# Patient Record
Sex: FEMALE | Race: WHITE | Employment: OTHER | URBAN - METROPOLITAN AREA
[De-identification: names, ages, dates, MRNs, and addresses within clinical notes are randomized per-mention and may not be internally consistent; named-entity substitution may affect disease eponyms.]

---

## 2017-02-20 ENCOUNTER — HOSPITAL ENCOUNTER (OUTPATIENT)
Age: 67
Setting detail: OUTPATIENT SURGERY
Discharge: HOME | End: 2017-02-20
Attending: COLON & RECTAL SURGERY | Admitting: COLON & RECTAL SURGERY
Payer: MEDICARE

## 2017-02-20 ENCOUNTER — ANESTHESIA (OUTPATIENT)
Dept: ENDOSCOPY | Age: 67
End: 2017-02-20
Payer: MEDICARE

## 2017-02-20 ENCOUNTER — ANESTHESIA EVENT (OUTPATIENT)
Dept: ENDOSCOPY | Age: 67
End: 2017-02-20
Payer: MEDICARE

## 2017-02-20 ENCOUNTER — SURGERY (OUTPATIENT)
Age: 67
End: 2017-02-20

## 2017-02-20 VITALS
HEART RATE: 63 BPM | BODY MASS INDEX: 40.46 KG/M2 | DIASTOLIC BLOOD PRESSURE: 58 MMHG | SYSTOLIC BLOOD PRESSURE: 119 MMHG | HEIGHT: 64 IN | OXYGEN SATURATION: 98 % | WEIGHT: 237 LBS | RESPIRATION RATE: 16 BRPM | TEMPERATURE: 98.6 F

## 2017-02-20 PROCEDURE — 77030009426 HC FCPS BIOP ENDOSC BSC -B: Performed by: COLON & RECTAL SURGERY

## 2017-02-20 PROCEDURE — 74011000250 HC RX REV CODE- 250

## 2017-02-20 PROCEDURE — 74011250636 HC RX REV CODE- 250/636: Performed by: ANESTHESIOLOGY

## 2017-02-20 PROCEDURE — 74011250636 HC RX REV CODE- 250/636

## 2017-02-20 PROCEDURE — 88305 TISSUE EXAM BY PATHOLOGIST: CPT | Performed by: COLON & RECTAL SURGERY

## 2017-02-20 PROCEDURE — 76060000033 HC ANESTHESIA 1 TO 1.5 HR: Performed by: COLON & RECTAL SURGERY

## 2017-02-20 PROCEDURE — 76040000026: Performed by: COLON & RECTAL SURGERY

## 2017-02-20 PROCEDURE — 77030011640 HC PAD GRND REM COVD -A: Performed by: COLON & RECTAL SURGERY

## 2017-02-20 PROCEDURE — 77030013991 HC SNR POLYP ENDOSC BSC -A: Performed by: COLON & RECTAL SURGERY

## 2017-02-20 RX ORDER — PROPOFOL 10 MG/ML
INJECTION, EMULSION INTRAVENOUS
Status: DISCONTINUED | OUTPATIENT
Start: 2017-02-20 | End: 2017-02-20 | Stop reason: HOSPADM

## 2017-02-20 RX ORDER — PROPOFOL 10 MG/ML
INJECTION, EMULSION INTRAVENOUS AS NEEDED
Status: DISCONTINUED | OUTPATIENT
Start: 2017-02-20 | End: 2017-02-20 | Stop reason: HOSPADM

## 2017-02-20 RX ORDER — SODIUM CHLORIDE, SODIUM LACTATE, POTASSIUM CHLORIDE, CALCIUM CHLORIDE 600; 310; 30; 20 MG/100ML; MG/100ML; MG/100ML; MG/100ML
100 INJECTION, SOLUTION INTRAVENOUS CONTINUOUS
Status: DISCONTINUED | OUTPATIENT
Start: 2017-02-20 | End: 2017-02-20 | Stop reason: HOSPADM

## 2017-02-20 RX ORDER — LIDOCAINE HYDROCHLORIDE 20 MG/ML
INJECTION, SOLUTION EPIDURAL; INFILTRATION; INTRACAUDAL; PERINEURAL AS NEEDED
Status: DISCONTINUED | OUTPATIENT
Start: 2017-02-20 | End: 2017-02-20 | Stop reason: HOSPADM

## 2017-02-20 RX ORDER — SODIUM CHLORIDE, SODIUM LACTATE, POTASSIUM CHLORIDE, CALCIUM CHLORIDE 600; 310; 30; 20 MG/100ML; MG/100ML; MG/100ML; MG/100ML
100 INJECTION, SOLUTION INTRAVENOUS CONTINUOUS
Status: CANCELLED | OUTPATIENT
Start: 2017-02-20

## 2017-02-20 RX ORDER — SODIUM CHLORIDE 0.9 % (FLUSH) 0.9 %
5-10 SYRINGE (ML) INJECTION AS NEEDED
Status: CANCELLED | OUTPATIENT
Start: 2017-02-20

## 2017-02-20 RX ADMIN — LIDOCAINE HYDROCHLORIDE 60 MG: 20 INJECTION, SOLUTION EPIDURAL; INFILTRATION; INTRACAUDAL; PERINEURAL at 10:04

## 2017-02-20 RX ADMIN — SODIUM CHLORIDE, SODIUM LACTATE, POTASSIUM CHLORIDE, AND CALCIUM CHLORIDE 100 ML/HR: 600; 310; 30; 20 INJECTION, SOLUTION INTRAVENOUS at 09:32

## 2017-02-20 RX ADMIN — PROPOFOL 140 MCG/KG/MIN: 10 INJECTION, EMULSION INTRAVENOUS at 10:23

## 2017-02-20 RX ADMIN — PROPOFOL 100 MG: 10 INJECTION, EMULSION INTRAVENOUS at 10:05

## 2017-02-20 RX ADMIN — PROPOFOL 50 MG: 10 INJECTION, EMULSION INTRAVENOUS at 10:04

## 2017-02-20 RX ADMIN — PROPOFOL 100 MG: 10 INJECTION, EMULSION INTRAVENOUS at 10:23

## 2017-02-20 NOTE — ANESTHESIA PREPROCEDURE EVALUATION
Anesthetic History   No history of anesthetic complications            Review of Systems / Medical History  Patient summary reviewed, nursing notes reviewed and pertinent labs reviewed    Pulmonary  Within defined limits                 Neuro/Psych     seizures (Childhood)    Psychiatric history     Cardiovascular    Hypertension: well controlled              Exercise tolerance: >4 METS     GI/Hepatic/Renal                Endo/Other        Morbid obesity and arthritis     Other Findings              Physical Exam    Airway  Mallampati: II  TM Distance: > 6 cm  Neck ROM: normal range of motion   Mouth opening: Normal     Cardiovascular    Rhythm: regular  Rate: normal         Dental    Dentition: Full lower dentures     Pulmonary  Breath sounds clear to auscultation               Abdominal         Other Findings            Anesthetic Plan    ASA: 3  Anesthesia type: total IV anesthesia            Anesthetic plan and risks discussed with: Patient

## 2017-02-20 NOTE — PROCEDURES
Colonoscopy Procedure Note    Mammie Felty  1950  [id number]    Indications:    Screening colonoscopy     Pre-operative Diagnosis:   Screening colonoscopy    Post-operative Diagnosis: Cecal, transverse, and rectal polyps; diverticulosis, rectocele    Surgeon: Matt Hodge MD    Referring Provider: Liane Kenney DO    Sedation:  MAC anesthesia Propofol    Pre-Procedure Exam:  Airway: clear   Heart: normal S1and S2    Lungs: clear bilateral  Abdomen: soft, nontender, bowel sounds present and normal in all quadrants   Mental Status: awake, alert, and oriented to person, place, and time    Procedure in Detail:  Informed consent was obtained for the procedure after delineating the risks, benefits, and alternatives to the procedure. Specific risks of perforation (1/1000), hemorrhage (1/1000), missed lesions, adverse drug reaction, and aspiration were discussed. The patient was placed in the left lateral decubitus position. Based on the pre-procedure assessment, including review of the patient's medical history, medications, and allergies, moderate sedation was felt to be appropriate. The aforementioned medications were given in an incremental fashion to achieve adequate sedation. The patient was monitored continuously with ECG tracing, pulse oximetry, blood pressure monitoring, and direct observations. A rectal examination was performed and showed small external hemorrhoidal tags and a small rectocele. The Olympus videocolonoscope [id number] was inserted per anus and advanced under direct vision to the terminal ileum. The quality of the colonic preparation was good. The colonoscope was slowly withdrawn using a to-and-fro and circumferential motion over 30 minutes with careful examination between folds. Retroflexion was performed in the rectum. Appropriate photodocumentation was obtained. Findings:   Rectum: Mild-moderate incidental internal hemorrhoids.   Four polyps--5, 3, 3, and 4 mm--hot snare excised an sent aggregate. No masses, bleeding, or mucosal abnormalities. Sigmoid: Mild incidental diverticulosis. No polyps, masses, bleeding, or mucosal abnormalities. Descending Colon: Normal.  No polyps, masses, bleeding, or mucosal abnormalities. Transverse Colon: Two 3 mm polyps cold forceps excised. No masses, bleeding, or mucosal abnormalities. Ascending Colon: Normal.  No polyps, masses, bleeding, or mucosal abnormalities. Cecum: Polypoid tissue at appendiceal orifice; multiple biopsies obtained, but polypoid tissue seemed to extend down into the appendiceal lumen and could not be completely cleared. No masses, bleeding, or mucosal abnormalities. Terminal Ileum: normal    Therapies:  7 complete and piece meal polypectomies were performed using hot snare  and cold biopsy forceps; the polyps were  retrieved    Implants: None    Specimen:  specimen #1, 10 mm in size, located in the cecum removed by cold biopsy and sent for pathology  #2, two polyps, 3 mm in size, located in the transverse colon removed by cold biopsy and sent for pathology  #3, three polyps, 5, 3, 3, and 4 mm in size, located in the rectum removed by snare cautery and retrieved for pathology    Complications: None; patient tolerated the procedure well. EBL:  3    Recommendations:   -Await pathology. -If adenoma is present in multiple specimens, repeat colonoscopy in 3 years.  -Otherwise, repeat colonoscopy in 5 years.   -If polypoid tissue at appendix is adenomatous, she will need appendectomy (with small partial cecectomy) to assure complete clearance  -High fiber diet.    -Follow up with Dr Silvio Roman in the office 4/18/17 as scheduled    Julieth Au MD  2/20/2017  11:08 AM

## 2017-02-20 NOTE — ANESTHESIA POSTPROCEDURE EVALUATION
Post-Anesthesia Evaluation and Assessment    Patient: Eric Goodwin MRN: [id number]  SSN: [id number]    YOB: 1950  Age: 77 y.o. Sex: female       Cardiovascular Function/Vital Signs  Visit Vitals    /58    Pulse 63    Temp 37 °C (98.6 °F)    Resp 16    Ht 5' 4\" (1.626 m)    Wt 107.5 kg (237 lb)    SpO2 98%    Breastfeeding No    BMI 40.68 kg/m2       Patient is status post total IV anesthesia anesthesia for Procedure(s):  COLONOSCOPY  BMI 41  COLON BIOPSY  ENDOSCOPIC POLYPECTOMY. Nausea/Vomiting: None    Postoperative hydration reviewed and adequate. Pain:  Pain Scale 1: Numeric (0 - 10) (02/20/17 1123)  Pain Intensity 1: 0 (02/20/17 1123)   Managed    Neurological Status: At baseline    Mental Status and Level of Consciousness: Arousable    Pulmonary Status:   O2 Device: Room air (02/20/17 1123)   Adequate oxygenation and airway patent    Complications related to anesthesia: None    Post-anesthesia assessment completed.  No concerns    Signed By: Randa Del Real MD     February 20, 2017

## 2017-02-20 NOTE — INTERVAL H&P NOTE
H&P Update:  All Gaitan was seen and examined. History and physical has been reviewed. The patient has been examined.  There have been no significant clinical changes since the completion of the originally dated History and Physical.    Signed By: Mica Ballard MD     February 20, 2017 9:57 AM

## 2017-02-20 NOTE — DISCHARGE INSTRUCTIONS
Gastrointestinal Colonoscopy/Flexible Sigmoidoscopy - Lower Exam Discharge Instructions  1. Call Dr. Bipin Paris at [phone] for any problems or questions. 2. Contact the doctors office for follow up appointment as directed  3. Medication may cause drowsiness for several hours, therefore, do not drive or operate machinery for remainder of the day. 4. No alcohol today. 5. Ordinarily, you may resume regular diet and activity after exam unless otherwise specified by your physician. 6. Because of air put into your colon during exam, you may experience some abdominal distension, relieved by the passage of gas, for several hours. 7. Contact your physician if you have any of the following:  a. Excessive amount of bleeding - large amount when having a bowel movement. Occasional specks of blood with bowel movement would not be unusual.  b. Severe abdominal pain  c. Fever or Chills  8. Polyp Removal - follow these additional instructions  a. Clear liquid diet for the next meal (jell-o, broth, clear drinks)  b. Soft diet for 24 hours, then resume regular diet   c. Take Metamucil - 1 tablespoon in juice every morning for 3 days  d. No Aspirin, Advil, Aleve, Nuprin, Ibuprofen, or medications that contain these drugs for 2 weeks. Any additional instructions:    -Follow up with Dr Bipin Paris in the office 4/18/17 as previously scheduled. -High fiber diet  -Repeat colonoscopy in 3-5 years depending on biopsy results      Instructions given to KupiKupon and other family members.   Instructions given by: Leon Prieto MD

## 2017-02-20 NOTE — ROUTINE PROCESS
Patient discharged via wheelchair. VSS on room air. No complaints of pain or discomfort. Spoke with Dr. Niraj Harrington at bedside. Anesthesia aware of discharge. Discharge instructions given to patient and family.

## 2017-02-20 NOTE — IP AVS SNAPSHOT
Claudia Uriosteguishaunanavarro 
 
 
 [address] [facility] [address] [facility] 
[phone] Patient: Eliz Mejia MRN: [id number] You are allergic to the following Allergen Reactions Codeine Nausea and Vomiting Oxycodone Nausea and Vomiting Recent Documentation Height Weight Breastfeeding? BMI OB Status Smoking Status 1.626 m 107.5 kg No 40.68 kg/m2 Postmenopausal Former Smoker Emergency Contacts Name Discharge Info Relation Home Work Mobile GermaineArnie DISCHARGE CAREGIVER [3] Spouse [3]   [phone] About your hospitalization You were admitted on:  February 20, 2017 You last received care in the:  [facility] You were discharged on:  February 20, 2017 Unit phone number:  [phone] Why you were hospitalized Your primary diagnosis was:  Not on File Providers Seen During Your Hospitalizations Provider Role Specialty Primary office phone Betzaida Louis MD Attending Provider Colon and Rectal Surgery [phone] Your Primary Care Physician (PCP) Primary Care Physician Office Phone Office Fax Edenilson Petersen [phone] Follow-up Information Follow up With Details Comments Contact Info Desi Liriano DO   [address] [facility] [address] [phone] Current Discharge Medication List  
  
CONTINUE these medications which have CHANGED Dose & Instructions Dispensing Information Comments Morning Noon Evening Bedtime  
 losartan-hydroCHLOROthiazide 50-12.5 mg per tablet Commonly known as:  HYZAAR What changed:  See the new instructions. Your next dose is: Today, Tomorrow Other:  _________ TAKE 1 TABLET ONE TIME DAILY Quantity:  90 Tab Refills:  3  
     
   
   
   
  
 sertraline 100 mg tablet Commonly known as:  ZOLOFT  
 What changed:  See the new instructions. Your next dose is: Today, Tomorrow Other:  _________ TAKE 1 TABLET EVERY DAY Quantity:  90 Tab Refills:  3  
     
   
   
   
  
 topiramate 100 mg tablet Commonly known as:  TOPAMAX What changed:  See the new instructions. Your next dose is: Today, Tomorrow Other:  _________ TAKE 1 TABLET EVERY DAY Quantity:  90 Tab Refills:  3 VITAMIN D2 50,000 unit capsule Generic drug:  ergocalciferol What changed:  See the new instructions. Your next dose is: Today, Tomorrow Other:  _________ TAKE 1 CAPSULE ONE TIME WEEKLY Quantity:  12 Cap Refills:  3 CONTINUE these medications which have NOT CHANGED Dose & Instructions Dispensing Information Comments Morning Noon Evening Bedtime HYDROcodone-acetaminophen 5-325 mg per tablet Commonly known as:  Denys Merino Your next dose is: Today, Tomorrow Other:  _________ Take  by mouth every six (6) hours as needed for Pain (not needed since 10/2016 shoulder surgery). Refills:  0 Discharge Instructions Gastrointestinal Colonoscopy/Flexible Sigmoidoscopy - Lower Exam Discharge Instructions 1. Call Dr. Escobar Hudson at [phone] for any problems or questions. 2. Contact the doctors office for follow up appointment as directed 3. Medication may cause drowsiness for several hours, therefore, do not drive or operate machinery for remainder of the day. 4. No alcohol today. 5. Ordinarily, you may resume regular diet and activity after exam unless otherwise specified by your physician. 6. Because of air put into your colon during exam, you may experience some abdominal distension, relieved by the passage of gas, for several hours. 7. Contact your physician if you have any of the following: a. Excessive amount of bleeding  large amount when having a bowel movement. Occasional specks of blood with bowel movement would not be unusual. 
b. Severe abdominal pain 
c. Fever or Chills 8. Polyp Removal  follow these additional instructions 
a. Clear liquid diet for the next meal (jell-o, broth, clear drinks) b. Soft diet for 24 hours, then resume regular diet  
c. Take Metamucil  1 tablespoon in juice every morning for 3 days 
d. No Aspirin, Advil, Aleve, Nuprin, Ibuprofen, or medications that contain these drugs for 2 weeks. Any additional instructions:   
-Follow up with Dr Niraj Harrington in the office 4/18/17 as previously scheduled. -High fiber diet 
-Repeat colonoscopy in 3-5 years depending on biopsy results Instructions given to Marcia Lutz and other family members. Instructions given by: Duncan Reyes MD 
 
 
 
 
Discharge Orders None ACO Transitions of Care Introducing [employer] [address] offers a voluntary care coordination program to provide high quality service and care to [facility] fee-for-service beneficiaries. [facility] was designed to help you enhance your health and well-being through the following services: ? Transitions of Care  support for individuals who are transitioning from one care setting to another (example: Hospital to home). ? Chronic and Complex Care Coordination  support for individuals and caregivers of those with serious or chronic illnesses or with more than one chronic (ongoing) condition and those who take a number of different medications. If you meet specific medical criteria, a [address] [place] [address] Rd may call you directly to coordinate your care with your primary care physician and your other care providers. For questions about the [facility] programs, please, contact your physicians office. For general questions or additional information about Accountable Care Organizations: 
Please visit www.medicare.gov/acos. html or call 1-800-MEDICARE (9-820.615.3323) TTY users should call 3-627.240.2042. Introducing [facility] SERVICES! Castaliarenny Oates[employer] introduces [employer] patient portal. Now you can access parts of your medical record, email your doctor's office, and request medication refills online. 1. In your internet browser, go to [email or web address] 2. Click on the First Time User? Click Here link in the Sign In box. You will see the New Member Sign Up page. 3. Enter your [employer] Access Code exactly as it appears below. You will not need to use this code after youve completed the sign-up process. If you do not sign up before the expiration date, you must request a new code. · [employer] Access Code: [email or web address] Expires: 4/6/2017 11:42 AM 
 
4. Enter the last four digits of your Social Security Number (xxxx) and Date of Birth (mm/dd/yyyy) as indicated and click Submit. You will be taken to the next sign-up page. 5. Create a [employer] ID. This will be your [employer] login ID and cannot be changed, so think of one that is secure and easy to remember. 6. Create a [employer] password. You can change your password at any time. 7. Enter your Password Reset Question and Answer. This can be used at a later time if you forget your password. 8. Enter your e-mail address. You will receive e-mail notification when new information is available in [address] 9. Click Sign Up. You can now view and download portions of your medical record. 10. Click the Download Summary menu link to download a portable copy of your medical information. If you have questions, please visit the Frequently Asked Questions section of the [employer] website. Remember, [employer] is NOT to be used for urgent needs. For medical emergencies, dial 911. Now available from your iPhone and Android! General Information Please provide this summary of care documentation to your next provider. Patient Signature:  ____________________________________________________________ Date:  ____________________________________________________________  
  
Burlene Von Provider Signature:  ____________________________________________________________ Date:  ____________________________________________________________

## 2017-02-20 NOTE — H&P (VIEW-ONLY)
Kailyn Kent M.D. Colon and Rectal Surgeon  [facility] [address], [address] [facility], [facility] Rhianna Hurtado   Phone: [phone] Fax: [phone]      Rupal Salgado  MRN: [id number]    PCP: Shelby Russell DO    HISTORY OF PRESENT ILLNESS:  Rupal Salgado is a 77y.o. year old female who I met 9/16/16 to discuss screening colonoscopy. She had never had a prior colonoscopy, and had no family history of colorectal cancer, polyps, or Cesar syndrome-related cancers. At her initial visit, she reported multiple other complaints. She reported increased straining with bowel movements and the sense of stool always being present in her rectum which she could not evacuate. She reported large, difficult bowel movements, along with small-volume, intermittent rectal bleeding. She reported perianal itching/burning. She reported generalized abdominal discomfort and cramping. There were no clear exacerbating or alleviating factors. Her abdominal exam was unremarkable. Her anal/rectal exam showed small, external fleshy tags and suggestion of a possible anal fistula. She had normal tone and squeeze. She had no significant internal hemorrhoids. I gave her recommendations on fiber supplementation, fluid hydration, and stool softeners. We talked about minimizing time on the commode and minimizing straining. We scheduled her for colonoscopy. She was scheduled for colonoscopy 11/28/16. She did not show up. When we called her, she stated she did not know about the procedure and had not done the prep. She returns today in follow-up. When I asked her again about not showing up for the colonoscopy, she blamed my office, stating that she never received any paperwork and did not even know that that procedure had been scheduled. This is despite the fact that her scheduling paperwork, along with her signature, is present in the chart.   She states that she has not tried to increase her fiber at all. She is not taking any stool softeners. She drinks plenty of water, per her report. She continues to have 2 or 3 bowel movements per day, which are always hard. Time on the commode is down to 10 or 15 minutes. She continues to strain a lot and have a frequent sense of incomplete evacuation. She continues to have small volume bleeding. She continues with mild, generalized abdominal discomfort/cramping. REVIEW OF SYSTEMS:  Constitutional: No fevers/chills, no weakness, no fatigue, no lightheadedness, no night sweats, no insomnia, no appetite changes, no weight changes, no memory problems. Eyes: No changes in vision, no diplopia, no tearing, no scotomata, no pain   Ears/Nose/Throat/Mouth:  No change in hearing, no tinnitus, no bleeding, no epistaxis, no nasal discharge, no sinusitis. Respiratory: No cough, no dyspnea, no wheezing, no hemoptysis, no sleep apnea   Cardiovascular: No chest pains, no chest pressure, no chest tightness, no palpitations   Gastrointestinal: +abdominal pains, +bowel habit changes, no nausea, no emesis, +hematochezia, no melena, no cramping, no constipation, no diarrhea, no incontinence, no jaundice, no diverticulosis.  Otherwise per HPI   Genitourinary: No dysuria, no hematuria, +urinary frequency, +nocturia, no recent UTIs   Musculoskeletal: +back/neck pain, +arthritis, no joint pain/swelling, +muscle pains   Skin: No rashes, no itching, no ulcers   Hematologic:  +easy bruising, no anemia   Neurologic: No headaches, no dizziness, no seizures   Psychiatric: No depressive symptoms, no anxiety symptoms, no changes in mood, no substance abuse     PAST MEDICAL HISTORY: Chronic diffuse pain, chronic sinusitis, osteoarthritis, history of migraines, HTN, impaired fasting glucose, restless leg syndrome, history of seizures as a child, history of tobacco abuse, vitamin D deficiency,     PAST SURGICAL HISTORY: Open cholecystectomy, right hip replacement ×3, tubal ligation, right rotator cuff repair, bilateral cataract surgery    ALLERGIES: codeine, oxycodone    HOME MEDICATIONS: Vitamin D 50,000 units weekly, Zoloft 100 mg daily, Topamax 100 mg daily, Hyzaar 50/12.5 daily, Myrbetriq 50 mg daily    SOCIAL HISTORY:  to her  of 45 years. They live in [place]. She has  2 children and 2 grandchildren. She is retired from a job as an . Non-smoker. Previous one pack per day ×50 year tobacco use. No alcohol    PREVENTION: Never had a colonoscopy     FAMILY HISTORY: No colon or rectal cancer. No history of polyps. No breast, prostate, ovary, endometrial, gastric, or pancreatic cancers. Mother with heart attack, hypertension, stomach ulcers. Father with diverticular disease. Mother had bladder cancer. PHYSICAL EXAM:  Blood pressure 108/58, pulse 64, height 5' 4\" (1.626 m), weight 237 lb (107.5 kg). Body mass index is 40.68 kg/(m^2). The patient was examined in the presence of a medical assistant. General: Normotensive, in no acute distress, well developed, well nourished appearing   Head:  AT/NC, no lesions   Eyes:  PERRLA, EOM's full, conjunctivae clear   Neck:  Supple, no masses, no lymphadenopathy, no thyromegaly, no carotid bruits   Chest:  Lungs clear, no rales, no rhonchi, no wheezes   Heart:  RR, no murmurs, no rubs, no gallops   Abdomen:  Soft, obese, no tenderness, no rebound, no guarding, no masses, nondistended. Well healed scars. No hernias   Back:  Normal curvature, no tenderness. Negative Aragon's punch. Extremities:  FROM, no deformities, no edema, no erythema   Neuro:  Physiologic, oriented x3, full affect, no localizing findings   Skin:  Normal, no rashes, no lesions noted. Rectal from 9/16/16: \"External exam shows no excoriation, no fissures, no erythema, no abscesses. She has some small, fleshy, external hemorrhoidal tags, none of which are irritated, inflamed, or really prominent.   Superior gluteal cleft reveals no pits or signs suggestive of prior pilonidal disease. Palpation of the perianal tissues and ischiorectal fossae shows no tenderness or fluctuance. Valsalva reveals no prolapse of tissue. Anal wink is present. There is a small dimple present in the left anterior position. I probed it and there is a small blind ending cavity that tracks about 1.5 cm anteromedially. No tracking into the anus    ANDRES shows normal resting tone and normal voluntary squeeze. Valsalva reveals appropriate relaxation. No presacral masses. Rectocele is not present. Stool in the vault is absent. Stool is heme negative, grossly. Anoscopy was performed to evaluate the internal hemorrhoids. No fissures, no fistula openings, no distal anorectal polyps or masses. No significant internal hemorrhoids. Mild mucosal redundancy without irritative changes, inflammation, prolapse, or bleeding. \"    Note from Dr Enid Lemus reviewed. Labs reviewed by me:  CMP normal except glucose 117 and creatinine 1.07. A1c 6.0. CRP 1.5. CBC essentially normal.    ASSESSMENT:  Age related colon cancer risk/screening  Incidental internal hemorrhoids  Small, blind ending chronic abscess/fistula cavity  Rare rectal bleeding, likely due to one of the above  Perianal itching  Generalized abdominal pain    PLAN:  --We reviewed the colonoscopy procedure. Major risks include bleeding (1/1000), perforation (1/1000), missed lesions, and reactions to sedation medications or bowel prep. We discussed alternatives which include flexible sigmoidoscopy, contrast enema, CT colonography, and FOBT. I explained the risks and benefits of each of these and why I feel colonoscopy is the best test for the patient. Reason for colonoscopy is screening  --The patient was given a handout on colonoscopy, as well as a handout describing the OTC Miralax/Dulcolax bowel prep. All questions were answered.     --We will work to schedule a colonoscopy at the next available opportunity. We will get her a new copy of all the paperwork, instructions, etc    --We talked about increasing her fiber intake and using supplements. We talked about drinking lots of fluids. We talked about starting a stool softener. I am hopeful that these changes will help improve her bowel frequency, consistency, and ease of evacuation. --We talked about minimizing time on the commode and minimizing straining  --We again reviewed that has no significant findings on anal/rectal exam to explain her complaints. I reassured her about most of them. She has normal function in terms of frequency and consistency. The small amount of bleeding may be related to the small fistula dimple, or possibly intermittent irritation of her hemorrhoids. --The patient will follow up with me in the office in a few months, some time after colonoscopy, to review symptoms and make further plans    Fax communication sent to Dr Blanca Jalloh. Greater than 50% of this 20 minute visit was spent on counseling, discussing the rationale behind colon cancer screening, the colonoscopy procedure, risks, benefits, and alternatives. We talked about her bowel function, the above recommended bowel regimen, as well as the possible anal fistula seen on her last exam    Janeth Colon MD  2/15/2017    Elements of this note have been created with speech-recognition software. As a result, errors in speech recognition may have occurred.

## 2017-02-27 NOTE — PROGRESS NOTES
Called patient with biopsy results. I reassured her that the biopsies do not show cancer in any of the specimens. That being said, the polyp that appeared to be growing out of her appendiceal orifice did show serrated sessile polyp. I explained to her that this is a precancerous lesion and that something more needs to be done. I recommended wide appendectomy taking a small piece of the base of the cecum to assure complete clearance. She asked about nonoperative therapies, which I recommended against.  She wished to speak with her  some more regarding this as well as with her PCP. I offered to set up a follow-up visit with her and discuss things with her further in the office. She did not feel that that is necessary. I will put together paperwork for laparoscopic appendectomy and await to hear from her. If we do not hear from her in the next few weeks, we will call to check on her.

## 2017-03-08 ENCOUNTER — HOSPITAL ENCOUNTER (OUTPATIENT)
Dept: SURGERY | Age: 67
Discharge: HOME | End: 2017-03-08
Payer: MEDICARE

## 2017-03-08 VITALS
DIASTOLIC BLOOD PRESSURE: 50 MMHG | TEMPERATURE: 96.2 F | WEIGHT: 235.4 LBS | RESPIRATION RATE: 20 BRPM | HEIGHT: 64 IN | BODY MASS INDEX: 40.19 KG/M2 | HEART RATE: 58 BPM | SYSTOLIC BLOOD PRESSURE: 140 MMHG | OXYGEN SATURATION: 97 %

## 2017-03-08 LAB
ATRIAL RATE: 56 BPM
CALCULATED P AXIS, ECG09: 70 DEGREES
CALCULATED R AXIS, ECG10: 43 DEGREES
CALCULATED T AXIS, ECG11: 57 DEGREES
CREAT SERPL-MCNC: 1.13 MG/DL (ref 0.6–1)
DIAGNOSIS, 93000: NORMAL
DIASTOLIC BP, ECG02: NORMAL MMHG
HGB BLD-MCNC: 13.1 G/DL (ref 11.7–15.4)
P-R INTERVAL, ECG05: 134 MS
POTASSIUM SERPL-SCNC: 4.2 MMOL/L (ref 3.5–5.1)
Q-T INTERVAL, ECG07: 436 MS
QRS DURATION, ECG06: 92 MS
QTC CALCULATION (BEZET), ECG08: 420 MS
SYSTOLIC BP, ECG01: NORMAL MMHG
VENTRICULAR RATE, ECG03: 56 BPM

## 2017-03-08 PROCEDURE — 82565 ASSAY OF CREATININE: CPT | Performed by: ANESTHESIOLOGY

## 2017-03-08 PROCEDURE — 85018 HEMOGLOBIN: CPT | Performed by: ANESTHESIOLOGY

## 2017-03-08 PROCEDURE — 84132 ASSAY OF SERUM POTASSIUM: CPT | Performed by: ANESTHESIOLOGY

## 2017-03-08 PROCEDURE — 93005 ELECTROCARDIOGRAM TRACING: CPT | Performed by: ANESTHESIOLOGY

## 2017-03-08 NOTE — PERIOP NOTES
Patient verified name, , and surgery as listed in [facility]. Type 2 surgery, PAT assessment complete. Labs per surgeon: no orders. Labs per anesthesia protocol: hgb,potassium,creatinine; results pending. EKG: today -states \" bradycardia, otherwise normal\". Echo printed from EMR: dated 10-18-16: within limits-placed in chart for reference. Hibiclens and instructions given per hospital policy. Patient provided with handouts including Guide to Surgery, Pain Management, Hand Hygiene, Blood Transfusion Education, and [facility] Brochure. Patient answered medical/surgical history questions at their best of ability. All prior to admission medications documented in [facility]. Original medication prescription bottle  visualized during patient appointment. Patient instructed to hold all vitamins 7 days prior to surgery and NSAIDS 5 days prior to surgery, patient verbalized understanding. Medications to be held :none. Patient instructed to continue previous medications as prescribed prior to surgery and to take the following medications the day of surgery according to anesthesia guidelines with a small sip of water: norco if needed. Patient taught back and verbalized understanding.

## 2017-03-14 ENCOUNTER — ANESTHESIA EVENT (OUTPATIENT)
Dept: SURGERY | Age: 67
End: 2017-03-14
Payer: MEDICARE

## 2017-03-15 ENCOUNTER — HOSPITAL ENCOUNTER (OUTPATIENT)
Age: 67
Setting detail: OUTPATIENT SURGERY
Discharge: HOME | End: 2017-03-15
Attending: COLON & RECTAL SURGERY | Admitting: COLON & RECTAL SURGERY
Payer: MEDICARE

## 2017-03-15 ENCOUNTER — SURGERY (OUTPATIENT)
Age: 67
End: 2017-03-15

## 2017-03-15 ENCOUNTER — ANESTHESIA (OUTPATIENT)
Dept: SURGERY | Age: 67
End: 2017-03-15
Payer: MEDICARE

## 2017-03-15 VITALS
WEIGHT: 237.19 LBS | OXYGEN SATURATION: 93 % | HEART RATE: 76 BPM | DIASTOLIC BLOOD PRESSURE: 62 MMHG | SYSTOLIC BLOOD PRESSURE: 146 MMHG | HEIGHT: 64 IN | RESPIRATION RATE: 16 BRPM | TEMPERATURE: 96.9 F | BODY MASS INDEX: 40.49 KG/M2

## 2017-03-15 DIAGNOSIS — D12.1 BENIGN NEOPLASM OF APPENDIX: Primary | ICD-10-CM

## 2017-03-15 PROCEDURE — 77030022473 HC HNDL ENDO GIA UNIV USDA -C: Performed by: COLON & RECTAL SURGERY

## 2017-03-15 PROCEDURE — 77030008756 HC TU IRR SUC STRY -B: Performed by: COLON & RECTAL SURGERY

## 2017-03-15 PROCEDURE — 74011250636 HC RX REV CODE- 250/636

## 2017-03-15 PROCEDURE — 74011000250 HC RX REV CODE- 250: Performed by: COLON & RECTAL SURGERY

## 2017-03-15 PROCEDURE — 77030009852 HC PCH RTVR ENDOSC COVD -B: Performed by: COLON & RECTAL SURGERY

## 2017-03-15 PROCEDURE — 77030020782 HC GWN BAIR PAWS FLX 3M -B: Performed by: ANESTHESIOLOGY

## 2017-03-15 PROCEDURE — 76010000162 HC OR TIME 1.5 TO 2 HR INTENSV-TIER 1: Performed by: COLON & RECTAL SURGERY

## 2017-03-15 PROCEDURE — 77030008612 HC TRCR ENDOSC VRS COVD -C: Performed by: COLON & RECTAL SURGERY

## 2017-03-15 PROCEDURE — 74011250636 HC RX REV CODE- 250/636: Performed by: ANESTHESIOLOGY

## 2017-03-15 PROCEDURE — 76210000017 HC OR PH I REC 1.5 TO 2 HR: Performed by: COLON & RECTAL SURGERY

## 2017-03-15 PROCEDURE — 77030008477 HC STYL SATN SLP COVD -A: Performed by: ANESTHESIOLOGY

## 2017-03-15 PROCEDURE — 74011000250 HC RX REV CODE- 250: Performed by: ANESTHESIOLOGY

## 2017-03-15 PROCEDURE — 77030031139 HC SUT VCRL2 J&J -A: Performed by: COLON & RECTAL SURGERY

## 2017-03-15 PROCEDURE — 77030011640 HC PAD GRND REM COVD -A: Performed by: COLON & RECTAL SURGERY

## 2017-03-15 PROCEDURE — 77030035048 HC TRCR ENDOSC OPTCL COVD -B: Performed by: COLON & RECTAL SURGERY

## 2017-03-15 PROCEDURE — 77030018836 HC SOL IRR NACL ICUM -A: Performed by: COLON & RECTAL SURGERY

## 2017-03-15 PROCEDURE — 88304 TISSUE EXAM BY PATHOLOGIST: CPT | Performed by: COLON & RECTAL SURGERY

## 2017-03-15 PROCEDURE — 77030034849: Performed by: COLON & RECTAL SURGERY

## 2017-03-15 PROCEDURE — 77030002933 HC SUT MCRYL J&J -A: Performed by: COLON & RECTAL SURGERY

## 2017-03-15 PROCEDURE — 77030008703 HC TU ET UNCUF COVD -A: Performed by: ANESTHESIOLOGY

## 2017-03-15 PROCEDURE — 74011250636 HC RX REV CODE- 250/636: Performed by: COLON & RECTAL SURGERY

## 2017-03-15 PROCEDURE — 77030032490 HC SLV COMPR SCD KNE COVD -B: Performed by: COLON & RECTAL SURGERY

## 2017-03-15 PROCEDURE — 77030022474 HC RELD STPLR ENDO GIA COVD -C: Performed by: COLON & RECTAL SURGERY

## 2017-03-15 PROCEDURE — 77030019908 HC STETH ESOPH SIMS -A: Performed by: ANESTHESIOLOGY

## 2017-03-15 PROCEDURE — 77030008518 HC TBNG INSUF ENDO STRY -B: Performed by: COLON & RECTAL SURGERY

## 2017-03-15 PROCEDURE — 77030019940 HC BLNKT HYPOTHRM STRY -B: Performed by: ANESTHESIOLOGY

## 2017-03-15 PROCEDURE — 77030010507 HC ADH SKN DERMBND J&J -B: Performed by: COLON & RECTAL SURGERY

## 2017-03-15 PROCEDURE — 77030009965 HC RELD STPLR ENDOS COVD -D: Performed by: COLON & RECTAL SURGERY

## 2017-03-15 PROCEDURE — 76060000034 HC ANESTHESIA 1.5 TO 2 HR: Performed by: COLON & RECTAL SURGERY

## 2017-03-15 PROCEDURE — 77030035051: Performed by: COLON & RECTAL SURGERY

## 2017-03-15 PROCEDURE — 74011000250 HC RX REV CODE- 250

## 2017-03-15 RX ORDER — METRONIDAZOLE 500 MG/100ML
500 INJECTION, SOLUTION INTRAVENOUS ONCE
Status: COMPLETED | OUTPATIENT
Start: 2017-03-15 | End: 2017-03-15

## 2017-03-15 RX ORDER — OXYCODONE HYDROCHLORIDE 5 MG/1
5 TABLET ORAL
Status: DISCONTINUED | OUTPATIENT
Start: 2017-03-15 | End: 2017-03-15

## 2017-03-15 RX ORDER — OXYCODONE HYDROCHLORIDE 5 MG/1
TABLET ORAL
Qty: 50 TAB | Refills: 0 | Status: SHIPPED | OUTPATIENT
Start: 2017-03-15 | End: 2017-06-16

## 2017-03-15 RX ORDER — ONDANSETRON 4 MG/1
4 TABLET, ORALLY DISINTEGRATING ORAL
Qty: 20 TAB | Refills: 0 | Status: SHIPPED | OUTPATIENT
Start: 2017-03-15 | End: 2017-06-16

## 2017-03-15 RX ORDER — LIDOCAINE HYDROCHLORIDE 20 MG/ML
INJECTION, SOLUTION EPIDURAL; INFILTRATION; INTRACAUDAL; PERINEURAL AS NEEDED
Status: DISCONTINUED | OUTPATIENT
Start: 2017-03-15 | End: 2017-03-15 | Stop reason: HOSPADM

## 2017-03-15 RX ORDER — NEOSTIGMINE METHYLSULFATE 1 MG/ML
INJECTION INTRAVENOUS AS NEEDED
Status: DISCONTINUED | OUTPATIENT
Start: 2017-03-15 | End: 2017-03-15 | Stop reason: HOSPADM

## 2017-03-15 RX ORDER — SUCCINYLCHOLINE CHLORIDE 20 MG/ML
INJECTION INTRAMUSCULAR; INTRAVENOUS AS NEEDED
Status: DISCONTINUED | OUTPATIENT
Start: 2017-03-15 | End: 2017-03-15 | Stop reason: HOSPADM

## 2017-03-15 RX ORDER — LIDOCAINE HYDROCHLORIDE 10 MG/ML
0.1 INJECTION INFILTRATION; PERINEURAL AS NEEDED
Status: DISCONTINUED | OUTPATIENT
Start: 2017-03-15 | End: 2017-03-15 | Stop reason: HOSPADM

## 2017-03-15 RX ORDER — PROPOFOL 10 MG/ML
INJECTION, EMULSION INTRAVENOUS AS NEEDED
Status: DISCONTINUED | OUTPATIENT
Start: 2017-03-15 | End: 2017-03-15 | Stop reason: HOSPADM

## 2017-03-15 RX ORDER — BUPIVACAINE HYDROCHLORIDE AND EPINEPHRINE 2.5; 5 MG/ML; UG/ML
INJECTION, SOLUTION EPIDURAL; INFILTRATION; INTRACAUDAL; PERINEURAL AS NEEDED
Status: DISCONTINUED | OUTPATIENT
Start: 2017-03-15 | End: 2017-03-15 | Stop reason: HOSPADM

## 2017-03-15 RX ORDER — SODIUM CHLORIDE, SODIUM LACTATE, POTASSIUM CHLORIDE, CALCIUM CHLORIDE 600; 310; 30; 20 MG/100ML; MG/100ML; MG/100ML; MG/100ML
75 INJECTION, SOLUTION INTRAVENOUS CONTINUOUS
Status: DISCONTINUED | OUTPATIENT
Start: 2017-03-15 | End: 2017-03-15 | Stop reason: HOSPADM

## 2017-03-15 RX ORDER — ROCURONIUM BROMIDE 10 MG/ML
INJECTION, SOLUTION INTRAVENOUS AS NEEDED
Status: DISCONTINUED | OUTPATIENT
Start: 2017-03-15 | End: 2017-03-15 | Stop reason: HOSPADM

## 2017-03-15 RX ORDER — FLUMAZENIL 0.1 MG/ML
0.2 INJECTION INTRAVENOUS AS NEEDED
Status: DISCONTINUED | OUTPATIENT
Start: 2017-03-15 | End: 2017-03-15 | Stop reason: HOSPADM

## 2017-03-15 RX ORDER — HYDROMORPHONE HYDROCHLORIDE 2 MG/ML
0.5 INJECTION, SOLUTION INTRAMUSCULAR; INTRAVENOUS; SUBCUTANEOUS
Status: DISCONTINUED | OUTPATIENT
Start: 2017-03-15 | End: 2017-03-15 | Stop reason: HOSPADM

## 2017-03-15 RX ORDER — MIDAZOLAM HYDROCHLORIDE 1 MG/ML
2 INJECTION, SOLUTION INTRAMUSCULAR; INTRAVENOUS
Status: COMPLETED | OUTPATIENT
Start: 2017-03-15 | End: 2017-03-15

## 2017-03-15 RX ORDER — FENTANYL CITRATE 50 UG/ML
INJECTION, SOLUTION INTRAMUSCULAR; INTRAVENOUS AS NEEDED
Status: DISCONTINUED | OUTPATIENT
Start: 2017-03-15 | End: 2017-03-15 | Stop reason: HOSPADM

## 2017-03-15 RX ORDER — DEXAMETHASONE SODIUM PHOSPHATE 4 MG/ML
INJECTION, SOLUTION INTRA-ARTICULAR; INTRALESIONAL; INTRAMUSCULAR; INTRAVENOUS; SOFT TISSUE AS NEEDED
Status: DISCONTINUED | OUTPATIENT
Start: 2017-03-15 | End: 2017-03-15 | Stop reason: HOSPADM

## 2017-03-15 RX ORDER — ONDANSETRON 2 MG/ML
INJECTION INTRAMUSCULAR; INTRAVENOUS AS NEEDED
Status: DISCONTINUED | OUTPATIENT
Start: 2017-03-15 | End: 2017-03-15 | Stop reason: HOSPADM

## 2017-03-15 RX ORDER — CEFAZOLIN SODIUM IN 0.9 % NACL 2 G/50 ML
2 INTRAVENOUS SOLUTION, PIGGYBACK (ML) INTRAVENOUS ONCE
Status: COMPLETED | OUTPATIENT
Start: 2017-03-15 | End: 2017-03-15

## 2017-03-15 RX ORDER — DIPHENHYDRAMINE HYDROCHLORIDE 50 MG/ML
12.5 INJECTION, SOLUTION INTRAMUSCULAR; INTRAVENOUS
Status: DISCONTINUED | OUTPATIENT
Start: 2017-03-15 | End: 2017-03-15 | Stop reason: HOSPADM

## 2017-03-15 RX ORDER — GLYCOPYRROLATE 0.2 MG/ML
INJECTION INTRAMUSCULAR; INTRAVENOUS AS NEEDED
Status: DISCONTINUED | OUTPATIENT
Start: 2017-03-15 | End: 2017-03-15 | Stop reason: HOSPADM

## 2017-03-15 RX ORDER — OXYCODONE HYDROCHLORIDE 5 MG/1
10 TABLET ORAL
Status: DISCONTINUED | OUTPATIENT
Start: 2017-03-15 | End: 2017-03-15

## 2017-03-15 RX ORDER — NALOXONE HYDROCHLORIDE 0.4 MG/ML
0.1 INJECTION, SOLUTION INTRAMUSCULAR; INTRAVENOUS; SUBCUTANEOUS
Status: DISCONTINUED | OUTPATIENT
Start: 2017-03-15 | End: 2017-03-15 | Stop reason: HOSPADM

## 2017-03-15 RX ADMIN — MIDAZOLAM HYDROCHLORIDE 2 MG: 1 INJECTION, SOLUTION INTRAMUSCULAR; INTRAVENOUS at 13:39

## 2017-03-15 RX ADMIN — PROPOFOL 200 MG: 10 INJECTION, EMULSION INTRAVENOUS at 15:04

## 2017-03-15 RX ADMIN — BUPIVACAINE HYDROCHLORIDE AND EPINEPHRINE BITARTRATE 20 ML: 2.5; .005 INJECTION, SOLUTION EPIDURAL; INFILTRATION; INTRACAUDAL; PERINEURAL at 15:24

## 2017-03-15 RX ADMIN — SODIUM CHLORIDE, SODIUM LACTATE, POTASSIUM CHLORIDE, AND CALCIUM CHLORIDE: 600; 310; 30; 20 INJECTION, SOLUTION INTRAVENOUS at 15:15

## 2017-03-15 RX ADMIN — LIDOCAINE HYDROCHLORIDE 100 MG: 20 INJECTION, SOLUTION EPIDURAL; INFILTRATION; INTRACAUDAL; PERINEURAL at 15:04

## 2017-03-15 RX ADMIN — CEFAZOLIN 2 G: 1 INJECTION, POWDER, FOR SOLUTION INTRAMUSCULAR; INTRAVENOUS; PARENTERAL at 14:59

## 2017-03-15 RX ADMIN — NEOSTIGMINE METHYLSULFATE 3 MG: 1 INJECTION INTRAVENOUS at 16:28

## 2017-03-15 RX ADMIN — DEXAMETHASONE SODIUM PHOSPHATE 10 MG: 4 INJECTION, SOLUTION INTRA-ARTICULAR; INTRALESIONAL; INTRAMUSCULAR; INTRAVENOUS; SOFT TISSUE at 15:04

## 2017-03-15 RX ADMIN — HYDROMORPHONE HYDROCHLORIDE 0.5 MG: 2 INJECTION, SOLUTION INTRAMUSCULAR; INTRAVENOUS; SUBCUTANEOUS at 16:50

## 2017-03-15 RX ADMIN — FENTANYL CITRATE 100 MCG: 50 INJECTION, SOLUTION INTRAMUSCULAR; INTRAVENOUS at 15:04

## 2017-03-15 RX ADMIN — ROCURONIUM BROMIDE 5 MG: 10 INJECTION, SOLUTION INTRAVENOUS at 15:04

## 2017-03-15 RX ADMIN — SODIUM CHLORIDE, SODIUM LACTATE, POTASSIUM CHLORIDE, AND CALCIUM CHLORIDE 75 ML/HR: 600; 310; 30; 20 INJECTION, SOLUTION INTRAVENOUS at 13:33

## 2017-03-15 RX ADMIN — ROCURONIUM BROMIDE 20 MG: 10 INJECTION, SOLUTION INTRAVENOUS at 15:15

## 2017-03-15 RX ADMIN — ONDANSETRON 4 MG: 2 INJECTION INTRAMUSCULAR; INTRAVENOUS at 15:04

## 2017-03-15 RX ADMIN — LIDOCAINE HYDROCHLORIDE 0.1 ML: 10 INJECTION, SOLUTION INFILTRATION; PERINEURAL at 13:33

## 2017-03-15 RX ADMIN — METRONIDAZOLE 500 MG: 500 INJECTION, SOLUTION INTRAVENOUS at 14:59

## 2017-03-15 RX ADMIN — GLYCOPYRROLATE 0.4 MG: 0.2 INJECTION INTRAMUSCULAR; INTRAVENOUS at 16:28

## 2017-03-15 RX ADMIN — SUCCINYLCHOLINE CHLORIDE 180 MG: 20 INJECTION INTRAMUSCULAR; INTRAVENOUS at 15:04

## 2017-03-15 NOTE — H&P (VIEW-ONLY)
Shweta Retana M.D. Colon and Rectal Surgeon  [facility], [address] [facility], [facility] Rhianna Hurtado   Phone: [phone] Fax: [phone]      Wyatt Celis  MRN: [id number]    PCP: Sveta Pemberton DO    HISTORY OF PRESENT ILLNESS:  Wyatt Celis is a 77y.o. year old female who I met 9/16/16 to discuss screening colonoscopy. She had never had a prior colonoscopy, and had no family history of colorectal cancer, polyps, or Cesar syndrome-related cancers. At her initial visit, she reported multiple other complaints. She reported increased straining with bowel movements and the sense of stool always being present in her rectum which she could not evacuate. She reported large, difficult bowel movements, along with small-volume, intermittent rectal bleeding. She reported perianal itching/burning. She reported generalized abdominal discomfort and cramping. There were no clear exacerbating or alleviating factors. Her abdominal exam was unremarkable. Her anal/rectal exam showed small, external fleshy tags and suggestion of a possible anal fistula. She had normal tone and squeeze. She had no significant internal hemorrhoids. I gave her recommendations on fiber supplementation, fluid hydration, and stool softeners. We talked about minimizing time on the commode and minimizing straining. We scheduled her for colonoscopy. She was scheduled for colonoscopy 11/28/16. She did not show up. When we called her, she stated she did not know about the procedure and had not done the prep. She returns today in follow-up. When I asked her again about not showing up for the colonoscopy, she blamed my office, stating that she never received any paperwork and did not even know that that procedure had been scheduled. This is despite the fact that her scheduling paperwork, along with her signature, is present in the chart.   She states that she has not tried to increase her fiber at all. She is not taking any stool softeners. She drinks plenty of water, per her report. She continues to have 2 or 3 bowel movements per day, which are always hard. Time on the commode is down to 10 or 15 minutes. She continues to strain a lot and have a frequent sense of incomplete evacuation. She continues to have small volume bleeding. She continues with mild, generalized abdominal discomfort/cramping. REVIEW OF SYSTEMS:  Constitutional: No fevers/chills, no weakness, no fatigue, no lightheadedness, no night sweats, no insomnia, no appetite changes, no weight changes, no memory problems. Eyes: No changes in vision, no diplopia, no tearing, no scotomata, no pain   Ears/Nose/Throat/Mouth:  No change in hearing, no tinnitus, no bleeding, no epistaxis, no nasal discharge, no sinusitis. Respiratory: No cough, no dyspnea, no wheezing, no hemoptysis, no sleep apnea   Cardiovascular: No chest pains, no chest pressure, no chest tightness, no palpitations   Gastrointestinal: +abdominal pains, +bowel habit changes, no nausea, no emesis, +hematochezia, no melena, no cramping, no constipation, no diarrhea, no incontinence, no jaundice, no diverticulosis.  Otherwise per HPI   Genitourinary: No dysuria, no hematuria, +urinary frequency, +nocturia, no recent UTIs   Musculoskeletal: +back/neck pain, +arthritis, no joint pain/swelling, +muscle pains   Skin: No rashes, no itching, no ulcers   Hematologic:  +easy bruising, no anemia   Neurologic: No headaches, no dizziness, no seizures   Psychiatric: No depressive symptoms, no anxiety symptoms, no changes in mood, no substance abuse     PAST MEDICAL HISTORY: Chronic diffuse pain, chronic sinusitis, osteoarthritis, history of migraines, HTN, impaired fasting glucose, restless leg syndrome, history of seizures as a child, history of tobacco abuse, vitamin D deficiency,     PAST SURGICAL HISTORY: Open cholecystectomy, right hip replacement ×3, tubal ligation, right rotator cuff repair, bilateral cataract surgery    ALLERGIES: codeine, oxycodone    HOME MEDICATIONS: Vitamin D 50,000 units weekly, Zoloft 100 mg daily, Topamax 100 mg daily, Hyzaar 50/12.5 daily, Myrbetriq 50 mg daily    SOCIAL HISTORY:  to her  of 45 years. They live in [place]. She has  2 children and 2 grandchildren. She is retired from a job as an . Non-smoker. Previous one pack per day ×50 year tobacco use. No alcohol    PREVENTION: Never had a colonoscopy     FAMILY HISTORY: No colon or rectal cancer. No history of polyps. No breast, prostate, ovary, endometrial, gastric, or pancreatic cancers. Mother with heart attack, hypertension, stomach ulcers. Father with diverticular disease. Mother had bladder cancer. PHYSICAL EXAM:  Blood pressure 108/58, pulse 64, height 5' 4\" (1.626 m), weight 237 lb (107.5 kg). Body mass index is 40.68 kg/(m^2). The patient was examined in the presence of a medical assistant. General: Normotensive, in no acute distress, well developed, well nourished appearing   Head:  AT/NC, no lesions   Eyes:  PERRLA, EOM's full, conjunctivae clear   Neck:  Supple, no masses, no lymphadenopathy, no thyromegaly, no carotid bruits   Chest:  Lungs clear, no rales, no rhonchi, no wheezes   Heart:  RR, no murmurs, no rubs, no gallops   Abdomen:  Soft, obese, no tenderness, no rebound, no guarding, no masses, nondistended. Well healed scars. No hernias   Back:  Normal curvature, no tenderness. Negative Aragon's punch. Extremities:  FROM, no deformities, no edema, no erythema   Neuro:  Physiologic, oriented x3, full affect, no localizing findings   Skin:  Normal, no rashes, no lesions noted. Rectal from 9/16/16: \"External exam shows no excoriation, no fissures, no erythema, no abscesses. She has some small, fleshy, external hemorrhoidal tags, none of which are irritated, inflamed, or really prominent.   Superior gluteal cleft reveals no pits or signs suggestive of prior pilonidal disease. Palpation of the perianal tissues and ischiorectal fossae shows no tenderness or fluctuance. Valsalva reveals no prolapse of tissue. Anal wink is present. There is a small dimple present in the left anterior position. I probed it and there is a small blind ending cavity that tracks about 1.5 cm anteromedially. No tracking into the anus    ANDRES shows normal resting tone and normal voluntary squeeze. Valsalva reveals appropriate relaxation. No presacral masses. Rectocele is not present. Stool in the vault is absent. Stool is heme negative, grossly. Anoscopy was performed to evaluate the internal hemorrhoids. No fissures, no fistula openings, no distal anorectal polyps or masses. No significant internal hemorrhoids. Mild mucosal redundancy without irritative changes, inflammation, prolapse, or bleeding. \"    Note from Dr Ranjit James reviewed. Labs reviewed by me:  CMP normal except glucose 117 and creatinine 1.07. A1c 6.0. CRP 1.5. CBC essentially normal.    ASSESSMENT:  Age related colon cancer risk/screening  Incidental internal hemorrhoids  Small, blind ending chronic abscess/fistula cavity  Rare rectal bleeding, likely due to one of the above  Perianal itching  Generalized abdominal pain    PLAN:  --We reviewed the colonoscopy procedure. Major risks include bleeding (1/1000), perforation (1/1000), missed lesions, and reactions to sedation medications or bowel prep. We discussed alternatives which include flexible sigmoidoscopy, contrast enema, CT colonography, and FOBT. I explained the risks and benefits of each of these and why I feel colonoscopy is the best test for the patient. Reason for colonoscopy is screening  --The patient was given a handout on colonoscopy, as well as a handout describing the OTC Miralax/Dulcolax bowel prep. All questions were answered.     --We will work to schedule a colonoscopy at the next available opportunity. We will get her a new copy of all the paperwork, instructions, etc    --We talked about increasing her fiber intake and using supplements. We talked about drinking lots of fluids. We talked about starting a stool softener. I am hopeful that these changes will help improve her bowel frequency, consistency, and ease of evacuation. --We talked about minimizing time on the commode and minimizing straining  --We again reviewed that has no significant findings on anal/rectal exam to explain her complaints. I reassured her about most of them. She has normal function in terms of frequency and consistency. The small amount of bleeding may be related to the small fistula dimple, or possibly intermittent irritation of her hemorrhoids. --The patient will follow up with me in the office in a few months, some time after colonoscopy, to review symptoms and make further plans    Fax communication sent to Dr Abner Summers. Greater than 50% of this 20 minute visit was spent on counseling, discussing the rationale behind colon cancer screening, the colonoscopy procedure, risks, benefits, and alternatives. We talked about her bowel function, the above recommended bowel regimen, as well as the possible anal fistula seen on her last exam    Rudy Mello MD  2/15/2017    Elements of this note have been created with speech-recognition software. As a result, errors in speech recognition may have occurred.

## 2017-03-15 NOTE — ANESTHESIA POSTPROCEDURE EVALUATION
Post-Anesthesia Evaluation and Assessment    Patient: Arun Ramirez MRN: [id number]  SSN: [id number]    YOB: 1950  Age: 77 y.o. Sex: female       Cardiovascular Function/Vital Signs  Visit Vitals    /62    Pulse 76    Temp 36.1 °C (96.9 °F)    Resp 16    Ht 5' 4\" (1.626 m)    Wt 107.6 kg (237 lb 3 oz)    SpO2 93%    BMI 40.71 kg/m2       Patient is status post general anesthesia for Procedure(s):   APPENDECTOMY LAPAROSCOPIC. Nausea/Vomiting: None    Postoperative hydration reviewed and adequate. Pain:  Pain Scale 1: Numeric (0 - 10) (03/15/17 1726)  Pain Intensity 1: 5 (03/15/17 1726)   Managed    Neurological Status:   Neuro (WDL): Exceptions to WDL (03/15/17 1726)  Neuro  Neurologic State: Drowsy (03/15/17 1726)   At baseline    Mental Status and Level of Consciousness: Arousable    Pulmonary Status:   O2 Device: Room air (03/15/17 1756)   Adequate oxygenation and airway patent    Complications related to anesthesia: None    Post-anesthesia assessment completed.  No concerns    Signed By: Ramin Zavala MD     March 15, 2017

## 2017-03-15 NOTE — OP NOTES
Operative Note   3/15/2017  [id number]    Preoperative diagnosis: Colon polyp at appendiceal orifice    Postoperative diagnosis: Same     Procedure: Laparoscopic appendectomy with partial cecectomy    Surgeon: Edmond Velasquez MD     Assistant: None     EBL: 20 ml     Anesthesia: General     Findings: distal appendix is fibrously obliterated. Proximal appendix with polypoid tissue. Appear to be a margin of negative/normal mucosa between staple line and polyp. Specimens: Appendix     Indications: Felice Jones is a 77 y.o. female who was found on colonoscopy to have a polyp extruding from the appendiceal orifice. I counseled her on attempts at laparoscopic resection with a wide appendectomy. She wished to proceed. Procedure: The patient was evaluated in the preoperative holding area. The planned procedure was reviewed. We discussed risks of bleeding, infection, damage to bowel, bladder, blood vessels, nerves, and other intraabdominal structures. We discussed the possible need for laparotomy, possibly bowel resection, etc.  We discussed the possibility of need for further procedures, anesthetic complications, and cardiopulmonary complications. She was brought to the OR and general anesthesia was obtained. SCD's were placed, and she was placed supine on the OR table. All pressure points were padded. Antibiotics were administered. DVT prophylaxis was given. A wu catheter was placed. Time out was performed. A periumbilical incision was made and dissection carried down to the fascia. This was grasped with Kochers and elevated. The fascia was opened sharply and two stay sutures were placed. The peritoneum was entered bluntly, and a Matt trocar placed. The abdomen was insufflated with carbon dioxide. A brief abdominal exploration was performed. Under direct vision, a 5 mm suprapubic port and a 5 mm left abdominal port were placed.   The patient was placed head down, left side down.    The appendix was identified. There was no inflammation surrounding the appendix. The appendix was not perforated. The mesoappendix was grasped and elevated and a window made at the base of the appendix at its junction with the cecum. I freed up the ileum to pull it down and away from the planned transection point. The appendix was transected slightly above its base with an endo FARTUN purple load stapler, intentionally including a small \"cuff\" of cecum, while preserving the ileocecal valve opening. Next, the mesoappendix was isolated, and it was divided with an endo FARTUN gold load stapler. The appendix was placed in an Endo-bag. I extracted the specimen through the umbilical port and opened it longitudinally. It appeared that there was a small cuff of normal mucosa between the polyp and the staple line. Irrigation of the RLQ and pelvis were performed with sterile saline. Hemostasis on the staple lines was good. The distal TI was evaluated and was normal, with no evidence of fat wrapping or inflammation. There was no Meckel's diverticulum. Adnexa were not seen. There was extensive scarring between the omentum and abdominal wall on the right from the previous open cholecystectomy. The other visualized intraabdominal structures were normal.      The RLQ was checked one more time for hemostasis. Suprapubic and left abdominal ports were removed under direct visualization and there was no bleed. The Matt was removed and the abdomen desufflated. The fascia of this port was closed with interrupted 0-Vicryl sutures. Irrigation was performed in the subcutaneous tissues. Skin was closed with 4-0 subcuticular Monocryl. Dermabond was applied. 30 ml of 0.25% marcaine with epinephrine was instilled into the skin and subcutaneous tissues for postoperative analgesia and anesthesia. The wu catheter was removed. The patient was awakened, extubated, and taken to recovery in stable condition. Sponge, instrument, and needle counts were correct.     Phill Hinds MD  3/15/2017 4:42 PM

## 2017-03-15 NOTE — ANESTHESIA PREPROCEDURE EVALUATION
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)  Headaches and psychiatric history    Comments: Chronic pain Cardiovascular    Hypertension: well controlled  Valvular problems/murmurs (mild AS): aortic stenosis            Exercise tolerance: >4 METS  Comments: Echo 10/16 - normal EF, mild AS   GI/Hepatic/Renal     GERD: well controlled           Endo/Other        Morbid obesity and arthritis     Other Findings            Physical Exam    Airway  Mallampati: II  TM Distance: 4 - 6 cm  Neck ROM: normal range of motion   Mouth opening: Normal     Cardiovascular    Rhythm: regular  Rate: normal    Murmur: Grade 2, Aortic area     Dental    Dentition: Full upper dentures and Full lower dentures     Pulmonary  Breath sounds clear to auscultation               Abdominal         Other Findings            Anesthetic Plan    ASA: 3  Anesthesia type: general            Anesthetic plan and risks discussed with: Patient

## 2017-03-15 NOTE — DISCHARGE INSTRUCTIONS
Postoperative Abdominal Surgery Instructions  Kira Lozano MD  [facility]--Colon and Rectal Surgery  [facility], [facility]  Haylee Peña  [phone]    Activity:  1. Remain active and walk several times per day, gradually increasing time and distance. You may walk and climb stairs without restrictions. 2. No driving while taking narcotic pain medication. You may ride in a car without restrictions. Frequent breaks are encouraged on long trips. 3. No strenuous activity or heavy lifting greater than 10 pounds (about a gallon of milk) for 3 weeks. This allows the deeper tissues to heal and decreases then chance of developing a hernia. 4. You may resume sexual relations 3 weeks after surgery  5. Continue to work on taking deep breaths and using your incentive spirometer at home. 6. The average time out of work after abdominal surgery is 1-3 weeks. You may return sooner or to light duty work if you feel able. 7. It is normal to feel more tired and to fatigue more easily after an operation. Take naps as needed, but avoiding sleeping too much during the day that you cannot sleep at night. Diet:  1. Small frequent meals are usually better tolerated than large ones after an operation. 2. Ensure adequate hydration. 3. Avoid alcohol while taking narcotic pain medications  Medications:  1. You will be given a prescription for pain medication at the time of discharge. Take as instructed on the bottle. Pain pills take about 30 minutes to start working after they are swallowed, so take them before the pain becomes severe. 2. Many pain medications contain Tylenol (acetaminophen), and therefore Tylenol should not be taken in addition to the pain medication. You may take Ibuprofen 600 mg every 8 hours with food either in between narcotic pain medication or instead of it to help control your pain. 3. Many pain medications cannot be called in to a pharmacy.   Watch the number of pills you have and let us know well before (2-3 days) you are due to run out. 4. You may purchase a stool softener over the counter if your stools are hard. Two examples are Colace 100 mg capsules twice daily or Surfak 240 mg capsules once daily. These should be taken with water. They will not cause diarrhea. 5. Resume all home medications, unless specifically addressed by your physician. 6. Do not use enemas or other laxatives, unless instructed by your physician  Wound care:  1. May shower and rinse over the wound/incision. Pat the area dry. Dressings usually do not need to be applied. 2. No tubs or swimming for two weeks after surgery, until incisions are completely sealed. 3. If there is a small amount of drainage from the wound, you may place a pad or gauze over the area to keep your clothes clean. Special instructions:  1. Call Dr Brigido Stanton office at [phone] to schedule your follow up appointment in ~2 weeks. 2. Call the office for any of the following problems:  a. Temperature greater than 101  b. Persistent nausea or vomiting for greater than 24 hours, and an inability to keep down liquids  c. Spreading redness around wound  d. Opening up of wound, or large amount of drainage from the wound  e. New or worsening pain, or pain uncontrolled by pain medications  f. Large amount of loose stool, with concerns for dehydration. Your stools will be looser or more frequent than preoperatively, and this is normal.  g. Passage of large amounts of blood or clots per rectum (small amounts of clot, streaks of blood, and small amounts of blood on the paper are normal and expected for the first few bowel movements)  h. Inability to move your bowels after 2-3 days  i. Inability to pass flatus for more than 24 hours, with distention (swelling) of your abdomen  3. Call the office with any other questions about your surgery.

## 2017-04-07 NOTE — ADDENDUM NOTE
Addendum  created 04/07/17 3410 by Claudene Monarch, CRNA    Anesthesia Event edited, Procedure Event Log accessed

## 2017-06-16 PROBLEM — Z98.890 HISTORY OF REPAIR OF RIGHT ROTATOR CUFF: Status: ACTIVE | Noted: 2017-06-16

## 2017-06-16 PROBLEM — R41.3 MEMORY DIFFICULTIES: Status: ACTIVE | Noted: 2017-06-16

## 2017-06-16 PROBLEM — M54.50 CHRONIC LEFT-SIDED LOW BACK PAIN WITHOUT SCIATICA: Status: ACTIVE | Noted: 2017-06-16

## 2017-06-16 PROBLEM — G89.29 CHRONIC LEFT-SIDED LOW BACK PAIN WITHOUT SCIATICA: Status: ACTIVE | Noted: 2017-06-16

## 2017-12-22 ENCOUNTER — APPOINTMENT (OUTPATIENT)
Dept: CT IMAGING | Age: 67
End: 2017-12-22
Attending: EMERGENCY MEDICINE
Payer: MEDICARE

## 2017-12-22 ENCOUNTER — HOSPITAL ENCOUNTER (OUTPATIENT)
Age: 67
Setting detail: OBSERVATION
Discharge: HOME | End: 2017-12-27
Attending: EMERGENCY MEDICINE | Admitting: INTERNAL MEDICINE
Payer: MEDICARE

## 2017-12-22 ENCOUNTER — APPOINTMENT (OUTPATIENT)
Dept: MRI IMAGING | Age: 67
End: 2017-12-22
Attending: NURSE PRACTITIONER
Payer: MEDICARE

## 2017-12-22 DIAGNOSIS — Q28.2 AVM (ARTERIOVENOUS MALFORMATION) BRAIN: Primary | ICD-10-CM

## 2017-12-22 DIAGNOSIS — R26.81 UNSTEADY GAIT: ICD-10-CM

## 2017-12-22 DIAGNOSIS — N39.0 URINARY TRACT INFECTION WITHOUT HEMATURIA, SITE UNSPECIFIED: ICD-10-CM

## 2017-12-22 LAB
ALBUMIN SERPL-MCNC: 3.2 G/DL (ref 3.2–4.6)
ALBUMIN/GLOB SERPL: 0.7 {RATIO} (ref 1.2–3.5)
ALP SERPL-CCNC: 128 U/L (ref 50–136)
ALT SERPL-CCNC: 16 U/L (ref 12–65)
ANION GAP SERPL CALC-SCNC: 3 MMOL/L (ref 7–16)
ARTERIAL PATENCY WRIST A: POSITIVE
AST SERPL-CCNC: 37 U/L (ref 15–37)
ATRIAL RATE: 64 BPM
BACTERIA URNS QL MICRO: ABNORMAL /HPF
BASE DEFICIT BLDA-SCNC: 0.2 MMOL/L (ref 0–2)
BASOPHILS # BLD: 0 K/UL (ref 0–0.2)
BASOPHILS NFR BLD: 0 % (ref 0–2)
BDY SITE: ABNORMAL
BILIRUB SERPL-MCNC: 0.5 MG/DL (ref 0.2–1.1)
BUN SERPL-MCNC: 18 MG/DL (ref 8–23)
CALCIUM SERPL-MCNC: 8.8 MG/DL (ref 8.3–10.4)
CALCULATED P AXIS, ECG09: 73 DEGREES
CALCULATED R AXIS, ECG10: 40 DEGREES
CALCULATED T AXIS, ECG11: 75 DEGREES
CASTS URNS QL MICRO: ABNORMAL /LPF
CHLORIDE SERPL-SCNC: 101 MMOL/L (ref 98–107)
CO2 SERPL-SCNC: 33 MMOL/L (ref 21–32)
COHGB MFR BLD: 0.6 % (ref 0.5–1.5)
CREAT SERPL-MCNC: 1.06 MG/DL (ref 0.6–1)
DIAGNOSIS, 93000: NORMAL
DIFFERENTIAL METHOD BLD: ABNORMAL
DO-HGB BLD-MCNC: 6 % (ref 0–5)
EOSINOPHIL # BLD: 0.1 K/UL (ref 0–0.8)
EOSINOPHIL NFR BLD: 1 % (ref 0.5–7.8)
EPI CELLS #/AREA URNS HPF: ABNORMAL /HPF
ERYTHROCYTE [DISTWIDTH] IN BLOOD BY AUTOMATED COUNT: 13.7 % (ref 11.9–14.6)
GLOBULIN SER CALC-MCNC: 4.6 G/DL (ref 2.3–3.5)
GLUCOSE BLD STRIP.AUTO-MCNC: 165 MG/DL (ref 65–100)
GLUCOSE SERPL-MCNC: 149 MG/DL (ref 65–100)
HCO3 BLDA-SCNC: 25 MMOL/L (ref 22–26)
HCT VFR BLD AUTO: 43.3 % (ref 35.8–46.3)
HGB BLD-MCNC: 14.7 G/DL (ref 11.7–15.4)
HGB BLDMV-MCNC: 14.9 GM/DL (ref 11.7–15)
IMM GRANULOCYTES # BLD: 0 K/UL (ref 0–0.5)
IMM GRANULOCYTES NFR BLD AUTO: 0 % (ref 0–5)
LYMPHOCYTES # BLD: 0.6 K/UL (ref 0.5–4.6)
LYMPHOCYTES NFR BLD: 6 % (ref 13–44)
MCH RBC QN AUTO: 30.6 PG (ref 26.1–32.9)
MCHC RBC AUTO-ENTMCNC: 33.9 G/DL (ref 31.4–35)
MCV RBC AUTO: 90 FL (ref 79.6–97.8)
METHGB MFR BLD: 0.2 % (ref 0–1.5)
MONOCYTES # BLD: 0.5 K/UL (ref 0.1–1.3)
MONOCYTES NFR BLD: 5 % (ref 4–12)
NEUTS SEG # BLD: 8.9 K/UL (ref 1.7–8.2)
NEUTS SEG NFR BLD: 88 % (ref 43–78)
OXYHGB MFR BLDA: 92.9 % (ref 94–97)
P-R INTERVAL, ECG05: 170 MS
PCO2 BLDA: 42 MMHG (ref 35–45)
PH BLDA: 7.39 [PH] (ref 7.35–7.45)
PLATELET # BLD AUTO: 151 K/UL (ref 150–450)
PMV BLD AUTO: 9.3 FL (ref 10.8–14.1)
PO2 BLDA: 68 MMHG (ref 80–105)
POTASSIUM SERPL-SCNC: 4.7 MMOL/L (ref 3.5–5.1)
PROT SERPL-MCNC: 7.8 G/DL (ref 6.3–8.2)
Q-T INTERVAL, ECG07: 396 MS
QRS DURATION, ECG06: 90 MS
QTC CALCULATION (BEZET), ECG08: 408 MS
RBC # BLD AUTO: 4.81 M/UL (ref 4.05–5.25)
RBC #/AREA URNS HPF: ABNORMAL /HPF
SAO2 % BLD: 94 % (ref 92–98.5)
SODIUM SERPL-SCNC: 137 MMOL/L (ref 136–145)
VENTILATION MODE VENT: ABNORMAL
VENTRICULAR RATE, ECG03: 64 BPM
WBC # BLD AUTO: 10 K/UL (ref 4.3–11.1)
WBC URNS QL MICRO: >100 /HPF

## 2017-12-22 PROCEDURE — 87186 SC STD MICRODIL/AGAR DIL: CPT | Performed by: EMERGENCY MEDICINE

## 2017-12-22 PROCEDURE — 87088 URINE BACTERIA CULTURE: CPT | Performed by: EMERGENCY MEDICINE

## 2017-12-22 PROCEDURE — 99284 EMERGENCY DEPT VISIT MOD MDM: CPT | Performed by: EMERGENCY MEDICINE

## 2017-12-22 PROCEDURE — 96374 THER/PROPH/DIAG INJ IV PUSH: CPT | Performed by: EMERGENCY MEDICINE

## 2017-12-22 PROCEDURE — 70553 MRI BRAIN STEM W/O & W/DYE: CPT

## 2017-12-22 PROCEDURE — 87086 URINE CULTURE/COLONY COUNT: CPT | Performed by: EMERGENCY MEDICINE

## 2017-12-22 PROCEDURE — A9577 INJ MULTIHANCE: HCPCS | Performed by: EMERGENCY MEDICINE

## 2017-12-22 PROCEDURE — 80053 COMPREHEN METABOLIC PANEL: CPT | Performed by: STUDENT IN AN ORGANIZED HEALTH CARE EDUCATION/TRAINING PROGRAM

## 2017-12-22 PROCEDURE — 85025 COMPLETE CBC W/AUTO DIFF WBC: CPT | Performed by: STUDENT IN AN ORGANIZED HEALTH CARE EDUCATION/TRAINING PROGRAM

## 2017-12-22 PROCEDURE — 74011000250 HC RX REV CODE- 250: Performed by: EMERGENCY MEDICINE

## 2017-12-22 PROCEDURE — 74011000302 HC RX REV CODE- 302: Performed by: INTERNAL MEDICINE

## 2017-12-22 PROCEDURE — 36600 WITHDRAWAL OF ARTERIAL BLOOD: CPT

## 2017-12-22 PROCEDURE — 86580 TB INTRADERMAL TEST: CPT | Performed by: INTERNAL MEDICINE

## 2017-12-22 PROCEDURE — 74011250636 HC RX REV CODE- 250/636: Performed by: INTERNAL MEDICINE

## 2017-12-22 PROCEDURE — 82962 GLUCOSE BLOOD TEST: CPT

## 2017-12-22 PROCEDURE — 99218 HC RM OBSERVATION: CPT

## 2017-12-22 PROCEDURE — 70450 CT HEAD/BRAIN W/O DYE: CPT

## 2017-12-22 PROCEDURE — 93005 ELECTROCARDIOGRAM TRACING: CPT | Performed by: STUDENT IN AN ORGANIZED HEALTH CARE EDUCATION/TRAINING PROGRAM

## 2017-12-22 PROCEDURE — 82803 BLOOD GASES ANY COMBINATION: CPT

## 2017-12-22 PROCEDURE — 96372 THER/PROPH/DIAG INJ SC/IM: CPT

## 2017-12-22 PROCEDURE — 81015 MICROSCOPIC EXAM OF URINE: CPT | Performed by: EMERGENCY MEDICINE

## 2017-12-22 PROCEDURE — 81003 URINALYSIS AUTO W/O SCOPE: CPT | Performed by: EMERGENCY MEDICINE

## 2017-12-22 PROCEDURE — 74011250637 HC RX REV CODE- 250/637: Performed by: INTERNAL MEDICINE

## 2017-12-22 PROCEDURE — 74011250636 HC RX REV CODE- 250/636: Performed by: EMERGENCY MEDICINE

## 2017-12-22 RX ORDER — ONDANSETRON 2 MG/ML
4 INJECTION INTRAMUSCULAR; INTRAVENOUS
Status: DISCONTINUED | OUTPATIENT
Start: 2017-12-22 | End: 2017-12-27 | Stop reason: HOSPADM

## 2017-12-22 RX ORDER — ENOXAPARIN SODIUM 100 MG/ML
40 INJECTION SUBCUTANEOUS EVERY 24 HOURS
Status: DISCONTINUED | OUTPATIENT
Start: 2017-12-22 | End: 2017-12-27 | Stop reason: HOSPADM

## 2017-12-22 RX ORDER — SODIUM CHLORIDE 0.9 % (FLUSH) 0.9 %
5-10 SYRINGE (ML) INJECTION AS NEEDED
Status: DISCONTINUED | OUTPATIENT
Start: 2017-12-22 | End: 2017-12-27 | Stop reason: HOSPADM

## 2017-12-22 RX ORDER — SERTRALINE HYDROCHLORIDE 100 MG/1
100 TABLET, FILM COATED ORAL DAILY
Status: DISCONTINUED | OUTPATIENT
Start: 2017-12-23 | End: 2017-12-27 | Stop reason: HOSPADM

## 2017-12-22 RX ORDER — SODIUM CHLORIDE 0.9 % (FLUSH) 0.9 %
10 SYRINGE (ML) INJECTION
Status: COMPLETED | OUTPATIENT
Start: 2017-12-22 | End: 2017-12-22

## 2017-12-22 RX ORDER — ACETAMINOPHEN 325 MG/1
650 TABLET ORAL
Status: DISCONTINUED | OUTPATIENT
Start: 2017-12-22 | End: 2017-12-27 | Stop reason: HOSPADM

## 2017-12-22 RX ORDER — SODIUM CHLORIDE 0.9 % (FLUSH) 0.9 %
5-10 SYRINGE (ML) INJECTION EVERY 8 HOURS
Status: DISCONTINUED | OUTPATIENT
Start: 2017-12-22 | End: 2017-12-27 | Stop reason: HOSPADM

## 2017-12-22 RX ADMIN — ENOXAPARIN SODIUM 40 MG: 40 INJECTION SUBCUTANEOUS at 19:26

## 2017-12-22 RX ADMIN — ACETAMINOPHEN 650 MG: 325 TABLET, FILM COATED ORAL at 19:32

## 2017-12-22 RX ADMIN — GADOBENATE DIMEGLUMINE 20 ML: 529 INJECTION, SOLUTION INTRAVENOUS at 17:07

## 2017-12-22 RX ADMIN — CEFTRIAXONE SODIUM 1 G: 1 INJECTION, POWDER, FOR SOLUTION INTRAMUSCULAR; INTRAVENOUS at 15:16

## 2017-12-22 RX ADMIN — Medication 10 ML: at 17:07

## 2017-12-22 RX ADMIN — TUBERCULIN PURIFIED PROTEIN DERIVATIVE 5 UNITS: 5 INJECTION, SOLUTION INTRADERMAL at 19:25

## 2017-12-22 RX ADMIN — Medication 10 ML: at 19:34

## 2017-12-22 RX ADMIN — Medication 10 ML: at 22:21

## 2017-12-22 NOTE — PROGRESS NOTES
Spoke with patient and her  at beside. Mr. Mariposa Simmons: [phone]. Just the patient and her  live in a one level home. Patient states she usually uses a cane, but has a walker she uses occasionally. Her  is on home O2 continuous 2 liters resting and 4 ambulating. He also uses a nebulizer. He does the grocery shopping and cooking. He also does the driving. Patient's husbands state he is having a more difficult time taking care of her. They are both open to home health since patient at this point is only admitted under observation.

## 2017-12-22 NOTE — ED PROVIDER NOTES
HPI Comments: Patient is a 15-year-old female who is coming in after having about a month's worth of increasing difficulty with her gait trouble with balance and trouble ambulating. She does not leave the house and generally holds onto things when she is in the house. She and  both state that this is a big departure to how she was a couple months ago. She denies any sort of pain she does feel some generalized weakness. The history is provided by the patient.         Past Medical History:   Diagnosis Date    Chronic pain     \"all over body\"-takes Zoloft daily    Chronic sinusitis 1/19/2015    Diverticulosis 2017    Edema 1/19/2015    Resolved    Former smoker     GERD (gastroesophageal reflux disease)     Headache 1/19/2015    Hearing loss 1/19/2015    \"some\"- no hearing aids    Heart murmur 1990    Hip osteoarthritis 1/19/2015    feet, hands    Hx of colonic polyps 2017    SSA    Hx of migraines     on medication    Hypercholesterolemia 1/19/2015    Hypertension     Impaired fasting glucose 1/19/2015    Joint disorder 1/19/2015    Morbid obesity (HCC)     BMI 40    Neuralgia 1/19/2015    Phlebitis and thrombophlebitis of superficial vessels of lower extremities 01/19/2015    pt denies     Psychiatric disorder     Restless leg syndrome 1/19/2015    Rheumatic fever     pt reports possibly R.F.    Seizures ([address]     seizures as a child, has NOT had one since she was 13years old    SOB (shortness of breath) on exertion     Stroke ([address]     at age 11 only residual poor peripheral vision    Tobacco abuse 1/19/2015    quit smoking 4/2015    Vascular anomalies, congenital     Vitamin D deficiency 1/19/2015       Past Surgical History:   Procedure Laterality Date    COLONOSCOPY N/A 2/20/2017    Bjorn--appendiceal serrated sessile polyp, transverse and rectal hyperplastic--3 year recall    HX APPENDECTOMY  03/15/2017    HX CATARACT REMOVAL Bilateral 3/2016    HX CHOLECYSTECTOMY [address] PROCEDURES Right 1995    pt denies    HX ROTATOR CUFF REPAIR Right 10/31/2016    x2    HX TUBAL LIGATION  1982    TOTAL HIP ARTHROPLASTY Right 2005    and again 5/2015 and another revision         Family History:   Problem Relation Age of Onset    Hypertension Mother     Cancer Mother      Bladder    Depression Mother     Parkinson's Disease Father     Alcohol abuse Brother     Alcohol abuse Son     No Known Problems Sister     No Known Problems Brother     Malignant Hyperthermia Neg Hx     Pseudocholinesterase Deficiency Neg Hx     Delayed Awakening Neg Hx     Post-op Nausea/Vomiting Neg Hx     Emergence Delirium Neg Hx     Post-op Cognitive Dysfunction Neg Hx     Other Neg Hx        Social History     Social History    Marital status:      Spouse name: N/A    Number of children: N/A    Years of education: N/A     Occupational History    Not on file. Social History Main Topics    Smoking status: Former Smoker     Packs/day: 1.00     Years: 50.00     Types: Cigarettes     Quit date: 3/29/2015    Smokeless tobacco: Never Used      Comment: quit 2 months and 2 weeks ago    Alcohol use No      Comment: Former- socially   Rachel Ching Drug use: No    Sexual activity: Not on file     Other Topics Concern    Not on file     Social History Narrative         ALLERGIES: Codeine and Oxycodone    Review of Systems   Constitutional: Negative for chills and fever. Respiratory: Negative for chest tightness, shortness of breath, wheezing and stridor. Cardiovascular: Negative for chest pain and palpitations. Gastrointestinal: Negative for abdominal pain, diarrhea, nausea and vomiting. Skin: Negative. All other systems reviewed and are negative.       Vitals:    12/22/17 1224   BP: 135/72   Pulse: 76   Resp: 18   Temp: 98 °F (36.7 °C)   SpO2: 93%   Weight: 113.4 kg (250 lb)   Height: 5' 4\" (1.626 m)            Physical Exam   Constitutional: She is oriented to person, place, and time. She appears well-developed and well-nourished. No distress. HENT:   Head: Normocephalic and atraumatic. Eyes: Conjunctivae are normal. No scleral icterus. Neck: Normal range of motion. Neck supple. Cardiovascular: Normal rate, regular rhythm and normal heart sounds. Pulmonary/Chest: Effort normal and breath sounds normal. No stridor. No respiratory distress. She has no wheezes. She has no rales. She exhibits no tenderness. Abdominal: Soft. She exhibits no distension. There is no tenderness. There is no rebound and no guarding. Musculoskeletal:   ssymmetric strength in upper and lower extremities. Patient unable to support her weight while standing. She required assistance during transfer. Neurological: She is alert and oriented to person, place, and time. No cranial nerve deficit. Coordination normal.   No focal weakness   Skin: Skin is warm and dry. No rash noted. She is not diaphoretic. No erythema. Psychiatric: She has a normal mood and affect. Her behavior is normal.   Nursing note and vitals reviewed. MDM  Number of Diagnoses or Management Options  Diagnosis management comments: Given patient's decline from baseline and inability to safely ambulate, UTI, and large area of calcium and irregularity on her ct scan I am going to ask hospitalist to see for further evaluation and likely mri. Augustina Hauser MD; 12/22/2017 @3:14 PM Voice dictation software was used during the making of this note. This software is not perfect and grammatical and other typographical errors may be present.   This note has not been proofread for errors.  ===================================================================        Amount and/or Complexity of Data Reviewed  Clinical lab tests: ordered and reviewed (Results for orders placed or performed during the hospital encounter of 12/22/17  -CBC WITH AUTOMATED DIFF       Result                                            Value Ref Range                       WBC                                               10.0                          4.3 - 11.1 K/uL                 RBC                                               4.81                          4.05 - 5.25 M/uL                HGB                                               14.7                          11.7 - 15.4 g/dL                HCT                                               43.3                          35.8 - 46.3 %                   MCV                                               90.0                          79.6 - 97.8 FL                  MCH                                               30.6                          26.1 - 32.9 PG                  MCHC                                              33.9                          31.4 - 35.0 g/dL                RDW                                               13.7                          11.9 - 14.6 %                   PLATELET                                          151                           150 - 450 K/uL                  MPV                                               9.3 (L)                       10.8 - 14.1 FL                  DF                                                AUTOMATED                                                     NEUTROPHILS                                       88 (H)                        43 - 78 %                       LYMPHOCYTES                                       6 (L)                         13 - 44 %                       MONOCYTES                                         5                             4.0 - 12.0 %                    EOSINOPHILS                                       1                             0.5 - 7.8 %                     BASOPHILS                                         0                             0.0 - 2.0 %                     IMMATURE GRANULOCYTES                             0                             0.0 - 5.0 %                     ABS. NEUTROPHILS                                  8.9 (H)                       1.7 - 8.2 K/UL                  ABS. LYMPHOCYTES                                  0.6                           0.5 - 4.6 K/UL                  ABS. MONOCYTES                                    0.5                           0.1 - 1.3 K/UL                  ABS. EOSINOPHILS                                  0.1                           0.0 - 0.8 K/UL                  ABS. BASOPHILS                                    0.0                           0.0 - 0.2 K/UL                  ABS. IMM.  GRANS.                                  0.0                           0.0 - 0.5 K/UL             -METABOLIC PANEL, COMPREHENSIVE       Result                                            Value                         Ref Range                       Sodium                                            137                           136 - 145 mmol/L                Potassium                                         4.7                           3.5 - 5.1 mmol/L                Chloride                                          101                           98 - 107 mmol/L                 CO2                                               33 (H)                        21 - 32 mmol/L                  Anion gap                                         3 (L)                         7 - 16 mmol/L                   Glucose                                           149 (H)                       65 - 100 mg/dL                  BUN                                               18                            8 - 23 MG/DL                    Creatinine                                        1.06 (H)                      0.6 - 1.0 MG/DL                 GFR est AA                                        >60                           >60 ml/min/1.73m2               GFR est non-AA                                    55 (L)                        >60 ml/min/1.73m2               Calcium 8.8                           8.3 - 10.4 MG/DL                Bilirubin, total                                  0.5                           0.2 - 1.1 MG/DL                 ALT (SGPT)                                        16                            12 - 65 U/L                     AST (SGOT)                                        37                            15 - 37 U/L                     Alk. phosphatase                                  128                           50 - 136 U/L                    Protein, total                                    7.8                           6.3 - 8.2 g/dL                  Albumin                                           3.2                           3.2 - 4.6 g/dL                  Globulin                                          4.6 (H)                       2.3 - 3.5 g/dL                  A-G Ratio                                         0.7 (L)                       1.2 - 3.5                  -URINE MICROSCOPIC       Result                                            Value                         Ref Range                       WBC                                               >100 (H)                      0 /hpf                          RBC                                               3-5                           0 /hpf                          Epithelial cells                                  3-5                           0 /hpf                          Bacteria                                          TRACE                         0 /hpf                          Casts                                             3-5                           0 /lpf                     -BLOOD GAS, ARTERIAL       Result                                            Value                         Ref Range                       pH                                                7.39                          7.35 - 7.45                     PCO2 42                            35 - 45 mmHg                    PO2                                               68 (L)                        80 - 105 mmHg                   BICARBONATE                                       25                            22 - 26 mmol/L                  BASE DEFICIT                                      0.2                           0 - 2 mmol/L                    TOTAL HEMOGLOBIN                                  14.9                          11.7 - 15.0 GM/DL               O2 SAT                                            94                            92 - 98.5 %                     Arterial O2 Hgb                                   92.9 (L)                      94 - 97 %                       CARBOXYHEMOGLOBIN                                 0.6                           0.5 - 1.5 %                     METHEMOGLOBIN                                     0.2                           0.0 - 1.5 %                     DEOXYHEMOGLOBIN                                   6 (H)                         0.0 - 5.0 %                     SITE                                              LR                                                            ALLENS TEST                                       POSITIVE                                                      MODE                                              RA                                                       -EKG, 12 LEAD, INITIAL       Result                                            Value                         Ref Range                       Ventricular Rate                                  64                            BPM                             Atrial Rate                                       64                            BPM                             P-R Interval                                      170                           ms                              QRS Duration 90                            ms                              Q-T Interval                                      396                           ms                              QTC Calculation (Bezet)                           408                           ms                              Calculated P Axis                                 73                            degrees                         Calculated R Axis                                 40                            degrees                         Calculated T Axis                                 75                            degrees                         Diagnosis                                                                                                   Normal sinus rhythm with sinus arrhythmia   Normal ECG   When compared with ECG of 08-MAR-2017 13:57,   No significant change was found   Confirmed by Walter Webster MD (), [place] ([id number]) on 12/22/2017 3:06:44 PM    )  Tests in the radiology section of CPT®: ordered and reviewed (Ct Head Wo Cont    Result Date: 12/22/2017  Noncontrast CT of the brain. Comparison: None Indication: Difficulty walking, unsteady gait Technique: Contiguous axial images were obtained from the skull base through the vertex without IV contrast. Radiation dose reduction techniques were used for this study:  Our CT scanners use one or all of the following: Automated exposure control, adjustment of the mA and/or kVp according to patient's size, iterative reconstruction. Findings: Numerous large vessels are present with areas of calcification involving the majority of the posterior right parieto-occipital region. Similar large cortical-based vessels with some intraparenchymal extension at the insular region on the left and left temporal lobe region. No midline shift or mass effect. No acute hematoma. No cortical-based infarct.  Visualized portions of the orbits are normal. Paranasal sinuses and mastoids are normal. No acute fracture. Impression: 1. No evidence for acute cortical-based infarct. 2. 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 MRI of the brain with and without contrast recommended.     )      ED Course       Procedures

## 2017-12-22 NOTE — PROGRESS NOTES
TRANSFER - IN REPORT:    Verbal report received from Hillary Ch on [address]  being received from ER(unit) for routine progression of care      Report consisted of patients Situation, Background, Assessment and   Recommendations(SBAR). Information from the following report(s) SBAR, ED Summary and MAR was reviewed with the receiving nurse. Opportunity for questions and clarification was provided. Assessment will be completed upon patients arrival to unit and care assumed.

## 2017-12-22 NOTE — IP AVS SNAPSHOT
[facility] 
 
 
 [facility] [address] [facility] 
[phone] Patient: Libra Malhotra MRN: [id number] About your hospitalization You were admitted on:  December 22, 2017 You last received care in the:  [facility] 6 MED SURG You were discharged on:  December 27, 2017 Why you were hospitalized Your primary diagnosis was:  Unsteady Gait Your diagnoses also included: Morbid Obesity With Bmi Of 40.0-44.9, Adult (Hcc), Moderate Episode Of Recurrent Major Depressive Disorder (Hcc) Things You Need To Do (next 8 weeks) Follow up with [address] Phone:  [phone] Where:  [address] [facility], [address] [address] Thursday Dec 28, 2017 ESTABLISHED PATIENT with Katharyn Sicard, DO at  3:30 PM  
Where:  [address] ([address] [place] [address]) Wednesday Jan 03, 2018 ESTABLISHED PATIENT with Katharyn Sicard, DO at 11:00 AM  
Where:  [address] ([address] [place] [address]) Tuesday Jan 23, 2018 Follow up with Tabitha Orozco MD  
11:30 Phone:  [phone] Where:  [address], [place] [address] Discharge Orders None A check rowena indicates which time of day the medication should be taken. My Medications TAKE these medications as instructed Instructions Each Dose to Equal  
 Morning Noon Evening Bedtime  
 ergocalciferol 50,000 unit capsule Commonly known as:  VITAMIN D2 Your next dose is:  Saturday TAKE 1 CAPSULE ONE TIME WEEKLY- takes on sat  
     
   
   
   
  
 levETIRAcetam 500 mg tablet Commonly known as:  KEPPRA Your next dose is: This evening Take 1 Tab by mouth two (2) times a day. 500 mg  
    
  
   
   
  
   
  
 losartan-hydroCHLOROthiazide 50-12.5 mg per tablet Commonly known as:  HYZAAR Your next dose is:  Tomorrow Morning TAKE 1 TABLET ONE TIME DAILY  
     
  
   
   
   
  
 sertraline 100 mg tablet Commonly known as:  ZOLOFT Your next dose is:  Tomorrow Morning TAKE 1 TABLET EVERY DAY Where to Get Your Medications Information on where to get these meds will be given to you by the nurse or doctor. ! Ask your nurse or doctor about these medications  
  levETIRAcetam 500 mg tablet Discharge Instructions DISCHARGE SUMMARY from Nurse PATIENT INSTRUCTIONS: 
 
 
F-face looks uneven A-arms unable to move or move unevenly S-speech slurred or non-existent T-time-call 911 as soon as signs and symptoms begin-DO NOT go Back to bed or wait to see if you get better-TIME IS BRAIN. Warning Signs of HEART ATTACK Call 911 if you have these symptoms: 
? Chest discomfort. Most heart attacks involve discomfort in the center of the chest that lasts more than a few minutes, or that goes away and comes back. It can feel like uncomfortable pressure, squeezing, fullness, or pain. ? Discomfort in other areas of the upper body. Symptoms can include pain or discomfort in one or both arms, the back, neck, jaw, or stomach. ? Shortness of breath with or without chest discomfort. ? Other signs may include breaking out in a cold sweat, nausea, or lightheadedness. Don't wait more than five minutes to call [address]! Fast action can save your life. Calling 911 is almost always the fastest way to get lifesaving treatment. Emergency Medical Services staff can begin treatment when they arrive  up to an hour sooner than if someone gets to the hospital by car. The discharge information has been reviewed with the patient. The patient verbalized understanding. Discharge medications reviewed with the patient and appropriate educational materials and side effects teaching were provided. ___________________________________________________________________________________________________________________________________ ACO Transitions of Care Introducing [facility] [address] Christen Nelson offers a voluntary care coordination program to provide high quality service and care to [facility] fee-for-service beneficiaries. Librado Walker was designed to help you enhance your health and well-being through the following services: ? Transitions of Care  support for individuals who are transitioning from one care setting to another (example: Hospital to home). ? Chronic and Complex Care Coordination  support for individuals and caregivers of those with serious or chronic illnesses or with more than one chronic (ongoing) condition and those who take a number of different medications. If you meet specific medical criteria, a [facility] [address] may call you directly to coordinate your care with your primary care physician and your other care providers. For questions about the [facility] programs, please, contact your physicians office. For general questions or additional information about Accountable Care Organizations: 
Please visit www.medicare.gov/acos. html or call 1-800-MEDICARE (4-870.225.7676) TTY users should call 3-708.977.8395. WeFi Announcement We are excited to announce that we are making your provider's discharge notes available to you in [employer]. You will see these notes when they are completed and signed by the physician that discharged you from your recent hospital stay.   If you have any questions or concerns about any information you see in Giritech, please call the Health Information Department where you were seen or reach out to your Primary Care Provider for more information about your plan of care. Introducing [facility]! Mikhailimelda Pérez introduces Girite[employer] patient portal. Now you can access parts of your medical record, email your doctor's office, and request medication refills online. 1. In your internet browser, go to [email or web address] 2. Click on the First Time User? Click Here link in the Sign In box. You will see the New Member Sign Up page. 3. Enter your Giritech Access Code exactly as it appears below. You will not need to use this code after youve completed the sign-up process. If you do not sign up before the expiration date, you must request a new code. · Giritech Access Code: [email or web address] Expires: 2/19/2018  4:13 PM 
 
4. Enter the last four digits of your Social Security Number (xxxx) and Date of Birth (mm/dd/yyyy) as indicated and click Submit. You will be taken to the next sign-up page. 5. Create a Giritech ID. This will be your Giritech login ID and cannot be changed, so think of one that is secure and easy to remember. 6. Create a Giritech password. You can change your password at any time. 7. Enter your Password Reset Question and Answer. This can be used at a later time if you forget your password. 8. Enter your e-mail address. You will receive e-mail notification when new information is available in [address] 9. Click Sign Up. You can now view and download portions of your medical record. 10. Click the Download Summary menu link to download a portable copy of your medical information. If you have questions, please visit the Frequently Asked Questions section of the Giritech website. Remember, Giritech is NOT to be used for urgent needs. For medical emergencies, dial 911. Now available from your iPhone and Android! Providers Seen During Your Hospitalization Provider Specialty Primary office phone Luke Castro MD Emergency Medicine [phone] [address] Internal Medicine [phone] Immunizations Administered for This Admission Name Date  
 TB Skin Test (PPD) Intradermal  Deferred (),  Deferred (), 12/22/2017 Your Primary Care Physician (PCP) Primary Care Physician Office Phone Office Fax Horacio De Leon [phone] You are allergic to the following Allergen Reactions Codeine Nausea and Vomiting Oxycodone Nausea and Vomiting Recent Documentation Height Weight BMI OB Status Smoking Status 1.626 m 113.4 kg 42.91 kg/m2 Postmenopausal Former Smoker Emergency Contacts Name Discharge Info Relation Home Work Mobile Arnie Herron DISCHARGE CAREGIVER [3] Spouse [3]   [phone] Patient Belongings The following personal items are in your possession at time of discharge: 
  Dental Appliances: None         Home Medications: None   Jewelry: None  Clothing: None    Other Valuables: None Discharge Instructions Attachments/References FAINTING (ENGLISH) UTI (URINARY TRACT INFECTION): FEMALE (ENGLISH) Patient Handouts Fainting: Care Instructions Your Care Instructions When you faint, or pass out, you lose consciousness for a short time. A brief drop in blood flow to the brain often causes it. When you fall or lie down, more blood flows to your brain and you regain consciousness. Emotional stress, pain, or overheating-especially if you have been standing-can make you faint. In these cases, fainting is usually not serious. But fainting can be a sign of a more serious problem. Your doctor may want you to have more tests to rule out other causes. The treatment you need depends on the reason why you fainted. The doctor has checked you carefully, but problems can develop later.  If you notice any problems or new symptoms, get medical treatment right away. Follow-up care is a key part of your treatment and safety. Be sure to make and go to all appointments, and call your doctor if you are having problems. It's also a good idea to know your test results and keep a list of the medicines you take. How can you care for yourself at home? · Drink plenty of fluids to prevent dehydration. If you have kidney, heart, or liver disease and have to limit fluids, talk with your doctor before you increase your fluid intake. When should you call for help? Call 911 anytime you think you may need emergency care. For example, call if: 
? · You have symptoms of a heart problem. These may include: ¨ Chest pain or pressure. ¨ Severe trouble breathing. ¨ A fast or irregular heartbeat. ¨ Lightheadedness or sudden weakness. ¨ Coughing up pink, foamy mucus. ¨ Passing out. After you call 911, the  may tell you to chew 1 adult-strength or 2 to 4 low-dose aspirin. Wait for an ambulance. Do not try to drive yourself. ? · You have symptoms of a stroke. These may include: 
¨ Sudden numbness, tingling, weakness, or loss of movement in your face, arm, or leg, especially on only one side of your body. ¨ Sudden vision changes. ¨ Sudden trouble speaking. ¨ Sudden confusion or trouble understanding simple statements. ¨ Sudden problems with walking or balance. ¨ A sudden, severe headache that is different from past headaches. ? · You passed out (lost consciousness) again. ? Watch closely for changes in your health, and be sure to contact your doctor if: 
? · You do not get better as expected. Where can you learn more? Go to http://davian-joshua.info/. Enter W867 in the search box to learn more about \"Fainting: Care Instructions. \" Current as of: March 20, 2017 Content Version: 11.4 © 9017-1012 Healthwise, Incorporated.  Care instructions adapted under license by [address] (which disclaims liability or warranty for this information). If you have questions about a medical condition or this instruction, always ask your healthcare professional. [place] [address] any warranty or liability for your use of this information. Urinary Tract Infection in Women: Care Instructions Your Care Instructions A urinary tract infection, or UTI, is a general term for an infection anywhere between the kidneys and the urethra (where urine comes out). Most UTIs are bladder infections. They often cause pain or burning when you urinate. UTIs are caused by bacteria and can be cured with antibiotics. Be sure to complete your treatment so that the infection goes away. Follow-up care is a key part of your treatment and safety. Be sure to make and go to all appointments, and call your doctor if you are having problems. It's also a good idea to know your test results and keep a list of the medicines you take. How can you care for yourself at home? · Take your antibiotics as directed. Do not stop taking them just because you feel better. You need to take the full course of antibiotics. · Drink extra water and other fluids for the next day or two. This may help wash out the bacteria that are causing the infection. (If you have kidney, heart, or liver disease and have to limit fluids, talk with your doctor before you increase your fluid intake.) · Avoid drinks that are carbonated or have caffeine. They can irritate the bladder. · Urinate often. Try to empty your bladder each time. · To relieve pain, take a hot bath or lay a heating pad set on low over your lower belly or genital area. Never go to sleep with a heating pad in place. To prevent UTIs · Drink plenty of water each day. This helps you urinate often, which clears bacteria from your system.  (If you have kidney, heart, or liver disease and have to limit fluids, talk with your doctor before you increase your fluid intake.) · Urinate when you need to. · Urinate right after you have sex. · Change sanitary pads often. · Avoid douches, bubble baths, feminine hygiene sprays, and other feminine hygiene products that have deodorants. · After going to the bathroom, wipe from front to back. When should you call for help? Call your doctor now or seek immediate medical care if: 
? · Symptoms such as fever, chills, nausea, or vomiting get worse or appear for the first time. ? · You have new pain in your back just below your rib cage. This is called flank pain. ? · There is new blood or pus in your urine. ? · You have any problems with your antibiotic medicine. ? Watch closely for changes in your health, and be sure to contact your doctor if: 
? · You are not getting better after taking an antibiotic for 2 days. ? · Your symptoms go away but then come back. Where can you learn more? Go to http://davian-joshua.info/. Enter J060 in the search box to learn more about \"Urinary Tract Infection in Women: Care Instructions. \" Current as of: May 12, 2017 Content Version: 11.4 © 5099-1487 Healthwise, [employer]. Care instructions adapted under license by [employer] (which disclaims liability or warranty for this information). If you have questions about a medical condition or this instruction, always ask your healthcare professional. [place] [address] any warranty or liability for your use of this information. Please provide this summary of care documentation to your next provider. Signatures-by signing, you are acknowledging that this After Visit Summary has been reviewed with you and you have received a copy. Patient Signature:  ____________________________________________________________ Date:  ____________________________________________________________  
  
Joelene Batman Provider Signature:  ____________________________________________________________ Date:  ____________________________________________________________

## 2017-12-22 NOTE — ED TRIAGE NOTES
Pt arrives per EMS with complaints of overall weakness. States she got into her chair and then couldn't get out. States when she stands up she feels like she is going to fall. Just states, \"im out of it, im out of everything\". Pt states she has been having trouble with her balance \"for quite some time\".

## 2017-12-22 NOTE — H&P
HOSPITALIST H&P/CONSULT  NAME:  Sarah Vernon   Age:  79 y.o.  :   1950   MRN:   [id number]  PCP: Wendy Palacios DO  Consulting MD:  Treatment Team: Attending Provider: Daniel Dunbar MD; Primary Nurse: Wilber Elder RN  HPI:     Pt is a 80 yo female with pmh chronic pain, arthritis, heart murmur who presented to the ER this afternoon after possibly losing consciousness while getting out of the recliner. She reports about a month of unsteady gait requiring cane or holding onto furniture. Pt admits at baseline her gait was weak due to her severe arthritis pain in left foot. CT in ER showed venous vs AV malformations with calcifications of right parieto-occipital area. UA c/w UTI. EKG SR.  BP/HR stable.      Complete ROS done and is as stated in HPI or otherwise negative  Past Medical History:   Diagnosis Date    Chronic pain     \"all over body\"-takes Zoloft daily    Chronic sinusitis 2015    Diverticulosis 2017    Edema 2015    Resolved    Former smoker     GERD (gastroesophageal reflux disease)     Headache 2015    Hearing loss 2015    \"some\"- no hearing aids    Heart murmur     Hip osteoarthritis 2015    feet, hands    Hx of colonic polyps     SSA    Hx of migraines     on medication    Hypercholesterolemia 2015    Hypertension     Impaired fasting glucose 2015    Joint disorder 2015    Morbid obesity (HCC)     BMI 40    Neuralgia 2015    Phlebitis and thrombophlebitis of superficial vessels of lower extremities 2015    pt denies     Psychiatric disorder     Restless leg syndrome 2015    Rheumatic fever     pt reports possibly R.F.    Seizures ([address]     seizures as a child, has NOT had one since she was 13years old    SOB (shortness of breath) on exertion     Stroke ([address]     at age 11 only residual poor peripheral vision    Tobacco abuse 2015    quit smoking 2015    Vascular anomalies, congenital  Vitamin D deficiency 1/19/2015      Past Surgical History:   Procedure Laterality Date    COLONOSCOPY N/A 2/20/2017    Bjorn--appendiceal serrated sessile polyp, transverse and rectal hyperplastic--3 year recall    HX APPENDECTOMY  03/15/2017    HX CATARACT REMOVAL Bilateral 3/2016    HX CHOLECYSTECTOMY  1982    HX MOHS PROCEDURES Right 1995    pt denies    HX ROTATOR CUFF REPAIR Right 10/31/2016    x2    HX TUBAL LIGATION  1982    TOTAL HIP ARTHROPLASTY Right 2005    and again 5/2015 and another revision      Prior to Admission Medications   Prescriptions Last Dose Informant Patient Reported?  Taking?   ergocalciferol (VITAMIN D2) 50,000 unit capsule   No No   Sig: TAKE 1 CAPSULE ONE TIME WEEKLY- takes on sat   losartan-hydroCHLOROthiazide (HYZAAR) 50-12.5 mg per tablet   No No   Sig: TAKE 1 TABLET ONE TIME DAILY   sertraline (ZOLOFT) 100 mg tablet   No No   Sig: TAKE 1 TABLET EVERY DAY      Facility-Administered Medications: None     Allergies   Allergen Reactions    Codeine Nausea and Vomiting    Oxycodone Nausea and Vomiting      Social History   Substance Use Topics    Smoking status: Former Smoker     Packs/day: 1.00     Years: 50.00     Types: Cigarettes     Quit date: 3/29/2015    Smokeless tobacco: Never Used      Comment: quit 2 months and 2 weeks ago    Alcohol use No      Comment: Former- socially      Family History   Problem Relation Age of Onset    Hypertension Mother     Cancer Mother      Bladder    Depression Mother     Parkinson's Disease Father     Alcohol abuse Brother     Alcohol abuse Son     No Known Problems Sister     No Known Problems Brother     Malignant Hyperthermia Neg Hx     Pseudocholinesterase Deficiency Neg Hx     Delayed Awakening Neg Hx     Post-op Nausea/Vomiting Neg Hx     Emergence Delirium Neg Hx     Post-op Cognitive Dysfunction Neg Hx     Other Neg Hx       Objective:     Visit Vitals    /72    Pulse 76    Temp 98 °F (36.7 °C)    Resp 18    Ht 5' 4\" (1.626 m)    Wt 113.4 kg (250 lb)    SpO2 93%    BMI 42.91 kg/m2      Temp (24hrs), Av °F (36.7 °C), Min:98 °F (36.7 °C), Max:98 °F (36.7 °C)    Oxygen Therapy  O2 Sat (%): 93 % (17 1224)  O2 Device: Room air (17 1224)    Physical Exam:  General:    Alert, cooperative, no distress, appears stated age. Head:   Normocephalic, without obvious abnormality, atraumatic. Nose:  Nares normal. No drainage or sinus tenderness. Lungs:   Clear to auscultation bilaterally. No Wheezing or Rhonchi. No rales. Heart:   Regular rate and rhythm,  Grade 3 murmur. No rub or gallop. Abdomen:   Soft, obese, non-tender. Not distended. Bowel sounds normal.   Extremities: No cyanosis. No edema. No clubbing  Skin:     Texture, turgor normal. No rashes or lesions. Not Jaundiced  Neurologic: Alert and oriented x 3, no focal deficits     Data Review:   Recent Results (from the past 24 hour(s))   CBC WITH AUTOMATED DIFF    Collection Time: 17 12:35 PM   Result Value Ref Range    WBC 10.0 4.3 - 11.1 K/uL    RBC 4.81 4.05 - 5.25 M/uL    HGB 14.7 11.7 - 15.4 g/dL    HCT 43.3 35.8 - 46.3 %    MCV 90.0 79.6 - 97.8 FL    MCH 30.6 26.1 - 32.9 PG    MCHC 33.9 31.4 - 35.0 g/dL    RDW 13.7 11.9 - 14.6 %    PLATELET 597 866 - 336 K/uL    MPV 9.3 (L) 10.8 - 14.1 FL    DF AUTOMATED      NEUTROPHILS 88 (H) 43 - 78 %    LYMPHOCYTES 6 (L) 13 - 44 %    MONOCYTES 5 4.0 - 12.0 %    EOSINOPHILS 1 0.5 - 7.8 %    BASOPHILS 0 0.0 - 2.0 %    IMMATURE GRANULOCYTES 0 0.0 - 5.0 %    ABS. NEUTROPHILS 8.9 (H) 1.7 - 8.2 K/UL    ABS. LYMPHOCYTES 0.6 0.5 - 4.6 K/UL    ABS. MONOCYTES 0.5 0.1 - 1.3 K/UL    ABS. EOSINOPHILS 0.1 0.0 - 0.8 K/UL    ABS. BASOPHILS 0.0 0.0 - 0.2 K/UL    ABS. IMM.  GRANS. 0.0 0.0 - 0.5 K/UL   METABOLIC PANEL, COMPREHENSIVE    Collection Time: 17 12:35 PM   Result Value Ref Range    Sodium 137 136 - 145 mmol/L    Potassium 4.7 3.5 - 5.1 mmol/L    Chloride 101 98 - 107 mmol/L    CO2 33 (H) 21 - 32 mmol/L    Anion gap 3 (L) 7 - 16 mmol/L    Glucose 149 (H) 65 - 100 mg/dL    BUN 18 8 - 23 MG/DL    Creatinine 1.06 (H) 0.6 - 1.0 MG/DL    GFR est AA >60 >60 ml/min/1.73m2    GFR est non-AA 55 (L) >60 ml/min/1.73m2    Calcium 8.8 8.3 - 10.4 MG/DL    Bilirubin, total 0.5 0.2 - 1.1 MG/DL    ALT (SGPT) 16 12 - 65 U/L    AST (SGOT) 37 15 - 37 U/L    Alk. phosphatase 128 50 - 136 U/L    Protein, total 7.8 6.3 - 8.2 g/dL    Albumin 3.2 3.2 - 4.6 g/dL    Globulin 4.6 (H) 2.3 - 3.5 g/dL    A-G Ratio 0.7 (L) 1.2 - 3.5     EKG, 12 LEAD, INITIAL    Collection Time: 12/22/17 12:37 PM   Result Value Ref Range    Ventricular Rate 64 BPM    Atrial Rate 64 BPM    P-R Interval 170 ms    QRS Duration 90 ms    Q-T Interval 396 ms    QTC Calculation (Bezet) 408 ms    Calculated P Axis 73 degrees    Calculated R Axis 40 degrees    Calculated T Axis 75 degrees    Diagnosis       Normal sinus rhythm with sinus arrhythmia  Normal ECG  When compared with ECG of 08-MAR-2017 13:57,  No significant change was found  Confirmed by Karishma Ray MD (), [place] LUCIO ([id number]) on 12/22/2017 3:06:44 PM     URINE MICROSCOPIC    Collection Time: 12/22/17  2:09 PM   Result Value Ref Range    WBC >100 (H) 0 /hpf    RBC 3-5 0 /hpf    Epithelial cells 3-5 0 /hpf    Bacteria TRACE 0 /hpf    Casts 3-5 0 /lpf   BLOOD GAS, ARTERIAL    Collection Time: 12/22/17  2:10 PM   Result Value Ref Range    pH 7.39 7.35 - 7.45      PCO2 42 35 - 45 mmHg    PO2 68 (L) 80 - 105 mmHg    BICARBONATE 25 22 - 26 mmol/L    BASE DEFICIT 0.2 0 - 2 mmol/L    TOTAL HEMOGLOBIN 14.9 11.7 - 15.0 GM/DL    O2 SAT 94 92 - 98.5 %    Arterial O2 Hgb 92.9 (L) 94 - 97 %    CARBOXYHEMOGLOBIN 0.6 0.5 - 1.5 %    METHEMOGLOBIN 0.2 0.0 - 1.5 %    DEOXYHEMOGLOBIN 6 (H) 0.0 - 5.0 %    SITE LR     ALLENS TEST POSITIVE      MODE RA      Imaging /Procedures /Studies     12/22 CT Head Impression: 1. No evidence for acute cortical-based infarct.  2. 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 MRI of the brain with and without contrast recommended. Assessment and Plan: Active Hospital Problems    Diagnosis Date Noted    Unsteady gait 12/22/2017       A/P:    Unsteady gait with falls at home- Baseline weakness r/t severe arthritis and body habitus. MRI brain due to CT findings of poss AVMs. Consult PT, will likely need continued outpatient or home health PT.     UTI- Follow urine culture and cont Rocephin started in ER.      DC planning- [facility] tomorrow    DVT Prophylaxis:  Lovenox  Code Status: Full  Anticipated discharge: 2-48 hours     Signed By: Karissa Powers NP     December 22, 2017

## 2017-12-23 LAB
ANION GAP SERPL CALC-SCNC: 8 MMOL/L (ref 7–16)
BUN SERPL-MCNC: 14 MG/DL (ref 8–23)
CALCIUM SERPL-MCNC: 8.8 MG/DL (ref 8.3–10.4)
CHLORIDE SERPL-SCNC: 104 MMOL/L (ref 98–107)
CO2 SERPL-SCNC: 31 MMOL/L (ref 21–32)
CREAT SERPL-MCNC: 0.79 MG/DL (ref 0.6–1)
ERYTHROCYTE [DISTWIDTH] IN BLOOD BY AUTOMATED COUNT: 13.5 % (ref 11.9–14.6)
GLUCOSE BLD STRIP.AUTO-MCNC: 103 MG/DL (ref 65–100)
GLUCOSE BLD STRIP.AUTO-MCNC: 110 MG/DL (ref 65–100)
GLUCOSE BLD STRIP.AUTO-MCNC: 133 MG/DL (ref 65–100)
GLUCOSE BLD STRIP.AUTO-MCNC: 159 MG/DL (ref 65–100)
GLUCOSE SERPL-MCNC: 113 MG/DL (ref 65–100)
HCT VFR BLD AUTO: 38.9 % (ref 35.8–46.3)
HGB BLD-MCNC: 12.7 G/DL (ref 11.7–15.4)
MCH RBC QN AUTO: 29.4 PG (ref 26.1–32.9)
MCHC RBC AUTO-ENTMCNC: 32.6 G/DL (ref 31.4–35)
MCV RBC AUTO: 90 FL (ref 79.6–97.8)
MM INDURATION POC: 0 MM (ref 0–5)
PLATELET # BLD AUTO: 147 K/UL (ref 150–450)
PMV BLD AUTO: 9.9 FL (ref 10.8–14.1)
POTASSIUM SERPL-SCNC: 3.1 MMOL/L (ref 3.5–5.1)
PPD POC: NEGATIVE NEGATIVE
RBC # BLD AUTO: 4.32 M/UL (ref 4.05–5.25)
SODIUM SERPL-SCNC: 143 MMOL/L (ref 136–145)
TSH SERPL DL<=0.005 MIU/L-ACNC: 1.59 UIU/ML (ref 0.36–3.74)
VIT B12 SERPL-MCNC: 214 PG/ML (ref 193–986)
WBC # BLD AUTO: 6.6 K/UL (ref 4.3–11.1)

## 2017-12-23 PROCEDURE — 97165 OT EVAL LOW COMPLEX 30 MIN: CPT

## 2017-12-23 PROCEDURE — G8987 SELF CARE CURRENT STATUS: HCPCS

## 2017-12-23 PROCEDURE — 74011250637 HC RX REV CODE- 250/637: Performed by: INTERNAL MEDICINE

## 2017-12-23 PROCEDURE — 74011250636 HC RX REV CODE- 250/636: Performed by: INTERNAL MEDICINE

## 2017-12-23 PROCEDURE — G8989 SELF CARE D/C STATUS: HCPCS

## 2017-12-23 PROCEDURE — 82962 GLUCOSE BLOOD TEST: CPT

## 2017-12-23 PROCEDURE — 82607 VITAMIN B-12: CPT | Performed by: INTERNAL MEDICINE

## 2017-12-23 PROCEDURE — 99218 HC RM OBSERVATION: CPT

## 2017-12-23 PROCEDURE — G8978 MOBILITY CURRENT STATUS: HCPCS

## 2017-12-23 PROCEDURE — 97161 PT EVAL LOW COMPLEX 20 MIN: CPT

## 2017-12-23 PROCEDURE — 74011000250 HC RX REV CODE- 250: Performed by: INTERNAL MEDICINE

## 2017-12-23 PROCEDURE — G8988 SELF CARE GOAL STATUS: HCPCS

## 2017-12-23 PROCEDURE — 96376 TX/PRO/DX INJ SAME DRUG ADON: CPT

## 2017-12-23 PROCEDURE — 85027 COMPLETE CBC AUTOMATED: CPT | Performed by: NURSE PRACTITIONER

## 2017-12-23 PROCEDURE — 86592 SYPHILIS TEST NON-TREP QUAL: CPT | Performed by: INTERNAL MEDICINE

## 2017-12-23 PROCEDURE — 84443 ASSAY THYROID STIM HORMONE: CPT | Performed by: INTERNAL MEDICINE

## 2017-12-23 PROCEDURE — 74011250637 HC RX REV CODE- 250/637: Performed by: NURSE PRACTITIONER

## 2017-12-23 PROCEDURE — G8979 MOBILITY GOAL STATUS: HCPCS

## 2017-12-23 PROCEDURE — 80048 BASIC METABOLIC PNL TOTAL CA: CPT | Performed by: NURSE PRACTITIONER

## 2017-12-23 PROCEDURE — 96372 THER/PROPH/DIAG INJ SC/IM: CPT

## 2017-12-23 PROCEDURE — 36415 COLL VENOUS BLD VENIPUNCTURE: CPT | Performed by: NURSE PRACTITIONER

## 2017-12-23 RX ORDER — MAG HYDROX/ALUMINUM HYD/SIMETH 200-200-20
30 SUSPENSION, ORAL (FINAL DOSE FORM) ORAL
Status: DISCONTINUED | OUTPATIENT
Start: 2017-12-23 | End: 2017-12-27 | Stop reason: HOSPADM

## 2017-12-23 RX ORDER — POTASSIUM CHLORIDE 20 MEQ/1
40 TABLET, EXTENDED RELEASE ORAL DAILY
Status: DISCONTINUED | OUTPATIENT
Start: 2017-12-23 | End: 2017-12-27 | Stop reason: HOSPADM

## 2017-12-23 RX ADMIN — HYDROCHLOROTHIAZIDE: 12.5 CAPSULE ORAL at 08:13

## 2017-12-23 RX ADMIN — CEFTRIAXONE 1 G: 1 INJECTION, POWDER, FOR SOLUTION INTRAMUSCULAR; INTRAVENOUS at 13:34

## 2017-12-23 RX ADMIN — Medication 10 ML: at 13:34

## 2017-12-23 RX ADMIN — SERTRALINE HYDROCHLORIDE 100 MG: 100 TABLET ORAL at 22:14

## 2017-12-23 RX ADMIN — ENOXAPARIN SODIUM 40 MG: 40 INJECTION SUBCUTANEOUS at 22:14

## 2017-12-23 RX ADMIN — ACETAMINOPHEN 650 MG: 325 TABLET, FILM COATED ORAL at 23:16

## 2017-12-23 RX ADMIN — Medication 10 ML: at 05:28

## 2017-12-23 RX ADMIN — Medication 10 ML: at 22:17

## 2017-12-23 RX ADMIN — ALUMINUM HYDROXIDE, MAGNESIUM HYDROXIDE, AND SIMETHICONE 30 ML: 200; 200; 20 SUSPENSION ORAL at 23:58

## 2017-12-23 RX ADMIN — POTASSIUM CHLORIDE 40 MEQ: 20 TABLET, EXTENDED RELEASE ORAL at 08:13

## 2017-12-23 NOTE — PROGRESS NOTES
Hospitalist Progress Note     Admit Date:  2017  1:16 PM   Name:  Sharon Macedo   Age:  79 y.o.  :  1950   MRN:  [id number]   PCP:  Kary Davis DO  Treatment Team: Attending Provider: Maribel Cardenas DO    Subjective:      is a 80 yo female with pmh chronic pain, arthritis, heart murmur who presented to the ER after a syncopal episode (LOC for about 1 minute). Prior to the fall, she reported dizziness and walked to the bathroom and came back. She denied any bowel or bladder incontinence. Her  was called today. He also reported that she leans to one side (present for the past 2 years). This has lead to increased falls at home. This morning, she denies the following: chest pain, or dyspnea. Of note, she does report dysuria.     Objective:   Patient Vitals for the past 24 hrs:   Temp Pulse Resp BP SpO2   17 0711 97.7 °F (36.5 °C) 75 20 163/66 92 %   17 0428 98 °F (36.7 °C) 69 16 159/63 92 %   17 2247 97.8 °F (36.6 °C) 67 16 149/56 91 %   17 1911 97.9 °F (36.6 °C) 68 19 156/76 94 %   17 1752 97.6 °F (36.4 °C) 66 20 153/82 94 %   17 1224 98 °F (36.7 °C) 76 18 135/72 93 %     Oxygen Therapy  O2 Sat (%): 92 % (17 0711)  O2 Device: Room air (17 1224)  No intake or output data in the 24 hours ending 17 0732        General appearance: NAD, conversant  Eyes: anicteric sclerae, moist conjunctivae; no lid-lag  ENT: Atraumatic; oropharynx clear with moist mucous membranes and no mucosal ulcerations; normal hard and soft palate  Neck: Trachea midline   FROM, supple, no thyromegaly or lymphadenopathy  Lungs: CTA, with normal respiratory effort and no intercostal retractions  CV: S1,S2 present, no added murmurs  Abdomen: Soft, non-tender; no masses   Extremities: No peripheral edema or extremity lymphadenopathy  Skin: Normal temperature, turgor and texture; no subcutaneous nodules  Psych: Appropriate affect, alert and oriented to person, place and time    Data Review:  I have reviewed all labs, meds, telemetry events, and studies from the last 24 hours. Recent Results (from the past 24 hour(s))   CBC WITH AUTOMATED DIFF    Collection Time: 12/22/17 12:35 PM   Result Value Ref Range    WBC 10.0 4.3 - 11.1 K/uL    RBC 4.81 4.05 - 5.25 M/uL    HGB 14.7 11.7 - 15.4 g/dL    HCT 43.3 35.8 - 46.3 %    MCV 90.0 79.6 - 97.8 FL    MCH 30.6 26.1 - 32.9 PG    MCHC 33.9 31.4 - 35.0 g/dL    RDW 13.7 11.9 - 14.6 %    PLATELET 194 398 - 036 K/uL    MPV 9.3 (L) 10.8 - 14.1 FL    DF AUTOMATED      NEUTROPHILS 88 (H) 43 - 78 %    LYMPHOCYTES 6 (L) 13 - 44 %    MONOCYTES 5 4.0 - 12.0 %    EOSINOPHILS 1 0.5 - 7.8 %    BASOPHILS 0 0.0 - 2.0 %    IMMATURE GRANULOCYTES 0 0.0 - 5.0 %    ABS. NEUTROPHILS 8.9 (H) 1.7 - 8.2 K/UL    ABS. LYMPHOCYTES 0.6 0.5 - 4.6 K/UL    ABS. MONOCYTES 0.5 0.1 - 1.3 K/UL    ABS. EOSINOPHILS 0.1 0.0 - 0.8 K/UL    ABS. BASOPHILS 0.0 0.0 - 0.2 K/UL    ABS. IMM. GRANS. 0.0 0.0 - 0.5 K/UL   METABOLIC PANEL, COMPREHENSIVE    Collection Time: 12/22/17 12:35 PM   Result Value Ref Range    Sodium 137 136 - 145 mmol/L    Potassium 4.7 3.5 - 5.1 mmol/L    Chloride 101 98 - 107 mmol/L    CO2 33 (H) 21 - 32 mmol/L    Anion gap 3 (L) 7 - 16 mmol/L    Glucose 149 (H) 65 - 100 mg/dL    BUN 18 8 - 23 MG/DL    Creatinine 1.06 (H) 0.6 - 1.0 MG/DL    GFR est AA >60 >60 ml/min/1.73m2    GFR est non-AA 55 (L) >60 ml/min/1.73m2    Calcium 8.8 8.3 - 10.4 MG/DL    Bilirubin, total 0.5 0.2 - 1.1 MG/DL    ALT (SGPT) 16 12 - 65 U/L    AST (SGOT) 37 15 - 37 U/L    Alk.  phosphatase 128 50 - 136 U/L    Protein, total 7.8 6.3 - 8.2 g/dL    Albumin 3.2 3.2 - 4.6 g/dL    Globulin 4.6 (H) 2.3 - 3.5 g/dL    A-G Ratio 0.7 (L) 1.2 - 3.5     EKG, 12 LEAD, INITIAL    Collection Time: 12/22/17 12:37 PM   Result Value Ref Range    Ventricular Rate 64 BPM    Atrial Rate 64 BPM    P-R Interval 170 ms    QRS Duration 90 ms    Q-T Interval 396 ms    QTC Calculation (Bezet) 408 ms    Calculated P Axis 73 degrees    Calculated R Axis 40 degrees    Calculated T Axis 75 degrees    Diagnosis       Normal sinus rhythm with sinus arrhythmia  Normal ECG  When compared with ECG of 08-MAR-2017 13:57,  No significant change was found  Confirmed by Ronan Herrera MD (), [place] ([id number]) on 12/22/2017 3:06:44 PM     URINE MICROSCOPIC    Collection Time: 12/22/17  2:09 PM   Result Value Ref Range    WBC >100 (H) 0 /hpf    RBC 3-5 0 /hpf    Epithelial cells 3-5 0 /hpf    Bacteria TRACE 0 /hpf    Casts 3-5 0 /lpf   BLOOD GAS, ARTERIAL    Collection Time: 12/22/17  2:10 PM   Result Value Ref Range    pH 7.39 7.35 - 7.45      PCO2 42 35 - 45 mmHg    PO2 68 (L) 80 - 105 mmHg    BICARBONATE 25 22 - 26 mmol/L    BASE DEFICIT 0.2 0 - 2 mmol/L    TOTAL HEMOGLOBIN 14.9 11.7 - 15.0 GM/DL    O2 SAT 94 92 - 98.5 %    Arterial O2 Hgb 92.9 (L) 94 - 97 %    CARBOXYHEMOGLOBIN 0.6 0.5 - 1.5 %    METHEMOGLOBIN 0.2 0.0 - 1.5 %    DEOXYHEMOGLOBIN 6 (H) 0.0 - 5.0 %    SITE LR     ALLENS TEST POSITIVE      MODE RA    GLUCOSE, POC    Collection Time: 12/22/17  8:13 PM   Result Value Ref Range    Glucose (POC) 165 (H) 65 - 007 mg/dL   METABOLIC PANEL, BASIC    Collection Time: 12/23/17  3:35 AM   Result Value Ref Range    Sodium 143 136 - 145 mmol/L    Potassium 3.1 (L) 3.5 - 5.1 mmol/L    Chloride 104 98 - 107 mmol/L    CO2 31 21 - 32 mmol/L    Anion gap 8 7 - 16 mmol/L    Glucose 113 (H) 65 - 100 mg/dL    BUN 14 8 - 23 MG/DL    Creatinine 0.79 0.6 - 1.0 MG/DL    GFR est AA >60 >60 ml/min/1.73m2    GFR est non-AA >60 >60 ml/min/1.73m2    Calcium 8.8 8.3 - 10.4 MG/DL   CBC W/O DIFF    Collection Time: 12/23/17  3:35 AM   Result Value Ref Range    WBC 6.6 4.3 - 11.1 K/uL    RBC 4.32 4.05 - 5.25 M/uL    HGB 12.7 11.7 - 15.4 g/dL    HCT 38.9 35.8 - 46.3 %    MCV 90.0 79.6 - 97.8 FL    MCH 29.4 26.1 - 32.9 PG    MCHC 32.6 31.4 - 35.0 g/dL    RDW 13.5 11.9 - 14.6 %    PLATELET 424 (L) 606 - 450 K/uL    MPV 9.9 (L) 10.8 - 14.1 FL GLUCOSE, POC    Collection Time: 12/23/17  7:09 AM   Result Value Ref Range    Glucose (POC) 110 (H) 65 - 100 mg/dL        All Micro Results     Procedure Component Value Units Date/Time    CULTURE, URINE [[id number]] Collected:  12/22/17 1515    Order Status:  Completed Specimen:  Urine from Clean catch Updated:  12/22/17 1806          Current Meds:  Current Facility-Administered Medications   Medication Dose Route Frequency    sertraline (ZOLOFT) tablet 100 mg  100 mg Oral DAILY    sodium chloride (NS) flush 5-10 mL  5-10 mL IntraVENous Q8H    sodium chloride (NS) flush 5-10 mL  5-10 mL IntraVENous PRN    acetaminophen (TYLENOL) tablet 650 mg  650 mg Oral Q4H PRN    ondansetron (ZOFRAN) injection 4 mg  4 mg IntraVENous Q4H PRN    enoxaparin (LOVENOX) injection 40 mg  40 mg SubCUTAneous Q24H    cefTRIAXone (ROCEPHIN) 1 g in sterile water (preservative free) 10 mL IV syringe  1 g IntraVENous Q24H    tuberculin injection 5 Units  5 Units IntraDERMal ONCE    losartan/hydroCHLOROthiazide (HYZAAR) 50/12.5 mg   Oral DAILY       Other Studies (last 24 hours):  Mri Brain W Wo Cont    Result Date: 12/22/2017  MRI BRAIN WITHOUT and WITH CONTRAST. HISTORY:  Unsteady gait. Abnormal CT scan. COMPARISON:  None. Study performed within 24 hours of admission. TECHNIQUE:  Sagittal T1, axial T1, T2, FLAIR, gradient echo, diffusion with ADC map were followed by 20cc intravenous gadolinium after which axial and coronal T1 images were repeated. Intravenous contrast was given to increase specificity of T2 abnormalities. FINDINGS: There is a large complex tangle of vessels centered in the right occipital and posterior parietal lobe. These vessels measure up to 13 mm in diameter. It crosses the midline at the posterior periventricular tissues and extends into the left temporal and frontal lobes as well. There is a large cortical vessel at the left frontal lobe which measures 17 mm in diameter.  Small vessels also extend through the incisura into both cerebellar hemispheres. There are prominent draining veins in the temporal scalp and extensive large draining veins the skull base connecting with the jugular veins. Diffusion images do not demonstrate any areas of restricted diffusion to suggest acute infarction. The lateral ventricles are normal size. The pituitary and parasellar structures are unremarkable on the sagittal T1 images. Orbits are grossly normal.  Paranasal sinuses are clear. 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 It crosses the midline and involves left temporal and frontal lobes and both cerebellar hemispheres. There are superficial temporal scalp draining veins and large veins at the skull base. Ct Head Wo Cont    Result Date: 12/22/2017  Noncontrast CT of the brain. Comparison: None Indication: Difficulty walking, unsteady gait Technique: Contiguous axial images were obtained from the skull base through the vertex without IV contrast. Radiation dose reduction techniques were used for this study:  Our CT scanners use one or all of the following: Automated exposure control, adjustment of the mA and/or kVp according to patient's size, iterative reconstruction. Findings: Numerous large vessels are present with areas of calcification involving the majority of the posterior right parieto-occipital region. Similar large cortical-based vessels with some intraparenchymal extension at the insular region on the left and left temporal lobe region. No midline shift or mass effect. No acute hematoma. No cortical-based infarct. Visualized portions of the orbits are normal. Paranasal sinuses and mastoids are normal. No acute fracture. Impression: 1. No evidence for acute cortical-based infarct. 2. 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  MRI of the brain with and without contrast recommended. Review of Systems:  Gen: No weight loss  Eyes: no vision changes  ENT: no sore throat  Resp: No sob or cough  CV: No cp  Abd:  no abd pain, no vomiting or diarrhea  : No incontinence, no hematuria or dysuria, no flank pain  MSK: no leg swelling  Neuro: +weakness, falls    Assessment and Plan:     Hospital Problems as of 12/23/2017  Date Reviewed: 6/21/2017          Codes Class Noted - Resolved POA    * (Principal)Unsteady gait ICD-10-CM: R26.81  ICD-9-CM: 781.2  12/22/2017 - Present Unknown        Moderate episode of recurrent major depressive disorder ([facility] ICD-10-CM: F33.1  ICD-9-CM: 296.32  5/5/2015 - Present Yes        Morbid obesity with BMI of 40.0-44.9, adult ([facility] ICD-10-CM: E66.01, Z68.41  ICD-9-CM: 278.01, V85.41  1/19/2015 - Present Yes              PLAN:    Syncope  Unknown cause, cannot yet rule neurocardiogenic syncope  Most likely secondary to dehydration in the setting of UTI (+dysuria)  MRI Brain: 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 It crosses the midline and involves left temporal and frontal lobes and both cerebellar hemispheres. There are superficial temporal scalp  draining veins and large veins at the skull base.   Dr. Manoj Vides called who recommended outpatient follow up for management of AVM given current symptoms  Plan  Continue ceftriaxone, urine culture pending   Continue IV fluids  Orthostatic vitals  RPR, B12,TSH  ECHO Pending  If negative, will obtain EEG and consult Tele Neurology  PT/OT consulted     Hypokalemia: KCL ordered    DC planning/Dispo:  PT/OT eval pending  DVT ppx:  enoxaparin    Signed:  Hussain Turner MD

## 2017-12-23 NOTE — PROGRESS NOTES
Problem: Self Care Deficits Care Plan (Adult)  Goal: *Acute Goals and Plan of Care (Insert Text)  1. Patient will complete lower body bathing and dressing with modified independence and adaptive equipment as needed. 2. Patient will complete toileting with modified independence. 3. Patient will tolerate 23 minutes of OT treatment with less than 3 rest breaks to increase activity tolerance for ADLs. 4. Patient will complete functional transfers with modified independence and adaptive equipment as needed. Timeframe: 7 visits     Comments:     OCCUPATIONAL THERAPY: Initial Assessment and PM 12/23/2017  OBSERVATION: Hospital Day: 2  [place] / [place] / Product Type: Medicare /      NAME/AGE/GENDER: All Gaitan is a 79 y.o. female   PRIMARY DIAGNOSIS:  Unsteady gait Unsteady gait Unsteady gait        ICD-10: Treatment Diagnosis:    · Generalized Muscle Weakness (M62.81)  · Other lack of cordination (R27.8)  · History of falling (Z91.81)   Precautions/Allergies:     Codeine and Oxycodone      ASSESSMENT:     Ms. Bonita Brambila presents to hospital for above. 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 It crosses the midline and involves left temporal and frontal  lobes and both cerebellar hemispheres. There are superficial temporal scalp draining veins and large veins at the skull base. \" Pt lives in a one-level home with her spouse and is typically independent to mod I with ADLs/IADLs. No longer driving. Pt uses a cane PRN for functional mobility. Spouse suggests pt has had ~3 falls in 6 months. He reports functional decline x 3-4 weeks. Today, she is supine in bed upon arrival, AOX4, and agreeable to OT evaluation. Pt completes bed mobility with supervision, STS with CGA, and transfers to toilet with CGA. She requires min A for STS from low toilet seat. Her balance is intact in sitting and fair in standing.  Per BUE screen, AROM is impaired in B shoulders but functional elsewhere; strength is generally decreased. Per visual screen, pt requires cues with tracking and presents with decreased speed during saccades task. Of note: pt reports that my face appears droopy to her. Pt is functioning slightly below her baseline and will require continued skilled OT services to maximize safety and independence with ADLs. Will follow during acute stay. Ms. Marjorie Dale was discharged from our facility before further treatment could be provided in this setting. This section established at most recent assessment   PROBLEM LIST (Impairments causing functional limitations):  1. Decreased Strength  2. Decreased ADL/Functional Activities  3. Decreased Transfer Abilities  4. Decreased Ambulation Ability/Technique  5. Decreased Balance  6. Decreased Activity Tolerance  7. Decreased Work Simplification/Energy Conservation Techniques  8. Decreased Flexibility/Joint Mobility  9. Decreased [place] with Home Exercise Program  10. Decreased Cognition   INTERVENTIONS PLANNED: (Benefits and precautions of occupational therapy have been discussed with the patient.)  1. Activities of daily living training  2. Adaptive equipment training  3. Balance training  4. Clothing management  5. Cognitive training  6. Donning&doffing training  7. Group therapy  8. Therapeutic activity  9. Therapeutic exercise  10. Safety education      TREATMENT PLAN: Frequency/Duration: Follow patient 2-3x/week to address above goals. Rehabilitation Potential For Stated Goals: Good     RECOMMENDED REHABILITATION/EQUIPMENT: (at time of discharge pending progress): Due to the probability of continued deficits (see above) this patient will likely need continued skilled occupational therapy after discharge.   Equipment:   MedRunner, Type: Tub Seat/Chair              OCCUPATIONAL PROFILE AND HISTORY:   History of Present Injury/Illness (Reason for Referral):  See H&P  Past Medical History/Comorbidities:   Ms. Guru Smith  has a past medical history of Chronic pain; Chronic sinusitis (1/19/2015); Diverticulosis (2017); Edema (1/19/2015); Former smoker; GERD (gastroesophageal reflux disease); Headache (1/19/2015); Hearing loss (1/19/2015); Heart murmur (1990); Hip osteoarthritis (1/19/2015); colonic polyps (2017); migraines; Hypercholesterolemia (1/19/2015); Hypertension; Impaired fasting glucose (1/19/2015); Joint disorder (1/19/2015); Morbid obesity ([address] Neuralgia (1/19/2015); Phlebitis and thrombophlebitis of superficial vessels of lower extremities (01/19/2015); Psychiatric disorder; Restless leg syndrome (1/19/2015); Rheumatic fever; Seizures ([address] SOB (shortness of breath) on exertion; Stroke ([address] Tobacco abuse (1/19/2015); Vascular anomalies, congenital; and Vitamin D deficiency (1/19/2015). She also has no past medical history of Adverse effect of anesthesia; Aneurysm ([address] Arrhythmia; Asthma; Autoimmune disease ([address] CAD (coronary artery disease); Cancer ([address] Chronic kidney disease; Chronic obstructive pulmonary disease ([address] Coagulation disorder ([address] Diabetes ([address] Difficult intubation; Endocarditis; Heart failure ([address] Liver disease; Malignant hyperthermia due to anesthesia; Nausea & vomiting; Pseudocholinesterase deficiency; PUD (peptic ulcer disease); Sleep apnea; Thromboembolus ([address] or Thyroid disease. Ms. Guru Smith  has a past surgical history that includes hx mohs procedure (Right, 1995); hx cholecystectomy (1982); colonoscopy (N/A, 2/20/2017); pr total hip arthroplasty (Right, 2005); hx rotator cuff repair (Right, 10/31/2016); hx cataract removal (Bilateral, 3/2016); hx tubal ligation (1982); and hx appendectomy (03/15/2017).   Social History/Living Environment:   Home Environment: Private residence  # Steps to Enter: 1  One/Two Story Residence: One story  Living Alone: No  Support Systems: Spouse/Significant Other/Partner  Patient Expects to be Discharged to[employer] residence  Current DME Used/Available at Home: Lea Sneed, straight, Walker, rolling  Tub or Shower Type: Tub/Shower combination  Prior Level of Function/Work/Activity:  [place] to mod I with ADLs  Personal Factors:          Sex:  female        Age:  79 y.o. Number of Personal Factors/Comorbidities that affect the Plan of Care: Extensive review of physical, cognitive, and psychosocial performance (3+):  HIGH COMPLEXITY   ASSESSMENT OF OCCUPATIONAL PERFORMANCE[de-identified]   Activities of Daily Living:           Basic ADLs (From Assessment) Complex ADLs (From Assessment)   Basic ADL  Feeding: Independent  Oral Facial Hygiene/Grooming: Independent  Bathing: Contact guard assistance  Upper Body Dressing: Independent  Lower Body Dressing: Contact guard assistance  Toileting: Contact guard assistance Instrumental ADL  Meal Preparation: Minimum assistance  Homemaking: Moderate assistance  Medication Management: Modified independent  Financial Management: Modified independent   Grooming/Bathing/Dressing Activities of Daily Living     Cognitive Retraining  Safety/Judgement: Awareness of environment; Fall prevention;Home safety                 Functional Transfers  Toilet Transfer : Contact guard assistance     Bed/Mat Mobility  Rolling: Supervision  Supine to Sit: Supervision  Sit to Supine: Supervision  Sit to Stand: Contact guard assistance  Scooting: Supervision       Most Recent Physical Functioning:   Gross Assessment:  AROM: Generally decreased, functional  PROM: Generally decreased, functional  Strength: Generally decreased, functional  Coordination: Within functional limits  Tone: Normal  Sensation: Intact               Posture:     Balance:  Sitting: Intact  Standing: Impaired  Standing - Static: Fair  Standing - Dynamic : Fair Bed Mobility:  Rolling: Supervision  Supine to Sit: Supervision  Sit to Supine: Supervision  Scooting: Supervision  Wheelchair Mobility:     Transfers:  Sit to Stand: Contact guard assistance  Stand to Sit: Contact guard assistance                Patient Vitals for the past 6 hrs:   BP BP Patient Position SpO2 Pulse   17 1109 137/75 At rest 94 % 68       Mental Status  Neurologic State: Alert  Orientation Level: Oriented X4  Cognition: Follows commands  Perception: Appears intact  Perseveration: No perseveration noted  Safety/Judgement: Awareness of environment, Fall prevention, Home safety                          Physical Skills Involved:  1. Range of Motion  2. Balance  3. Strength  4. Activity Tolerance  5. Vision Cognitive Skills Affected (resulting in the inability to perform in a timely and safe manner):  1. Executive Function  2. Immediate Memory  3. Expression Psychosocial Skills Affected:  1. Habits/Routines  2. Environmental Adaptation  3. Self-Awareness   Number of elements that affect the Plan of Care: 5+:  HIGH COMPLEXITY   CLINICAL DECISION MAKIN[address] [place] [address] AM-PAC 6 Clicks   Daily Activity Inpatient Short Form  How much help from another person does the patient currently need. .. Total A Lot A Little None   1. Putting on and taking off regular lower body clothing? [] 1   [] 2   [x] 3   [] 4   2. Bathing (including washing, rinsing, drying)? [] 1   [] 2   [x] 3   [] 4   3. Toileting, which includes using toilet, bedpan or urinal?   [] 1   [] 2   [x] 3   [] 4   4. Putting on and taking off regular upper body clothing? [] 1   [] 2   [] 3   [x] 4   5. Taking care of personal grooming such as brushing teeth? [] 1   [] 2   [] 3   [x] 4   6. Eating meals? [] 1   [] 2   [] 3   [x] 4   © , Trustees of [address] [place] [id number], under license to [employer]. All rights reserved      Score:  Initial: 21 Most Recent: X (Date: -- )    Interpretation of Tool:  Represents activities that are increasingly more difficult (i.e. Bed mobility, Transfers, Gait). Score 24 23 22-20 19-15 14-10 9-7 6     Modifier CH CI CJ CK CL CM CN      ?  Self Care:     - CURRENT STATUS: CJ - 20%-39% impaired, limited or restricted    - GOAL STATUS: CI - 1%-19% impaired, limited or restricted    - D/C STATUS:  CJ - 20%-39% impaired, limited or restricted  [place] / [place] / Product Type: Medicare /      Medical Necessity:     · Patient is expected to demonstrate progress in strength, balance, coordination and functional technique to improve safety during ADLs. Reason for Services/Other Comments:  · Patient continues to require skilled intervention due to medical complications. Use of outcome tool(s) and clinical judgement create a POC that gives a: LOW COMPLEXITY         TREATMENT:   (In addition to Assessment/Re-Assessment sessions the following treatments were rendered)     Pre-treatment Symptoms/Complaints:    Pain: Initial:   Pain Intensity 1: 0  Post Session:  same     Assessment/Reassessment only, no treatment provided today    Braces/Orthotics/Lines/Etc:   · O2 Device: Room air  Treatment/Session Assessment:    · Response to Treatment:  eval only   · Interdisciplinary Collaboration:   o Occupational Therapist  o Registered Nurse  · After treatment position/precautions:   o Supine in bed  o Bed alarm/tab alert on  o Bed/Chair-wheels locked  o Bed in low position  o Caregiver at bedside  o Call light within reach   · Compliance with Program/Exercises: compliant all of the time. · Recommendations/Intent for next treatment session: \"Next visit will focus on advancements to more challenging activities and reduction in assistance provided\".   Total Treatment Duration:  OT Patient Time In/Time Out  Time In: 1400  Time Out: [address]

## 2017-12-23 NOTE — PROGRESS NOTES
Problem: Falls - Risk of  Goal: *Absence of Falls  Document Ronnie Fall Risk and appropriate interventions in the flowsheet.    Outcome: Progressing Towards Goal  Fall Risk Interventions:  Mobility Interventions: Bed/chair exit alarm, OT consult for ADLs, PT Consult for mobility concerns, Communicate number of staff needed for ambulation/transfer         Medication Interventions: Patient to call before getting OOB, Teach patient to arise slowly    Elimination Interventions: Call light in reach, Patient to call for help with toileting needs, Toileting schedule/hourly rounds    History of Falls Interventions: Door open when patient unattended, Room close to nurse's station

## 2017-12-23 NOTE — PROGRESS NOTES
Pt rounded on hourly this shift, c/o headache at beginning of shift. Medicated per MAR. No other complaints.

## 2017-12-23 NOTE — PROGRESS NOTES
CM Specialist Isai Benítez met with patient and discussed admission status. Medicare Outpatient Observation Notice provided to the patient. Oral explanation was provided and all questions answered. Signed document placed in the medical chart. Copy provided to patient.

## 2017-12-23 NOTE — PROGRESS NOTES
Pt arrived unit via stretcher. Dual skin assessment performed on pt with Yoly Jorgensen RN. No skin breakdown noted. Pt has old scar from previous right hip replacement. Pt denies pain at this time. Hourly rounds completed. Pt resting in bed; denies needs at this time. Will continue to monitor and report to oncoming night shift nurse.

## 2017-12-24 ENCOUNTER — HOME HEALTH ADMISSION (OUTPATIENT)
Dept: HOME HEALTH SERVICES | Facility: HOME HEALTH | Age: 67
End: 2017-12-24
Payer: MEDICARE

## 2017-12-24 LAB
ANION GAP SERPL CALC-SCNC: 9 MMOL/L (ref 7–16)
BUN SERPL-MCNC: 17 MG/DL (ref 8–23)
CALCIUM SERPL-MCNC: 8.6 MG/DL (ref 8.3–10.4)
CHLORIDE SERPL-SCNC: 105 MMOL/L (ref 98–107)
CO2 SERPL-SCNC: 30 MMOL/L (ref 21–32)
CREAT SERPL-MCNC: 0.83 MG/DL (ref 0.6–1)
GLUCOSE BLD STRIP.AUTO-MCNC: 118 MG/DL (ref 65–100)
GLUCOSE BLD STRIP.AUTO-MCNC: 118 MG/DL (ref 65–100)
GLUCOSE BLD STRIP.AUTO-MCNC: 125 MG/DL (ref 65–100)
GLUCOSE SERPL-MCNC: 110 MG/DL (ref 65–100)
MM INDURATION POC: NORMAL 0MM (ref 0–5)
POTASSIUM SERPL-SCNC: 3.7 MMOL/L (ref 3.5–5.1)
PPD POC: NORMAL NEGATIVE
SODIUM SERPL-SCNC: 144 MMOL/L (ref 136–145)

## 2017-12-24 PROCEDURE — 74011250637 HC RX REV CODE- 250/637: Performed by: INTERNAL MEDICINE

## 2017-12-24 PROCEDURE — 74011250636 HC RX REV CODE- 250/636: Performed by: INTERNAL MEDICINE

## 2017-12-24 PROCEDURE — 96376 TX/PRO/DX INJ SAME DRUG ADON: CPT

## 2017-12-24 PROCEDURE — 82962 GLUCOSE BLOOD TEST: CPT

## 2017-12-24 PROCEDURE — 74011000250 HC RX REV CODE- 250: Performed by: INTERNAL MEDICINE

## 2017-12-24 PROCEDURE — 99218 HC RM OBSERVATION: CPT

## 2017-12-24 PROCEDURE — 96372 THER/PROPH/DIAG INJ SC/IM: CPT

## 2017-12-24 PROCEDURE — 36415 COLL VENOUS BLD VENIPUNCTURE: CPT | Performed by: INTERNAL MEDICINE

## 2017-12-24 PROCEDURE — 80048 BASIC METABOLIC PNL TOTAL CA: CPT | Performed by: INTERNAL MEDICINE

## 2017-12-24 PROCEDURE — 74011250637 HC RX REV CODE- 250/637: Performed by: NURSE PRACTITIONER

## 2017-12-24 RX ORDER — HYDROCODONE BITARTRATE AND ACETAMINOPHEN 5; 325 MG/1; MG/1
2 TABLET ORAL
Qty: 2 TAB | Refills: 0 | Status: SHIPPED | OUTPATIENT
Start: 2017-12-24 | End: 2017-12-24

## 2017-12-24 RX ADMIN — POTASSIUM CHLORIDE 40 MEQ: 20 TABLET, EXTENDED RELEASE ORAL at 08:55

## 2017-12-24 RX ADMIN — Medication 10 ML: at 05:11

## 2017-12-24 RX ADMIN — ENOXAPARIN SODIUM 40 MG: 40 INJECTION SUBCUTANEOUS at 19:53

## 2017-12-24 RX ADMIN — HYDROCHLOROTHIAZIDE: 12.5 CAPSULE ORAL at 08:55

## 2017-12-24 RX ADMIN — CEFTRIAXONE 1 G: 1 INJECTION, POWDER, FOR SOLUTION INTRAMUSCULAR; INTRAVENOUS at 13:14

## 2017-12-24 RX ADMIN — ACETAMINOPHEN 650 MG: 325 TABLET, FILM COATED ORAL at 14:47

## 2017-12-24 RX ADMIN — Medication 10 ML: at 13:16

## 2017-12-24 RX ADMIN — SERTRALINE HYDROCHLORIDE 100 MG: 100 TABLET ORAL at 22:18

## 2017-12-24 RX ADMIN — ACETAMINOPHEN 650 MG: 325 TABLET, FILM COATED ORAL at 19:52

## 2017-12-24 RX ADMIN — Medication 10 ML: at 22:18

## 2017-12-24 NOTE — PROGRESS NOTES
Problem: Mobility Impaired (Adult and Pediatric)  Goal: *Therapy Goal (Edit Goal, Insert Text)  STG:  (1.)Ms. Herron will move from supine to sit and sit to supine , scoot up and down and roll side to side with MODIFIED INDEPENDENCE within 4 day(s). (2.)Ms. Herron will transfer from bed to chair and chair to bed with MODIFIED INDEPENDENCE using the least restrictive device within 4 day(s). (3.)Ms. Herron will ambulate with MODIFIED INDEPENDENCE for 100 feet with the least restrictive device within 4 day(s). LTG:  (1.)Ms. Herron will move from supine to sit and sit to supine , scoot up and down and roll side to side in bed with INDEPENDENT within 7 day(s). (2.)Ms. Herron will transfer from bed to chair and chair to bed with INDEPENDENT using the least restrictive device within 7 day(s). (3.)Ms. Herron will ambulate with INDEPENDENT for 200 feet with the least restrictive device within 7 day(s). ________________________________________________________________________________________________       PHYSICAL THERAPY: Initial Assessment, Treatment Day: Day of Assessment 12/23/2017  OBSERVATION: Hospital Day: 2  [place] / [place] / Product Type: Medicare /      NAME/AGE/GENDER: Pernell Alas is a 79 y.o. female   PRIMARY DIAGNOSIS: Unsteady gait Unsteady gait Unsteady gait        ICD-10: Treatment Diagnosis:   · Difficulty in walking, Not elsewhere classified (R26.2)   Precaution/Allergies:  Codeine and Oxycodone      ASSESSMENT:   Pernell Alas presents with decreased transfers, ambulation and mobility with above diagnosis. She demonstrates transfers of CGA x 1 out of bed to sit and then CGA x 1 to stand. Then, she ambulates for 100 feet without complaints of increased pain and mild SOB then returned to room and is seated edge of bed with return to supine CGA x 1 and positioned. She does report profound fatigue upon return to bed.  Skilled PT is indicated for patient safety and mobility progression during current acute care episode. This section established at most recent assessment   PROBLEM LIST (Impairments causing functional limitations):  1. Decreased Strength  2. Decreased ADL/Functional Activities  3. Decreased Transfer Abilities  4. Decreased Ambulation Ability/Technique  5. Decreased Balance  6. Decreased Activity Tolerance  7. Decreased Pacing Skills  8. Decreased Flexibility/Joint Mobility  9. Decreased [place] with Home Exercise Program   INTERVENTIONS PLANNED: (Benefits and precautions of physical therapy have been discussed with the patient.)  1. Balance Exercise  2. Bed Mobility  3. Family Education  4. Gait Training  5. Home Exercise Program (HEP)  6. Range of Motion (ROM)  7. Therapeutic Activites  8. Therapeutic Exercise/Strengthening  9. Transfer Training     TREATMENT PLAN: Frequency/Duration: 3-5 times a week for duration of hospital stay  Rehabilitation Potential For Stated Goals: Good     RECOMMENDED REHABILITATION/EQUIPMENT: (at time of discharge pending progress): Due to the probability of continued deficits (see above) this patient will likely need continued skilled outpatient physical therapy after discharge. HISTORY:   History of Present Injury/Illness (Reason for Referral):  Pt is a 78 yo female with pmh chronic pain, arthritis, heart murmur who presented to the ER this afternoon after possibly losing consciousness while getting out of the recliner. She reports about a month of unsteady gait requiring cane or holding onto furniture. Pt admits at baseline her gait was weak due to her severe arthritis pain in left foot. CT in ER showed venous vs AV malformations with calcifications of right parieto-occipital area. UA c/w UTI. EKG SR.  BP/HR stable. Past Medical History/Comorbidities:   Ms. Maya Healy  has a past medical history of Chronic pain; Chronic sinusitis (1/19/2015); Diverticulosis (2017); Edema (1/19/2015);  Former smoker; GERD (gastroesophageal reflux disease); Headache (1/19/2015); Hearing loss (1/19/2015); Heart murmur (1990); Hip osteoarthritis (1/19/2015); colonic polyps (2017); migraines; Hypercholesterolemia (1/19/2015); Hypertension; Impaired fasting glucose (1/19/2015); Joint disorder (1/19/2015); Morbid obesity ([address] Neuralgia (1/19/2015); Phlebitis and thrombophlebitis of superficial vessels of lower extremities (01/19/2015); Psychiatric disorder; Restless leg syndrome (1/19/2015); Rheumatic fever; Seizures ([address] SOB (shortness of breath) on exertion; Stroke ([address] Tobacco abuse (1/19/2015); Vascular anomalies, congenital; and Vitamin D deficiency (1/19/2015). She also has no past medical history of Adverse effect of anesthesia; Aneurysm ([address] Arrhythmia; Asthma; Autoimmune disease ([address] CAD (coronary artery disease); Cancer ([address] Chronic kidney disease; Chronic obstructive pulmonary disease ([address] Coagulation disorder ([address] Diabetes ([address] Difficult intubation; Endocarditis; Heart failure ([address] Liver disease; Malignant hyperthermia due to anesthesia; Nausea & vomiting; Pseudocholinesterase deficiency; PUD (peptic ulcer disease); Sleep apnea; Thromboembolus ([address] or Thyroid disease. Ms. Adorno Nucla  has a past surgical history that includes hx mohs procedure (Right, 1995); hx cholecystectomy (1982); colonoscopy (N/A, 2/20/2017); pr total hip arthroplasty (Right, 2005); hx rotator cuff repair (Right, 10/31/2016); hx cataract removal (Bilateral, 3/2016); hx tubal ligation (1982); and hx appendectomy (03/15/2017).   Social History/Living Environment:   Home Environment: Private residence  # Steps to Enter: 1  One/Two Story Residence: One story  Living Alone: No  Support Systems: Spouse/Significant Other/Partner  Patient Expects to be Discharged to[de-identified] Private residence  Current DME Used/Available at Home: [address], [place], straight, Walker, rolling  Tub or Shower Type: Tub/Shower combination  Prior Level of Function/Work/Activity:  She reports fatigue at time and is deconditioned, often sitting in front of the television all day. Dominant Side:         RIGHT  Personal Factors:          Sex:  female        Age:  79 y.o. Number of Personal Factors/Comorbidities that affect the Plan of Care: 0: LOW COMPLEXITY   EXAMINATION:   Most Recent Physical Functioning:   Gross Assessment:  AROM: Generally decreased, functional  PROM: Generally decreased, functional  Strength: Generally decreased, functional  Coordination: Within functional limits  Tone: Normal  Sensation: Intact               Posture:  Posture (WDL): Exceptions to WDL  Posture Assessment: Cervical, Forward head  Balance:  Sitting: Intact  Standing: Impaired  Standing - Static: Fair  Standing - Dynamic : Fair Bed Mobility:  Rolling: Contact guard assistance  Supine to Sit: Contact guard assistance  Sit to Supine: Contact guard assistance  Scooting: Contact guard assistance  Wheelchair Mobility:     Transfers:  Sit to Stand: Contact guard assistance  Stand to Sit: Contact guard assistance  Bed to Chair: Contact guard assistance  Gait:     Base of Support: Center of gravity altered  Speed/Marjorie: Fluctuations; Pace decreased (<100 feet/min); Shuffled; Slow  Step Length: Left shortened;Right shortened  Swing Pattern: Left asymmetrical;Right asymmetrical  Stance: Left decreased;Right decreased;Time  Gait Abnormalities: Decreased step clearance;Shuffling gait; Steppage gait; Step to gait; Toe walking;Trunk sway increased  Distance (ft): 100 Feet (ft)  Assistive Device:  (close guard PT assistance )  Ambulation - Level of Assistance: Contact guard assistance;Minimal assistance  Interventions: Manual cues; Safety awareness training; Tactile cues; Verbal cues; Visual/Demos      Body Structures Involved:  1. Bones  2. Joints  3. Muscles  4. Ligaments Body Functions Affected:  1. Neuromusculoskeletal  2. Movement Related  3. Skin Related  4.  Metobolic/Endocrine Activities and Participation Affected:  1. Mobility  2. Self Care  3. Community, Social and [place]   Number of elements that affect the Plan of Care: 3: MODERATE COMPLEXITY   CLINICAL PRESENTATION:   Presentation: Stable and uncomplicated: LOW COMPLEXITY   CLINICAL DECISION MAKIN [facility] [place] [address] [address] AM-PAC 6 Clicks   Basic Mobility Inpatient Short Form  How much difficulty does the patient currently have. .. Unable A Lot A Little None   1. Turning over in bed (including adjusting bedclothes, sheets and blankets)? [] 1   [] 2   [x] 3   [] 4   2. Sitting down on and standing up from a chair with arms ( e.g., wheelchair, bedside commode, etc.)   [] 1   [] 2   [x] 3   [] 4   3. Moving from lying on back to sitting on the side of the bed? [] 1   [] 2   [x] 3   [] 4   How much help from another person does the patient currently need. .. Total A Lot A Little None   4. Moving to and from a bed to a chair (including a wheelchair)? [] 1   [] 2   [x] 3   [] 4   5. Need to walk in hospital room? [] 1   [] 2   [x] 3   [] 4   6. Climbing 3-5 steps with a railing? [] 1   [] 2   [x] 3   [] 4   © , Trustees of [address] [facility] [place] [address] [address], under license to [employer]. All rights reserved      Score:  Initial: 18 Most Recent: X (Date: -- )    Interpretation of Tool:  Represents activities that are increasingly more difficult (i.e. Bed mobility, Transfers, Gait). Score 24 23 22-20 19-15 14-10 9-7 6     Modifier CH CI CJ CK CL CM CN      ? Mobility - Walking and Moving Around:     - CURRENT STATUS: CK - 40%-59% impaired, limited or restricted    - GOAL STATUS: CJ - 20%-39% impaired, limited or restricted    - D/C STATUS:  ---------------To be determined---------------  [place] / [place] / Product Type: Medicare /      Medical Necessity:     · Patient demonstrates good rehab potential due to higher previous functional level.   Reason for Services/Other Comments:  · Patient continues to require skilled intervention due to medical complications, patient unable to attend/participate in therapy as expected and decreased transfers, ambulation and mobility. Use of outcome tool(s) and clinical judgement create a POC that gives a: Clear prediction of patient's progress: LOW COMPLEXITY            TREATMENT:   (In addition to Assessment/Re-Assessment sessions the following treatments were rendered)   Pre-treatment Symptoms/Complaints: 0/10 pain complaints today reported. Pain: Initial:   Pain Intensity 1: 0  Post Session:  0/10      Assessment/Reassessment only, no treatment provided today    Braces/Orthotics/Lines/Etc:   · O2 Device: Room air  Treatment/Session Assessment:    · Response to Treatment:  Improving overall for transfers, ambulation and mobility with evaluation. · Interdisciplinary Collaboration:   o Physical Therapist  o Registered Nurse  · After treatment position/precautions:   o Supine in bed  o Bed alarm/tab alert on  o Bed/Chair-wheels locked  o Bed in low position   · Compliance with Program/Exercises: Will assess as treatment progresses. · Recommendations/Intent for next treatment session: \"Next visit will focus on advancements to more challenging activities, reduction in assistance provided and transfers, ambulation and mobility\".   Total Treatment Duration:  PT Patient Time In/Time Out  Time In: 1858  Time Out: [address], PT

## 2017-12-24 NOTE — PROGRESS NOTES
Care Management Interventions  PCP Verified by CM: Yes (Pt saw her Dr Nicci Dumont 1-2 weeks ago, per pt)  Transition of Care Consult (CM Consult): Discharge Planning  Physical Therapy Consult: Yes  Occupational Therapy Consult: Yes  Current Support Network: Lives with Spouse, Own Home (Pt reports that she lives with her spouse in a very tiny house)  Confirm Follow Up Transport: Family (spouse)  Plan discussed with Pt/Family/Caregiver: Yes  Freedom of Choice Offered: Yes  Discharge Location  Discharge Placement: Unable to determine at this time   LMSW met with this pt who will require PT/OT/SW/RN HH upon d/c. Both PT and OT request this, and bec pt has additional social service needs, a  evaluation is also being requested. Pt has no choice of [facility] facilities. This SW has referred pt to [facility] for RN/PT/OTY/SW assistance.

## 2017-12-24 NOTE — PROGRESS NOTES
Hospitalist Progress Note     Admit Date:  2017  1:16 PM   Name:  Jennifer Dooley   Age:  79 y.o.  :  1950   MRN:  [id number]   PCP:  Walter Bravo DO  Treatment Team: Attending Provider: Kory Macdonald DO; Utilization Review: Ngoc Corley    Subjective:      is a 78 yo female with pmh chronic pain, arthritis, heart murmur who presented to the ER after a syncopal episode (LOC for about 1 minute). Prior to the fall, she reported dizziness and walked to the bathroom and came back. She denied any bowel or bladder incontinence. This morning, she denies any dizziness, nausea or vomiting.     Objective:     Patient Vitals for the past 24 hrs:   Temp Pulse Resp BP SpO2   17 0327 98.2 °F (36.8 °C) 76 18 144/69 91 %   17 2323 98 °F (36.7 °C) 80 19 147/57 91 %   17 1926 98.2 °F (36.8 °C) 73 19 161/65 92 %   17 1513 - - - 142/72 -   17 1511 - - - 139/74 -   17 1507 97.7 °F (36.5 °C) 65 20 127/71 91 %   17 1109 97.9 °F (36.6 °C) 68 18 137/75 94 %   17 0711 97.7 °F (36.5 °C) 75 20 163/66 92 %     Oxygen Therapy  O2 Sat (%): 91 % (17 0327)  O2 Device: Room air (17 1224)  No intake or output data in the 24 hours ending 17 0707        General appearance: NAD, conversant  Eyes: anicteric sclerae, moist conjunctivae; no lid-lag  ENT: Atraumatic; oropharynx clear with moist mucous membranes and no mucosal ulcerations; normal hard and soft palate  Neck: Trachea midline   FROM, supple, no thyromegaly or lymphadenopathy  Lungs: CTA, with normal respiratory effort and no intercostal retractions  CV: S1,S2 present, no added murmurs  Abdomen: Soft, non-tender; no masses   Extremities: No peripheral edema or extremity lymphadenopathy  Skin: Normal temperature, turgor and texture; no subcutaneous nodules  Psych: Appropriate affect, alert and oriented to person, place and time    Data Review:  I have reviewed all labs, meds, telemetry events, and studies from the last 24 hours.     Recent Results (from the past 24 hour(s))   GLUCOSE, POC    Collection Time: 12/23/17  7:09 AM   Result Value Ref Range    Glucose (POC) 110 (H) 65 - 100 mg/dL   GLUCOSE, POC    Collection Time: 12/23/17 11:09 AM   Result Value Ref Range    Glucose (POC) 103 (H) 65 - 100 mg/dL   VITAMIN B12    Collection Time: 12/23/17  1:44 PM   Result Value Ref Range    Vitamin B12 214 193 - 986 pg/mL   TSH 3RD GENERATION    Collection Time: 12/23/17  1:44 PM   Result Value Ref Range    TSH 1.590 0.358 - 3.740 uIU/mL   GLUCOSE, POC    Collection Time: 12/23/17  4:03 PM   Result Value Ref Range    Glucose (POC) 159 (H) 65 - 100 mg/dL   PLEASE READ & DOCUMENT PPD TEST IN 24 HRS    Collection Time: 12/23/17  8:14 PM   Result Value Ref Range    PPD negative Negative    mm Induration 0 mm   GLUCOSE, POC    Collection Time: 12/23/17  8:54 PM   Result Value Ref Range    Glucose (POC) 133 (H) 65 - 100 mg/dL        All Micro Results     Procedure Component Value Units Date/Time    CULTURE, URINE [[id number]]  (Abnormal) Collected:  12/22/17 1515    Order Status:  Completed Specimen:  Urine from Clean catch Updated:  12/23/17 0829     Special Requests: NO SPECIAL REQUESTS        Culture result:         >100,000 COLONIES/mL GRAM NEGATIVE RODS SUBCULTURE IN PROGRESS (A)          Current Meds:  Current Facility-Administered Medications   Medication Dose Route Frequency    potassium chloride (K-DUR, KLOR-CON) SR tablet 40 mEq  40 mEq Oral DAILY    alum-mag hydroxide-simeth (MYLANTA) oral suspension 30 mL  30 mL Oral Q4H PRN    sertraline (ZOLOFT) tablet 100 mg  100 mg Oral DAILY    sodium chloride (NS) flush 5-10 mL  5-10 mL IntraVENous Q8H    sodium chloride (NS) flush 5-10 mL  5-10 mL IntraVENous PRN    acetaminophen (TYLENOL) tablet 650 mg  650 mg Oral Q4H PRN    ondansetron (ZOFRAN) injection 4 mg  4 mg IntraVENous Q4H PRN    enoxaparin (LOVENOX) injection 40 mg  40 mg SubCUTAneous Q24H  cefTRIAXone (ROCEPHIN) 1 g in sterile water (preservative free) 10 mL IV syringe  1 g IntraVENous Q24H    losartan/hydroCHLOROthiazide (HYZAAR) 50/12.5 mg   Oral DAILY       Other Studies (last 24 hours):  No results found. Review of Systems:  Gen: No weight loss  Eyes: no vision changes  ENT: no sore throat  Resp: No sob or cough  CV: No cp  Abd:  no abd pain, no vomiting or diarrhea  : No incontinence, no hematuria or dysuria, no flank pain  MSK: no leg swelling  Neuro: +weakness, falls    Assessment and Plan:     Hospital Problems as of 12/24/2017  Date Reviewed: 6/21/2017          Codes Class Noted - Resolved POA    * (Principal)Unsteady gait ICD-10-CM: R26.81  ICD-9-CM: 781.2  12/22/2017 - Present Unknown        Moderate episode of recurrent major depressive disorder ([facility] ICD-10-CM: F33.1  ICD-9-CM: 296.32  5/5/2015 - Present Yes        Morbid obesity with BMI of 40.0-44.9, adult ([facility] ICD-10-CM: E66.01, Z68.41  ICD-9-CM: 278.01, V85.41  1/19/2015 - Present Yes              PLAN:    Syncope  Unknown cause, cannot yet rule neurocardiogenic syncope  Most likely secondary to dehydration in the setting of UTI (+dysuria)  MRI Brain: 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 It crosses the midline and involves left temporal and frontal lobes and both cerebellar hemispheres. There are superficial temporal scalp  draining veins and large veins at the skull base. Dr. Bryant Bison called who recommended outpatient follow up for management of AVM given current symptoms  Orthostatic vitals negative  B12 WNL.  TSH WNL  Negative orthostatic vitals  Plan  Continue ceftriaxone, urine culture pending (+>100,000 gram - rods)   Continue IV fluids  RPR  ECHO Pending  If negative, will obtain EEG and consult Tele Neurology  PT/OT consulted     Hypokalemia: KCL ordered    DC planning/Dispo:  PT/OT eval pending  DVT ppx:  enoxaparin    Signed:  Frederic Mar MD

## 2017-12-24 NOTE — PROGRESS NOTES
[address]  Face to Face Encounter    Patients Name: Sharon Macedo    YOB: 1950    Ordering Physician: Glendy Valladares MD    Primary Diagnosis: Unsteady gait    Date of Face to Face:   12/24/2017                                  Face to Face Encounter findings are related to primary reason for home care:   yes. 1. I certify that the patient needs intermittent care as follows: skilled nursing care:  skilled observation/assessment, patient education  physical therapy: strengthening  occupational therapy:  ADL safety (ie. cooking, bathing, dressing)    2. I certify that this patient is homebound, that is: 1) patient requires the use of a walker device, special transportation, or assistance of another to leave the home; or 2) patient's condition makes leaving the home medically contraindicated; and 3) patient has a normal inability to leave the home and leaving the home requires considerable and taxing effort. Patient may leave the home for infrequent and short duration for medical reasons, and occasional absences for non-medical reasons. Homebound status is due to the following functional limitations: Patient with strength deficits limiting the performance of all ADL's without caregiver assistance or the use of an assistive device. 3. I certify that this patient is under my care and that I, or a nurse practitioner or  [id number], or clinical nurse specialist, or certified nurse midwife, working with me, had a Face-to-Face Encounter that meets the physician Face-to-Face Encounter requirements.   The following are the clinical findings from the [address] [place] [address] encounter that support the need for skilled services and is a summary of the encounter: see hospital medical record    See discharge summary      Courtney Ramos LMSW  12/24/2017      THE FOLLOWING TO BE COMPLETED BY THE COMMUNITY PHYSICIAN:    I concur with the findings described above from the [facility] encounter that this patient is homebound and in need of a skilled service.     Certifying Physician: _____________________________________      Printed Certifying Physician Name: _____________________________________    Date: _________________

## 2017-12-24 NOTE — PROGRESS NOTES
Hourly rounds done. Pt c/o headache & heartburn, medicated per MAR. Mylanta 30 mL q4h PRN ordered per Dr. Moore Ohs. Denies nausea, vomiting. Voiding this shift, yellow/straw/clear. No BM this shift, passing gas. All needs met at this time.

## 2017-12-25 ENCOUNTER — APPOINTMENT (OUTPATIENT)
Dept: GENERAL RADIOLOGY | Age: 67
End: 2017-12-25
Attending: INTERNAL MEDICINE
Payer: MEDICARE

## 2017-12-25 ENCOUNTER — APPOINTMENT (OUTPATIENT)
Dept: CT IMAGING | Age: 67
End: 2017-12-25
Attending: INTERNAL MEDICINE
Payer: MEDICARE

## 2017-12-25 LAB
BACTERIA SPEC CULT: ABNORMAL
BACTERIA SPEC CULT: ABNORMAL
SERVICE CMNT-IMP: ABNORMAL

## 2017-12-25 PROCEDURE — 74011000250 HC RX REV CODE- 250: Performed by: INTERNAL MEDICINE

## 2017-12-25 PROCEDURE — 73030 X-RAY EXAM OF SHOULDER: CPT

## 2017-12-25 PROCEDURE — 96372 THER/PROPH/DIAG INJ SC/IM: CPT

## 2017-12-25 PROCEDURE — 74011250637 HC RX REV CODE- 250/637: Performed by: INTERNAL MEDICINE

## 2017-12-25 PROCEDURE — 74011250637 HC RX REV CODE- 250/637: Performed by: NURSE PRACTITIONER

## 2017-12-25 PROCEDURE — 96376 TX/PRO/DX INJ SAME DRUG ADON: CPT

## 2017-12-25 PROCEDURE — 96375 TX/PRO/DX INJ NEW DRUG ADDON: CPT

## 2017-12-25 PROCEDURE — 99218 HC RM OBSERVATION: CPT

## 2017-12-25 PROCEDURE — 70486 CT MAXILLOFACIAL W/O DYE: CPT

## 2017-12-25 PROCEDURE — 70450 CT HEAD/BRAIN W/O DYE: CPT

## 2017-12-25 PROCEDURE — 74011250636 HC RX REV CODE- 250/636: Performed by: INTERNAL MEDICINE

## 2017-12-25 RX ADMIN — POTASSIUM CHLORIDE 40 MEQ: 20 TABLET, EXTENDED RELEASE ORAL at 08:21

## 2017-12-25 RX ADMIN — CEFTRIAXONE 1 G: 1 INJECTION, POWDER, FOR SOLUTION INTRAMUSCULAR; INTRAVENOUS at 14:17

## 2017-12-25 RX ADMIN — ENOXAPARIN SODIUM 40 MG: 40 INJECTION SUBCUTANEOUS at 21:33

## 2017-12-25 RX ADMIN — Medication 10 ML: at 14:22

## 2017-12-25 RX ADMIN — ACETAMINOPHEN 650 MG: 325 TABLET, FILM COATED ORAL at 21:33

## 2017-12-25 RX ADMIN — HYDROCHLOROTHIAZIDE: 12.5 CAPSULE ORAL at 08:21

## 2017-12-25 RX ADMIN — SERTRALINE HYDROCHLORIDE 100 MG: 100 TABLET ORAL at 21:33

## 2017-12-25 RX ADMIN — Medication 5 ML: at 05:35

## 2017-12-25 RX ADMIN — Medication 10 ML: at 21:35

## 2017-12-25 RX ADMIN — ONDANSETRON 4 MG: 2 INJECTION INTRAMUSCULAR; INTRAVENOUS at 07:08

## 2017-12-25 RX ADMIN — ACETAMINOPHEN 650 MG: 325 TABLET, FILM COATED ORAL at 06:18

## 2017-12-25 NOTE — PROGRESS NOTES
Problem: Falls - Risk of  Goal: *Absence of Falls  Document Ronnie Fall Risk and appropriate interventions in the flowsheet.    Outcome: Progressing Towards Goal  Fall Risk Interventions:  Mobility Interventions: Bed/chair exit alarm, Patient to call before getting OOB    Mentation Interventions: Bed/chair exit alarm, More frequent rounding    Medication Interventions: Patient to call before getting OOB, Teach patient to arise slowly    Elimination Interventions: Call light in reach, Patient to call for help with toileting needs, Toileting schedule/hourly rounds    History of Falls Interventions: Door open when patient unattended

## 2017-12-25 NOTE — PROGRESS NOTES
Hourly rounds performed through shift, pt denies needs at this time. Bed in low position and call light/ personal items within reach. Will continue to monitor and report to night shift nurse.

## 2017-12-25 NOTE — PROGRESS NOTES
Received call from nursing staff to inform that pt had a fall and was found on the floor by nursing staff. She is alert  And oriented, Vitals stable. Complaints of right shoulder pain. Reports hitting her face. CT head/maxillofacial negative.  Xray right shoulder , no e/o any fracture

## 2017-12-25 NOTE — PROGRESS NOTES
Hospitalist Progress Note     Admit Date:  2017  1:16 PM   Name:  Belle Snider   Age:  79 y.o.  :  1950   MRN:  [id number]   PCP:  Presley Chinchilla DO  Treatment Team: Attending Provider: Julius Mayer DO; Care Manager: Regino Block LMSW    Subjective:      is a 80 yo female with pmh chronic pain, arthritis, heart murmur who presented to the ER after a syncopal episode (LOC for about 1 minute). Prior to the fall, she reported dizziness and walked to the bathroom and came back. She denied any bowel or bladder incontinence. Overnight, she had a fall (trippled while reaching doorknob, no LOC, no head injury). She denied any dyspnea or cough this morning.     Objective:     Patient Vitals for the past 24 hrs:   Temp Pulse Resp BP SpO2   17 98.2 °F (36.8 °C) 82 18 159/76 90 %   17 0313 98.5 °F (36.9 °C) 75 18 177/76 92 %   17 2359 98 °F (36.7 °C) 72 18 150/69 90 %   17 1945 97.9 °F (36.6 °C) 85 18 158/56 91 %   17 1528 98.2 °F (36.8 °C) 66 20 159/86 92 %   17 1120 97.5 °F (36.4 °C) 62 20 157/74 91 %     Oxygen Therapy  O2 Sat (%): 90 % (17)  Pulse via Oximetry: 92 beats per minute (17 0313)  O2 Device: Nasal cannula (17)  O2 Flow Rate (L/min): 2 l/min (17)  No intake or output data in the 24 hours ending 17 1028        General appearance: NAD, conversant  Eyes: anicteric sclerae, moist conjunctivae; no lid-lag  ENT: Atraumatic; oropharynx clear with moist mucous membranes and no mucosal ulcerations; normal hard and soft palate  Neck: Trachea midline   FROM, supple, no thyromegaly or lymphadenopathy  Lungs: CTA, with normal respiratory effort and no intercostal retractions  CV: S1,S2 present, no added murmurs  Abdomen: Soft, non-tender; no masses   Extremities: No peripheral edema or extremity lymphadenopathy  Skin: Normal temperature, turgor and texture; no subcutaneous nodules  Psych: Appropriate affect, alert and oriented to person, place and time    Data Review:  I have reviewed all labs, meds, telemetry events, and studies from the last 24 hours. Recent Results (from the past 24 hour(s))   GLUCOSE, POC    Collection Time: 12/24/17 11:19 AM   Result Value Ref Range    Glucose (POC) 125 (H) 65 - 100 mg/dL   GLUCOSE, POC    Collection Time: 12/24/17  4:10 PM   Result Value Ref Range    Glucose (POC) 118 (H) 65 - 100 mg/dL   PLEASE READ & DOCUMENT PPD TEST IN 48 HRS    Collection Time: 12/24/17  8:15 PM   Result Value Ref Range    PPD  Negative    mm Induration  0mm        All Micro Results     Procedure Component Value Units Date/Time    CULTURE, URINE [[id number]]  (Abnormal)  (Susceptibility) Collected:  12/22/17 1515    Order Status:  Completed Specimen:  Urine from Clean catch Updated:  12/25/17 0715     Special Requests: NO SPECIAL REQUESTS        Culture result:         >100,000 COLONIES/mL ESCHERICHIA COLI (A)              10,000 to 50,000 COLONIES/mL MIXED SKIN TRINO ISOLATED          Current Meds:  Current Facility-Administered Medications   Medication Dose Route Frequency    potassium chloride (K-DUR, KLOR-CON) SR tablet 40 mEq  40 mEq Oral DAILY    alum-mag hydroxide-simeth (MYLANTA) oral suspension 30 mL  30 mL Oral Q4H PRN    sertraline (ZOLOFT) tablet 100 mg  100 mg Oral DAILY    sodium chloride (NS) flush 5-10 mL  5-10 mL IntraVENous Q8H    sodium chloride (NS) flush 5-10 mL  5-10 mL IntraVENous PRN    acetaminophen (TYLENOL) tablet 650 mg  650 mg Oral Q4H PRN    ondansetron (ZOFRAN) injection 4 mg  4 mg IntraVENous Q4H PRN    enoxaparin (LOVENOX) injection 40 mg  40 mg SubCUTAneous Q24H    cefTRIAXone (ROCEPHIN) 1 g in sterile water (preservative free) 10 mL IV syringe  1 g IntraVENous Q24H    losartan/hydroCHLOROthiazide (HYZAAR) 50/12.5 mg   Oral DAILY       Other Studies (last 24 hours):  Xr Shoulder Rt Ap/lat Min 2 V    Result Date: 12/25/2017  X-ray right shoulder 3 views. HISTORY: Pain after trauma. COMPARISON: None. FINDINGS: Bones the shoulder anatomically aligned. No evidence of fractures. Soft tissues are normal.     IMPRESSION: Unremarkable right shoulder. Ct Head Wo Cont    Result Date: 12/25/2017  CT HEAD WITHOUT CONTRAST HISTORY:  Trauma. COMPARISON: 12/22/2017 TECHNIQUE: Axial imaging was performed without intravenous contrast utilizing 5mm slice thickness. Sagittal and coronal reformats were performed. Radiation dose reduction techniques were used for this study. Our CT scanner uses one or all of the following: Automated exposure control, adjustment of the MAS or KUB according to patient's size and iterative reconstruction. FINDINGS:    *BRAIN:    -  Large bilateral arteriovenous malformations with calcification in the right parieto-occipital lobe unchanged. -  No intracranial mass, hematoma, or hydrocephalus.    -  No gross white matter abnormality by CT. *VISUALIZED PARANASAL SINUSES: Well aerated. *MASTOIDS:  Clear. *CALVARIUM AND SCALP: Unremarkable. IMPRESSION: No acute abnormalities. Date of Dictation: 12/25/2017 4:01 AM      Ct Maxillofacial Wo Cont    Result Date: 12/25/2017  CT FACIAL BONES  WITHOUT CONTRAST HISTORY: Trauma  COMPARISON: None TECHNIQUE:  Thin section helical axial images were acquired. Coronal and sagittal  reformatted images were generated. Dose reduction techniques used: Automated exposure control, adjustment of the mAs and/or kVp according to patient's size, and iterative reconstruction techniques. FINDINGS: *  SINUSES: The visualized paranasal sinuses are clear. *  NASAL CAVITY: Unremarkable. *  ORBITS: Unremarkable. *  FACIAL BONES: No fracture or bone destruction. *  FACIAL SOFT TISSUES: Unremarkable. IMPRESSION: No evidence of acute facial trauma.  Date of Dictation: 12/25/2017 4:02 AM          Review of Systems:  Gen: No weight loss  Eyes: no vision changes  ENT: no sore throat  Resp: No sob or cough  CV: No cp  Abd:  no abd pain, no vomiting or diarrhea  : No incontinence, no hematuria or dysuria, no flank pain  MSK: no leg swelling  Neuro: +weakness, falls    Assessment and Plan:     Hospital Problems as of 12/25/2017  Date Reviewed: 6/21/2017          Codes Class Noted - Resolved POA    * (Principal)Unsteady gait ICD-10-CM: R26.81  ICD-9-CM: 781.2  12/22/2017 - Present Unknown        Moderate episode of recurrent major depressive disorder ([facility] [address] ICD-10-CM: F33.1  ICD-9-CM: 296.32  5/5/2015 - Present Yes        Morbid obesity with BMI of 40.0-44.9, adult [facility]) ICD-10-CM: E66.01, Z68.41  ICD-9-CM: 278.01, V85.41  1/19/2015 - Present Yes              PLAN:    Syncope  Unknown cause, cannot yet rule neurocardiogenic syncope  Most likely secondary to dehydration in the setting of UTI (+dysuria)  MRI Brain: 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 It crosses the midline and involves left temporal and frontal lobes and both cerebellar hemispheres. There are superficial temporal scalp  draining veins and large veins at the skull base. Dr. Dunn Pac called who recommended outpatient follow up for management of AVM given current symptoms  Orthostatic vitals negative  B12 WNL.  TSH WNL  Negative orthostatic vitals  B12,TSH  Plan  Continue ceftriaxone, urine culture + E Coli (day 3)   Continue IV fluids  RPR  ECHO Pending  If negative, will obtain EEG and consult Tele Neurology  PT/OT consulted     Hypokalemia: KCL ordered    DC planning/Dispo:  PT/OT eval done  DVT ppx:  enoxaparin    Signed:  Danilo Morales MD

## 2017-12-26 LAB
GLUCOSE BLD STRIP.AUTO-MCNC: 105 MG/DL (ref 65–100)
GLUCOSE BLD STRIP.AUTO-MCNC: 128 MG/DL (ref 65–100)
GLUCOSE BLD STRIP.AUTO-MCNC: 95 MG/DL (ref 65–100)
GLUCOSE BLD STRIP.AUTO-MCNC: 99 MG/DL (ref 65–100)
MM INDURATION POC: NORMAL MM (ref 0–5)
PPD POC: NORMAL NEGATIVE
RPR SER QL: NONREACTIVE

## 2017-12-26 PROCEDURE — 99218 HC RM OBSERVATION: CPT

## 2017-12-26 PROCEDURE — 74011250637 HC RX REV CODE- 250/637: Performed by: INTERNAL MEDICINE

## 2017-12-26 PROCEDURE — 74011250636 HC RX REV CODE- 250/636: Performed by: INTERNAL MEDICINE

## 2017-12-26 PROCEDURE — 96372 THER/PROPH/DIAG INJ SC/IM: CPT

## 2017-12-26 PROCEDURE — 82962 GLUCOSE BLOOD TEST: CPT

## 2017-12-26 PROCEDURE — 93306 TTE W/DOPPLER COMPLETE: CPT

## 2017-12-26 PROCEDURE — 96376 TX/PRO/DX INJ SAME DRUG ADON: CPT

## 2017-12-26 PROCEDURE — 74011000250 HC RX REV CODE- 250: Performed by: INTERNAL MEDICINE

## 2017-12-26 PROCEDURE — 74011250637 HC RX REV CODE- 250/637: Performed by: NURSE PRACTITIONER

## 2017-12-26 PROCEDURE — 95816 EEG AWAKE AND DROWSY: CPT | Performed by: INTERNAL MEDICINE

## 2017-12-26 RX ADMIN — Medication 10 ML: at 14:11

## 2017-12-26 RX ADMIN — HYDROCHLOROTHIAZIDE: 12.5 CAPSULE ORAL at 08:58

## 2017-12-26 RX ADMIN — Medication 10 ML: at 21:52

## 2017-12-26 RX ADMIN — SERTRALINE HYDROCHLORIDE 100 MG: 100 TABLET ORAL at 21:47

## 2017-12-26 RX ADMIN — ENOXAPARIN SODIUM 40 MG: 40 INJECTION SUBCUTANEOUS at 21:47

## 2017-12-26 RX ADMIN — POTASSIUM CHLORIDE 40 MEQ: 20 TABLET, EXTENDED RELEASE ORAL at 08:58

## 2017-12-26 RX ADMIN — CEFTRIAXONE 1 G: 1 INJECTION, POWDER, FOR SOLUTION INTRAMUSCULAR; INTRAVENOUS at 14:11

## 2017-12-26 RX ADMIN — Medication 10 ML: at 05:41

## 2017-12-26 RX ADMIN — ACETAMINOPHEN 650 MG: 325 TABLET, FILM COATED ORAL at 10:58

## 2017-12-26 NOTE — PHYSICIAN ADVISORY
Letter of Determination:  Outpatient states receiving Observation Services    This case was reviewed, and I concur with Outpatient status receiving observation services. While this patients hospital stay exceeded two midnights, I cannot document sufficient medical necessity for inpatient hospital care. This patients care would be outpatient after the first 48 hours of Observation Services. This determination may change depending on further medical information, condition changes of the patient, or treatment requirements.       Maryam Cooper MD, FERNANDO,   Physician [facility].

## 2017-12-26 NOTE — PROGRESS NOTES
Pt resting quietly in bed. Denies needs or concerns at this time. Alert and oriented x4. Lungs sounds clear but diminished in bases bilaterally with respirations even and unlabored. Bowel sounds active in all quadrants. Last BM 12/25/2017.  +2 pulses bilaterally in upper and lower extremities. IV capped with no signs of phlebitis. Instructed to call for assistance. Call light in reach. Voiced understanding and identified call light. Bed alarm on.

## 2017-12-26 NOTE — PROGRESS NOTES
Hourly rounds done. Pt c/o headache, medicated per MAR. Denies nausea, vomiting. Voiding this shift, yellow/straw//clear. No BM this shift. All needs met at this time.

## 2017-12-26 NOTE — INTERDISCIPLINARY ROUNDS
Interdisciplinary team rounds were held 12/26/2017 with the following team members:Care Management, Nursing, Pharmacy, Physical Therapy and Physician. Plan of care discussed. See clinical pathway and/or care plan for interventions and desired outcomes.

## 2017-12-26 NOTE — PROGRESS NOTES
Pt has 10 day bed hold until 1/2/18. Karl Lambert spoke with Katina Li who stated that pt is LT resident and bed will be held for pt. Pt's chart made available to Guillermo.

## 2017-12-26 NOTE — PROGRESS NOTES
Hospitalist Progress Note     Admit Date:  2017  1:16 PM   Name:  Todd Garcia   Age:  79 y.o.  :  1950   MRN:  [id number]   PCP:  Brandon Vidales DO  Treatment Team: Attending Provider: Jair Garza DO; Care Manager: Trae Moran LMSW    Subjective:      is a 80 yo female with pmh chronic pain, arthritis, heart murmur who presented to the ER after a syncopal episode (LOC for about 1 minute). Prior to the fall, she reported dizziness and walked to the bathroom and came back. She denied any bowel or bladder incontinence. This morning, she reports that she wants to go home. She was advised to wait for the ECHO result and PT e valuation. Neurology recommended keppra 1500mg load with 500mg BID (no EEG was done, will follow up).     Objective:     Patient Vitals for the past 24 hrs:   Temp Pulse Resp BP SpO2   17 0719 98.3 °F (36.8 °C) 63 18 160/56 95 %   17 0410 98.3 °F (36.8 °C) 71 18 146/69 94 %   17 2329 98.1 °F (36.7 °C) 65 19 146/78 92 %   17 1914 98.3 °F (36.8 °C) 70 18 155/69 97 %   17 1419 98.2 °F (36.8 °C) 63 18 145/74 93 %   17 1158 97.8 °F (36.6 °C) 66 18 155/67 90 %     Oxygen Therapy  O2 Sat (%): 95 % (17 0719)  Pulse via Oximetry: 92 beats per minute (17 0313)  O2 Device: Nasal cannula (17 141)  O2 Flow Rate (L/min): 2 l/min (17 141)  No intake or output data in the 24 hours ending 17 1008        General appearance: NAD, conversant  Eyes: anicteric sclerae, moist conjunctivae; no lid-lag  ENT: Atraumatic; oropharynx clear with moist mucous membranes and no mucosal ulcerations; normal hard and soft palate  Neck: Trachea midline   FROM, supple, no thyromegaly or lymphadenopathy  Lungs: CTA, with normal respiratory effort and no intercostal retractions  CV: S1,S2 present, no added murmurs  Abdomen: Soft, non-tender; no masses   Extremities: No peripheral edema or extremity lymphadenopathy  Skin: Normal temperature, turgor and texture; no subcutaneous nodules  Psych: Appropriate affect, alert and oriented to person, place and time    Data Review:  I have reviewed all labs, meds, telemetry events, and studies from the last 24 hours. Recent Results (from the past 24 hour(s))   PLEASE READ & DOCUMENT PPD TEST IN 72 HRS    Collection Time: 12/25/17  7:25 PM   Result Value Ref Range    PPD  Negative    mm Induration  mm   GLUCOSE, POC    Collection Time: 12/26/17  7:17 AM   Result Value Ref Range    Glucose (POC) 95 65 - 100 mg/dL        All Micro Results     Procedure Component Value Units Date/Time    CULTURE, URINE [[id number]]  (Abnormal)  (Susceptibility) Collected:  12/22/17 1515    Order Status:  Completed Specimen:  Urine from Clean catch Updated:  12/25/17 0715     Special Requests: NO SPECIAL REQUESTS        Culture result:         >100,000 COLONIES/mL ESCHERICHIA COLI (A)              10,000 to 50,000 COLONIES/mL MIXED SKIN TRINO ISOLATED          Current Meds:  Current Facility-Administered Medications   Medication Dose Route Frequency    potassium chloride (K-DUR, KLOR-CON) SR tablet 40 mEq  40 mEq Oral DAILY    alum-mag hydroxide-simeth (MYLANTA) oral suspension 30 mL  30 mL Oral Q4H PRN    sertraline (ZOLOFT) tablet 100 mg  100 mg Oral DAILY    sodium chloride (NS) flush 5-10 mL  5-10 mL IntraVENous Q8H    sodium chloride (NS) flush 5-10 mL  5-10 mL IntraVENous PRN    acetaminophen (TYLENOL) tablet 650 mg  650 mg Oral Q4H PRN    ondansetron (ZOFRAN) injection 4 mg  4 mg IntraVENous Q4H PRN    enoxaparin (LOVENOX) injection 40 mg  40 mg SubCUTAneous Q24H    cefTRIAXone (ROCEPHIN) 1 g in sterile water (preservative free) 10 mL IV syringe  1 g IntraVENous Q24H    losartan/hydroCHLOROthiazide (HYZAAR) 50/12.5 mg   Oral DAILY       Other Studies (last 24 hours):  No results found.       Review of Systems:  Gen: No weight loss  Eyes: no vision changes  ENT: no sore throat  Resp: No sob or cough  CV: No cp  Abd:  no abd pain, no vomiting or diarrhea  : No incontinence, no hematuria or dysuria, no flank pain  MSK: no leg swelling  Neuro: +weakness, falls    Assessment and Plan:     Hospital Problems as of 12/26/2017  Date Reviewed: 6/21/2017          Codes Class Noted - Resolved POA    * (Principal)Unsteady gait ICD-10-CM: R26.81  ICD-9-CM: 781.2  12/22/2017 - Present Unknown        Moderate episode of recurrent major depressive disorder ([facility] [address] ICD-10-CM: F33.1  ICD-9-CM: 296.32  5/5/2015 - Present Yes        Morbid obesity with BMI of 40.0-44.9, adult [facility]) ICD-10-CM: E66.01, Z68.41  ICD-9-CM: 278.01, V85.41  1/19/2015 - Present Yes              PLAN:    Syncope  Unknown cause, cannot yet rule neurocardiogenic syncope  Most likely secondary to dehydration in the setting of UTI (+dysuria)  MRI Brain: 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 It crosses the midline and involves left temporal and frontal lobes and both cerebellar hemispheres. There are superficial temporal scalp  draining veins and large veins at the skull base. Dr. Dunn Pac called who recommended outpatient follow up for management of AVM given current symptoms  Orthostatic vitals negative  B12 WNL.  TSH WNL  Negative orthostatic vitals  B12,TSH  Plan  Continue ceftriaxone, urine culture + E Coli (day 4/7)   Continue IV fluids  RPR  ECHO Pending  TeleNeurology: recommended Keppra (no eeg was done)  PT/OT consulted     Hypokalemia: KCL ordered    DC planning/Dispo:  PT/OT eval done  DVT ppx:  enoxaparin    Signed:  Danilo Morales MD

## 2017-12-27 VITALS
DIASTOLIC BLOOD PRESSURE: 46 MMHG | HEART RATE: 58 BPM | TEMPERATURE: 97.8 F | HEIGHT: 64 IN | WEIGHT: 250 LBS | BODY MASS INDEX: 42.68 KG/M2 | OXYGEN SATURATION: 93 % | SYSTOLIC BLOOD PRESSURE: 115 MMHG | RESPIRATION RATE: 18 BRPM

## 2017-12-27 LAB
ANION GAP SERPL CALC-SCNC: 9 MMOL/L (ref 7–16)
BUN SERPL-MCNC: 17 MG/DL (ref 8–23)
CALCIUM SERPL-MCNC: 8.7 MG/DL (ref 8.3–10.4)
CHLORIDE SERPL-SCNC: 102 MMOL/L (ref 98–107)
CO2 SERPL-SCNC: 30 MMOL/L (ref 21–32)
CREAT SERPL-MCNC: 0.83 MG/DL (ref 0.6–1)
GLUCOSE BLD STRIP.AUTO-MCNC: 104 MG/DL (ref 65–100)
GLUCOSE BLD STRIP.AUTO-MCNC: 107 MG/DL (ref 65–100)
GLUCOSE SERPL-MCNC: 97 MG/DL (ref 65–100)
POTASSIUM SERPL-SCNC: 3.7 MMOL/L (ref 3.5–5.1)
SODIUM SERPL-SCNC: 141 MMOL/L (ref 136–145)

## 2017-12-27 PROCEDURE — 74011250637 HC RX REV CODE- 250/637: Performed by: NURSE PRACTITIONER

## 2017-12-27 PROCEDURE — 74011250637 HC RX REV CODE- 250/637: Performed by: INTERNAL MEDICINE

## 2017-12-27 PROCEDURE — 74011000258 HC RX REV CODE- 258: Performed by: INTERNAL MEDICINE

## 2017-12-27 PROCEDURE — 74011250636 HC RX REV CODE- 250/636: Performed by: INTERNAL MEDICINE

## 2017-12-27 PROCEDURE — 80048 BASIC METABOLIC PNL TOTAL CA: CPT | Performed by: INTERNAL MEDICINE

## 2017-12-27 PROCEDURE — 96376 TX/PRO/DX INJ SAME DRUG ADON: CPT

## 2017-12-27 PROCEDURE — 82962 GLUCOSE BLOOD TEST: CPT

## 2017-12-27 PROCEDURE — 74011000250 HC RX REV CODE- 250: Performed by: INTERNAL MEDICINE

## 2017-12-27 PROCEDURE — G8979 MOBILITY GOAL STATUS: HCPCS

## 2017-12-27 PROCEDURE — 36415 COLL VENOUS BLD VENIPUNCTURE: CPT | Performed by: INTERNAL MEDICINE

## 2017-12-27 PROCEDURE — 97530 THERAPEUTIC ACTIVITIES: CPT

## 2017-12-27 PROCEDURE — 99218 HC RM OBSERVATION: CPT

## 2017-12-27 PROCEDURE — 96375 TX/PRO/DX INJ NEW DRUG ADDON: CPT

## 2017-12-27 PROCEDURE — G8980 MOBILITY D/C STATUS: HCPCS

## 2017-12-27 RX ORDER — LEVETIRACETAM 500 MG/1
500 TABLET ORAL 2 TIMES DAILY
Status: DISCONTINUED | OUTPATIENT
Start: 2017-12-27 | End: 2017-12-27 | Stop reason: HOSPADM

## 2017-12-27 RX ORDER — LEVETIRACETAM 500 MG/1
500 TABLET ORAL 2 TIMES DAILY
Qty: 30 TAB | Refills: 1 | Status: SHIPPED | OUTPATIENT
Start: 2017-12-27 | End: 2018-01-16

## 2017-12-27 RX ADMIN — Medication 10 ML: at 14:04

## 2017-12-27 RX ADMIN — ACETAMINOPHEN 650 MG: 325 TABLET, FILM COATED ORAL at 15:19

## 2017-12-27 RX ADMIN — CEFTRIAXONE 1 G: 1 INJECTION, POWDER, FOR SOLUTION INTRAMUSCULAR; INTRAVENOUS at 13:58

## 2017-12-27 RX ADMIN — POTASSIUM CHLORIDE 40 MEQ: 20 TABLET, EXTENDED RELEASE ORAL at 09:40

## 2017-12-27 RX ADMIN — SODIUM CHLORIDE 1500 MG: 900 INJECTION, SOLUTION INTRAVENOUS at 09:41

## 2017-12-27 RX ADMIN — LEVETIRACETAM 500 MG: 500 TABLET ORAL at 16:30

## 2017-12-27 RX ADMIN — HYDROCHLOROTHIAZIDE: 12.5 CAPSULE ORAL at 09:40

## 2017-12-27 NOTE — PROCEDURES
[facility] Neurology   Routine Electroencephalogram Report      DATE: December 26, 2017    EEG Number: [id number]    Indication: Spell with loss of consciousness    Medications:   Current Facility-Administered Medications   Medication Dose Route Frequency Provider Last Rate Last Dose    potassium chloride (K-DUR, KLOR-CON) SR tablet 40 mEq  40 mEq Oral DAILY Jerry Mayo MD   40 mEq at 12/26/17 0858    alum-mag hydroxide-simeth (MYLANTA) oral suspension 30 mL  30 mL Oral Q4H PRN Soha Márquez MD   30 mL at 12/23/17 2358    sertraline (ZOLOFT) tablet 100 mg  100 mg Oral DAILY Malgorzata Bitters, DO   100 mg at 12/25/17 2133    sodium chloride (NS) flush 5-10 mL  5-10 mL IntraVENous Q8H Upton E Hunt, DO   10 mL at 12/26/17 1411    sodium chloride (NS) flush 5-10 mL  5-10 mL IntraVENous PRN Malgorzata Bitters, DO        acetaminophen (TYLENOL) tablet 650 mg  650 mg Oral Q4H PRN Malgorzata Bitters, DO   650 mg at 12/26/17 1058    ondansetron (ZOFRAN) injection 4 mg  4 mg IntraVENous Q4H PRN Malgorzata Bitters, DO   4 mg at 12/25/17 0708    enoxaparin (LOVENOX) injection 40 mg  40 mg SubCUTAneous Q24H Upton E Hunt, DO   40 mg at 12/25/17 2133    cefTRIAXone (ROCEPHIN) 1 g in sterile water (preservative free) 10 mL IV syringe  1 g IntraVENous Q24H Upton PA Dami, DO   1 g at 12/26/17 1411    losartan/hydroCHLOROthiazide (HYZAAR) 50/12.5 mg   Oral DAILY Miranda Roca NP           Technique: This EEG was performed using the [employer] 10/20 System. An EKG was monitored. The length of the recording was 30 minutes. State of Consciousness: awake, drowsy and asleep       Description:  Background showed frequent bilateral theta activities, bilateral frontal semirhythmic delta activities, bilateral triphasic waves. Potentials often slower in the right frontotemporal region. Beta 15-25 Hz, low wattage, better formed in the left hemisphere.   Posterior rhythm reached 8 Hz intermittently, sometimes mixed with theta activities on the right. No spike and slow wave complex, no rhythmic activities. Activation Procedures:  Hyperventilation: Not performed  Photic Stimulation: No driving    EK/KMF, regular      Interpretation: This is a mildly abnormal EEG due to the presence of diffuse, nonspecific cerebral dysfunction. Posterior rhythm reaches 7-8 Hz intermittently. In addition, the potentials of the right frontotemporal region are often slower. There are no convincing epinephrine discharges. Findings suggest mild metabolic/toxic encephalopathy, and the right frontotemporal structural abnormality or brain atrophy.   EKG monitoring is normal.      Chari Johnson MD

## 2017-12-27 NOTE — INTERDISCIPLINARY ROUNDS
Interdisciplinary team rounds were held 12/27/2017 with the following team members:Care Management, Nursing, Pharmacy, Physical Therapy and Physician. Plan of care discussed. See clinical pathway and/or care plan for interventions and desired outcomes. Anticipating discharge to home with home health today.

## 2017-12-27 NOTE — PROGRESS NOTES
patient called for assistance to the bathroom all shift long. No signs of confusion noted, hourly rounds done per protocol.

## 2017-12-27 NOTE — DISCHARGE SUMMARY
Hospitalist Discharge Summary     Admit Date:  2017  1:16 PM   Name:  Henrry Nino   Age:  79 y.o.  :  1950   MRN:  [id number]   PCP:  Hunter Ceja DO  Treatment Team: Attending Provider: Ramy Cochran DO; Care Manager: Celine Mas LMSW    Problem List for this Hospitalization:  Hospital Problems as of 2017  Date Reviewed: 2017          Codes Class Noted - Resolved POA    * (Principal)Unsteady gait ICD-10-CM: R26.81  ICD-9-CM: 781.2  2017 - Present Unknown        Moderate episode of recurrent major depressive disorder ([facility] [address] ICD-10-CM: F33.1  ICD-9-CM: 296.32  2015 - Present Yes        Morbid obesity with BMI of 40.0-44.9, adult [facility]) ICD-10-CM: E66.01, Z68.41  ICD-9-CM: 278.01, V85.41  2015 - Present Yes                Admission HPI from 2017:    \" Pt is a 78 yo female with pmh chronic pain, arthritis, heart murmur who presented to the ER this afternoon after possibly losing consciousness while getting out of the recliner. She reports about a month of unsteady gait requiring cane or holding onto furniture. Pt admits at baseline her gait was weak due to her severe arthritis pain in left foot. CT in ER showed venous vs AV malformations with calcifications of right parieto-occipital area. UA c/w UTI. EKG SR.  BP/HR stable.     \"    Hospital Course:     is a 78 yo female with pmh chronic pain, arthritis, heart murmur who presented to the ER  With a syncopal episode. She reports about a month of unsteady gait requiring cane or holding onto furniture. She admits at baseline her gait was weak due to her severe arthritis pain in left foot. CT in ER showed venous vs AV malformations with calcifications of right parieto-occipital area. MRI brain showed a large complex AV 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 It crosses the midline and involves left temporal and frontal lobes and both cerebellar hemispheres.  There are superficial temporal scalp  draining veins and large veins at the skull base. Given her loss of consciousness, ECHO was done (unremarkable, no events on telemetry, weakness improved with IV fluids and antibiotics). Neurology was called who recommended keppra 1500mg then 500mg BID for seizure prevention. EEG done inpatient showed no signs of epileptiform activity. Her urine cultures were also positive for E Coli UTI (s/p 5 days of ceftriaxone inpatient). Given the frequent falls and syncope and large complex AV malformation, Dr.Sharon Cory Cogan (Vascular Neurosurgeon was called and she recommended outpatient follow up for the patient). The patient will see her outpatient on 1/23 at 11:30 AM. The patient agreed to outpatient follow up. The patient clinically improved inpatient and was discharged home on 12/27/2017 with home PT/OT and home nursing.         Follow up instructions below. Plan was discussed with the patient. All questions answered. Patient was stable at time of discharge and was instructed to call or return if there are any concerns or recurrence of symptoms. Diagnostic Imaging/Tests:   Mri Brain W Wo Cont    Result Date: 12/22/2017  MRI BRAIN WITHOUT and WITH CONTRAST. HISTORY:  Unsteady gait. Abnormal CT scan. COMPARISON:  None. Study performed within 24 hours of admission. TECHNIQUE:  Sagittal T1, axial T1, T2, FLAIR, gradient echo, diffusion with ADC map were followed by 20cc intravenous gadolinium after which axial and coronal T1 images were repeated. Intravenous contrast was given to increase specificity of T2 abnormalities. FINDINGS: There is a large complex tangle of vessels centered in the right occipital and posterior parietal lobe. These vessels measure up to 13 mm in diameter. It crosses the midline at the posterior periventricular tissues and extends into the left temporal and frontal lobes as well.  There is a large cortical vessel at the left frontal lobe which measures 17 mm in diameter. Small vessels also extend through the incisura into both cerebellar hemispheres. There are prominent draining veins in the temporal scalp and extensive large draining veins the skull base connecting with the jugular veins. Diffusion images do not demonstrate any areas of restricted diffusion to suggest acute infarction. The lateral ventricles are normal size. The pituitary and parasellar structures are unremarkable on the sagittal T1 images. Orbits are grossly normal.  Paranasal sinuses are clear. 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 It crosses the midline and involves left temporal and frontal lobes and both cerebellar hemispheres. There are superficial temporal scalp draining veins and large veins at the skull base. Ct Head Wo Cont    Result Date: 12/26/2017  Noncontrast CT of the brain. Comparison: None Indication: Difficulty walking, unsteady gait Technique: Contiguous axial images were obtained from the skull base through the vertex without IV contrast. Radiation dose reduction techniques were used for this study:  Our CT scanners use one or all of the following: Automated exposure control, adjustment of the mA and/or kVp according to patient's size, iterative reconstruction. Findings: Numerous large vessels are present with areas of calcification involving the majority of the posterior right parieto-occipital region. Similar large cortical-based vessels with some intraparenchymal extension at the insular region on the left and left temporal lobe region. No midline shift or mass effect. No acute hematoma. No cortical-based infarct. Visualized portions of the orbits are normal. Paranasal sinuses and mastoids are normal. No acute fracture. Impression: 1. No evidence for acute cortical-based infarct. 2. 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 MRI of the brain with and without contrast recommended. Echocardiogram results:  Results for orders placed or performed during the hospital encounter of 17   2D ECHO COMPLETE ADULT (TTE) W OR [address] [facility]  [address] [facility], [address] [facility]  [phone]    Transthoracic Echocardiogram  2D, M-mode, Doppler, and Color Doppler    Patient: Heraclio Canales  MR #: [id number]  : [id number]  Age: 79 years  Gender: Female  Study date: 26-Dec-2017  Account #: [de-identified]  Height: 64 in  Weight: 249.5 lb  BSA: 2.15 mï¾²  Status:Routine  Location: 603  BP: 137/ 75    Allergies: CODEINE, OXYCODONE    Sonographer:  CHUCHO Tyson  Group:  [facility]  Referring Physician:  Abiodun Davis MD  Reading Physician:  Debbie Clark. Yifan Monteiro MD [facility]    INDICATIONS: Syncope    PROCEDURE: This was a routine study. A transthoracic echocardiogram was  performed. The study included complete 2D imaging, M-mode, complete spectral  Doppler, and color Doppler. Image quality was adequate. LEFT VENTRICLE: Indeterminate diastolic function with an Average E/e' of   21.55  Size was normal. Systolic function was normal. Ejection fraction was   estimated  in the range of 55 % to 60 %. There were no regional wall motion   abnormalities. Wall thickness was normal.    RIGHT VENTRICLE: The size was normal. Systolic function was normal. The  tricuspid jet envelope definition was inadequate for estimation of RV   systolic  pressure. LEFT ATRIUM: Size was normal.    RIGHT ATRIUM: Size was normal.    SYSTEMIC VEINS: IVC: The inferior vena cava was normal in size and course. AORTIC VALVE: The valve was not visualized well enough to rule out a bicuspid  morphology. The aortic valve area by the continuity equation was 1.54 cm2. The  peak velocity was 2.81 m/s. The mean pressure gradient was 17 mmHg. The  findings were most consistent with mild aortic stenosis.  The peak pressure  gradient was 32 mmHg. Stroke volume index: 46.51 There was mild regurgitation. MITRAL VALVE: Valve structure was normal. There was no evidence for stenosis. There was no regurgitation. TRICUSPID VALVE: The valve structure was normal. There was no evidence for  stenosis. There was no regurgitation. PULMONIC VALVE: The valve structure was normal. There was no evidence for  stenosis. There was no insufficiency. PERICARDIUM: There was no pericardial effusion. AORTA: The root exhibited normal size. SUMMARY:    -  Left ventricle: Indeterminate diastolic function with an Average E/e' of  82.82 Systolic function was normal. Ejection fraction was estimated in the  range of 55 % to 60 %. There were no regional wall motion abnormalities. -  Aortic valve: The valve was not visualized well enough to rule out a  bicuspid morphology. The aortic valve area by the continuity equation was   1.54  cm2. The findings were most consistent with mild aortic stenosis. SYSTEM MEASUREMENT TABLES    2D mode  AoR Diam (2D): 2.8 cm  LA Dimension (2D): 3.6 cm  Left Atrium Systolic Volume Index; Method of Disks, Biplane; 2D mode;: 29.8  ml/m2  IVS/LVPW (2D): 1  IVSd (2D): 1.2 cm  LVIDd (2D): 5.7 cm  LVIDs (2D): 4.6 cm  LVOT Area (2D): 3.5 cm2  LVPWd (2D): 1.2 cm  RVIDd (2D): 3 cm    Unspecified Scan Mode  Peak Grad; Mean; Antegrade Flow: 24 mm[Hg]  Vmax; Antegrade Flow: 213 cm/s  LVOT Diam: 2.1 cm    Prepared and signed by    Bunnie Hatchet.  Delores Dakins, MD [facility]  Signed 26-Dec-2017 13:19:11           All Micro Results     Procedure Component Value Units Date/Time    CULTURE, URINE [[id number]]  (Abnormal)  (Susceptibility) Collected:  12/22/17 0311    Order Status:  Completed Specimen:  Urine from Clean catch Updated:  12/25/17 0784     Special Requests: NO SPECIAL REQUESTS        Culture result:         >100,000 COLONIES/mL ESCHERICHIA COLI (A)              10,000 to 50,000 COLONIES/mL MIXED SKIN TRINO ISOLATED Labs: Results:       BMP, Mg, Phos Recent Labs      12/27/17   0335   NA  141   K  3.7   CL  102   CO2  30   AGAP  9   BUN  17   CREA  0.83   CA  8.7   GLU  97      CBC No results for input(s): WBC, RBC, HGB, HCT, PLT, GRANS, LYMPH, EOS, MONOS, BASOS, IG, ANEU, ABL, KIANA, ABM, ABB, AIG, HGBEXT, HCTEXT, PLTEXT in the last 72 hours. LFT No results for input(s): SGOT, ALT, TBIL, AP, TP, ALB, GLOB, AGRAT, GPT in the last 72 hours.    Cardiac Testing No results found for: BNPP, BNP, CPK, RCK1, RCK2, RCK3, RCK4, CKMB, CKNDX, CKND1, TROPT, TROIQ   Coagulation Tests Lab Results   Component Value Date/Time    Prothrombin time 10.2 04/22/2015 12:11 PM    Prothrombin time 10.6 01/23/2012 09:12 AM    INR 1.0 04/22/2015 12:11 PM    INR 1.0 01/23/2012 09:12 AM    aPTT 24.8 04/22/2015 12:11 PM    aPTT 25.3 01/23/2012 09:12 AM      A1c Lab Results   Component Value Date/Time    Hemoglobin A1c 5.6 06/26/2017 02:28 PM    Hemoglobin A1c 5.9 06/15/2017 02:34 PM    Hemoglobin A1c 6.0 05/12/2016 08:53 AM    Hemoglobin A1c, External 5.7 12/10/2014      Lipid Panel No results found for: CHOL, CHOLPOCT, CHOLX, CHLST, CHOLV, 207463, HDL, LDL, LDLC, DLDLP, 902398, VLDLC, VLDL, TGLX, TRIGL, TRIGP, TGLPOCT, CHHD, [facility]   Thyroid Panel Lab Results   Component Value Date/Time    TSH 1.590 12/23/2017 01:44 PM        Most Recent UA Lab Results   Component Value Date/Time    Color YELLOW 04/22/2015 12:02 PM    Appearance CLOUDY 04/22/2015 12:02 PM    Specific gravity 1.024 04/22/2015 12:02 PM    pH (UA) 6.0 04/22/2015 12:02 PM    Protein NEGATIVE  04/22/2015 12:02 PM    Glucose NEGATIVE  04/22/2015 12:02 PM    Ketone NEGATIVE  04/22/2015 12:02 PM    Bilirubin SMALL 04/22/2015 12:02 PM    Blood NEGATIVE  04/22/2015 12:02 PM    Urobilinogen 1.0 04/22/2015 12:02 PM    Nitrites NEGATIVE  04/22/2015 12:02 PM    Leukocyte Esterase SMALL 04/22/2015 12:02 PM        Allergies   Allergen Reactions    Codeine Nausea and Vomiting    Oxycodone Nausea and Vomiting     Immunization History   Administered Date(s) Administered    Influenza High Dose Vaccine PF 08/25/2015, 01/06/2017    Influenza Vaccine 09/21/2010, 09/09/2011, 10/31/2013, 10/15/2014    Pneumococcal Conjugate (PCV-13) 06/03/2015    TB Skin Test (PPD) Intradermal 05/05/2015, 12/22/2017    Td, Adsorbed PF 11/24/2015    ZZZ-RETIRED (DO NOT USE) Pneumococcal Vaccine (Unspecified Type) 02/10/2012    Zoster Vaccine, Live 06/21/2012       All Labs from Last 24 Hrs:  Recent Results (from the past 24 hour(s))   GLUCOSE, POC    Collection Time: 12/26/17  4:11 PM   Result Value Ref Range    Glucose (POC) 99 65 - 100 mg/dL   GLUCOSE, POC    Collection Time: 12/26/17  8:03 PM   Result Value Ref Range    Glucose (POC) 105 (H) 65 - 005 mg/dL   METABOLIC PANEL, BASIC    Collection Time: 12/27/17  3:35 AM   Result Value Ref Range    Sodium 141 136 - 145 mmol/L    Potassium 3.7 3.5 - 5.1 mmol/L    Chloride 102 98 - 107 mmol/L    CO2 30 21 - 32 mmol/L    Anion gap 9 7 - 16 mmol/L    Glucose 97 65 - 100 mg/dL    BUN 17 8 - 23 MG/DL    Creatinine 0.83 0.6 - 1.0 MG/DL    GFR est AA >60 >60 ml/min/1.73m2    GFR est non-AA >60 >60 ml/min/1.73m2    Calcium 8.7 8.3 - 10.4 MG/DL   GLUCOSE, POC    Collection Time: 12/27/17  7:31 AM   Result Value Ref Range    Glucose (POC) 104 (H) 65 - 100 mg/dL   GLUCOSE, POC    Collection Time: 12/27/17 11:24 AM   Result Value Ref Range    Glucose (POC) 107 (H) 65 - 100 mg/dL       Discharge Exam:  Patient Vitals for the past 24 hrs:   Temp Pulse Resp BP SpO2   12/27/17 1410 97.8 °F (36.6 °C) (!) 58 18 115/46 93 %   12/27/17 1127 98.2 °F (36.8 °C) 61 18 153/68 93 %   12/27/17 0734 98.1 °F (36.7 °C) 69 18 151/61 90 %   12/27/17 0352 98.4 °F (36.9 °C) 76 18 153/56 90 %   12/27/17 0030 98.1 °F (36.7 °C) 68 18 116/60 90 %   12/26/17 1959 98.5 °F (36.9 °C) 65 18 152/60 90 %     Oxygen Therapy  O2 Sat (%): 93 % (12/27/17 1410)  Pulse via Oximetry: 90 beats per minute (12/27/17 0352)  O2 Device: Room air (12/27/17 1410)  O2 Flow Rate (L/min): 0 l/min (12/26/17 1523)  No intake or output data in the 24 hours ending 12/27/17 1542    General:    Well nourished. Alert. No distress. Eyes:   Normal sclera. Extraocular movements intact. ENT:  Normocephalic, atraumatic. Moist mucous membranes  CV:   Regular rate and rhythm. No murmur, rub, or gallop. Lungs:  Clear to auscultation bilaterally. No wheezing, rhonchi, or rales. Abdomen: Soft, nontender, nondistended. Bowel sounds normal.   Extremities: Warm and dry. No cyanosis or edema. Neurologic: CN II-XII grossly intact. Sensation intact. Skin:     No rashes or jaundice. Psych:  Normal mood and affect. Discharge Info:   Current Discharge Medication List      START taking these medications    Details   levETIRAcetam (KEPPRA) 500 mg tablet Take 1 Tab by mouth two (2) times a day. Qty: 30 Tab, Refills: 1         CONTINUE these medications which have NOT CHANGED    Details   losartan-hydroCHLOROthiazide (HYZAAR) 50-12.5 mg per tablet TAKE 1 TABLET ONE TIME DAILY  Qty: 90 Tab, Refills: 3      ergocalciferol (VITAMIN D2) 50,000 unit capsule TAKE 1 CAPSULE ONE TIME WEEKLY- takes on sat  Qty: 12 Cap, Refills: 3    Associated Diagnoses: Vitamin D deficiency      sertraline (ZOLOFT) 100 mg tablet TAKE 1 TABLET EVERY DAY  Qty: 90 Tab, Refills: 3    Associated Diagnoses: Moderate episode of recurrent major depressive disorder ([facility] [address]               Disposition: home    Activity: Activity as tolerated  Diet: DIET CARDIAC Regular    Follow-up Information     Follow up With Details Comments [address] [facility] [address] [facility] [address] [facility] [address]  [address] DO On 1/3/2018 11:00 595 [facility]              Time spent in patient discharge planning and coordination 45 minutes.     Signed:  Clark Wharton MD

## 2017-12-27 NOTE — PROGRESS NOTES
Information that was requested was faxed to Dr. Rg Kumar. Information was also left on their referral hotline.

## 2017-12-27 NOTE — PROGRESS NOTES
MD requesting appointment with neurosurgeon to be made prior to administering d/c instructions. Attempted to call and speak with someone at the office [phone]. Cannot speak to an actual . MD Aware.

## 2017-12-27 NOTE — PROGRESS NOTES
Hospitalist Progress Note     Admit Date:  2017  1:16 PM   Name:  Jillian Seth   Age:  79 y.o.  :  1950   MRN:  [id number]   PCP:  Cheryle Girt, DO  Treatment Team: Attending Provider: Katharine Interiano DO; Care Manager: Mayda Hector LMSW    Subjective:      is a 80 yo female with pmh chronic pain, arthritis, heart murmur who presented to the ER after a syncopal episode (LOC for about 1 minute). Prior to the fall, she reported dizziness and walked to the bathroom and came back. She denied any bowel or bladder incontinence. This morning, she reports that she wants to go home. She was advised to wait for the ECHO result and PT e valuation. Neurology recommended keppra 1500mg load with 500mg BID. This morning, she also denies any dyspnea or cough.     Objective:     Patient Vitals for the past 24 hrs:   Temp Pulse Resp BP SpO2   17 1410 97.8 °F (36.6 °C) (!) 58 18 115/46 93 %   17 1127 98.2 °F (36.8 °C) 61 18 153/68 93 %   17 0734 98.1 °F (36.7 °C) 69 18 151/61 90 %   17 0352 98.4 °F (36.9 °C) 76 18 153/56 90 %   17 0030 98.1 °F (36.7 °C) 68 18 116/60 90 %   17 1959 98.5 °F (36.9 °C) 65 18 152/60 90 %   17 1524 98.9 °F (37.2 °C) 68 18 151/68 91 %     Oxygen Therapy  O2 Sat (%): 93 % (17 1410)  Pulse via Oximetry: 90 beats per minute (17 0352)  O2 Device: Room air (17 1410)  O2 Flow Rate (L/min): 0 l/min (17 1523)  No intake or output data in the 24 hours ending 17 1436        General appearance: NAD, conversant  Eyes: anicteric sclerae, moist conjunctivae; no lid-lag  ENT: Atraumatic; oropharynx clear with moist mucous membranes and no mucosal ulcerations; normal hard and soft palate  Neck: Trachea midline   FROM, supple, no thyromegaly or lymphadenopathy  Lungs: CTA, with normal respiratory effort and no intercostal retractions  CV: S1,S2 present, no added murmurs  Abdomen: Soft, non-tender; no masses Extremities: No peripheral edema or extremity lymphadenopathy  Skin: Normal temperature, turgor and texture; no subcutaneous nodules  Psych: Appropriate affect, alert and oriented to person, place and time    Data Review:  I have reviewed all labs, meds, telemetry events, and studies from the last 24 hours.     Recent Results (from the past 24 hour(s))   GLUCOSE, POC    Collection Time: 12/26/17  4:11 PM   Result Value Ref Range    Glucose (POC) 99 65 - 100 mg/dL   GLUCOSE, POC    Collection Time: 12/26/17  8:03 PM   Result Value Ref Range    Glucose (POC) 105 (H) 65 - 631 mg/dL   METABOLIC PANEL, BASIC    Collection Time: 12/27/17  3:35 AM   Result Value Ref Range    Sodium 141 136 - 145 mmol/L    Potassium 3.7 3.5 - 5.1 mmol/L    Chloride 102 98 - 107 mmol/L    CO2 30 21 - 32 mmol/L    Anion gap 9 7 - 16 mmol/L    Glucose 97 65 - 100 mg/dL    BUN 17 8 - 23 MG/DL    Creatinine 0.83 0.6 - 1.0 MG/DL    GFR est AA >60 >60 ml/min/1.73m2    GFR est non-AA >60 >60 ml/min/1.73m2    Calcium 8.7 8.3 - 10.4 MG/DL   GLUCOSE, POC    Collection Time: 12/27/17  7:31 AM   Result Value Ref Range    Glucose (POC) 104 (H) 65 - 100 mg/dL   GLUCOSE, POC    Collection Time: 12/27/17 11:24 AM   Result Value Ref Range    Glucose (POC) 107 (H) 65 - 100 mg/dL        All Micro Results     Procedure Component Value Units Date/Time    CULTURE, URINE [[id number]]  (Abnormal)  (Susceptibility) Collected:  12/22/17 0600    Order Status:  Completed Specimen:  Urine from Clean catch Updated:  12/25/17 1440     Special Requests: NO SPECIAL REQUESTS        Culture result:         >100,000 COLONIES/mL ESCHERICHIA COLI (A)              10,000 to 50,000 COLONIES/mL MIXED SKIN TRINO ISOLATED          Current Meds:  Current Facility-Administered Medications   Medication Dose Route Frequency    levETIRAcetam (KEPPRA) tablet 500 mg  500 mg Oral BID    potassium chloride (K-DUR, KLOR-CON) SR tablet 40 mEq  40 mEq Oral DAILY    alum-mag hydroxide-simeth (MYLANTA) oral suspension 30 mL  30 mL Oral Q4H PRN    sertraline (ZOLOFT) tablet 100 mg  100 mg Oral DAILY    sodium chloride (NS) flush 5-10 mL  5-10 mL IntraVENous Q8H    sodium chloride (NS) flush 5-10 mL  5-10 mL IntraVENous PRN    acetaminophen (TYLENOL) tablet 650 mg  650 mg Oral Q4H PRN    ondansetron (ZOFRAN) injection 4 mg  4 mg IntraVENous Q4H PRN    enoxaparin (LOVENOX) injection 40 mg  40 mg SubCUTAneous Q24H    cefTRIAXone (ROCEPHIN) 1 g in sterile water (preservative free) 10 mL IV syringe  1 g IntraVENous Q24H    losartan/hydroCHLOROthiazide (HYZAAR) 50/12.5 mg   Oral DAILY       Other Studies (last 24 hours):  No results found. Review of Systems:  Gen: No weight loss  Eyes: no vision changes  ENT: no sore throat  Resp: No sob or cough  CV: No cp  Abd:  no abd pain, no vomiting or diarrhea  : No incontinence, no hematuria or dysuria, no flank pain  MSK: no leg swelling  Neuro: +weakness, falls    Assessment and Plan:     Hospital Problems as of 12/27/2017  Date Reviewed: 6/21/2017          Codes Class Noted - Resolved POA    * (Principal)Unsteady gait ICD-10-CM: R26.81  ICD-9-CM: 781.2  12/22/2017 - Present Unknown        Moderate episode of recurrent major depressive disorder ([facility] ICD-10-CM: F33.1  ICD-9-CM: 296.32  5/5/2015 - Present Yes        Morbid obesity with BMI of 40.0-44.9, adult ([facility] ICD-10-CM: E66.01, Z68.41  ICD-9-CM: 278.01, V85.41  1/19/2015 - Present Yes              PLAN:    Syncope  Unknown cause, cannot yet rule neurocardiogenic syncope  Most likely secondary to dehydration in the setting of UTI (+dysuria)  MRI Brain: 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 It crosses the midline and involves left temporal and frontal lobes and both cerebellar hemispheres. There are superficial temporal scalp  draining veins and large veins at the skull base.   Dr. Uvaldo Gustafson called who recommended outpatient follow up for management of AVM given current symptoms  Orthostatic vitals negative  B12 WNL.  TSH WNL  Negative orthostatic vitals  B12,TSH  Continue ceftriaxone, urine culture + E Coli (day 5/7)  ECHO: 55%  Plan  Neurology: Keppra 500 BID on discharge  PT/OT following    Hypokalemia: KCL ordered    DC planning/Dispo:  PT/OT eval done, discharge today  DVT ppx:  enoxaparin    Signed:  Corazon Mars MD

## 2017-12-27 NOTE — PROGRESS NOTES
Referral to Home health has been made with [facility]. PT,OT, RN and SW ordered. Patient has stated she was sure she could afford her Keppra. [facility] updated about patient's discharge today.

## 2017-12-27 NOTE — DISCHARGE INSTRUCTIONS
0  DISCHARGE SUMMARY from Nurse    PATIENT INSTRUCTIONS:    After general anesthesia or intravenous sedation, for 24 hours or while taking prescription Narcotics:  · Limit your activities  · Do not drive and operate hazardous machinery  · Do not make important personal or business decisions  · Do  not drink alcoholic beverages  · If you have not urinated within 8 hours after discharge, please contact your surgeon on call. Report the following to your surgeon:  · Excessive pain, swelling, redness or odor of or around the surgical area  · Temperature over 100.5  · Nausea and vomiting lasting longer than 4 hours or if unable to take medications  · Any signs of decreased circulation or nerve impairment to extremity: change in color, persistent  numbness, tingling, coldness or increase pain  · Any questions    What to do at Home:  Recommended activity: Activity as tolerated,     If you experience any of the following symptoms fever, chills, new or unrelieved pain, persistent nausea or vomiting, persistent or worsening dizziness, please follow up with PCP. *  Please give a list of your current medications to your Primary Care Provider. *  Please update this list whenever your medications are discontinued, doses are      changed, or new medications (including over-the-counter products) are added. *  Please carry medication information at all times in case of emergency situations. These are general instructions for a healthy lifestyle:    No smoking/ No tobacco products/ Avoid exposure to second hand smoke  Surgeon General's Warning:  Quitting smoking now greatly reduces serious risk to your health.     Obesity, smoking, and sedentary lifestyle greatly increases your risk for illness    A healthy diet, regular physical exercise & weight monitoring are important for maintaining a healthy lifestyle    You may be retaining fluid if you have a history of heart failure or if you experience any of the following symptoms:  Weight gain of 3 pounds or more overnight or 5 pounds in a week, increased swelling in our hands or feet or shortness of breath while lying flat in bed. Please call your doctor as soon as you notice any of these symptoms; do not wait until your next office visit. Recognize signs and symptoms of STROKE:    F-face looks uneven    A-arms unable to move or move unevenly    S-speech slurred or non-existent    T-time-call 911 as soon as signs and symptoms begin-DO NOT go       Back to bed or wait to see if you get better-TIME IS BRAIN. Warning Signs of HEART ATTACK     Call 911 if you have these symptoms:   Chest discomfort. Most heart attacks involve discomfort in the center of the chest that lasts more than a few minutes, or that goes away and comes back. It can feel like uncomfortable pressure, squeezing, fullness, or pain.  Discomfort in other areas of the upper body. Symptoms can include pain or discomfort in one or both arms, the back, neck, jaw, or stomach.  Shortness of breath with or without chest discomfort.  Other signs may include breaking out in a cold sweat, nausea, or lightheadedness. Don't wait more than five minutes to call 911 - MINUTES MATTER! Fast action can save your life. Calling 911 is almost always the fastest way to get lifesaving treatment. Emergency Medical Services staff can begin treatment when they arrive -- up to an hour sooner than if someone gets to the hospital by car. The discharge information has been reviewed with the patient. The patient verbalized understanding. Discharge medications reviewed with the patient and appropriate educational materials and side effects teaching were provided.   ___________________________________________________________________________________________________________________________________

## 2017-12-27 NOTE — PROGRESS NOTES
Per Dr. Abdul [facility] pt has appointment Jan 23 @ 11:30 with neurosurgeon. Information put in discharge papers and will be given to pt.

## 2017-12-27 NOTE — PROGRESS NOTES
Problem: Mobility Impaired (Adult and Pediatric)  Goal: *Therapy Goal (Edit Goal, Insert Text)  STG:  (1.)Ms. Herron will move from supine to sit and sit to supine , scoot up and down and roll side to side with MODIFIED INDEPENDENCE within 4 day(s). (2.)Ms. Herron will transfer from bed to chair and chair to bed with MODIFIED INDEPENDENCE using the least restrictive device within 4 day(s). (3.)Ms. Herron will ambulate with MODIFIED INDEPENDENCE for 100 feet with the least restrictive device within 4 day(s). LTG:  (1.)Ms. Herron will move from supine to sit and sit to supine , scoot up and down and roll side to side in bed with INDEPENDENT within 7 day(s). (2.)Ms. Herron will transfer from bed to chair and chair to bed with INDEPENDENT using the least restrictive device within 7 day(s). (3.)Ms. Herron will ambulate with INDEPENDENT for 200 feet with the least restrictive device within 7 day(s). ________________________________________________________________________________________________       PHYSICAL THERAPY: Daily Note, Treatment Day: 1st, AM 12/27/2017  OBSERVATION: Hospital Day: 6  [place] / [place] / Product Type: Medicare /      NAME/AGE/GENDER: Arun Ramirez is a 79 y.o. female   PRIMARY DIAGNOSIS: Unsteady gait Unsteady gait Unsteady gait        ICD-10: Treatment Diagnosis:   · Difficulty in walking, Not elsewhere classified (R26.2)   Precaution/Allergies:  Codeine and Oxycodone      ASSESSMENT:   Arun Ramirez presents in supine asking for assist to get to bathroom (for safety). Pt transfers to sitting with supervision. Stands with SBA and ambulates from bed to bathroom reaching for wall for support. Performs toilet transfers, hygiene, and standing ADLs/balance activities at sink with supervision for safety.  Pt performs seated functional activities/balance activities (reaching, upper and lower body dressing, etc) independently (feels more comfortable sitting for this vs standing). Then ambulates total of 140' in hallway with cane for support and close CGA for safety. Demonstrates trunk sway/path deviations and mild losses of balance during ambulation so provided with verbal cueing for safety/mechanics and strongly suggested walker use at home instead of cane. Pt returned to room and up to chair after activity with needs in reach. Reminded to call for staff assist with mobility. Progress noted today with mobility, balance, and activity tolerance however pt still seems to be functioning well below baseline. Recommend continued therapy services at discharge; discussed with staff. Ms. General Dumont was discharged from our facility before further treatment could be provided in this setting. This section established at most recent assessment   PROBLEM LIST (Impairments causing functional limitations):  1. Decreased Strength  2. Decreased ADL/Functional Activities  3. Decreased Transfer Abilities  4. Decreased Ambulation Ability/Technique  5. Decreased Balance  6. Decreased Activity Tolerance  7. Decreased Pacing Skills  8. Decreased Flexibility/Joint Mobility  9. Decreased [place] with Home Exercise Program   INTERVENTIONS PLANNED: (Benefits and precautions of physical therapy have been discussed with the patient.)  1. Balance Exercise  2. Bed Mobility  3. Family Education  4. Gait Training  5. Home Exercise Program (HEP)  6. Range of Motion (ROM)  7. Therapeutic Activites  8. Therapeutic Exercise/Strengthening  9. Transfer Training     TREATMENT PLAN: Frequency/Duration: 3 times a week for duration of hospital stay  Rehabilitation Potential For Stated Goals: Good     RECOMMENDED REHABILITATION/EQUIPMENT: (at time of discharge pending progress): Due to the probability of continued deficits (see above) this patient will likely need continued skilled outpatient physical therapy after discharge.                 HISTORY:   History of Present Injury/Illness (Reason for Referral):  Pt is a 80 yo female with pmh chronic pain, arthritis, heart murmur who presented to the ER this afternoon after possibly losing consciousness while getting out of the recliner. She reports about a month of unsteady gait requiring cane or holding onto furniture. Pt admits at baseline her gait was weak due to her severe arthritis pain in left foot. CT in ER showed venous vs AV malformations with calcifications of right parieto-occipital area. UA c/w UTI. EKG SR.  BP/HR stable. Past Medical History/Comorbidities:   Ms. Baldo Blackwell  has a past medical history of Chronic pain; Chronic sinusitis (1/19/2015); Diverticulosis (2017); Edema (1/19/2015); Former smoker; GERD (gastroesophageal reflux disease); Headache (1/19/2015); Hearing loss (1/19/2015); Heart murmur (1990); Hip osteoarthritis (1/19/2015); colonic polyps (2017); migraines; Hypercholesterolemia (1/19/2015); Hypertension; Impaired fasting glucose (1/19/2015); Joint disorder (1/19/2015); Morbid obesity ([address] Neuralgia (1/19/2015); Phlebitis and thrombophlebitis of superficial vessels of lower extremities (01/19/2015); Psychiatric disorder; Restless leg syndrome (1/19/2015); Rheumatic fever; Seizures ([address] SOB (shortness of breath) on exertion; Stroke ([address] Tobacco abuse (1/19/2015); Vascular anomalies, congenital; and Vitamin D deficiency (1/19/2015). She also has no past medical history of Adverse effect of anesthesia; Aneurysm ([address] Arrhythmia; Asthma; Autoimmune disease ([address] CAD (coronary artery disease); Cancer ([address] Chronic kidney disease; Chronic obstructive pulmonary disease ([address] Coagulation disorder ([address] Diabetes ([address] Difficult intubation; Endocarditis; Heart failure ([address] Liver disease; Malignant hyperthermia due to anesthesia; Nausea & vomiting; Pseudocholinesterase deficiency; PUD (peptic ulcer disease); Sleep apnea; Thromboembolus ([address] or Thyroid disease.   Ms. Baldo Blackwell  has a past surgical history that includes hx mohs procedure (Right, 1995); hx cholecystectomy (1982); colonoscopy (N/A, 2/20/2017); pr total hip arthroplasty (Right, 2005); hx rotator cuff repair (Right, 10/31/2016); hx cataract removal (Bilateral, 3/2016); hx tubal ligation (1982); and hx appendectomy (03/15/2017). Social History/Living Environment:   Home Environment: Private residence  # Steps to Enter: 1  One/Two Story Residence: One story  Living Alone: No  Support Systems: Spouse/Significant Other/Partner  Patient Expects to be Discharged to[de-identified] Private residence  Current DME Used/Available at Home: Cane, straight, Walker, rolling  Tub or Shower Type: Tub/Shower combination  Prior Level of Function/Work/Activity:  She reports fatigue at time and is deconditioned, often sitting in front of the television all day. Dominant Side:         RIGHT  Personal Factors:          Sex:  female        Age:  79 y.o. Number of Personal Factors/Comorbidities that affect the Plan of Care: 0: LOW COMPLEXITY   EXAMINATION:   Most Recent Physical Functioning:   Gross Assessment:                  Posture:     Balance:  Sitting: Intact  Standing: Impaired  Standing - Static: Fair  Standing - Dynamic : Fair Bed Mobility:  Rolling: Supervision  Supine to Sit: Supervision  Scooting: Supervision  Wheelchair Mobility:     Transfers:  Sit to Stand: Supervision  Stand to Sit: Supervision  Bed to Chair: Stand-by asssistance  Interventions: Verbal cues; Safety awareness training  Duration: 26 Minutes  Gait:     Speed/Marjorie: Fluctuations; Pace decreased (<100 feet/min)  Step Length: Left shortened;Right shortened  Gait Abnormalities: Decreased step clearance;Trunk sway increased; Path deviations  Distance (ft): 140 Feet (ft)  Assistive Device: Cane, straight  Ambulation - Level of Assistance: Contact guard assistance  Interventions: Verbal cues; Safety awareness training; Tactile cues      Body Structures Involved:  1. Bones  2. Joints  3. Muscles  4.  Ligaments Body Functions Affected:  1. Neuromusculoskeletal  2. Movement Related  3. Skin Related  4. Metobolic/Endocrine Activities and Participation Affected:  1. Mobility  2. Self Care  3. Community, Social and [place]   Number of elements that affect the Plan of Care: 3: MODERATE COMPLEXITY   CLINICAL PRESENTATION:   Presentation: Stable and uncomplicated: LOW COMPLEXITY   CLINICAL DECISION MAKIN[address] [place] [address] AM-PAC 6 Clicks   Basic Mobility Inpatient Short Form  How much difficulty does the patient currently have. .. Unable A Lot A Little None   1. Turning over in bed (including adjusting bedclothes, sheets and blankets)? [] 1   [] 2   [] 3   [x] 4   2. Sitting down on and standing up from a chair with arms ( e.g., wheelchair, bedside commode, etc.)   [] 1   [] 2   [x] 3   [] 4   3. Moving from lying on back to sitting on the side of the bed? [] 1   [] 2   [] 3   [x] 4   How much help from another person does the patient currently need. .. Total A Lot A Little None   4. Moving to and from a bed to a chair (including a wheelchair)? [] 1   [] 2   [x] 3   [] 4   5. Need to walk in hospital room? [] 1   [] 2   [x] 3   [] 4   6. Climbing 3-5 steps with a railing? [] 1   [] 2   [x] 3   [] 4   © , Trustees of [address] [place] [address], under license to [employer]. All rights reserved      Score:  Initial: 18 Most Recent: 20 (Date: 17 )    Interpretation of Tool:  Represents activities that are increasingly more difficult (i.e. Bed mobility, Transfers, Gait). Score 24 23 22-20 19-15 14-10 9-7 6     Modifier CH CI CJ CK CL CM CN      ?  Mobility - Walking and Moving Around:     - CURRENT STATUS: CJ - 20%-39% impaired, limited or restricted    - GOAL STATUS: CI - 1%-19% impaired, limited or restricted    - D/C STATUS:  CI - 1%-19% impaired, limited or restricted  [place] / [place] / Product Type: Medicare /      Medical Necessity:     · Patient demonstrates good rehab potential due to higher previous functional level. Reason for Services/Other Comments:  · Patient continues to require skilled intervention due to medical complications, patient unable to attend/participate in therapy as expected and decreased transfers, ambulation and mobility. Use of outcome tool(s) and clinical judgement create a POC that gives a: Clear prediction of patient's progress: LOW COMPLEXITY            TREATMENT:   (In addition to Assessment/Re-Assessment sessions the following treatments were rendered)   Pre-treatment Symptoms/Complaints: \"I feel weak\"  Pain: Initial:   Pain Intensity 1: 0  Post Session:  0/10 denies pain     Therapeutic Activity: (  26 Minutes ):  Therapeutic activities including Bed transfers, Chair transfers, Toilet transfers, Ambulation on level ground and seated and standing functional activities, ADLs, balance activities to improve mobility, strength, balance, coordination and activity tolerance. Required minimal Verbal cues; Safety awareness training; Tactile cues to promote static and dynamic balance in standing and promote motor control of bilateral, upper extremity(s), lower extremity(s). Braces/Orthotics/Lines/Etc:   · O2 Device: Room air  Treatment/Session Assessment:    · Response to Treatment:  Good progress with mobility, distance, safety  · Interdisciplinary Collaboration:   o Physical Therapist  o Registered Nurse  · After treatment position/precautions:   o Up in chair  o Bed/Chair-wheels locked  o Bed in low position  o Call light within reach  o RN notified   · Compliance with Program/Exercises: Will assess as treatment progresses. · Recommendations/Intent for next treatment session: \"Next visit will focus on advancements to more challenging activities, reduction in assistance provided and transfers, ambulation and mobility\".   Total Treatment Duration:  PT Patient Time In/Time Out  Time In: 0924  Time Out: [address] [facility], DPT

## 2017-12-27 NOTE — PROGRESS NOTES
Hospitalist Progress Note     Admit Date:  2017  1:16 PM   Name:  Freddy Zavala   Age:  79 y.o.  :  1950   MRN:  [id number]   PCP:  Haley Parikh DO  Treatment Team: Attending Provider: Simone Richey DO; Care Manager: Shaunna Moscoso LMSW    Subjective:      is a 78 yo female with pmh chronic pain, arthritis, heart murmur who presented to the ER after a syncopal episode (LOC for about 1 minute). Prior to the fall, she reported dizziness and walked to the bathroom and came back. She denied any bowel or bladder incontinence. This morning, she reports that she wants to go home. She was advised to wait for the ECHO result and PT e valuation. Neurology recommended keppra 1500mg load with 500mg BID (no EEG was done, will follow up). This morning, she also denies any dyspnea or cough.     Objective:     Patient Vitals for the past 24 hrs:   Temp Pulse Resp BP SpO2   17 0734 98.1 °F (36.7 °C) 69 18 151/61 90 %   17 0352 98.4 °F (36.9 °C) 76 18 153/56 90 %   17 0030 98.1 °F (36.7 °C) 68 18 116/60 90 %   17 1959 98.5 °F (36.9 °C) 65 18 152/60 90 %   17 1524 98.9 °F (37.2 °C) 68 18 151/68 91 %   17 1124 98.1 °F (36.7 °C) 70 18 144/53 92 %     Oxygen Therapy  O2 Sat (%): 90 % (17 0734)  Pulse via Oximetry: 90 beats per minute (17 0352)  O2 Device: Room air (17)  O2 Flow Rate (L/min): 0 l/min (17 1523)  No intake or output data in the 24 hours ending 17 0824        General appearance: NAD, conversant  Eyes: anicteric sclerae, moist conjunctivae; no lid-lag  ENT: Atraumatic; oropharynx clear with moist mucous membranes and no mucosal ulcerations; normal hard and soft palate  Neck: Trachea midline   FROM, supple, no thyromegaly or lymphadenopathy  Lungs: CTA, with normal respiratory effort and no intercostal retractions  CV: S1,S2 present, no added murmurs  Abdomen: Soft, non-tender; no masses   Extremities: No peripheral edema or extremity lymphadenopathy  Skin: Normal temperature, turgor and texture; no subcutaneous nodules  Psych: Appropriate affect, alert and oriented to person, place and time    Data Review:  I have reviewed all labs, meds, telemetry events, and studies from the last 24 hours.     Recent Results (from the past 24 hour(s))   GLUCOSE, POC    Collection Time: 12/26/17 11:22 AM   Result Value Ref Range    Glucose (POC) 128 (H) 65 - 100 mg/dL   GLUCOSE, POC    Collection Time: 12/26/17  4:11 PM   Result Value Ref Range    Glucose (POC) 99 65 - 100 mg/dL   GLUCOSE, POC    Collection Time: 12/26/17  8:03 PM   Result Value Ref Range    Glucose (POC) 105 (H) 65 - 962 mg/dL   METABOLIC PANEL, BASIC    Collection Time: 12/27/17  3:35 AM   Result Value Ref Range    Sodium 141 136 - 145 mmol/L    Potassium 3.7 3.5 - 5.1 mmol/L    Chloride 102 98 - 107 mmol/L    CO2 30 21 - 32 mmol/L    Anion gap 9 7 - 16 mmol/L    Glucose 97 65 - 100 mg/dL    BUN 17 8 - 23 MG/DL    Creatinine 0.83 0.6 - 1.0 MG/DL    GFR est AA >60 >60 ml/min/1.73m2    GFR est non-AA >60 >60 ml/min/1.73m2    Calcium 8.7 8.3 - 10.4 MG/DL        All Micro Results     Procedure Component Value Units Date/Time    CULTURE, URINE [[id number]]  (Abnormal)  (Susceptibility) Collected:  12/22/17 1515    Order Status:  Completed Specimen:  Urine from Clean catch Updated:  12/25/17 3218     Special Requests: NO SPECIAL REQUESTS        Culture result:         >100,000 COLONIES/mL ESCHERICHIA COLI (A)              10,000 to 50,000 COLONIES/mL MIXED SKIN TRINO ISOLATED          Current Meds:  Current Facility-Administered Medications   Medication Dose Route Frequency    levETIRAcetam (KEPPRA) 1,500 mg in 0.9% sodium chloride 100 mL IVPB  1,500 mg IntraVENous ONCE    levETIRAcetam (KEPPRA) tablet 500 mg  500 mg Oral BID    potassium chloride (K-DUR, KLOR-CON) SR tablet 40 mEq  40 mEq Oral DAILY    alum-mag hydroxide-simeth (MYLANTA) oral suspension 30 mL  30 mL Oral Q4H PRN    sertraline (ZOLOFT) tablet 100 mg  100 mg Oral DAILY    sodium chloride (NS) flush 5-10 mL  5-10 mL IntraVENous Q8H    sodium chloride (NS) flush 5-10 mL  5-10 mL IntraVENous PRN    acetaminophen (TYLENOL) tablet 650 mg  650 mg Oral Q4H PRN    ondansetron (ZOFRAN) injection 4 mg  4 mg IntraVENous Q4H PRN    enoxaparin (LOVENOX) injection 40 mg  40 mg SubCUTAneous Q24H    cefTRIAXone (ROCEPHIN) 1 g in sterile water (preservative free) 10 mL IV syringe  1 g IntraVENous Q24H    losartan/hydroCHLOROthiazide (HYZAAR) 50/12.5 mg   Oral DAILY       Other Studies (last 24 hours):  No results found. Review of Systems:  Gen: No weight loss  Eyes: no vision changes  ENT: no sore throat  Resp: No sob or cough  CV: No cp  Abd:  no abd pain, no vomiting or diarrhea  : No incontinence, no hematuria or dysuria, no flank pain  MSK: no leg swelling  Neuro: +weakness, falls    Assessment and Plan:     Hospital Problems as of 12/27/2017  Date Reviewed: 6/21/2017          Codes Class Noted - Resolved POA    * (Principal)Unsteady gait ICD-10-CM: R26.81  ICD-9-CM: 781.2  12/22/2017 - Present Unknown        Moderate episode of recurrent major depressive disorder ([facility] ICD-10-CM: F33.1  ICD-9-CM: 296.32  5/5/2015 - Present Yes        Morbid obesity with BMI of 40.0-44.9, adult ([facility] ICD-10-CM: E66.01, Z68.41  ICD-9-CM: 278.01, V85.41  1/19/2015 - Present Yes              PLAN:    Syncope  Unknown cause, cannot yet rule neurocardiogenic syncope  Most likely secondary to dehydration in the setting of UTI (+dysuria)  MRI Brain: 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 It crosses the midline and involves left temporal and frontal lobes and both cerebellar hemispheres. There are superficial temporal scalp  draining veins and large veins at the skull base.   Dr. Caryn Valencia called who recommended outpatient follow up for management of AVM given current symptoms  Orthostatic vitals negative  B12 WNL.  TSH WNL  Negative orthostatic vitals  B12,TSH  Plan  Continue ceftriaxone, urine culture + E Coli (day 5/7)  ECHO: 55%  Plan  Neurology: Keppra 500 BID on discharge  PT/OT consulted     Hypokalemia: KCL ordered    DC planning/Dispo:  PT/OT eval done  DVT ppx:  enoxaparin    Signed:  Danilo Morales MD

## 2018-01-03 PROBLEM — R90.89 ABNORMAL BRAIN MRI: Status: ACTIVE | Noted: 2018-01-03

## 2018-01-04 ENCOUNTER — HOME CARE VISIT (OUTPATIENT)
Dept: SCHEDULING | Facility: HOME HEALTH | Age: 68
End: 2018-01-04
Payer: MEDICARE

## 2018-01-04 VITALS
OXYGEN SATURATION: 98 % | RESPIRATION RATE: 18 BRPM | HEART RATE: 68 BPM | SYSTOLIC BLOOD PRESSURE: 108 MMHG | TEMPERATURE: 97.6 F | DIASTOLIC BLOOD PRESSURE: 64 MMHG

## 2018-01-04 PROCEDURE — 3331090002 HH PPS REVENUE DEBIT

## 2018-01-04 PROCEDURE — G0299 HHS/HOSPICE OF RN EA 15 MIN: HCPCS

## 2018-01-04 PROCEDURE — 3331090001 HH PPS REVENUE CREDIT

## 2018-01-04 PROCEDURE — 400013 HH SOC

## 2018-01-05 ENCOUNTER — HOME CARE VISIT (OUTPATIENT)
Dept: SCHEDULING | Facility: HOME HEALTH | Age: 68
End: 2018-01-05
Payer: MEDICARE

## 2018-01-05 VITALS
HEART RATE: 60 BPM | DIASTOLIC BLOOD PRESSURE: 74 MMHG | OXYGEN SATURATION: 93 % | RESPIRATION RATE: 18 BRPM | SYSTOLIC BLOOD PRESSURE: 118 MMHG | TEMPERATURE: 97.2 F

## 2018-01-05 PROCEDURE — G0299 HHS/HOSPICE OF RN EA 15 MIN: HCPCS

## 2018-01-05 PROCEDURE — 3331090001 HH PPS REVENUE CREDIT

## 2018-01-05 PROCEDURE — 3331090002 HH PPS REVENUE DEBIT

## 2018-01-06 PROCEDURE — 3331090002 HH PPS REVENUE DEBIT

## 2018-01-06 PROCEDURE — 3331090001 HH PPS REVENUE CREDIT

## 2018-01-07 PROCEDURE — 3331090001 HH PPS REVENUE CREDIT

## 2018-01-07 PROCEDURE — 3331090002 HH PPS REVENUE DEBIT

## 2018-01-08 ENCOUNTER — HOME CARE VISIT (OUTPATIENT)
Dept: SCHEDULING | Facility: HOME HEALTH | Age: 68
End: 2018-01-08
Payer: MEDICARE

## 2018-01-08 VITALS
DIASTOLIC BLOOD PRESSURE: 64 MMHG | TEMPERATURE: 97 F | RESPIRATION RATE: 18 BRPM | SYSTOLIC BLOOD PRESSURE: 118 MMHG | HEART RATE: 60 BPM

## 2018-01-08 VITALS
HEART RATE: 60 BPM | TEMPERATURE: 97.6 F | OXYGEN SATURATION: 93 % | SYSTOLIC BLOOD PRESSURE: 125 MMHG | DIASTOLIC BLOOD PRESSURE: 58 MMHG

## 2018-01-08 PROCEDURE — G0151 HHCP-SERV OF PT,EA 15 MIN: HCPCS

## 2018-01-08 PROCEDURE — 3331090001 HH PPS REVENUE CREDIT

## 2018-01-08 PROCEDURE — 3331090002 HH PPS REVENUE DEBIT

## 2018-01-08 PROCEDURE — G0152 HHCP-SERV OF OT,EA 15 MIN: HCPCS

## 2018-01-09 ENCOUNTER — HOME CARE VISIT (OUTPATIENT)
Dept: SCHEDULING | Facility: HOME HEALTH | Age: 68
End: 2018-01-09
Payer: MEDICARE

## 2018-01-09 VITALS
OXYGEN SATURATION: 98 % | TEMPERATURE: 98 F | DIASTOLIC BLOOD PRESSURE: 70 MMHG | RESPIRATION RATE: 20 BRPM | SYSTOLIC BLOOD PRESSURE: 130 MMHG | HEART RATE: 61 BPM

## 2018-01-09 PROCEDURE — G0299 HHS/HOSPICE OF RN EA 15 MIN: HCPCS

## 2018-01-09 PROCEDURE — 3331090001 HH PPS REVENUE CREDIT

## 2018-01-09 PROCEDURE — 3331090002 HH PPS REVENUE DEBIT

## 2018-01-09 PROCEDURE — G0155 HHCP-SVS OF CSW,EA 15 MIN: HCPCS

## 2018-01-10 PROCEDURE — 3331090002 HH PPS REVENUE DEBIT

## 2018-01-10 PROCEDURE — 3331090001 HH PPS REVENUE CREDIT

## 2018-01-11 ENCOUNTER — HOME CARE VISIT (OUTPATIENT)
Dept: SCHEDULING | Facility: HOME HEALTH | Age: 68
End: 2018-01-11
Payer: MEDICARE

## 2018-01-11 PROCEDURE — 3331090002 HH PPS REVENUE DEBIT

## 2018-01-11 PROCEDURE — G0157 HHC PT ASSISTANT EA 15: HCPCS

## 2018-01-11 PROCEDURE — 3331090001 HH PPS REVENUE CREDIT

## 2018-01-12 ENCOUNTER — HOME CARE VISIT (OUTPATIENT)
Dept: SCHEDULING | Facility: HOME HEALTH | Age: 68
End: 2018-01-12
Payer: MEDICARE

## 2018-01-12 VITALS
HEART RATE: 60 BPM | DIASTOLIC BLOOD PRESSURE: 70 MMHG | RESPIRATION RATE: 18 BRPM | TEMPERATURE: 96.8 F | SYSTOLIC BLOOD PRESSURE: 132 MMHG

## 2018-01-12 PROCEDURE — 3331090002 HH PPS REVENUE DEBIT

## 2018-01-12 PROCEDURE — G0299 HHS/HOSPICE OF RN EA 15 MIN: HCPCS

## 2018-01-12 PROCEDURE — 3331090001 HH PPS REVENUE CREDIT

## 2018-01-13 PROCEDURE — 3331090002 HH PPS REVENUE DEBIT

## 2018-01-13 PROCEDURE — 3331090001 HH PPS REVENUE CREDIT

## 2018-01-14 VITALS
HEART RATE: 60 BPM | OXYGEN SATURATION: 93 % | DIASTOLIC BLOOD PRESSURE: 78 MMHG | TEMPERATURE: 98.1 F | RESPIRATION RATE: 18 BRPM | SYSTOLIC BLOOD PRESSURE: 152 MMHG

## 2018-01-14 PROCEDURE — 3331090001 HH PPS REVENUE CREDIT

## 2018-01-14 PROCEDURE — 3331090002 HH PPS REVENUE DEBIT

## 2018-01-15 ENCOUNTER — HOME CARE VISIT (OUTPATIENT)
Dept: SCHEDULING | Facility: HOME HEALTH | Age: 68
End: 2018-01-15
Payer: MEDICARE

## 2018-01-15 VITALS
RESPIRATION RATE: 17 BRPM | DIASTOLIC BLOOD PRESSURE: 62 MMHG | TEMPERATURE: 98.4 F | SYSTOLIC BLOOD PRESSURE: 141 MMHG | HEART RATE: 63 BPM | OXYGEN SATURATION: 92 %

## 2018-01-15 PROCEDURE — 3331090001 HH PPS REVENUE CREDIT

## 2018-01-15 PROCEDURE — 3331090002 HH PPS REVENUE DEBIT

## 2018-01-15 PROCEDURE — G0158 HHC OT ASSISTANT EA 15: HCPCS

## 2018-01-16 ENCOUNTER — HOME CARE VISIT (OUTPATIENT)
Dept: SCHEDULING | Facility: HOME HEALTH | Age: 68
End: 2018-01-16
Payer: MEDICARE

## 2018-01-16 ENCOUNTER — HOSPITAL ENCOUNTER (EMERGENCY)
Age: 68
Discharge: HOME | End: 2018-01-16
Attending: EMERGENCY MEDICINE
Payer: MEDICARE

## 2018-01-16 ENCOUNTER — APPOINTMENT (OUTPATIENT)
Dept: GENERAL RADIOLOGY | Age: 68
End: 2018-01-16
Attending: EMERGENCY MEDICINE
Payer: MEDICARE

## 2018-01-16 VITALS
DIASTOLIC BLOOD PRESSURE: 75 MMHG | HEIGHT: 64 IN | BODY MASS INDEX: 40.46 KG/M2 | TEMPERATURE: 98 F | WEIGHT: 237 LBS | SYSTOLIC BLOOD PRESSURE: 133 MMHG | RESPIRATION RATE: 18 BRPM | OXYGEN SATURATION: 100 % | HEART RATE: 75 BPM

## 2018-01-16 DIAGNOSIS — R56.9 SEIZURE (HCC): Primary | ICD-10-CM

## 2018-01-16 LAB
ALBUMIN SERPL-MCNC: 3.2 G/DL (ref 3.2–4.6)
ALBUMIN/GLOB SERPL: 0.9 {RATIO} (ref 1.2–3.5)
ALP SERPL-CCNC: 116 U/L (ref 50–136)
ALT SERPL-CCNC: 11 U/L (ref 12–65)
ANION GAP SERPL CALC-SCNC: 9 MMOL/L (ref 7–16)
AST SERPL-CCNC: 15 U/L (ref 15–37)
ATRIAL RATE: 74 BPM
BASOPHILS # BLD: 0 K/UL (ref 0–0.2)
BASOPHILS NFR BLD: 0 % (ref 0–2)
BILIRUB SERPL-MCNC: 0.3 MG/DL (ref 0.2–1.1)
BUN SERPL-MCNC: 13 MG/DL (ref 8–23)
CALCIUM SERPL-MCNC: 8.3 MG/DL (ref 8.3–10.4)
CALCULATED P AXIS, ECG09: 63 DEGREES
CALCULATED R AXIS, ECG10: 31 DEGREES
CALCULATED T AXIS, ECG11: 50 DEGREES
CHLORIDE SERPL-SCNC: 107 MMOL/L (ref 98–107)
CO2 SERPL-SCNC: 26 MMOL/L (ref 21–32)
CREAT SERPL-MCNC: 1.05 MG/DL (ref 0.6–1)
DIAGNOSIS, 93000: NORMAL
DIFFERENTIAL METHOD BLD: ABNORMAL
EOSINOPHIL # BLD: 0.1 K/UL (ref 0–0.8)
EOSINOPHIL NFR BLD: 1 % (ref 0.5–7.8)
ERYTHROCYTE [DISTWIDTH] IN BLOOD BY AUTOMATED COUNT: 13.6 % (ref 11.9–14.6)
GLOBULIN SER CALC-MCNC: 3.4 G/DL (ref 2.3–3.5)
GLUCOSE SERPL-MCNC: 137 MG/DL (ref 65–100)
HCT VFR BLD AUTO: 39.8 % (ref 35.8–46.3)
HGB BLD-MCNC: 13.4 G/DL (ref 11.7–15.4)
IMM GRANULOCYTES # BLD: 0 K/UL (ref 0–0.5)
IMM GRANULOCYTES NFR BLD AUTO: 1 % (ref 0–5)
LYMPHOCYTES # BLD: 0.7 K/UL (ref 0.5–4.6)
LYMPHOCYTES NFR BLD: 7 % (ref 13–44)
MCH RBC QN AUTO: 30 PG (ref 26.1–32.9)
MCHC RBC AUTO-ENTMCNC: 33.7 G/DL (ref 31.4–35)
MCV RBC AUTO: 89 FL (ref 79.6–97.8)
MONOCYTES # BLD: 0.4 K/UL (ref 0.1–1.3)
MONOCYTES NFR BLD: 4 % (ref 4–12)
NEUTS SEG # BLD: 7.7 K/UL (ref 1.7–8.2)
NEUTS SEG NFR BLD: 87 % (ref 43–78)
P-R INTERVAL, ECG05: 192 MS
PLATELET # BLD AUTO: 117 K/UL (ref 150–450)
PMV BLD AUTO: 9.2 FL (ref 10.8–14.1)
POTASSIUM SERPL-SCNC: 4.2 MMOL/L (ref 3.5–5.1)
PROT SERPL-MCNC: 6.6 G/DL (ref 6.3–8.2)
Q-T INTERVAL, ECG07: 366 MS
QRS DURATION, ECG06: 86 MS
QTC CALCULATION (BEZET), ECG08: 406 MS
RBC # BLD AUTO: 4.47 M/UL (ref 4.05–5.25)
SODIUM SERPL-SCNC: 142 MMOL/L (ref 136–145)
TROPONIN I SERPL-MCNC: <0.02 NG/ML (ref 0.02–0.05)
VENTRICULAR RATE, ECG03: 74 BPM
WBC # BLD AUTO: 8.8 K/UL (ref 4.3–11.1)

## 2018-01-16 PROCEDURE — G0157 HHC PT ASSISTANT EA 15: HCPCS

## 2018-01-16 PROCEDURE — 93005 ELECTROCARDIOGRAM TRACING: CPT | Performed by: EMERGENCY MEDICINE

## 2018-01-16 PROCEDURE — 80177 DRUG SCRN QUAN LEVETIRACETAM: CPT | Performed by: EMERGENCY MEDICINE

## 2018-01-16 PROCEDURE — 3331090001 HH PPS REVENUE CREDIT

## 2018-01-16 PROCEDURE — 85025 COMPLETE CBC W/AUTO DIFF WBC: CPT | Performed by: EMERGENCY MEDICINE

## 2018-01-16 PROCEDURE — 74011250636 HC RX REV CODE- 250/636: Performed by: EMERGENCY MEDICINE

## 2018-01-16 PROCEDURE — 84484 ASSAY OF TROPONIN QUANT: CPT | Performed by: EMERGENCY MEDICINE

## 2018-01-16 PROCEDURE — 74011000258 HC RX REV CODE- 258: Performed by: EMERGENCY MEDICINE

## 2018-01-16 PROCEDURE — 99284 EMERGENCY DEPT VISIT MOD MDM: CPT | Performed by: EMERGENCY MEDICINE

## 2018-01-16 PROCEDURE — 96365 THER/PROPH/DIAG IV INF INIT: CPT | Performed by: EMERGENCY MEDICINE

## 2018-01-16 PROCEDURE — 71045 X-RAY EXAM CHEST 1 VIEW: CPT

## 2018-01-16 PROCEDURE — 3331090002 HH PPS REVENUE DEBIT

## 2018-01-16 PROCEDURE — 80053 COMPREHEN METABOLIC PANEL: CPT | Performed by: EMERGENCY MEDICINE

## 2018-01-16 RX ORDER — LEVETIRACETAM 500 MG/1
500 TABLET ORAL 2 TIMES DAILY
Qty: 30 TAB | Refills: 11 | Status: SHIPPED | OUTPATIENT
Start: 2018-01-16 | End: 2018-01-23 | Stop reason: SDUPTHER

## 2018-01-16 RX ORDER — AMIODARONE HYDROCHLORIDE 150 MG/3ML
INJECTION, SOLUTION INTRAVENOUS
Status: DISCONTINUED
Start: 2018-01-16 | End: 2018-01-16 | Stop reason: HOSPADM

## 2018-01-16 RX ADMIN — SODIUM CHLORIDE 1000 MG: 900 INJECTION, SOLUTION INTRAVENOUS at 12:25

## 2018-01-16 NOTE — ED TRIAGE NOTES
Patient brought in via EMS for seizure x2. Kirk Vergara has not been taken for 3-4 days. Post ictal for EMS.  /80, HR 90s,

## 2018-01-16 NOTE — ED PROVIDER NOTES
HPI Comments: Seizures this morning  ran out of [place] a few days ago  Most recent seizure activity was maybe a few weeks ago    Patient is a 79 y.o. female presenting with seizures. The history is provided by the patient. Seizure    This is a recurrent problem. The current episode started less than 1 hour ago. There were 2 - 3 seizures. Pertinent negatives include no headaches, no sore throat, no chest pain, no cough, no nausea and no vomiting. Characteristics include rhythmic jerking and loss of consciousness. The episode was witnessed. The seizures did not continue in the ED. The seizure(s) had no focality. Possible causes include missed seizure meds and missed seizure meds. She reports no chest pain, no vomiting, no headaches, no sore throat, no cough.         Past Medical History:   Diagnosis Date    Chronic pain     \"all over body\"-takes Zoloft daily    Chronic sinusitis 1/19/2015    Diverticulosis 2017    Edema 1/19/2015    Resolved    Former smoker     GERD (gastroesophageal reflux disease)     Headache 1/19/2015    Hearing loss 1/19/2015    \"some\"- no hearing aids    Heart murmur 1990    Hip osteoarthritis 1/19/2015    feet, hands    Hx of colonic polyps 2017    SSA    Hx of migraines     on medication    Hypercholesterolemia 1/19/2015    Hypertension     Impaired fasting glucose 1/19/2015    Joint disorder 1/19/2015    Morbid obesity (HCC)     BMI 40    Neuralgia 1/19/2015    Phlebitis and thrombophlebitis of superficial vessels of lower extremities 01/19/2015    pt denies     Psychiatric disorder     Restless leg syndrome 1/19/2015    Rheumatic fever     pt reports possibly R.F.    Seizures ([facility] [address]     seizures as a child, has NOT had one since she was 13years old    SOB (shortness of breath) on exertion     Stroke ([address]     at age 11 only residual poor peripheral vision    Tobacco abuse 1/19/2015    quit smoking 4/2015    Vascular anomalies, congenital     Vitamin D deficiency 1/19/2015       Past Surgical History:   Procedure Laterality Date    COLONOSCOPY N/A 2/20/2017    Bjorn--appendiceal serrated sessile polyp, transverse and rectal hyperplastic--3 year recall    HX APPENDECTOMY  03/15/2017    HX CATARACT REMOVAL Bilateral 3/2016    HX CHOLECYSTECTOMY  1982    HX MOHS PROCEDURES Right 1995    pt denies    HX ROTATOR CUFF REPAIR Right 10/31/2016    x2    HX TUBAL LIGATION  1982    TOTAL HIP ARTHROPLASTY Right 2005    and again 5/2015 and another revision         Family History:   Problem Relation Age of Onset    Hypertension Mother     Cancer Mother      Bladder    Depression Mother     Parkinson's Disease Father     Alcohol abuse Brother     Alcohol abuse Son     No Known Problems Sister     No Known Problems Brother     Malignant Hyperthermia Neg Hx     Pseudocholinesterase Deficiency Neg Hx     Delayed Awakening Neg Hx     Post-op Nausea/Vomiting Neg Hx     Emergence Delirium Neg Hx     Post-op Cognitive Dysfunction Neg Hx     Other Neg Hx        Social History     Social History    Marital status:      Spouse name: N/A    Number of children: N/A    Years of education: N/A     Occupational History    Not on file. Social History Main Topics    Smoking status: Former Smoker     Packs/day: 1.00     Years: 50.00     Types: Cigarettes     Quit date: 3/29/2015    Smokeless tobacco: Never Used      Comment: quit 2 months and 2 weeks ago    Alcohol use No      Comment: Former- socially   [employer] Drug use: No    Sexual activity: Not on file     Other Topics Concern    Not on file     Social History Narrative         ALLERGIES: Codeine and Oxycodone    Review of Systems   Constitutional: Negative for chills and fever. HENT: Negative for rhinorrhea and sore throat. Eyes: Negative for discharge and redness. Respiratory: Negative for cough and shortness of breath. Cardiovascular: Negative for chest pain and palpitations. Gastrointestinal: Negative for abdominal pain, nausea and vomiting. Musculoskeletal: Negative for arthralgias and back pain. Skin: Negative for rash. Neurological: Positive for seizures and loss of consciousness. Negative for dizziness and headaches. All other systems reviewed and are negative. Vitals:    01/16/18 1135   BP: 192/84   Pulse: 78   Resp: 18   Temp: 98.5 °F (36.9 °C)   SpO2: 90%   Weight: 107.5 kg (237 lb)   Height: 5' 4\" (1.626 m)            Physical Exam   Constitutional: She is oriented to person, place, and time. She appears well-developed and well-nourished. No distress. Still a little groggy/postictal   HENT:   Head: Normocephalic and atraumatic. Eyes: Conjunctivae are normal. Pupils are equal, round, and reactive to light. Right eye exhibits no discharge. Left eye exhibits no discharge. No scleral icterus. Neck: Normal range of motion. Neck supple. Cardiovascular: Normal rate, regular rhythm and normal heart sounds. Exam reveals no gallop. No murmur heard. Pulmonary/Chest: Effort normal and breath sounds normal. No respiratory distress. She has no wheezes. She has no rales. Abdominal: Soft. Bowel sounds are normal. There is no tenderness. There is no guarding. Musculoskeletal: Normal range of motion. She exhibits no edema. Neurological: She is alert and oriented to person, place, and time. She exhibits normal muscle tone. cni 2-12 grossly   Skin: Skin is warm and dry. She is not diaphoretic. Psychiatric: She has a normal mood and affect. Her behavior is normal.   Nursing note and vitals reviewed. MDM  Number of Diagnoses or Management Options  Diagnosis management comments: Medical decision making note:  Seizure,  Ran out of keppra  Run in some keppra  Refill her keppra  This concludes the \"medical decision making note\" part of this emergency department visit note.       ED Course       Procedures

## 2018-01-16 NOTE — DISCHARGE INSTRUCTIONS
Seizure: Care Instructions  Your Care Instructions    Seizures are caused by abnormal patterns of electrical signals in the brain. They are different for each person. Seizures can affect movement, speech, vision, or awareness. Some people have only slight shaking of a hand and do not pass out. Other people may pass out and have violent shaking of the whole body. Some people appear to stare into space. They are awake, but they can't respond normally. Later, they may not remember what happened. You may need tests to identify the type and cause of the seizures. A seizure may occur only once, or you may have them more than one time. Taking medicines as directed and following up with your doctor may help keep you from having more seizures. The doctor has checked you carefully, but problems can develop later. If you notice any problems or new symptoms, get medical treatment right away. Follow-up care is a key part of your treatment and safety. Be sure to make and go to all appointments, and call your doctor if you are having problems. It's also a good idea to know your test results and keep a list of the medicines you take. How can you care for yourself at home? · Be safe with medicines. Take your medicines exactly as prescribed. Call your doctor if you think you are having a problem with your medicine. · Do not do any activity that could be dangerous to you or others until your doctor says it is safe to do so. For example, do not drive a car, operate machinery, swim, or climb ladders. · Be sure that anyone treating you for any health problem knows that you have had a seizure and what medicines you are taking for it. · Identify and avoid things that may make you more likely to have a seizure. These may include lack of sleep, alcohol or drug use, stress, or not eating. · Make sure you go to your follow-up appointment. When should you call for help? Call 911 anytime you think you may need emergency care. For example, call if:  ? · You have another seizure. ? · You have more than one seizure in 24 hours. ? · You have new symptoms, such as trouble walking, speaking, or thinking clearly. ?Call your doctor now or seek immediate medical care if:  ? · You are not acting normally. ? Watch closely for changes in your health, and be sure to contact your doctor if you have any problems. Where can you learn more? Go to http://davian-joshua.info/. Enter T866 in the search box to learn more about \"Seizure: Care Instructions. \"  Current as of: October 14, 2016  Content Version: 11.4  © 2717-5733 [facility]. Care instructions adapted under license by [employer] (which disclaims liability or warranty for this information). If you have questions about a medical condition or this instruction, always ask your healthcare professional. [address] any warranty or liability for your use of this information.

## 2018-01-16 NOTE — ED NOTES
I have reviewed discharge instructions with the patient. The patient verbalized understanding. Patient left ED via Discharge Method: wheelchair to Home with     Opportunity for questions and clarification provided. Patient given 1 scripts. To continue your aftercare when you leave the hospital, you may receive an automated call from our care team to check in on how you are doing. This is a free service and part of our promise to provide the best care and service to meet your aftercare needs.  If you have questions, or wish to unsubscribe from this service please call [phone]. Thank you for Choosing our [facility] Emergency Department.

## 2018-01-17 PROCEDURE — 3331090002 HH PPS REVENUE DEBIT

## 2018-01-17 PROCEDURE — 3331090001 HH PPS REVENUE CREDIT

## 2018-01-18 LAB — LEVETIRACETAM SERPL-MCNC: NORMAL UG/ML (ref 10–40)

## 2018-01-18 PROCEDURE — 3331090002 HH PPS REVENUE DEBIT

## 2018-01-18 PROCEDURE — 3331090001 HH PPS REVENUE CREDIT

## 2018-01-19 ENCOUNTER — HOME CARE VISIT (OUTPATIENT)
Dept: SCHEDULING | Facility: HOME HEALTH | Age: 68
End: 2018-01-19
Payer: MEDICARE

## 2018-01-19 VITALS
RESPIRATION RATE: 17 BRPM | HEART RATE: 57 BPM | SYSTOLIC BLOOD PRESSURE: 140 MMHG | TEMPERATURE: 98.4 F | DIASTOLIC BLOOD PRESSURE: 60 MMHG | OXYGEN SATURATION: 94 %

## 2018-01-19 VITALS
TEMPERATURE: 99 F | SYSTOLIC BLOOD PRESSURE: 120 MMHG | HEART RATE: 57 BPM | OXYGEN SATURATION: 99 % | DIASTOLIC BLOOD PRESSURE: 60 MMHG | RESPIRATION RATE: 18 BRPM

## 2018-01-19 PROCEDURE — G0157 HHC PT ASSISTANT EA 15: HCPCS

## 2018-01-19 PROCEDURE — G0158 HHC OT ASSISTANT EA 15: HCPCS

## 2018-01-19 PROCEDURE — G0299 HHS/HOSPICE OF RN EA 15 MIN: HCPCS

## 2018-01-19 PROCEDURE — 3331090001 HH PPS REVENUE CREDIT

## 2018-01-19 PROCEDURE — 3331090002 HH PPS REVENUE DEBIT

## 2018-01-20 PROCEDURE — 3331090002 HH PPS REVENUE DEBIT

## 2018-01-20 PROCEDURE — 3331090001 HH PPS REVENUE CREDIT

## 2018-01-21 VITALS
TEMPERATURE: 97.4 F | SYSTOLIC BLOOD PRESSURE: 130 MMHG | HEART RATE: 94 BPM | RESPIRATION RATE: 17 BRPM | DIASTOLIC BLOOD PRESSURE: 80 MMHG

## 2018-01-21 PROCEDURE — 3331090001 HH PPS REVENUE CREDIT

## 2018-01-21 PROCEDURE — 3331090002 HH PPS REVENUE DEBIT

## 2018-01-22 PROBLEM — G40.909 SEIZURE DISORDER (HCC): Status: ACTIVE | Noted: 2018-01-22

## 2018-01-22 PROCEDURE — 3331090002 HH PPS REVENUE DEBIT

## 2018-01-22 PROCEDURE — 3331090001 HH PPS REVENUE CREDIT

## 2018-01-23 PROCEDURE — 3331090002 HH PPS REVENUE DEBIT

## 2018-01-23 PROCEDURE — 3331090001 HH PPS REVENUE CREDIT

## 2018-01-24 ENCOUNTER — HOME CARE VISIT (OUTPATIENT)
Dept: SCHEDULING | Facility: HOME HEALTH | Age: 68
End: 2018-01-24
Payer: MEDICARE

## 2018-01-24 VITALS
DIASTOLIC BLOOD PRESSURE: 90 MMHG | HEART RATE: 69 BPM | TEMPERATURE: 98 F | SYSTOLIC BLOOD PRESSURE: 150 MMHG | OXYGEN SATURATION: 94 %

## 2018-01-24 VITALS — OXYGEN SATURATION: 92 % | HEART RATE: 59 BPM | TEMPERATURE: 98.2 F

## 2018-01-24 PROCEDURE — 3331090001 HH PPS REVENUE CREDIT

## 2018-01-24 PROCEDURE — 3331090002 HH PPS REVENUE DEBIT

## 2018-01-24 PROCEDURE — G0299 HHS/HOSPICE OF RN EA 15 MIN: HCPCS

## 2018-01-24 PROCEDURE — G0152 HHCP-SERV OF OT,EA 15 MIN: HCPCS

## 2018-01-24 PROCEDURE — G0155 HHCP-SVS OF CSW,EA 15 MIN: HCPCS

## 2018-01-25 ENCOUNTER — HOME CARE VISIT (OUTPATIENT)
Dept: SCHEDULING | Facility: HOME HEALTH | Age: 68
End: 2018-01-25
Payer: MEDICARE

## 2018-01-25 VITALS
RESPIRATION RATE: 18 BRPM | TEMPERATURE: 97.2 F | HEART RATE: 62 BPM | DIASTOLIC BLOOD PRESSURE: 74 MMHG | SYSTOLIC BLOOD PRESSURE: 146 MMHG

## 2018-01-25 PROCEDURE — G0151 HHCP-SERV OF PT,EA 15 MIN: HCPCS

## 2018-01-25 PROCEDURE — 3331090001 HH PPS REVENUE CREDIT

## 2018-01-25 PROCEDURE — 3331090002 HH PPS REVENUE DEBIT

## 2018-01-26 PROCEDURE — 3331090002 HH PPS REVENUE DEBIT

## 2018-01-26 PROCEDURE — 3331090001 HH PPS REVENUE CREDIT

## 2018-01-27 PROCEDURE — 3331090001 HH PPS REVENUE CREDIT

## 2018-01-27 PROCEDURE — 3331090002 HH PPS REVENUE DEBIT

## 2018-01-28 PROCEDURE — 3331090002 HH PPS REVENUE DEBIT

## 2018-01-28 PROCEDURE — 3331090001 HH PPS REVENUE CREDIT

## 2018-01-29 PROCEDURE — 3331090001 HH PPS REVENUE CREDIT

## 2018-01-29 PROCEDURE — 3331090002 HH PPS REVENUE DEBIT

## 2018-01-30 ENCOUNTER — HOME CARE VISIT (OUTPATIENT)
Dept: SCHEDULING | Facility: HOME HEALTH | Age: 68
End: 2018-01-30
Payer: MEDICARE

## 2018-01-30 VITALS
TEMPERATURE: 98.1 F | OXYGEN SATURATION: 94 % | SYSTOLIC BLOOD PRESSURE: 142 MMHG | HEART RATE: 67 BPM | DIASTOLIC BLOOD PRESSURE: 60 MMHG | RESPIRATION RATE: 18 BRPM

## 2018-01-30 PROCEDURE — 3331090002 HH PPS REVENUE DEBIT

## 2018-01-30 PROCEDURE — G0299 HHS/HOSPICE OF RN EA 15 MIN: HCPCS

## 2018-01-30 PROCEDURE — 3331090001 HH PPS REVENUE CREDIT

## 2018-02-25 ENCOUNTER — HOSPITAL ENCOUNTER (OUTPATIENT)
Dept: LAB | Age: 68
Discharge: HOME | End: 2018-02-25

## 2018-02-25 LAB
APPEARANCE UR: CLEAR
BACTERIA URNS QL MICRO: 0 /HPF
BILIRUB UR QL: NEGATIVE
CASTS URNS QL MICRO: ABNORMAL /LPF
COLOR UR: YELLOW
EPI CELLS #/AREA URNS HPF: ABNORMAL /HPF
GLUCOSE UR STRIP.AUTO-MCNC: NEGATIVE MG/DL
HGB UR QL STRIP: NEGATIVE
KETONES UR QL STRIP.AUTO: NEGATIVE MG/DL
LEUKOCYTE ESTERASE UR QL STRIP.AUTO: ABNORMAL
NITRITE UR QL STRIP.AUTO: NEGATIVE
PH UR STRIP: 6.5 [PH] (ref 5–9)
PROT UR STRIP-MCNC: NEGATIVE MG/DL
RBC #/AREA URNS HPF: ABNORMAL /HPF
SP GR UR REFRACTOMETRY: 1.01 (ref 1–1.02)
UROBILINOGEN UR QL STRIP.AUTO: 0.2 EU/DL (ref 0.2–1)
WBC URNS QL MICRO: ABNORMAL /HPF

## 2018-02-25 PROCEDURE — 81001 URINALYSIS AUTO W/SCOPE: CPT | Performed by: INTERNAL MEDICINE

## 2018-06-18 PROBLEM — G89.29 CHRONIC RIGHT HIP PAIN: Status: ACTIVE | Noted: 2018-06-18

## 2018-06-18 PROBLEM — M25.551 CHRONIC RIGHT HIP PAIN: Status: ACTIVE | Noted: 2018-06-18

## 2018-06-18 PROBLEM — K59.04 CHRONIC IDIOPATHIC CONSTIPATION: Status: ACTIVE | Noted: 2018-06-18

## 2018-06-18 PROBLEM — G89.29 CHRONIC PAIN OF LEFT ANKLE: Status: ACTIVE | Noted: 2018-06-18

## 2018-06-18 PROBLEM — M25.572 CHRONIC PAIN OF LEFT ANKLE: Status: ACTIVE | Noted: 2018-06-18

## 2018-06-18 PROBLEM — R26.89 BALANCE PROBLEM: Status: ACTIVE | Noted: 2018-06-18

## 2018-06-28 PROBLEM — G47.10 EXCESSIVE SLEEPINESS: Status: ACTIVE | Noted: 2018-06-28

## 2018-06-28 PROBLEM — N32.81 OAB (OVERACTIVE BLADDER): Status: ACTIVE | Noted: 2018-06-28

## 2018-07-29 ENCOUNTER — APPOINTMENT (OUTPATIENT)
Dept: GENERAL RADIOLOGY | Age: 68
DRG: 064 | End: 2018-07-29
Attending: EMERGENCY MEDICINE
Payer: MEDICARE

## 2018-07-29 ENCOUNTER — HOSPITAL ENCOUNTER (INPATIENT)
Age: 68
LOS: 7 days | Discharge: NURSING, REHAB OR LONG-TERM CARE | DRG: 064 | End: 2018-08-06
Attending: EMERGENCY MEDICINE | Admitting: FAMILY MEDICINE
Payer: MEDICARE

## 2018-07-29 DIAGNOSIS — R52 CHRONIC PAIN OF MULTIPLE SITES: Chronic | ICD-10-CM

## 2018-07-29 DIAGNOSIS — I61.0 THALAMIC HEMORRHAGE (HCC): Primary | ICD-10-CM

## 2018-07-29 DIAGNOSIS — G93.5 BRAIN COMPRESSION (HCC): ICD-10-CM

## 2018-07-29 DIAGNOSIS — G89.29 CHRONIC PAIN OF MULTIPLE SITES: Chronic | ICD-10-CM

## 2018-07-29 DIAGNOSIS — G40.909 SEIZURE DISORDER (HCC): Chronic | ICD-10-CM

## 2018-07-29 DIAGNOSIS — G93.6 CEREBRAL EDEMA (HCC): ICD-10-CM

## 2018-07-29 DIAGNOSIS — R60.0 EDEMA OF BOTH LEGS: ICD-10-CM

## 2018-07-29 DIAGNOSIS — Q28.2 CEREBRAL AV MALFORMATION: Chronic | ICD-10-CM

## 2018-07-29 LAB
ALBUMIN SERPL-MCNC: 3.4 G/DL (ref 3.2–4.6)
ALBUMIN/GLOB SERPL: 0.9 {RATIO} (ref 1.2–3.5)
ALP SERPL-CCNC: 140 U/L (ref 50–136)
ALT SERPL-CCNC: 19 U/L (ref 12–65)
ANION GAP SERPL CALC-SCNC: 6 MMOL/L (ref 7–16)
AST SERPL-CCNC: 20 U/L (ref 15–37)
BASOPHILS # BLD: 0 K/UL (ref 0–0.2)
BASOPHILS NFR BLD: 0 % (ref 0–2)
BILIRUB SERPL-MCNC: 0.3 MG/DL (ref 0.2–1.1)
BUN SERPL-MCNC: 14 MG/DL (ref 8–23)
CALCIUM SERPL-MCNC: 8.2 MG/DL (ref 8.3–10.4)
CHLORIDE SERPL-SCNC: 104 MMOL/L (ref 98–107)
CO2 SERPL-SCNC: 30 MMOL/L (ref 21–32)
CREAT SERPL-MCNC: 1.29 MG/DL (ref 0.6–1)
DIFFERENTIAL METHOD BLD: ABNORMAL
EOSINOPHIL # BLD: 0.2 K/UL (ref 0–0.8)
EOSINOPHIL NFR BLD: 2 % (ref 0.5–7.8)
ERYTHROCYTE [DISTWIDTH] IN BLOOD BY AUTOMATED COUNT: 13.9 % (ref 11.9–14.6)
GLOBULIN SER CALC-MCNC: 3.8 G/DL (ref 2.3–3.5)
GLUCOSE SERPL-MCNC: 107 MG/DL (ref 65–100)
HCT VFR BLD AUTO: 39.5 % (ref 35.8–46.3)
HGB BLD-MCNC: 12.8 G/DL (ref 11.7–15.4)
IMM GRANULOCYTES # BLD: 0 K/UL (ref 0–0.5)
IMM GRANULOCYTES NFR BLD AUTO: 0 % (ref 0–5)
LYMPHOCYTES # BLD: 1.1 K/UL (ref 0.5–4.6)
LYMPHOCYTES NFR BLD: 16 % (ref 13–44)
MCH RBC QN AUTO: 29.2 PG (ref 26.1–32.9)
MCHC RBC AUTO-ENTMCNC: 32.4 G/DL (ref 31.4–35)
MCV RBC AUTO: 90 FL (ref 79.6–97.8)
MONOCYTES # BLD: 0.6 K/UL (ref 0.1–1.3)
MONOCYTES NFR BLD: 8 % (ref 4–12)
NEUTS SEG # BLD: 5.2 K/UL (ref 1.7–8.2)
NEUTS SEG NFR BLD: 74 % (ref 43–78)
PLATELET # BLD AUTO: 141 K/UL (ref 150–450)
PMV BLD AUTO: 9.9 FL (ref 10.8–14.1)
POTASSIUM SERPL-SCNC: 4.2 MMOL/L (ref 3.5–5.1)
PROT SERPL-MCNC: 7.2 G/DL (ref 6.3–8.2)
RBC # BLD AUTO: 4.39 M/UL (ref 4.05–5.25)
SODIUM SERPL-SCNC: 140 MMOL/L (ref 136–145)
WBC # BLD AUTO: 7.1 K/UL (ref 4.3–11.1)

## 2018-07-29 PROCEDURE — 71046 X-RAY EXAM CHEST 2 VIEWS: CPT

## 2018-07-29 PROCEDURE — 85025 COMPLETE CBC W/AUTO DIFF WBC: CPT | Performed by: EMERGENCY MEDICINE

## 2018-07-29 PROCEDURE — 74011250636 HC RX REV CODE- 250/636: Performed by: EMERGENCY MEDICINE

## 2018-07-29 PROCEDURE — 99285 EMERGENCY DEPT VISIT HI MDM: CPT | Performed by: EMERGENCY MEDICINE

## 2018-07-29 PROCEDURE — 80177 DRUG SCRN QUAN LEVETIRACETAM: CPT | Performed by: EMERGENCY MEDICINE

## 2018-07-29 PROCEDURE — 93005 ELECTROCARDIOGRAM TRACING: CPT | Performed by: EMERGENCY MEDICINE

## 2018-07-29 PROCEDURE — 80053 COMPREHEN METABOLIC PANEL: CPT | Performed by: EMERGENCY MEDICINE

## 2018-07-29 PROCEDURE — 81003 URINALYSIS AUTO W/O SCOPE: CPT | Performed by: EMERGENCY MEDICINE

## 2018-07-29 PROCEDURE — 94640 AIRWAY INHALATION TREATMENT: CPT

## 2018-07-29 PROCEDURE — 74011000250 HC RX REV CODE- 250: Performed by: EMERGENCY MEDICINE

## 2018-07-29 PROCEDURE — 81015 MICROSCOPIC EXAM OF URINE: CPT | Performed by: EMERGENCY MEDICINE

## 2018-07-29 RX ORDER — OXYCODONE HYDROCHLORIDE 5 MG/1
5 TABLET ORAL
COMMUNITY
End: 2018-08-06

## 2018-07-29 RX ORDER — IPRATROPIUM BROMIDE AND ALBUTEROL SULFATE 2.5; .5 MG/3ML; MG/3ML
3 SOLUTION RESPIRATORY (INHALATION)
Status: COMPLETED | OUTPATIENT
Start: 2018-07-29 | End: 2018-07-29

## 2018-07-29 RX ORDER — AMLODIPINE BESYLATE 10 MG/1
10 TABLET ORAL DAILY
Status: ON HOLD | COMMUNITY
End: 2019-03-28 | Stop reason: SDUPTHER

## 2018-07-29 RX ADMIN — SODIUM CHLORIDE 1000 ML: 900 INJECTION, SOLUTION INTRAVENOUS at 23:27

## 2018-07-29 RX ADMIN — IPRATROPIUM BROMIDE AND ALBUTEROL SULFATE 3 ML: .5; 3 SOLUTION RESPIRATORY (INHALATION) at 23:30

## 2018-07-30 ENCOUNTER — APPOINTMENT (OUTPATIENT)
Dept: GENERAL RADIOLOGY | Age: 68
DRG: 064 | End: 2018-07-30
Attending: INTERNAL MEDICINE
Payer: MEDICARE

## 2018-07-30 ENCOUNTER — APPOINTMENT (OUTPATIENT)
Dept: CT IMAGING | Age: 68
DRG: 064 | End: 2018-07-30
Attending: FAMILY MEDICINE
Payer: MEDICARE

## 2018-07-30 ENCOUNTER — APPOINTMENT (OUTPATIENT)
Dept: CT IMAGING | Age: 68
DRG: 064 | End: 2018-07-30
Attending: EMERGENCY MEDICINE
Payer: MEDICARE

## 2018-07-30 ENCOUNTER — APPOINTMENT (OUTPATIENT)
Dept: CT IMAGING | Age: 68
DRG: 064 | End: 2018-07-30
Attending: INTERNAL MEDICINE
Payer: MEDICARE

## 2018-07-30 PROBLEM — R52 CHRONIC PAIN OF MULTIPLE SITES: Chronic | Status: ACTIVE | Noted: 2018-07-30

## 2018-07-30 PROBLEM — I16.0 HYPERTENSIVE URGENCY: Status: ACTIVE | Noted: 2018-07-30

## 2018-07-30 PROBLEM — Q28.2 CEREBRAL AV MALFORMATION: Chronic | Status: ACTIVE | Noted: 2018-07-30

## 2018-07-30 PROBLEM — I61.9 NONTRAUMATIC THALAMIC HEMORRHAGE (HCC): Status: ACTIVE | Noted: 2018-07-30

## 2018-07-30 PROBLEM — G40.909 SEIZURE DISORDER (HCC): Chronic | Status: ACTIVE | Noted: 2018-01-22

## 2018-07-30 PROBLEM — G89.29 CHRONIC PAIN OF MULTIPLE SITES: Chronic | Status: ACTIVE | Noted: 2018-07-30

## 2018-07-30 LAB
ABO + RH BLD: NORMAL
ALBUMIN SERPL-MCNC: 3.1 G/DL (ref 3.2–4.6)
ALBUMIN/GLOB SERPL: 0.9 {RATIO} (ref 1.2–3.5)
ALP SERPL-CCNC: 125 U/L (ref 50–136)
ALT SERPL-CCNC: 18 U/L (ref 12–65)
ANION GAP SERPL CALC-SCNC: 4 MMOL/L (ref 7–16)
APTT PPP: 29.8 SEC (ref 23.2–35.3)
AST SERPL-CCNC: 14 U/L (ref 15–37)
ATRIAL RATE: 68 BPM
BACTERIA URNS QL MICRO: 0 /HPF
BASOPHILS # BLD: 0 K/UL (ref 0–0.2)
BASOPHILS NFR BLD: 0 % (ref 0–2)
BILIRUB SERPL-MCNC: 0.4 MG/DL (ref 0.2–1.1)
BLOOD GROUP ANTIBODIES SERPL: NORMAL
BUN SERPL-MCNC: 13 MG/DL (ref 8–23)
CALCIUM SERPL-MCNC: 8 MG/DL (ref 8.3–10.4)
CALCULATED P AXIS, ECG09: 77 DEGREES
CALCULATED R AXIS, ECG10: 57 DEGREES
CALCULATED T AXIS, ECG11: 61 DEGREES
CASTS URNS QL MICRO: 0 /LPF
CHLORIDE SERPL-SCNC: 106 MMOL/L (ref 98–107)
CHOLEST SERPL-MCNC: 190 MG/DL
CO2 SERPL-SCNC: 30 MMOL/L (ref 21–32)
CREAT SERPL-MCNC: 1.04 MG/DL (ref 0.6–1)
CRYSTALS URNS QL MICRO: 0 /LPF
DIAGNOSIS, 93000: NORMAL
DIFFERENTIAL METHOD BLD: ABNORMAL
EOSINOPHIL # BLD: 0 K/UL (ref 0–0.8)
EOSINOPHIL NFR BLD: 1 % (ref 0.5–7.8)
EPI CELLS #/AREA URNS HPF: NORMAL /HPF
ERYTHROCYTE [DISTWIDTH] IN BLOOD BY AUTOMATED COUNT: 13.9 % (ref 11.9–14.6)
GLOBULIN SER CALC-MCNC: 3.6 G/DL (ref 2.3–3.5)
GLUCOSE SERPL-MCNC: 147 MG/DL (ref 65–100)
HCT VFR BLD AUTO: 37.7 % (ref 35.8–46.3)
HDLC SERPL-MCNC: 69 MG/DL (ref 40–60)
HDLC SERPL: 2.8 {RATIO}
HGB BLD-MCNC: 11.9 G/DL (ref 11.7–15.4)
IMM GRANULOCYTES # BLD: 0 K/UL (ref 0–0.5)
IMM GRANULOCYTES NFR BLD AUTO: 0 % (ref 0–5)
INR PPP: 1.1
LDLC SERPL CALC-MCNC: 108 MG/DL
LIPID PROFILE,FLP: ABNORMAL
LYMPHOCYTES # BLD: 0.6 K/UL (ref 0.5–4.6)
LYMPHOCYTES NFR BLD: 9 % (ref 13–44)
MAGNESIUM SERPL-MCNC: 2.2 MG/DL (ref 1.8–2.4)
MCH RBC QN AUTO: 28.5 PG (ref 26.1–32.9)
MCHC RBC AUTO-ENTMCNC: 31.6 G/DL (ref 31.4–35)
MCV RBC AUTO: 90.4 FL (ref 79.6–97.8)
MONOCYTES # BLD: 0.4 K/UL (ref 0.1–1.3)
MONOCYTES NFR BLD: 6 % (ref 4–12)
MUCOUS THREADS URNS QL MICRO: 0 /LPF
NEUTS SEG # BLD: 5.5 K/UL (ref 1.7–8.2)
NEUTS SEG NFR BLD: 84 % (ref 43–78)
OTHER OBSERVATIONS,UCOM: NORMAL
P-R INTERVAL, ECG05: 156 MS
PHOSPHATE SERPL-MCNC: 3.6 MG/DL (ref 2.3–3.7)
PLATELET # BLD AUTO: 127 K/UL (ref 150–450)
PMV BLD AUTO: 10 FL (ref 10.8–14.1)
POTASSIUM SERPL-SCNC: 4.1 MMOL/L (ref 3.5–5.1)
PROT SERPL-MCNC: 6.7 G/DL (ref 6.3–8.2)
PROTHROMBIN TIME: 13.5 SEC (ref 11.5–14.5)
Q-T INTERVAL, ECG07: 378 MS
QRS DURATION, ECG06: 84 MS
QTC CALCULATION (BEZET), ECG08: 408 MS
RBC # BLD AUTO: 4.17 M/UL (ref 4.05–5.25)
RBC #/AREA URNS HPF: NORMAL /HPF
SODIUM SERPL-SCNC: 140 MMOL/L (ref 136–145)
SPECIMEN EXP DATE BLD: NORMAL
TRIGL SERPL-MCNC: 65 MG/DL (ref 35–150)
VENTRICULAR RATE, ECG03: 70 BPM
VLDLC SERPL CALC-MCNC: 13 MG/DL (ref 6–23)
WBC # BLD AUTO: 6.5 K/UL (ref 4.3–11.1)
WBC URNS QL MICRO: NORMAL /HPF

## 2018-07-30 PROCEDURE — 74011250636 HC RX REV CODE- 250/636: Performed by: FAMILY MEDICINE

## 2018-07-30 PROCEDURE — 86900 BLOOD TYPING SEROLOGIC ABO: CPT | Performed by: FAMILY MEDICINE

## 2018-07-30 PROCEDURE — 84100 ASSAY OF PHOSPHORUS: CPT | Performed by: FAMILY MEDICINE

## 2018-07-30 PROCEDURE — 77030004950 HC CATH ENTRL NG COVD -A

## 2018-07-30 PROCEDURE — 77030019605

## 2018-07-30 PROCEDURE — 74011636320 HC RX REV CODE- 636/320: Performed by: FAMILY MEDICINE

## 2018-07-30 PROCEDURE — 77030008771 HC TU NG SALEM SUMP -A

## 2018-07-30 PROCEDURE — 65610000001 HC ROOM ICU GENERAL

## 2018-07-30 PROCEDURE — 77030032490 HC SLV COMPR SCD KNE COVD -B

## 2018-07-30 PROCEDURE — 74011250636 HC RX REV CODE- 250/636

## 2018-07-30 PROCEDURE — 74011000258 HC RX REV CODE- 258: Performed by: INTERNAL MEDICINE

## 2018-07-30 PROCEDURE — 70496 CT ANGIOGRAPHY HEAD: CPT

## 2018-07-30 PROCEDURE — 74011000250 HC RX REV CODE- 250: Performed by: EMERGENCY MEDICINE

## 2018-07-30 PROCEDURE — 80053 COMPREHEN METABOLIC PANEL: CPT | Performed by: FAMILY MEDICINE

## 2018-07-30 PROCEDURE — 85610 PROTHROMBIN TIME: CPT | Performed by: FAMILY MEDICINE

## 2018-07-30 PROCEDURE — 83735 ASSAY OF MAGNESIUM: CPT | Performed by: FAMILY MEDICINE

## 2018-07-30 PROCEDURE — 77030020263 HC SOL INJ SOD CL0.9% LFCR 1000ML

## 2018-07-30 PROCEDURE — 74011250637 HC RX REV CODE- 250/637: Performed by: FAMILY MEDICINE

## 2018-07-30 PROCEDURE — 85730 THROMBOPLASTIN TIME PARTIAL: CPT | Performed by: FAMILY MEDICINE

## 2018-07-30 PROCEDURE — 74018 RADEX ABDOMEN 1 VIEW: CPT

## 2018-07-30 PROCEDURE — 36430 TRANSFUSION BLD/BLD COMPNT: CPT

## 2018-07-30 PROCEDURE — 86580 TB INTRADERMAL TEST: CPT | Performed by: FAMILY MEDICINE

## 2018-07-30 PROCEDURE — 70450 CT HEAD/BRAIN W/O DYE: CPT

## 2018-07-30 PROCEDURE — 74011250636 HC RX REV CODE- 250/636: Performed by: INTERNAL MEDICINE

## 2018-07-30 PROCEDURE — 77030039270 HC TU BLD FLTR CARD -A

## 2018-07-30 PROCEDURE — 74011000302 HC RX REV CODE- 302: Performed by: FAMILY MEDICINE

## 2018-07-30 PROCEDURE — 77010033678 HC OXYGEN DAILY

## 2018-07-30 PROCEDURE — 77030018798 HC PMP KT ENTRL FED COVD -A

## 2018-07-30 PROCEDURE — 80061 LIPID PANEL: CPT | Performed by: FAMILY MEDICINE

## 2018-07-30 PROCEDURE — 76450000000

## 2018-07-30 PROCEDURE — 30233R1 TRANSFUSION OF NONAUTOLOGOUS PLATELETS INTO PERIPHERAL VEIN, PERCUTANEOUS APPROACH: ICD-10-PCS | Performed by: FAMILY MEDICINE

## 2018-07-30 PROCEDURE — P9035 PLATELET PHERES LEUKOREDUCED: HCPCS | Performed by: FAMILY MEDICINE

## 2018-07-30 PROCEDURE — 77030020186 HC BOOT HL PROTCT SAGE -B

## 2018-07-30 PROCEDURE — 85025 COMPLETE CBC W/AUTO DIFF WBC: CPT | Performed by: FAMILY MEDICINE

## 2018-07-30 RX ORDER — NALOXONE HYDROCHLORIDE 1 MG/ML
1 INJECTION INTRAMUSCULAR; INTRAVENOUS; SUBCUTANEOUS ONCE
Status: COMPLETED | OUTPATIENT
Start: 2018-07-30 | End: 2018-07-30

## 2018-07-30 RX ORDER — SODIUM CHLORIDE 0.9 % (FLUSH) 0.9 %
10 SYRINGE (ML) INJECTION
Status: COMPLETED | OUTPATIENT
Start: 2018-07-30 | End: 2018-07-30

## 2018-07-30 RX ORDER — SERTRALINE HYDROCHLORIDE 50 MG/1
200 TABLET, FILM COATED ORAL DAILY
Status: DISCONTINUED | OUTPATIENT
Start: 2018-07-30 | End: 2018-08-01

## 2018-07-30 RX ORDER — SODIUM CHLORIDE 0.9 % (FLUSH) 0.9 %
5-10 SYRINGE (ML) INJECTION AS NEEDED
Status: DISCONTINUED | OUTPATIENT
Start: 2018-07-30 | End: 2018-08-06 | Stop reason: HOSPADM

## 2018-07-30 RX ORDER — MORPHINE SULFATE 10 MG/ML
5 INJECTION, SOLUTION INTRAMUSCULAR; INTRAVENOUS
Status: COMPLETED | OUTPATIENT
Start: 2018-07-30 | End: 2018-07-30

## 2018-07-30 RX ORDER — ACETAMINOPHEN 325 MG/1
650 TABLET ORAL
Status: DISCONTINUED | OUTPATIENT
Start: 2018-07-30 | End: 2018-08-01

## 2018-07-30 RX ORDER — NALOXONE HYDROCHLORIDE 1 MG/ML
INJECTION INTRAMUSCULAR; INTRAVENOUS; SUBCUTANEOUS
Status: COMPLETED
Start: 2018-07-30 | End: 2018-07-30

## 2018-07-30 RX ORDER — OXYCODONE HYDROCHLORIDE 5 MG/1
5 TABLET ORAL
Status: DISCONTINUED | OUTPATIENT
Start: 2018-07-30 | End: 2018-07-31

## 2018-07-30 RX ORDER — HYDRALAZINE HYDROCHLORIDE 25 MG/1
25 TABLET, FILM COATED ORAL 3 TIMES DAILY
Status: DISCONTINUED | OUTPATIENT
Start: 2018-07-30 | End: 2018-07-31

## 2018-07-30 RX ORDER — NALOXONE HYDROCHLORIDE 0.4 MG/ML
0.4 INJECTION, SOLUTION INTRAMUSCULAR; INTRAVENOUS; SUBCUTANEOUS AS NEEDED
Status: DISCONTINUED | OUTPATIENT
Start: 2018-07-30 | End: 2018-08-06 | Stop reason: HOSPADM

## 2018-07-30 RX ORDER — LORAZEPAM 2 MG/ML
1 INJECTION INTRAMUSCULAR
Status: DISCONTINUED | OUTPATIENT
Start: 2018-07-30 | End: 2018-08-06 | Stop reason: HOSPADM

## 2018-07-30 RX ORDER — SODIUM CHLORIDE 9 MG/ML
250 INJECTION, SOLUTION INTRAVENOUS AS NEEDED
Status: DISCONTINUED | OUTPATIENT
Start: 2018-07-30 | End: 2018-08-06 | Stop reason: HOSPADM

## 2018-07-30 RX ORDER — SODIUM CHLORIDE 0.9 % (FLUSH) 0.9 %
5-10 SYRINGE (ML) INJECTION EVERY 8 HOURS
Status: DISCONTINUED | OUTPATIENT
Start: 2018-07-30 | End: 2018-08-06 | Stop reason: HOSPADM

## 2018-07-30 RX ORDER — LEVETIRACETAM 500 MG/1
1000 TABLET ORAL 2 TIMES DAILY
Status: DISCONTINUED | OUTPATIENT
Start: 2018-07-30 | End: 2018-07-30

## 2018-07-30 RX ORDER — NICARDIPINE HYDROCHLORIDE 0.1 MG/ML
5-15 INJECTION INTRAVENOUS CONTINUOUS
Status: DISCONTINUED | OUTPATIENT
Start: 2018-07-30 | End: 2018-07-30 | Stop reason: SDUPTHER

## 2018-07-30 RX ORDER — AMLODIPINE BESYLATE 10 MG/1
10 TABLET ORAL DAILY
Status: DISCONTINUED | OUTPATIENT
Start: 2018-07-30 | End: 2018-08-01

## 2018-07-30 RX ORDER — SODIUM CHLORIDE 9 MG/ML
50 INJECTION, SOLUTION INTRAVENOUS CONTINUOUS
Status: DISCONTINUED | OUTPATIENT
Start: 2018-07-30 | End: 2018-08-01

## 2018-07-30 RX ORDER — LOSARTAN POTASSIUM 50 MG/1
100 TABLET ORAL DAILY
Status: DISCONTINUED | OUTPATIENT
Start: 2018-07-30 | End: 2018-08-01

## 2018-07-30 RX ORDER — ONDANSETRON 2 MG/ML
4 INJECTION INTRAMUSCULAR; INTRAVENOUS
Status: COMPLETED | OUTPATIENT
Start: 2018-07-30 | End: 2018-07-30

## 2018-07-30 RX ADMIN — TUBERCULIN PURIFIED PROTEIN DERIVATIVE 5 UNITS: 5 INJECTION, SOLUTION INTRADERMAL at 04:52

## 2018-07-30 RX ADMIN — NALOXONE HYDROCHLORIDE 1 MG: 1 INJECTION INTRAMUSCULAR; INTRAVENOUS; SUBCUTANEOUS at 02:43

## 2018-07-30 RX ADMIN — NICARDIPINE HYDROCHLORIDE 5 MG/HR: 25 INJECTION INTRAVENOUS at 16:33

## 2018-07-30 RX ADMIN — SERTRALINE HYDROCHLORIDE 200 MG: 50 TABLET ORAL at 09:00

## 2018-07-30 RX ADMIN — NALOXONE HYDROCHLORIDE 1 MG: 1 INJECTION PARENTERAL at 02:43

## 2018-07-30 RX ADMIN — ONDANSETRON 4 MG: 2 INJECTION, SOLUTION INTRAMUSCULAR; INTRAVENOUS at 02:28

## 2018-07-30 RX ADMIN — AMLODIPINE BESYLATE 10 MG: 10 TABLET ORAL at 13:32

## 2018-07-30 RX ADMIN — NICARDIPINE HYDROCHLORIDE 15 MG/HR: 25 INJECTION INTRAVENOUS at 12:17

## 2018-07-30 RX ADMIN — MORPHINE SULFATE 5 MG: 10 INJECTION INTRAMUSCULAR; INTRAVENOUS; SUBCUTANEOUS at 02:28

## 2018-07-30 RX ADMIN — Medication 10 ML: at 14:18

## 2018-07-30 RX ADMIN — NICARDIPINE HYDROCHLORIDE 5 MG/HR: 25 INJECTION INTRAVENOUS at 02:13

## 2018-07-30 RX ADMIN — HYDRALAZINE HYDROCHLORIDE 25 MG: 25 TABLET, FILM COATED ORAL at 13:32

## 2018-07-30 RX ADMIN — NICARDIPINE HYDROCHLORIDE 10 MG/HR: 25 INJECTION INTRAVENOUS at 10:00

## 2018-07-30 RX ADMIN — Medication 10 ML: at 05:04

## 2018-07-30 RX ADMIN — IOPAMIDOL 80 ML: 755 INJECTION, SOLUTION INTRAVENOUS at 12:46

## 2018-07-30 RX ADMIN — Medication 10 ML: at 12:46

## 2018-07-30 RX ADMIN — LOSARTAN POTASSIUM 100 MG: 50 TABLET ORAL at 13:33

## 2018-07-30 RX ADMIN — NICARDIPINE HYDROCHLORIDE 12.5 MG/HR: 25 INJECTION INTRAVENOUS at 11:01

## 2018-07-30 RX ADMIN — NICARDIPINE HYDROCHLORIDE 15 MG/HR: 25 INJECTION INTRAVENOUS at 11:17

## 2018-07-30 RX ADMIN — SODIUM CHLORIDE 40 ML/HR: 900 INJECTION, SOLUTION INTRAVENOUS at 15:51

## 2018-07-30 RX ADMIN — NICARDIPINE HYDROCHLORIDE 15 MG/HR: 25 INJECTION INTRAVENOUS at 14:09

## 2018-07-30 RX ADMIN — Medication 10 ML: at 21:12

## 2018-07-30 RX ADMIN — SODIUM CHLORIDE 1000 MG: 900 INJECTION, SOLUTION INTRAVENOUS at 10:34

## 2018-07-30 RX ADMIN — OXYCODONE HYDROCHLORIDE 5 MG: 5 TABLET ORAL at 15:22

## 2018-07-30 RX ADMIN — NICARDIPINE HYDROCHLORIDE 7.5 MG/HR: 25 INJECTION INTRAVENOUS at 04:51

## 2018-07-30 RX ADMIN — SODIUM CHLORIDE 1000 MG: 900 INJECTION, SOLUTION INTRAVENOUS at 22:14

## 2018-07-30 RX ADMIN — HYDRALAZINE HYDROCHLORIDE 25 MG: 25 TABLET, FILM COATED ORAL at 21:06

## 2018-07-30 RX ADMIN — SODIUM CHLORIDE 100 ML/HR: 900 INJECTION, SOLUTION INTRAVENOUS at 09:50

## 2018-07-30 RX ADMIN — SODIUM CHLORIDE 30 ML/HR: 900 INJECTION, SOLUTION INTRAVENOUS at 19:15

## 2018-07-30 NOTE — PROGRESS NOTES
07/30/18 4102 Dual Skin Pressure Injury Assessment Dual Skin Pressure Injury Assessment WDL Second Care Provider (Based on [address] [place] [address]) Mary Jane Allen, RN Dual skin assessment completed with Mary Jane Allen, RORY. No evidence of skin breakdown/pressure injury. Heels red, slightly boggy, blanchable. Slight excoriation between buttocks. Scar to R hip. Scattered bruising.

## 2018-07-30 NOTE — ED PROVIDER NOTES
HPI Comments: Pt stated that she stared getting jittery legs and weakness while at home. She states she did not fall. She states she has had some difficulty walking. She denies any seizure activity to me. She is now complaining of some right sided flank pain that has developed this evening, but currently resolved. Patient denies any sob although ems did have an oxygen saturation that was 85 %. Patient is a 76 y.o. female presenting with flank pain. The history is provided by the patient. Flank Pain Associated symptoms include weakness. Pertinent negatives include no fever and no abdominal pain. Past Medical History:  
Diagnosis Date  Chronic pain \"all over body\"-takes Zoloft daily  Chronic sinusitis 1/19/2015  Diverticulosis 2017  Edema 1/19/2015 Resolved  Former smoker  GERD (gastroesophageal reflux disease)  Headache 1/19/2015  Hearing loss 1/19/2015 \"some\"- no hearing aids  Heart murmur 1990  
 Hip osteoarthritis 1/19/2015  
 feet, hands  Hx of colonic polyps 2017 SSA  Hx of migraines   
 on medication  Hypercholesterolemia 1/19/2015  Hypertension  Impaired fasting glucose 1/19/2015  Joint disorder 1/19/2015  Morbid obesity ([address] BMI 40  
 Neuralgia 1/19/2015  Phlebitis and thrombophlebitis of superficial vessels of lower extremities 01/19/2015  
 pt denies  Psychiatric disorder  Restless leg syndrome 1/19/2015  Rheumatic fever   
 pt reports possibly R.F.  
 Seizures ([address]   
 seizures as a child, has NOT had one since she was 13years old  SOB (shortness of breath) on exertion  Stroke [facility])   
 at age 11 only residual poor peripheral vision  Tobacco abuse 1/19/2015  
 quit smoking 4/2015  Vascular anomalies, congenital   
 Vitamin D deficiency 1/19/2015 Past Surgical History:  
Procedure Laterality Date  COLONOSCOPY N/A 2/20/2017  Bjorn--appendiceal serrated sessile polyp, transverse and rectal hyperplastic--3 year recall  HX APPENDECTOMY  03/15/2017  HX CATARACT REMOVAL Bilateral 3/2016 [place]  
 pt denies  HX ROTATOR CUFF REPAIR Right 10/31/2016  
 x2 [address]  TOTAL HIP ARTHROPLASTY Right 2005  
 and again 5/2015 and another revision Family History:  
Problem Relation Age of Onset  Hypertension Mother  Cancer Mother Bladder  Depression Mother  Parkinson's Disease Father  Alcohol abuse Brother  Alcohol abuse Son  No Known Problems Sister  No Known Problems Brother  Malignant Hyperthermia Neg Hx  Pseudocholinesterase Deficiency Neg Hx  Delayed Awakening Neg Hx  Post-op Nausea/Vomiting Neg Hx  Emergence Delirium Neg Hx  Post-op Cognitive Dysfunction Neg Hx   
 Other Neg Hx Social History Social History  Marital status:  Spouse name: N/A  
 Number of children: N/A  
 Years of education: N/A Occupational History  Not on file. Social History Main Topics  Smoking status: Former Smoker Packs/day: 1.00 Years: 50.00 Types: Cigarettes Quit date: 3/29/2015  Smokeless tobacco: Never Used Comment: quit 2 months and 2 weeks ago  Alcohol use No  
   Comment: Former- socially  Drug use: No  
 Sexual activity: Not on file Other Topics Concern  Not on file Social History Narrative ALLERGIES: Codeine and Oxycodone Review of Systems Constitutional: Negative for chills and fever. Respiratory: Positive for shortness of breath. Negative for cough and chest tightness. Gastrointestinal: Negative for abdominal pain, diarrhea, nausea and vomiting. Genitourinary: Positive for flank pain. Negative for vaginal pain. Skin: Negative. Neurological: Positive for weakness. All other systems reviewed and are negative.  
 
 
Vitals:  
 07/29/18 2148 07/29/18 [id number] [address] 07/29/18 2223 07/29/18 2238 BP: (!) 205/83 (!) 208/81 199/82 179/74 Pulse: 77 74 73 77 Resp: 20   16 Temp: 98.4 °F (36.9 °C) SpO2: 97% 91% 91% 90% Weight: 104.3 kg (230 lb) Height: 5' 4\" (1.626 m) Physical Exam  
Constitutional: She is oriented to person, place, and time. She appears well-developed and well-nourished. No distress. HENT:  
Head: Normocephalic and atraumatic. Eyes: Conjunctivae are normal. No scleral icterus. Neck: Normal range of motion. Neck supple. Cardiovascular: Normal rate and regular rhythm. Murmur heard. Pulmonary/Chest: Effort normal. No stridor. No respiratory distress. She has no rales. Abdominal: Soft. There is no tenderness. There is no rebound and no guarding. Neurological: She is alert and oriented to person, place, and time. No focal weakness Skin: Skin is warm and dry. No rash noted. She is not diaphoretic. Psychiatric: She has a normal mood and affect. Her behavior is normal.  
Nursing note and vitals reviewed. MDM Number of Diagnoses or Management Options Thalamic hemorrhage [facility]):  
Diagnosis management comments: Patient has nonspecific constellation of symptoms. That did appear somewhat altered I ordered a head CT. Patient has known history of large AVM she does have new 2 cm thalamic bleed. I discussed case with Dr. Beti Brand of neurosurgery who stated this would be nonoperative. I given patient Shelli monk and admitted patient to the hospitalist.  Patient did receive 5 mg of morphine and became somewhat hypoxic I have just ordered 1 mg of Narcan which improved symptoms. Enid Gardner MD; 7/30/2018 @2:45 AM Voice dictation software was used during the making of this note. This software is not perfect and grammatical and other typographical errors may be present. This note has not been proofread for errors. 
=================================================================== Amount and/or Complexity of Data Reviewed Clinical lab tests: ordered and reviewed (Results for orders placed or performed during the hospital encounter of 07/29/18 
-CBC WITH AUTOMATED DIFF Result                                            Value                         Ref Range WBC                                               7.1                           4.3 - 11.1 K/uL           
     RBC                                               4.39                          4.05 - 5.25 M/uL HGB                                               12.8                          11.7 - 15.4 g/dL HCT                                               39.5                          35.8 - 46.3 % MCV                                               90.0                          79.6 - 97.8 FL            
     MCH                                               29.2                          26.1 - 32.9 PG            
     MCHC                                              32.4                          31.4 - 35.0 g/dL RDW                                               13.9                          11.9 - 14.6 % PLATELET                                          141 (L)                       150 - 450 K/uL MPV                                               9.9 (L)                       10.8 - 14.1 FL            
     DF                                                AUTOMATED NEUTROPHILS                                       74                            43 - 78 % LYMPHOCYTES                                       16                            13 - 44 % MONOCYTES                                         8                             4.0 - 12.0 %      EOSINOPHILS                                       2                             0.5 - 7.8 % BASOPHILS                                         0                             0.0 - 2.0 % IMMATURE GRANULOCYTES                             0                             0.0 - 5.0 %               
     ABS. NEUTROPHILS                                  5.2                           1.7 - 8.2 K/UL            
     ABS. LYMPHOCYTES                                  1.1                           0.5 - 4.6 K/UL            
     ABS. MONOCYTES                                    0.6                           0.1 - 1.3 K/UL            
     ABS. EOSINOPHILS                                  0.2                           0.0 - 0.8 K/UL            
     ABS. BASOPHILS                                    0.0                           0.0 - 0.2 K/UL            
     ABS. IMM. GRANS.                                  0.0                           0.0 - 0.5 K/UL            
-METABOLIC PANEL, COMPREHENSIVE Result                                            Value                         Ref Range Sodium                                            140                           136 - 145 mmol/L Potassium                                         4.2                           3.5 - 5.1 mmol/L Chloride                                          104                           98 - 107 mmol/L           
     CO2                                               30                            21 - 32 mmol/L Anion gap                                         6 (L)                         7 - 16 mmol/L Glucose                                           107 (H)                       65 - 100 mg/dL BUN                                               14                            8 - 23 MG/DL      Creatinine                                        1.29 (H)                      0.6 - 1.0 MG/DL           
 GFR est AA                                        53 (L)                        >60 ml/min/1.73m2 GFR est non-AA                                    44 (L)                        >60 ml/min/1.73m2 Calcium                                           8.2 (L)                       8.3 - 10.4 MG/DL Bilirubin, total                                  0.3                           0.2 - 1.1 MG/DL           
     ALT (SGPT)                                        19                            12 - 65 U/L               
     AST (SGOT)                                        20                            15 - 37 U/L Alk. phosphatase                                  140 (H)                       50 - 136 U/L Protein, total                                    7.2                           6.3 - 8.2 g/dL Albumin                                           3.4                           3.2 - 4.6 g/dL Globulin                                          3.8 (H)                       2.3 - 3.5 g/dL A-G Ratio                                         0.9 (L)                       1.2 - 3.5                 
-URINE MICROSCOPIC Result                                            Value                         Ref Range WBC                                               20-50                         0 /hpf                    
     RBC                                               0-3                           0 /hpf Epithelial cells                                  3-5                           0 /hpf Bacteria                                          0                             0 /hpf Casts                                             0                             0 /lpf      Crystals, urine 0                             0 /LPF Mucus                                             0                             0 /lpf Other observations                                RESULTS VERIFIED MANUALLY                               ) 
Tests in the radiology section of CPT®: ordered and reviewed (Xr Chest Pa Lat Result Date: 7/30/2018 Chest 2 view dated 7/29/2018 Previous exam 1/16/2018 CLINICAL INFORMATION: Shortness of breath and wheezing Heart is enlarged and mediastinum unremarkable. Vascularity appears normal. There is a small ill-defined density at the left lung base consistent atelectasis or infiltrate. No pleural effusion. IMPRESSION: Small area of atelectasis or infiltrate left lung base Ct Head Wo Cont Result Date: 7/30/2018 CT Brain dated 7/30/2018  Comparison: CT scan dated 12/25/2017, MRI dated 12/22/2017 Clinical Information:  Seizure, altered mental status ventricles are normal in size. There is no midline shift. 5 mm axial images were obtained from skull base to vertex without contrast. Radiation dose reduction techniques were used for this study. Our scanners use one or all of the following:  Automated exposure control, adjustment of the mA and/or kV according to patient size, iterative reconstruction. Findings: Again, there are numerous calcifications involving right posterior parietal area extending in the right occipital region with abnormal appearing vessels also in the left frontal region and left middle cranial fossa. The prior MRI showed this patient have a complex arteriovenous malformation. On today's exam, there is hyperdense acute hemorrhage in the right thalamus. This measures approximately 2 x 1.2 cm. See axial images 12 through 15. Ventricles are normal in size. No extra-axial abnormality. . Mastoid air cells and visualized paranasal sinuses are aerated and unremarkable. Impression: 1.  Changes consistent with complex arteriovenous malformation as demonstrated on prior exams. 2. Hyperdense acute hemorrhage in the right thalamus DC5 The critical results contained in this report were communicated to Dr. Rodolfo Lambert in the emergency department by myself on 7/30/2018 at 0120 hours. Critical results were communicated as outlined in Section II.C.2.a.i of the ACR Practice Guideline for Communication of Diagnostic Imaging Findings. ) Critical Care Total time providing critical care: 30-74 minutes (35 minutes) ED Course Procedures

## 2018-07-30 NOTE — PROGRESS NOTES
SPEECH PATHOLOGY NOTE: 
 
Speech therapy consult received and appreciated. Chart review completed, and case discussed with RN. Attempted to see for bedside swallow assessment this morning. Patient is drowsy, difficulty to rouse, and unable to maintain adequate alertness. Single ice chip presented in attempt to increase alertness, but no oral manipulation and anterior loss of bolus on left side as ice chip melted. Bolus was then removed from oral cavity. Not appropriate for po intake at this time. Recommend NPO until dysphagia assessment can be completed. Will follow up at later time today. Addendum: Patient remains inappropriate for po trials. Will re-attempt at later time/date as patient's status improves and is able to participate.   
 
[address]iss [address] CCC-SLP

## 2018-07-30 NOTE — PROGRESS NOTES
Problem: Nutrition Deficit Goal: *Optimize nutritional status Nutrition: 
Reason for assessment: Consult for TF management per nutritional support protocols. (Dr. Brian Francois) Assessment:  
Food/Nutrition History: Pt with Rt thalamic bleed. NPO s/p SLP evaluation d/t lethargy. NGT in place for temporary enteral nutrition. Abdomen obese with hypoactive bowel sounds. Date of Last BM: unknown. Pertinent Medications: cardene drip; IVF: NS @ 100ml/hr Pertinent labs-K 4.1, glucose 147, Na 140 Triglyceride 65; A1C 5.6 on 6/12/18 Anthropometrics:Height: 5' 4\" (162.6 cm), Weight Source: Bed, Weight: 117.1 kg (258 lb 2.5 oz), Body mass index is 44.31 kg/(m^2). BMI class of morbid obesity class III. No edema noted. Macronutrient needs: EER:  6748-4725 kcal /day (12-14 kcal/kg listed BW) EPR:  55-66 grams protein/day (1-1.2 grams/kg IBW)(GFR 56)-no renal insufficiency noted. Max CHO:  225 grams/day (55% kcal) Fluid:  1ml/kcal 
Intake/Comparative standards: Current NPO status does not meet kcal or protein needs Nutrition Diagnosis: Difficulty swallowing r/t lethargy as evidenced by NPO s/p SLP evaluation. Intervention: 
Meals and snacks: NPO 
EN:Start TF of Osmolite 1.2 at 10 ml/hr and increase by 10 ml/hr q 6 hrs as tolerated to goal rate of 50ml/hr with 50ml water q 1hr to provide 1440 kcal/day (100% of needs), 67 grams protein/day (100% of needs), 190 grams CHO/day (does not exceed max CHO),  and ~ 984ml free water/day for a total of 2184 ml fluid/day (100% of needs). Nutrition Supplement Therapy: Electrolyte replacement per nutritional support protocols are active on MAR. IV: Adjust IVF ml for ml of TF rate increase. Coordination of nutrition care: AM ICU interdisciplinary rounds Discharge nutrition plan: Too soon to determine [address], [address] [place] [address], [facility] [address] [place] [address], [phone]

## 2018-07-30 NOTE — PROGRESS NOTES
Patient increasingly lethargic this afternoon. Will open eyes to voice/stimulus but unable to keep open. Follows commands-  remains unequal, left arm flaccid. Cardene infusion at 5 mg/hr, breathing pattern unchanged- remains on 5L NC. Did receive 5 mg roxicodone for headache at 1522. MD notified of assessment and increasing lethargy.

## 2018-07-30 NOTE — CONSULTS
Palliative Care    Patient: Jennifer Vera MRN: [id number]  SSN: [id number]    YOB: 1950  Age: 76 y.o. Sex: female       Date of Request: 7/30/2018  Date of Consult:  7/30/2018  Reason for Consult:  goals of care  Requesting Physician: Dr. Sonja Ibrahim     Assessment/Plan:     Principal Diagnosis:    Altered Mental Status R41.82  Additional Diagnoses:   · Debility, Unspecified  R53.81  · Fatigue, Lethargy  R53.83  · Counseling, Encounter for Medical Advice  Z71.9  · Encounter for Palliative Care  Z51.5    Palliative Performance Scale (PPS):  PPS: 30    Medical Decision Making:   Reviewed and summarized chart from admission to present. Discussed case with appropriate providers: ICU IDT  Reviewed laboratory and x-ray data: CBC, CMP, CT head    No family present-  has recently left. Patient lethargic, arouses to voice. She can follow commands and squeezes my hand with her right hand and wiggles right toes. Overall lethargic. NGT just placed for tube feeds, as she is unable to participate with SLP. CTA completed; awaiting further discussion with Dr. Collette Jordan at [facility]. Will continue to follow for goals of care. Will discuss findings with members of the interdisciplinary team.      Thank you for this referral.          .    Subjective:     History obtained from:  Care Provider and Chart    Chief Complaint: AMS, [address]  History of Present Illness:  Ms. Janet Saldaña is a 77 yo female with PMH of chronic pain, diverticulosis, GERD, obesity, prior tobacco abuse, and other history as listed below. EMS was called on 7/29 due to reported seizure activity. Upon EMS arrival, patient with no seizure or post-ictal activity. She was noted to be hypoxic at 85%, not usually on O2. CT head showed right thalamic bleed and known AVM of brain. Repeat head CT on 7/30 unchanged. Advance Directive: No       Code Status:  Full Code            Health Care Power of : Unknown.     Past Medical History:   Diagnosis Date  Chronic pain     \"all over body\"-takes Zoloft daily    Chronic sinusitis 1/19/2015    Diverticulosis 2017    Edema 1/19/2015    Resolved    Former smoker     GERD (gastroesophageal reflux disease)     Headache 1/19/2015    Hearing loss 1/19/2015    \"some\"- no hearing aids    Heart murmur 1990    Hip osteoarthritis 1/19/2015    feet, hands    Hx of colonic polyps 2017    SSA    Hx of migraines     on medication    Hypercholesterolemia 1/19/2015    Hypertension     Impaired fasting glucose 1/19/2015    Joint disorder 1/19/2015    Morbid obesity (HCC)     BMI 40    Neuralgia 1/19/2015    Phlebitis and thrombophlebitis of superficial vessels of lower extremities 01/19/2015    pt denies     Psychiatric disorder     Restless leg syndrome 1/19/2015    Rheumatic fever     pt reports possibly R.F.    Seizures ([address]     seizures as a child, has NOT had one since she was 13years old    SOB (shortness of breath) on exertion     Stroke ([address]     at age 11 only residual poor peripheral vision    Tobacco abuse 1/19/2015    quit smoking 4/2015    Vascular anomalies, congenital     Vitamin D deficiency 1/19/2015      Past Surgical History:   Procedure Laterality Date    COLONOSCOPY N/A 2/20/2017    Bjorn--appendiceal serrated sessile polyp, transverse and rectal hyperplastic--3 year recall    HX APPENDECTOMY  03/15/2017    HX CATARACT REMOVAL Bilateral 3/2016    HX CHOLECYSTECTOMY  1982    HX MOHS PROCEDURES Right 1995    pt denies    HX ROTATOR CUFF REPAIR Right 10/31/2016    x2    HX TUBAL LIGATION  1982    TOTAL HIP ARTHROPLASTY Right 2005    and again 5/2015 and another revision     Family History   Problem Relation Age of Onset    Hypertension Mother     Cancer Mother      Bladder    Depression Mother     Parkinson's Disease Father     Alcohol abuse Brother     Alcohol abuse Son     No Known Problems Sister     No Known Problems Brother     Malignant Hyperthermia Neg Hx     Pseudocholinesterase Deficiency Neg Hx     Delayed Awakening Neg Hx     Post-op Nausea/Vomiting Neg Hx     Emergence Delirium Neg Hx     Post-op Cognitive Dysfunction Neg Hx     Other Neg Hx       Social History   Substance Use Topics    Smoking status: Former Smoker     Packs/day: 1.00     Years: 50.00     Types: Cigarettes     Quit date: 3/29/2015    Smokeless tobacco: Never Used      Comment: quit 2 months and 2 weeks ago    Alcohol use No      Comment: Former- socially     Prior to Admission medications    Medication Sig Start Date End Date Taking? Authorizing Provider   amLODIPine (NORVASC) 10 mg tablet Take 10 mg by mouth daily. Yes Phys Other, MD   oxyCODONE IR (ROXICODONE) 5 mg immediate release tablet Take 5 mg by mouth every six (6) hours as needed for Pain. Yes Phys Other, MD   mirabegron ER (MYRBETRIQ) 50 mg ER tablet Take 1 Tab by mouth daily. 6/28/18   Brandon Vidales,    hydrALAZINE (APRESOLINE) 25 mg tablet TAKE ONE TABLET BY MOUTH TWICE A DAY FOR BLOOD PRESSURE 6/26/18   Brandon Vidales DO   losartan (COZAAR) 100 mg tablet Take 1 Tab by mouth daily. Indications: hypertension 6/18/18   Brandon Vidales, DO   levETIRAcetam 1,000 mg tablet Take 1,000 mg by mouth two (2) times a day. 2/7/18 6/28/18  Historical Provider   ergocalciferol (VITAMIN D2) 50,000 unit capsule TAKE 1 CAPSULE ONE TIME WEEKLY- takes on sat 3/1/18   Brandon Vidales, DO   sertraline (ZOLOFT) 100 mg tablet Take 2 Tabs by mouth daily. 1/25/18   Brandon Vidales, DO   ibuprofen (MOTRIN) 200 mg tablet Take 200 mg by mouth every eight (8) hours as needed for Pain.     Historical Provider       Allergies   Allergen Reactions    Codeine Nausea and Vomiting    Oxycodone Nausea and Vomiting        Review of Systems:  Review of systems not obtained due to patient factors: lethargic     Objective:     Visit Vitals    /59    Pulse 82    Temp 99.7 °F (37.6 °C)    Resp 26    Ht 5' 4\" (1.626 m)    Wt 117.1 kg (258 lb 2.5 oz)    SpO2 94%    BMI 44.31 kg/m2        Physical Exam:    General:  AMS, lethargic. No distress noted. Eyes:  Conjunctivae/corneas clear. Nose: Nares normal. Septum midline. NGT present. O2 via NC. Neck: Supple, symmetrical, trachea midline. Lungs:   Clear to auscultation bilaterally. Heart:  Regular rate and rhythm. Abdomen:   Obese. Soft, non-tender, non-distended. Extremities: Normal, atraumatic, no cyanosis or edema. Skin: Skin color, texture, turgor normal. No rash. Neurologic: Left sided hemiparesis. Lethargic. Psych: Unable to assess.       Assessment:     Hospital Problems  Date Reviewed: 6/28/2018          Codes Class Noted POA    * (Principal)Nontraumatic thalamic hemorrhage ([facility] ICD-10-CM: I61.9  ICD-9-CM: 372  7/30/2018 Yes        Hypertensive urgency ICD-10-CM: I16.0  ICD-9-CM: 401.9  7/30/2018 Yes        Chronic pain of multiple sites (Chronic) ICD-10-CM: R52, G89.29  ICD-9-CM: 780.96, 338.29  7/30/2018 Yes        Cerebral AV malformation (Chronic) ICD-10-CM: Q28.2  ICD-9-CM: 747.81  7/30/2018 Yes        Seizure disorder ([facility] (Chronic) ICD-10-CM: G40.909  ICD-9-CM: 345.90  1/22/2018 Yes        Edema of both legs ICD-10-CM: R60.0  ICD-9-CM: 782.3  1/19/2015 Yes        Morbid obesity with BMI of 40.0-44.9, adult (HCC) (Chronic) ICD-10-CM: E66.01, Z68.41  ICD-9-CM: 278.01, V85.41  1/19/2015 Yes              Signed By: Glynis Homans, NP     July 30, 2018

## 2018-07-30 NOTE — PROGRESS NOTES
Report received from [facility]. [facility] neuro assessment. Pt lethargic, responds to commands, oriented x4. Left pupil sluggish, left arm flaccid, decreased sensation, left leg weakness, some effort against gravity. BP systolic 516, restarted cardene at 5mg/hr.

## 2018-07-30 NOTE — PROGRESS NOTES
TRANSFER - IN REPORT: 
 
Verbal report received from Britni RN(name) on [address]  being received from ED(unit) for routine progression of care Report consisted of patients Situation, Background, Assessment and  
Recommendations(SBAR). Information from the following report(s) SBAR, Kardex, ED Summary, Procedure Summary, MAR and Cardiac Rhythm NSR was reviewed with the receiving nurse. Opportunity for questions and clarification was provided. Assessment completed upon patients arrival to unit and care assumed. Hemorrhagic Stroke/Intracerebral Hemorrhage (excludes Subdural/Epidural Hematoma) VTE Prophylaxis: Yes: SCDs Antiplatelet: Yes: Orders for platelet transfusion Statin if LDL Greater Than or Equal to100: No, MD to be notified BP Parameters: Less Than 140/90 Controlled With: Continuous IV Dysphagia Screen Completed: Yes: Pass Patient has PEG, NG Tube, Feeding Tube: No 
 
Medication orders per above route: Yes 
 
Nutrition Status: PO 
 
NIH Stroke Scale Complete: Yes: NIH 1 per ED report Frequency of Vital Signs: Every hour for 24 hours, ending at 0330 Frequency of Neuro Checks: Every hour for 24 hours, ending at 0330 Daily Education/Care Plan Updated: Yes Heidy Del Angel

## 2018-07-30 NOTE — ED NOTES
Pt given 5mg of Morphine, pt's sats dropped to 81%, pt placed on nasal cannula, sats did not increase on 8L, switched to NRB at 20L, sats 100%, resp 7, Dr. Yovani Downs put in 1mg of Narcan. Pt's resp increase to 14.

## 2018-07-30 NOTE — ED TRIAGE NOTES
EMS initially called out for a seizure, upon EMS arrival there was not seizure activity nor was the pt post ictal. Pt c/o right sided flank pain with EMS while she was in the bath tub. Pt also c/o right leg pain now. Pt denies falling, pt denies chest pain, denies shob. EMS found pt at 85% room air and placed pt on 4L, pt does not wear oxygen. Pt's room air sats now 95%.

## 2018-07-30 NOTE — CONSULTS
Physical Medicine & Rehabilitation Note-consult    Patient: Heather Stokes MRN: [id number]  SSN: [id number]    YOB: 1950  Age: 76 y.o. Sex: female      Admit Date: 7/29/2018  Admitting Physician: Meghna Chavira MD    Medical Decision Making/Plan/Recommend:   Patient seen for rehab consult. She is difficult to arouse, somnolent at this time. PT/ OT and ST will resume when appropriate. Will return for complete evaluation when condition improves. Thank you for the opportunity to participate in the care of this patient.     Signed By: Ivana Castillo MD     July 30, 2018

## 2018-07-30 NOTE — PROGRESS NOTES
Head CT completed. Patient is lethargic but still awakens to voice/stimulation. Following commands, decreased attention.  called and updated on patient condition. Cardene weaned off.

## 2018-07-30 NOTE — H&P
HOSPITALIST H&P/CONSULT 
NAME:  Aubrie Talley Age:  76 y.o. 
:   1950 MRN:   [id number] PCP: Alessandro Brumfield DO Treatment Team: Attending Provider: Desirae Marquez MD; Triage Nurse: Patel Chou Prior CC: Reason for admission is: rt thalamic bleed HPI:  
Patient history was obtained from the ER provider prior to seeing the patient. Patient is a 76 y.o. female who presents to the ER due to legs \"feeling like jello\". She reported jittery legs and weakness to ER staff. She has had increased difficulty walking but no fall reported. EMS was called; they reported an O2 sat of 85% on room air. Pt has complained of multiple sites of pain and some vague symptoms while in ER. Difficult to determine what is new vs baseline, as she does have chronic pain of multiple sites. ER workup shows continued mild hypoxia and accelerated HTN. Head CT unfortunately shows a rt thalamic bleed. 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 ER contacted neurosurgery, who recommended BP control and supportive care. There is no recommended surgical intervention. Denies fever/chills, n/v/d, SOB, cough/congestion Pt states that she is going to die. She is offered reassurance. ROS: 
All systems have been reviewed and are negative except as stated in HPI or elsewhere. Past Medical History:  
Diagnosis Date  Chronic pain \"all over body\"-takes Zoloft daily  Chronic sinusitis 2015  Diverticulosis 2017  Edema 2015 Resolved  Former smoker  GERD (gastroesophageal reflux disease)  Headache 2015  Hearing loss 2015 \"some\"- no hearing aids  Heart murmur   
 Hip osteoarthritis 2015  
 feet, hands  Hx of colonic polyps  SSA  Hx of migraines   
 on medication  Hypercholesterolemia 2015  Hypertension  Impaired fasting glucose 2015  Joint disorder 2015  Morbid obesity ([facility] [address] BMI 40  
 Neuralgia 1/19/2015  Phlebitis and thrombophlebitis of superficial vessels of lower extremities 01/19/2015  
 pt denies  Psychiatric disorder  Restless leg syndrome 1/19/2015  Rheumatic fever   
 pt reports possibly R.F.  
 Seizures ([facility] [address]   
 seizures as a child, has NOT had one since she was 13years old  SOB (shortness of breath) on exertion  Stroke [facility])   
 at age 11 only residual poor peripheral vision  Tobacco abuse 1/19/2015  
 quit smoking 4/2015  Vascular anomalies, congenital   
 Vitamin D deficiency 1/19/2015 Past Surgical History:  
Procedure Laterality Date  COLONOSCOPY N/A 2/20/2017 Bjorn--appendiceal serrated sessile polyp, transverse and rectal hyperplastic--3 year recall  HX APPENDECTOMY  03/15/2017  HX CATARACT REMOVAL Bilateral 3/2016 [address]  
 pt denies  HX ROTATOR CUFF REPAIR Right 10/31/2016  
 x2 [address]  TOTAL HIP ARTHROPLASTY Right 2005  
 and again 5/2015 and another revision Social History Substance Use Topics  Smoking status: Former Smoker Packs/day: 1.00 Years: 50.00 Types: Cigarettes Quit date: 3/29/2015  Smokeless tobacco: Never Used Comment: quit 2 months and 2 weeks ago  Alcohol use No  
   Comment: Former- socially Family History Problem Relation Age of Onset  Hypertension Mother  Cancer Mother Bladder  Depression Mother  Parkinson's Disease Father  Alcohol abuse Brother  Alcohol abuse Son  No Known Problems Sister  No Known Problems Brother  Malignant Hyperthermia Neg Hx  Pseudocholinesterase Deficiency Neg Hx  Delayed Awakening Neg Hx  Post-op Nausea/Vomiting Neg Hx  Emergence Delirium Neg Hx  Post-op Cognitive Dysfunction Neg Hx   
 Other Neg Hx FH Reviewed and non-contributory to admitting diagnosis Allergies Allergen Reactions  Codeine Nausea and Vomiting  Oxycodone Nausea and Vomiting Prior to Admission Medications Prescriptions Last Dose Informant Patient Reported? Taking? amLODIPine (NORVASC) 10 mg tablet   Yes Yes Sig: Take 10 mg by mouth daily. ergocalciferol (VITAMIN D2) 50,000 unit capsule   No No  
Sig: TAKE 1 CAPSULE ONE TIME WEEKLY- takes on sat  
hydrALAZINE (APRESOLINE) 25 mg tablet   No No  
Sig: TAKE ONE TABLET BY MOUTH TWICE A DAY FOR BLOOD PRESSURE  
ibuprofen (MOTRIN) 200 mg tablet   Yes No  
Sig: Take 200 mg by mouth every eight (8) hours as needed for Pain.  
levETIRAcetam 1,000 mg tablet   Yes No  
Sig: Take 1,000 mg by mouth two (2) times a day. losartan (COZAAR) 100 mg tablet   No No  
Sig: Take 1 Tab by mouth daily. Indications: hypertension  
mirabegron ER (MYRBETRIQ) 50 mg ER tablet   No No  
Sig: Take 1 Tab by mouth daily. oxyCODONE IR (ROXICODONE) 5 mg immediate release tablet   Yes Yes Sig: Take 5 mg by mouth every six (6) hours as needed for Pain. sertraline (ZOLOFT) 100 mg tablet   No No  
Sig: Take 2 Tabs by mouth daily. Facility-Administered Medications: None Objective:  
 
Patient Vitals for the past 24 hrs: 
 Temp Pulse Resp BP SpO2  
07/30/18 0255 - - - - 94 % 07/30/18 0251 - 86 - 182/79 99 % 07/30/18 0242 - 81 - 190/79 99 % 07/30/18 0231 - 83 - 180/74 90 % 07/30/18 0222 - - - 184/74 -  
07/30/18 0213 - 83 - (!) 183/122 -  
07/30/18 0150 - 84 - 197/81 91 %  
07/30/18 0129 - 85 - 143/84 93 % 07/29/18 2352 - 80 18 166/72 91 %  
07/29/18 2338 - 79 - 182/81 95 %  
07/29/18 2330 - - - - 91 %  
07/29/18 2323 - 78 17 191/78 91 %  
07/29/18 2309 - 78 - 189/76 91 %  
07/29/18 2253 - 75 19 188/76 90 % 07/29/18 2238 - 77 16 179/74 90 % 07/29/18 2223 - 73 - 199/82 91 %  
07/29/18 2210 - 74 - (!) 208/81 91 %  
07/29/18 2148 98.4 °F (36.9 °C) 77 20 (!) 205/83 97 % No intake or output data in the 24 hours ending 07/30/18 0301 Temp (24hrs), Av.4 °F (36.9 °C), Min:98.4 °F (36.9 °C), Max:98.4 °F (36.9 °C) Oxygen Therapy O2 Sat (%): 94 % (18) Pulse via Oximetry: 86 beats per minute (181) O2 Device: Nasal cannula (18) O2 Flow Rate (L/min): 3.5 l/min (18) Body mass index is 39.48 kg/(m^2). Physical Exam: 
 
General:    WD and WN, No apparent distress. Head:   Normocephalic, without obvious abnormality, atraumatic. Eyes:  PERRL; EOMI; sclera normal/non-icteric ENT:  Hearing is normal.  Oropharynx is clear with tacky mucous membranes Resp:    Clear to auscultation bilaterally. No Wheezing or Rhonchi. Resp are even and unlabored; increased upper airway noise; Pt frequently clearing throat- she states this is normal for her Heart[de-identified]  Regular rate and rhythm,  no murmur,   1+ LE edema Abdomen:   Soft, non-tender. Not distended. Bowel sounds normal.  hepato-splenomegaly cannot be assess due to obesity Musc/SK: Muscle strength is good and tone normal; No cyanosis. No clubbing Skin:     Texture, turgor normal. No significant rashes or lesions. Neurologic: CN II - XII are grossly intact - other than Eye exam as noted above; Has a stuttering type speech - unknown baseline;  RN thinks this has worsened since arrival 
Psych: Alert and oriented x 4;  Judgement and insight are impaired; she can answer most questions appropriately, but doesn't seem to understand all information and no family here to advise on baseline status. She just seems \"off\" Data Review:  
Recent Results (from the past 24 hour(s)) CBC WITH AUTOMATED DIFF Collection Time: 18  9:56 PM  
Result Value Ref Range WBC 7.1 4.3 - 11.1 K/uL  
 RBC 4.39 4.05 - 5.25 M/uL  
 HGB 12.8 11.7 - 15.4 g/dL HCT 39.5 35.8 - 46.3 % MCV 90.0 79.6 - 97.8 FL  
 MCH 29.2 26.1 - 32.9 PG  
 MCHC 32.4 31.4 - 35.0 g/dL  
 RDW 13.9 11.9 - 14.6 % PLATELET 554 (L) 401 - 450 K/uL MPV 9.9 (L) 10.8 - 14.1 FL  
 DF AUTOMATED NEUTROPHILS 74 43 - 78 % LYMPHOCYTES 16 13 - 44 % MONOCYTES 8 4.0 - 12.0 % EOSINOPHILS 2 0.5 - 7.8 % BASOPHILS 0 0.0 - 2.0 % IMMATURE GRANULOCYTES 0 0.0 - 5.0 %  
 ABS. NEUTROPHILS 5.2 1.7 - 8.2 K/UL  
 ABS. LYMPHOCYTES 1.1 0.5 - 4.6 K/UL  
 ABS. MONOCYTES 0.6 0.1 - 1.3 K/UL  
 ABS. EOSINOPHILS 0.2 0.0 - 0.8 K/UL  
 ABS. BASOPHILS 0.0 0.0 - 0.2 K/UL  
 ABS. IMM. GRANS. 0.0 0.0 - 0.5 K/UL METABOLIC PANEL, COMPREHENSIVE Collection Time: 07/29/18  9:56 PM  
Result Value Ref Range Sodium 140 136 - 145 mmol/L Potassium 4.2 3.5 - 5.1 mmol/L Chloride 104 98 - 107 mmol/L  
 CO2 30 21 - 32 mmol/L Anion gap 6 (L) 7 - 16 mmol/L Glucose 107 (H) 65 - 100 mg/dL BUN 14 8 - 23 MG/DL Creatinine 1.29 (H) 0.6 - 1.0 MG/DL  
 GFR est AA 53 (L) >60 ml/min/1.73m2 GFR est non-AA 44 (L) >60 ml/min/1.73m2 Calcium 8.2 (L) 8.3 - 10.4 MG/DL Bilirubin, total 0.3 0.2 - 1.1 MG/DL  
 ALT (SGPT) 19 12 - 65 U/L  
 AST (SGOT) 20 15 - 37 U/L Alk. phosphatase 140 (H) 50 - 136 U/L Protein, total 7.2 6.3 - 8.2 g/dL Albumin 3.4 3.2 - 4.6 g/dL Globulin 3.8 (H) 2.3 - 3.5 g/dL A-G Ratio 0.9 (L) 1.2 - 3.5 URINE MICROSCOPIC Collection Time: 07/29/18 11:50 PM  
Result Value Ref Range WBC 20-50 0 /hpf  
 RBC 0-3 0 /hpf Epithelial cells 3-5 0 /hpf Bacteria 0 0 /hpf Casts 0 0 /lpf Crystals, urine 0 0 /LPF Mucus 0 0 /lpf Other observations RESULTS VERIFIED MANUALLY CXR Results  (Last 48 hours) 07/30/18 0010  XR CHEST PA LAT Final result Impression:  IMPRESSION: Small area of atelectasis or infiltrate left lung base Narrative:  Chest 2 view dated 7/29/2018 Previous exam 1/16/2018 CLINICAL INFORMATION: Shortness of breath and wheezing Heart is enlarged and mediastinum unremarkable. Vascularity appears normal.  
There is a small ill-defined density at the left lung base consistent  
atelectasis or infiltrate. No pleural effusion. CT Results  (Last 48 hours) 07/30/18 0103  CT HEAD WO CONT Final result Impression:  Impression: 1. Changes consistent with complex arteriovenous malformation as demonstrated on  
prior exams. 2. Hyperdense acute hemorrhage in the right thalamus Camila  The critical results contained in this report were communicated to Dr. Nelly Gonzalez in  
the emergency department by myself on 7/30/2018 at 0120 hours. Critical results  
were communicated as outlined in Section II.C.2.a.i of the ACR Practice Guideline for Communication of Diagnostic Imaging Findings. Narrative:  CT Brain dated 7/30/2018 Comparison: CT scan dated 12/25/2017, MRI dated 12/22/2017 Clinical Information:  Seizure, altered mental status ventricles are normal in  
size. There is no midline shift. 5 mm axial images were obtained from skull base to vertex without contrast.  
   
Radiation dose reduction techniques were used for this study. Our scanners use  
one or all of the following:  Automated exposure control, adjustment of the mA  
and/or kV according to patient size, iterative reconstruction. Findings:  
   
Again, there are numerous calcifications involving right posterior parietal area  
extending in the right occipital region with abnormal appearing vessels also in  
the left frontal region and left middle cranial fossa. The prior MRI showed this  
patient have a complex arteriovenous malformation. On today's exam, there is  
hyperdense acute hemorrhage in the right thalamus. This measures approximately 2  
x 1.2 cm. See axial images 12 through 15. Ventricles are normal in size. No  
extra-axial abnormality. . Mastoid air cells and visualized paranasal sinuses are  
aerated and unremarkable. Assessment and Plan: Active Hospital Problems Diagnosis Date Noted  Nontraumatic thalamic hemorrhage ([address] 07/30/2018  Hypertensive urgency 07/30/2018  Chronic pain of multiple sites 07/30/2018  Cerebral AV malformation 07/30/2018  [facility] 01/22/2018  Morbid obesity with BMI of 40.0-44.9, adult ([facility] [address] 01/19/2015  Edema of both legs 01/19/2015 Principal Problem: 
  Nontraumatic thalamic hemorrhage ([facility] [address] (7/30/2018) Consult neurosurgery- done by ER; platelets borderline low, may have platelet dysfxn, will transfuse 1 unit to help with clotting; control BP; ICU admit; neuro checks Active Problems: 
  Edema of both legs (1/19/2015) Chronic; monitor Morbid obesity with BMI of 40.0-44.9, adult ([facility] [address] (1/19/2015) Interferes with some treatments and recovery; increased morbidity Seizure disorder ([facility] (1/22/2018) Cont Keppra; at high risk with new bleed; monitor; ativan prn for seizure Hypertensive urgency (7/30/2018) 
  cardene drip Chronic pain of multiple sites (7/30/2018) Home meds; Pt was quite uncomfortable in ER and I ordered one dose IV Morphine 5 mg,  Pt de-satted, requiring NRB and narcan 
 
     ++JACKY strongly suspected but not noted on problem list 
 
 
 
· PLAN  
·  
· Cont appropriate home meds (see MAR) · Control symptoms (pain, n/v, fever, etc) · Monitor appropriate labs · DVT prophylaxis: SCDs only due to bleed · Code status: Full · Risk: high- IV cardene; thalamic bleed; co-morbidities · Anticipated DC needs: · Estimated LOS:  Greater than 2 midnights · Plans discussed with patient and/or caregiver; questions answered. Med records reviewed if applicable; findings:  
 
Critical care time if applicable:   
 
Signed By: Yves Dunn MD   
 July 30, 2018

## 2018-07-30 NOTE — PROGRESS NOTES
Order received, chart reviewed. RN deferred OT evaluation at this time as pt is not appropriate due to not being able to maintain alertness to participate. Will follow up for evaluation on 7/31.  
 
Litzy Jones, OT

## 2018-07-30 NOTE — PROGRESS NOTES
Per ED note, patient with NIH scale of 1 for slurred speech, recorded shortly prior to transfer. Upon admission to ICU, patient with NIH scale score 8, significant motor and sensory deficits to left side. Dr. Joel Kohler notified, orders received for repeat head CT at 0600.  Confirmed with MD that neurosurgery MD is aware of bleed and non-surgical.

## 2018-07-30 NOTE — CONSULTS
LEAPFROG PROTOCOL NOTE    Curlene Degree  7/30/2018    The patient is currently in the critical care setting managed by Dr. Malina Gutierrez with thalamic hemorrhage. The patient's chart is reviewed and the patient is discussed with the staff. Patient is currently hemodynamically stable. Patient has no needs identified for Intensivist management in the critical care setting at this time. Please notify us if can be of assistance. No charge billed to the patient. Thank you. Caryle Dach, NP    Agree.     Yadiel Rivas MD

## 2018-07-30 NOTE — ED NOTES
TRANSFER - OUT REPORT: 
 
Verbal report given to RORY Burton(name) on Jennifer Lawn  being transferred to [facility](unit) for routine progression of care Report consisted of patients Situation, Background, Assessment and  
Recommendations(SBAR). Information from the following report(s) ED Summary, MAR, Recent Results and Med Rec Status was reveiwed with the receiving nurse. Opportunity for questions and clarification was provided. Hemorrhagic Stroke/Intracerebral Hemorrhage (excludes Subdural/Epidural Hematoma) BP Parameters: Less Than 140/90 Controlled With: Continuous IV Dysphagia Screen Completed: Yes: Pass NIH Stroke Scale Complete: Yes: 1 Frequency of Vital Signs: Every hour for 24 hours, ending at 07/31/18 6389 Frequency of Neuro Checks: Every hour for 24 hours, ending at 07/31/18 6612

## 2018-07-30 NOTE — INTERDISCIPLINARY ROUNDS
Interdisciplinary team rounds were held 7/30/2018 with the following team members:Care Management, Home Health, Hospice, Nursing, Nurse Practitioner, Nutrition, Palliative Care, Pastoral Care, Pharmacy, Physical Therapy, Physician, Physician's Assistant, Respiratory Therapy and Clinical Coordinator and the patient. Plan of care discussed. See clinical pathway and/or care plan for interventions and desired outcomes.

## 2018-07-30 NOTE — PROGRESS NOTES
NS SMALL RIGHT THALAMIC ICH  STABLE 
LARGE AVM IN FRONTAL AREAS 
REVIEWED BY Candy CASE  
A/P WOULD REVIEW CASE WITH Eduardo CASE AT [facility] DUE TO AVM Rigoberto Stout MD

## 2018-07-30 NOTE — PROGRESS NOTES
PT Note:  Order received, chart reviewed. Per OT note,  RN deferred therapy evaluation at this time as pt is not appropriate due to not being able to maintain alertness to participate. Will follow up for evaluation on 7/31 if patient appropriate. Kristin Ng, PT, DPT

## 2018-07-30 NOTE — ED NOTES
Patient report to [facility], [address] [facility]. Patient care to be assumed at this time.

## 2018-07-30 NOTE — PROGRESS NOTES
Spoke with spouse at bedside, pt currently in ICU s/p nontraumatic thalamic hemorrhage. Spouse states just him and her, one son , not sure where other son is. Spouse currently on O2 continuous and needed assistance by w/c to go back to lobby. CM to follow and assist with any d/c needs. PPD/PT/OT/Speech already ordered. Care Management Interventions PCP Verified by CM: Yes (spouse confirms Dr. Jimbo Mcpherson) Mode of Transport at Discharge: Other (see comment) Transition of Care Consult (CM Consult): Discharge Planning Discharge Durable Medical Equipment:  (uses walker to ambulate, has cane (does not use) per spouse) Physical Therapy Consult: Yes Occupational Therapy Consult: Yes Speech Therapy Consult: Yes Current Support Network: Lives with Spouse, Own Home (lives with spouse, [place], [phone]) Confirm Follow Up Transport: Family (pt does not drive, spouse provides transportation) Plan discussed with Pt/Family/Caregiver: Yes Freedom of Choice Offered: Yes [employer] Information Provided?:  (confirms WhoWantsMe) Discharge Location Discharge Placement: Unable to determine at this time

## 2018-07-31 ENCOUNTER — APPOINTMENT (OUTPATIENT)
Dept: GENERAL RADIOLOGY | Age: 68
DRG: 064 | End: 2018-07-31
Attending: INTERNAL MEDICINE
Payer: MEDICARE

## 2018-07-31 ENCOUNTER — APPOINTMENT (OUTPATIENT)
Dept: CT IMAGING | Age: 68
DRG: 064 | End: 2018-07-31
Attending: INTERNAL MEDICINE
Payer: MEDICARE

## 2018-07-31 PROBLEM — N39.0 UTI (URINARY TRACT INFECTION): Status: ACTIVE | Noted: 2018-07-31

## 2018-07-31 PROBLEM — J81.1 PULMONARY EDEMA: Status: ACTIVE | Noted: 2018-07-31

## 2018-07-31 LAB
ALBUMIN SERPL-MCNC: 3 G/DL (ref 3.2–4.6)
ALBUMIN/GLOB SERPL: 0.9 {RATIO} (ref 1.2–3.5)
ALP SERPL-CCNC: 122 U/L (ref 50–136)
ALT SERPL-CCNC: 17 U/L (ref 12–65)
ANION GAP SERPL CALC-SCNC: 7 MMOL/L (ref 7–16)
APPEARANCE UR: ABNORMAL
AST SERPL-CCNC: 14 U/L (ref 15–37)
BACTERIA URNS QL MICRO: ABNORMAL /HPF
BASOPHILS # BLD: 0 K/UL (ref 0–0.2)
BASOPHILS NFR BLD: 0 % (ref 0–2)
BILIRUB SERPL-MCNC: 0.4 MG/DL (ref 0.2–1.1)
BILIRUB UR QL: NEGATIVE
BLD PROD TYP BPU: NORMAL
BPU ID: NORMAL
BUN SERPL-MCNC: 11 MG/DL (ref 8–23)
CALCIUM SERPL-MCNC: 8 MG/DL (ref 8.3–10.4)
CASTS URNS QL MICRO: ABNORMAL /LPF
CHLORIDE SERPL-SCNC: 104 MMOL/L (ref 98–107)
CO2 SERPL-SCNC: 30 MMOL/L (ref 21–32)
COLOR UR: YELLOW
CREAT SERPL-MCNC: 0.85 MG/DL (ref 0.6–1)
DIFFERENTIAL METHOD BLD: ABNORMAL
EOSINOPHIL # BLD: 0 K/UL (ref 0–0.8)
EOSINOPHIL NFR BLD: 0 % (ref 0.5–7.8)
EPI CELLS #/AREA URNS HPF: 0 /HPF
ERYTHROCYTE [DISTWIDTH] IN BLOOD BY AUTOMATED COUNT: 14.1 % (ref 11.9–14.6)
GLOBULIN SER CALC-MCNC: 3.4 G/DL (ref 2.3–3.5)
GLUCOSE SERPL-MCNC: 140 MG/DL (ref 65–100)
GLUCOSE UR STRIP.AUTO-MCNC: NEGATIVE MG/DL
HCT VFR BLD AUTO: 36.6 % (ref 35.8–46.3)
HGB BLD-MCNC: 11.2 G/DL (ref 11.7–15.4)
HGB UR QL STRIP: NEGATIVE
IMM GRANULOCYTES # BLD: 0.1 K/UL (ref 0–0.5)
IMM GRANULOCYTES NFR BLD AUTO: 1 % (ref 0–5)
KETONES UR QL STRIP.AUTO: NEGATIVE MG/DL
LEUKOCYTE ESTERASE UR QL STRIP.AUTO: ABNORMAL
LYMPHOCYTES # BLD: 0.4 K/UL (ref 0.5–4.6)
LYMPHOCYTES NFR BLD: 6 % (ref 13–44)
MCH RBC QN AUTO: 28.6 PG (ref 26.1–32.9)
MCHC RBC AUTO-ENTMCNC: 30.6 G/DL (ref 31.4–35)
MCV RBC AUTO: 93.4 FL (ref 79.6–97.8)
MM INDURATION POC: 0 MM (ref 0–5)
MM INDURATION POC: 0 MM (ref 0–5)
MONOCYTES # BLD: 0.5 K/UL (ref 0.1–1.3)
MONOCYTES NFR BLD: 7 % (ref 4–12)
NEUTS SEG # BLD: 6.7 K/UL (ref 1.7–8.2)
NEUTS SEG NFR BLD: 86 % (ref 43–78)
NITRITE UR QL STRIP.AUTO: POSITIVE
PH UR STRIP: 6 [PH] (ref 5–9)
PLATELET # BLD AUTO: 142 K/UL (ref 150–450)
PMV BLD AUTO: 9.7 FL (ref 10.8–14.1)
POTASSIUM SERPL-SCNC: 4.5 MMOL/L (ref 3.5–5.1)
PPD POC: NEGATIVE NEGATIVE
PPD POC: NEGATIVE NEGATIVE
PROT SERPL-MCNC: 6.4 G/DL (ref 6.3–8.2)
PROT UR STRIP-MCNC: 30 MG/DL
RBC # BLD AUTO: 3.92 M/UL (ref 4.05–5.25)
RBC #/AREA URNS HPF: ABNORMAL /HPF
SODIUM SERPL-SCNC: 141 MMOL/L (ref 136–145)
SP GR UR REFRACTOMETRY: 1.02 (ref 1–1.02)
STATUS OF UNIT,%ST: NORMAL
UNIT DIVISION, %UDIV: 0
UROBILINOGEN UR QL STRIP.AUTO: 0.2 EU/DL (ref 0.2–1)
WBC # BLD AUTO: 7.7 K/UL (ref 4.3–11.1)
WBC URNS QL MICRO: >100 /HPF

## 2018-07-31 PROCEDURE — 74011000250 HC RX REV CODE- 250: Performed by: EMERGENCY MEDICINE

## 2018-07-31 PROCEDURE — 71045 X-RAY EXAM CHEST 1 VIEW: CPT

## 2018-07-31 PROCEDURE — 81001 URINALYSIS AUTO W/SCOPE: CPT | Performed by: INTERNAL MEDICINE

## 2018-07-31 PROCEDURE — 87186 SC STD MICRODIL/AGAR DIL: CPT

## 2018-07-31 PROCEDURE — 85025 COMPLETE CBC W/AUTO DIFF WBC: CPT | Performed by: FAMILY MEDICINE

## 2018-07-31 PROCEDURE — 99222 1ST HOSP IP/OBS MODERATE 55: CPT | Performed by: PSYCHIATRY & NEUROLOGY

## 2018-07-31 PROCEDURE — 74011000258 HC RX REV CODE- 258: Performed by: INTERNAL MEDICINE

## 2018-07-31 PROCEDURE — 74011250637 HC RX REV CODE- 250/637: Performed by: INTERNAL MEDICINE

## 2018-07-31 PROCEDURE — 65610000001 HC ROOM ICU GENERAL

## 2018-07-31 PROCEDURE — 77030021907 HC KT URIN FOL O&M -A

## 2018-07-31 PROCEDURE — 99221 1ST HOSP IP/OBS SF/LOW 40: CPT | Performed by: PHYSICAL MEDICINE & REHABILITATION

## 2018-07-31 PROCEDURE — 70450 CT HEAD/BRAIN W/O DYE: CPT

## 2018-07-31 PROCEDURE — 74011250636 HC RX REV CODE- 250/636: Performed by: INTERNAL MEDICINE

## 2018-07-31 PROCEDURE — 74011250637 HC RX REV CODE- 250/637: Performed by: FAMILY MEDICINE

## 2018-07-31 PROCEDURE — 80053 COMPREHEN METABOLIC PANEL: CPT | Performed by: FAMILY MEDICINE

## 2018-07-31 PROCEDURE — 77030018798 HC PMP KT ENTRL FED COVD -A

## 2018-07-31 PROCEDURE — 87086 URINE CULTURE/COLONY COUNT: CPT | Performed by: INTERNAL MEDICINE

## 2018-07-31 PROCEDURE — 36415 COLL VENOUS BLD VENIPUNCTURE: CPT | Performed by: FAMILY MEDICINE

## 2018-07-31 PROCEDURE — 74011250636 HC RX REV CODE- 250/636

## 2018-07-31 PROCEDURE — 87088 URINE BACTERIA CULTURE: CPT

## 2018-07-31 RX ORDER — HYDRALAZINE HYDROCHLORIDE 20 MG/ML
20 INJECTION INTRAMUSCULAR; INTRAVENOUS
Status: DISCONTINUED | OUTPATIENT
Start: 2018-07-31 | End: 2018-08-06 | Stop reason: HOSPADM

## 2018-07-31 RX ORDER — OXYCODONE HYDROCHLORIDE 5 MG/1
2.5 TABLET ORAL
Status: DISCONTINUED | OUTPATIENT
Start: 2018-07-31 | End: 2018-08-06 | Stop reason: HOSPADM

## 2018-07-31 RX ORDER — DEXAMETHASONE SODIUM PHOSPHATE 4 MG/ML
4 INJECTION, SOLUTION INTRA-ARTICULAR; INTRALESIONAL; INTRAMUSCULAR; INTRAVENOUS; SOFT TISSUE EVERY 6 HOURS
Status: DISCONTINUED | OUTPATIENT
Start: 2018-07-31 | End: 2018-08-01

## 2018-07-31 RX ORDER — TRAMADOL HYDROCHLORIDE 50 MG/1
50 TABLET ORAL
Status: DISCONTINUED | OUTPATIENT
Start: 2018-07-31 | End: 2018-07-31

## 2018-07-31 RX ORDER — ONDANSETRON 2 MG/ML
4 INJECTION INTRAMUSCULAR; INTRAVENOUS
Status: DISCONTINUED | OUTPATIENT
Start: 2018-07-31 | End: 2018-08-06 | Stop reason: HOSPADM

## 2018-07-31 RX ORDER — FUROSEMIDE 10 MG/ML
40 INJECTION INTRAMUSCULAR; INTRAVENOUS ONCE
Status: COMPLETED | OUTPATIENT
Start: 2018-07-31 | End: 2018-07-31

## 2018-07-31 RX ORDER — HYDRALAZINE HYDROCHLORIDE 50 MG/1
50 TABLET, FILM COATED ORAL 3 TIMES DAILY
Status: DISCONTINUED | OUTPATIENT
Start: 2018-07-31 | End: 2018-08-01

## 2018-07-31 RX ORDER — ONDANSETRON 2 MG/ML
INJECTION INTRAMUSCULAR; INTRAVENOUS
Status: COMPLETED
Start: 2018-07-31 | End: 2018-07-31

## 2018-07-31 RX ADMIN — DEXAMETHASONE SODIUM PHOSPHATE 4 MG: 4 INJECTION, SOLUTION INTRAMUSCULAR; INTRAVENOUS at 11:32

## 2018-07-31 RX ADMIN — AMLODIPINE BESYLATE 10 MG: 10 TABLET ORAL at 08:05

## 2018-07-31 RX ADMIN — NICARDIPINE HYDROCHLORIDE 7.5 MG/HR: 25 INJECTION INTRAVENOUS at 03:38

## 2018-07-31 RX ADMIN — ONDANSETRON 4 MG: 2 INJECTION INTRAMUSCULAR; INTRAVENOUS at 10:00

## 2018-07-31 RX ADMIN — DEXAMETHASONE SODIUM PHOSPHATE 4 MG: 4 INJECTION, SOLUTION INTRAMUSCULAR; INTRAVENOUS at 17:16

## 2018-07-31 RX ADMIN — Medication 10 ML: at 05:08

## 2018-07-31 RX ADMIN — HYDRALAZINE HYDROCHLORIDE 20 MG: 20 INJECTION INTRAMUSCULAR; INTRAVENOUS at 17:49

## 2018-07-31 RX ADMIN — DEXAMETHASONE SODIUM PHOSPHATE 4 MG: 4 INJECTION, SOLUTION INTRAMUSCULAR; INTRAVENOUS at 23:18

## 2018-07-31 RX ADMIN — NICARDIPINE HYDROCHLORIDE 5 MG/HR: 25 INJECTION INTRAVENOUS at 09:43

## 2018-07-31 RX ADMIN — HYDRALAZINE HYDROCHLORIDE 50 MG: 50 TABLET, FILM COATED ORAL at 14:20

## 2018-07-31 RX ADMIN — HYDRALAZINE HYDROCHLORIDE 20 MG: 20 INJECTION INTRAMUSCULAR; INTRAVENOUS at 12:06

## 2018-07-31 RX ADMIN — SODIUM CHLORIDE 20 ML/HR: 900 INJECTION, SOLUTION INTRAVENOUS at 01:27

## 2018-07-31 RX ADMIN — SODIUM CHLORIDE 1000 MG: 900 INJECTION, SOLUTION INTRAVENOUS at 21:04

## 2018-07-31 RX ADMIN — FUROSEMIDE 40 MG: 10 INJECTION, SOLUTION INTRAMUSCULAR; INTRAVENOUS at 10:37

## 2018-07-31 RX ADMIN — CEFTRIAXONE SODIUM 1 G: 1 INJECTION, POWDER, FOR SOLUTION INTRAMUSCULAR; INTRAVENOUS at 10:50

## 2018-07-31 RX ADMIN — HYDRALAZINE HYDROCHLORIDE 50 MG: 50 TABLET, FILM COATED ORAL at 21:04

## 2018-07-31 RX ADMIN — NICARDIPINE HYDROCHLORIDE 5 MG/HR: 25 INJECTION INTRAVENOUS at 19:41

## 2018-07-31 RX ADMIN — LOSARTAN POTASSIUM 100 MG: 50 TABLET ORAL at 08:05

## 2018-07-31 RX ADMIN — ACETAMINOPHEN 650 MG: 325 TABLET ORAL at 03:27

## 2018-07-31 RX ADMIN — Medication 10 ML: at 21:04

## 2018-07-31 RX ADMIN — HYDRALAZINE HYDROCHLORIDE 25 MG: 25 TABLET, FILM COATED ORAL at 08:05

## 2018-07-31 RX ADMIN — SERTRALINE HYDROCHLORIDE 200 MG: 50 TABLET ORAL at 08:05

## 2018-07-31 RX ADMIN — TRAMADOL HYDROCHLORIDE 50 MG: 50 TABLET, FILM COATED ORAL at 11:32

## 2018-07-31 RX ADMIN — SODIUM CHLORIDE 1000 MG: 900 INJECTION, SOLUTION INTRAVENOUS at 10:30

## 2018-07-31 RX ADMIN — OXYCODONE HYDROCHLORIDE 2.5 MG: 5 TABLET ORAL at 17:16

## 2018-07-31 RX ADMIN — Medication 10 ML: at 14:20

## 2018-07-31 NOTE — PROGRESS NOTES
SPEECH PATHOLOGY NOTE: 
 
Attempted to see for bedside swallow assessment. Preparing to go off the floor for CT. Will re-attempt at later time today.   
 
Liss [address] CCC-SLP

## 2018-07-31 NOTE — PROGRESS NOTES
Palliative Care Progress Note Patient: Sharon Macedo MRN: [id number]  SSN: [id number] YOB: 1950  Age: 76 y.o. Sex: female Assessment/Plan: Chief Complaint/Interval History: Magali Pean Principal Diagnosis: · Altered Mental Status R41.82 Additional Diagnoses: · Debility, Unspecified  R53.81 · Fatigue, Lethargy  R53.83 
· Counseling, Encounter for Medical Advice  Z71.9 
· Encounter for Palliative Care  Z51.5 Palliative Performance Scale (PPS) PPS: 30 Medical Decision Making:  
Reviewed and summarized notes over last 24 hours. Discussed case with appropriate providers: RORY Kruger, JUDI Patel Reviewed laboratory and x-ray data over last 24 hours. Pt resting in bed, no family at bedside. The pt said that her  will be in later. Pt's eyes were closed, but she awoke to voice. When asked how she was feeling, she replied with Norberto Vail! \"  When asked for specifics, she said that her right leg, on which she said she has had 3 surgeries, was bothering her. The nurse is aware, and has been adjusting the position of the pt's leg regularly. The pt denied other pain, n/v, coughing or other discomforts. 3:15  The pt's  arrived, in a wheelchair, and on oxygen. The pt began to rouse after her  arrived, asking for a drink of water, or the cat, or making comments about what her  was saying. Housing Needs   We talked about the couple's housing situation. Mr. Jason Andres said that they live in a \"double-wide on the side of a hill. And in the winter you can hardly get in or out\". He said that they've been talking about the need to move due to their disabilities. JUDI Patel suggested that Mr. Jason Andres talk to the [facility] for assistance in looking for appropriate housing. Mr. Jason Andres promised to call the [place].    
Discharge Plans  Mr. Jason Andres said that he was upset this morning when he received a phone call asking him whether he has considered hospice for his wife, and whether he would want her to have a feeding tube. He did not know who had called, and wondered why someone had not talked with him while he was visiting yesterday, rather than asking such difficult questions on the phone. We discussed that we are trying to plan for all possible outcomes, even while we hope for the best.  We discussed that, ideally, the patient would be able to go to rehab to regain some strength. We talked about the indications for a feeding tube. Mr. Nila Nguyễn seemed to understand that nothing will be done without a full explanation to him and his permission. He seemed relieved to hear that we will try to have all discussion in person rather than on the phone. Plan  Code status still needs to be broached. This should be done while the  is visiting. We will continue to follow, especially to assist the pt and  in goals of care decisions. Will discuss findings with members of the interdisciplinary team.   
 
   
More than 50% of this 35 minute visit was spent counseling and coordination of care as outlined above. Subjective:  
 
Review of Systems: A comprehensive review of systems was negative except for:  
Constitutional: Positive for malaise, fatigue. Musculoskeletal: Positive for discomfort in right leg. Objective:  
 
Visit Vitals  /55  Pulse 81  Temp 99.7 °F (37.6 °C)  Resp 22  
 Ht 5' 4\" (1.626 m)  Wt 123.2 kg (271 lb 9.7 oz)  SpO2 93%  BMI 46.62 kg/m2 Physical Exam: 
 
General:  Cooperative. No acute distress. Eyes:  Conjunctivae/corneas clear Nose: Nares normal. Septum midline. Neck: Supple, symmetrical, trachea midline, no JVD Lungs:   Clear to auscultation bilaterally, unlabored Heart:  Regular rate and rhythm, no murmur Abdomen:   Soft, non-tender, non-distended Extremities: Normal, atraumatic, no cyanosis or edema Skin: Skin color, texture, turgor normal. No rash or lesions. Neurologic: Nonfocal  
Psych: Alert and oriented Signed By: Jovan Melvin NP   
 July 31, 2018

## 2018-07-31 NOTE — CDMP QUERY
Please clarify if this patient is being treated/managed for: CEREBRAL EDEMA WITH BRAIN COMPRESSION in the setting of 69yo admitted with  nontraumatic thalamic hemorrhage, large AVM, h/o HTN being managed with serial CT Head scans, NS consult, decadron. =>Other Explanation of clinical findings =>Unable to Determine (no explanation of clinical findings) The medical record reflects the following: 
 
Risk Factors:  admitted with  nontraumatic thalamic hemorrhage, large AVM, h/o HTN Clinical Indicators: CT shows vasogenic edema and 5mm midline shift Treatment: serial CT Head scans, NS consult, decadron Please clarify and document your clinical opinion in the progress notes and discharge summary including the definitive and/or presumptive diagnosis, (suspected or probable), related to the above clinical findings. Please include clinical findings supporting your diagnosis. Thank you, 
Kuldip Del Castillo RN, BSN, CCDS 
[phone]

## 2018-07-31 NOTE — PROGRESS NOTES
[facility] Temp 99F axillary. Pt is drowsy, responds to voice. Oriented to person and time. Pupils are equal and reactive. Pt is able to follow commands R > L . Lung sounds are clear - 5L NC with spo2 93%. Tube feeds infusing per NGT - rate increased from 10cc/hr to 20cc/hr per order with a goal of 50cc/hr. No gastric residuals present. Full assessment in flow sheet. [address] started for sbp 154 despite prn hydralazine given at 1749.

## 2018-07-31 NOTE — CONSULTS
Consult    Patient: Aubrie Talley MRN: [id number]     YOB: 1950  Age: 76 y.o. Sex: female      Subjective:      Aubrie Talley is a 76 y.o. female who is being seen for Hemorrhagic stroke. In brief, she has a history of a large AVM and localization related epilepsy, on Keppra. She has a history of hypertension and presented to the emergency department with lower extremity weakness. CT scan of the head showed a right thalamic bleed. Neurosurgery saw the patient and did not recommend surgery and recommended surgery. She had a repeat CT scan in the morning of 7/30 which was stable. She does have some mild midline shift. On 7/31 she was noted to have a urinary tract infection and was started on Rocephin. On my encounter with the patient and she easily awakens. She is initially disoriented to place. I reminded her that she is in the hospital.  She is able to tell me the year and autobiographical information.   She initially seems apprised that she was in the hospital.    Past Medical History:   Diagnosis Date    Chronic pain     \"all over body\"-takes Zoloft daily    Chronic sinusitis 1/19/2015    Diverticulosis 2017    Edema 1/19/2015    Resolved    Former smoker     GERD (gastroesophageal reflux disease)     Headache 1/19/2015    Hearing loss 1/19/2015    \"some\"- no hearing aids    Heart murmur 1990    Hip osteoarthritis 1/19/2015    feet, hands    Hx of colonic polyps 2017    SSA    Hx of migraines     on medication    Hypercholesterolemia 1/19/2015    Hypertension     Impaired fasting glucose 1/19/2015    Joint disorder 1/19/2015    Morbid obesity (HCC)     BMI 40    Neuralgia 1/19/2015    Phlebitis and thrombophlebitis of superficial vessels of lower extremities 01/19/2015    pt denies     Psychiatric disorder     Restless leg syndrome 1/19/2015    Rheumatic fever     pt reports possibly R.F.    Seizures ([facility] [address]     seizures as a child, has NOT had one since she was 13years old    SOB (shortness of breath) on exertion     Stroke ([address]     at age 11 only residual poor peripheral vision    Tobacco abuse 1/19/2015    quit smoking 4/2015    Vascular anomalies, congenital     Vitamin D deficiency 1/19/2015     Past Surgical History:   Procedure Laterality Date    COLONOSCOPY N/A 2/20/2017    Bjorn--appendiceal serrated sessile polyp, transverse and rectal hyperplastic--3 year recall    HX APPENDECTOMY  03/15/2017    HX CATARACT REMOVAL Bilateral 3/2016    HX CHOLECYSTECTOMY  1982    HX MOHS PROCEDURES Right 1995    pt denies    HX ROTATOR CUFF REPAIR Right 10/31/2016    x2    HX TUBAL LIGATION  1982    TOTAL HIP ARTHROPLASTY Right 2005    and again 5/2015 and another revision      Family History   Problem Relation Age of Onset    Hypertension Mother     Cancer Mother      Bladder    Depression Mother     Parkinson's Disease Father     Alcohol abuse Brother     Alcohol abuse Son     No Known Problems Sister     No Known Problems Brother     Malignant Hyperthermia Neg Hx     Pseudocholinesterase Deficiency Neg Hx     Delayed Awakening Neg Hx     Post-op Nausea/Vomiting Neg Hx     Emergence Delirium Neg Hx     Post-op Cognitive Dysfunction Neg Hx     Other Neg Hx      Social History   Substance Use Topics    Smoking status: Former Smoker     Packs/day: 1.00     Years: 50.00     Types: Cigarettes     Quit date: 3/29/2015    Smokeless tobacco: Never Used      Comment: quit 2 months and 2 weeks ago    Alcohol use No      Comment: Former- socially      Current Facility-Administered Medications   Medication Dose Route Frequency Provider Last Rate Last Dose    ondansetron (ZOFRAN) injection 4 mg  4 mg IntraVENous Q4H PRN Laura Tariq, DO   4 mg at 07/31/18 1000    dexamethasone (DECADRON) 4 mg/mL injection 4 mg  4 mg IntraVENous Q6H Hi Huitron, DO   4 mg at 07/31/18 1132    cefTRIAXone (ROCEPHIN) 1 g in 0.9% sodium chloride (MBP/ADV) 50 mL  1 g IntraVENous Q24H Alois Florian,  mL/hr at 07/31/18 1050 1 g at 07/31/18 1050    traMADol (ULTRAM) tablet 50 mg  50 mg Oral Q6H PRN Alois Florian, DO   50 mg at 07/31/18 1132    hydrALAZINE (APRESOLINE) tablet 50 mg  50 mg Oral TID Alois Florian, DO   50 mg at 07/31/18 1420    hydrALAZINE (APRESOLINE) 20 mg/mL injection 20 mg  20 mg IntraVENous Q6H PRN Alois Florian, DO   20 mg at 07/31/18 1206    niCARdipine in Saline (CARDENE) 25 MG/250 mL infusion kit  5-15 mg/hr IntraVENous TITRATE Nida Chao MD   Stopped at 07/31/18 1045    amLODIPine (NORVASC) tablet 10 mg  10 mg Oral DAILY Cathy Ng MD   10 mg at 07/31/18 0805    losartan (COZAAR) tablet 100 mg  100 mg Oral DAILY Cathy Ng MD   100 mg at 07/31/18 0805    mirabegron ER (MYRBETRIQ) tablet 50 mg (Patient Supplied)  50 mg Oral DAILY Cathy Ng MD   Stopped at 07/30/18 0900    sertraline (ZOLOFT) tablet 200 mg  200 mg Oral DAILY Cathy Ng MD   200 mg at 07/31/18 0805    sodium chloride (NS) flush 5-10 mL  5-10 mL IntraVENous Q8H Cathy Ng MD   10 mL at 07/31/18 1420    sodium chloride (NS) flush 5-10 mL  5-10 mL IntraVENous PRN Cathy Ng MD        acetaminophen (TYLENOL) tablet 650 mg  650 mg Oral Q4H PRN Cathy Ng MD   650 mg at 07/31/18 0327    naloxone [facility]) injection 0.4 mg  0.4 mg IntraVENous PRN Cathy Ng MD        0.9% sodium chloride infusion 250 mL  250 mL IntraVENous PRN Cathy Ng MD        LORazepam (ATIVAN) injection 1 mg  1 mg IntraVENous EVERY 2 MINUTES AS NEEDED Cathy Ng MD        0.9% sodium chloride infusion  50 mL/hr IntraVENous CONTINUOUS Alshaye Florian, DO 50 mL/hr at 07/31/18 1030 50 mL/hr at 07/31/18 1030    levETIRAcetam (KEPPRA) 1,000 mg in 0.9% sodium chloride 100 mL IVPB  1,000 mg IntraVENous Q12H Jannie Florian  mL/hr at 07/30/18 2214 1,000 mg at 07/31/18 1030    NUTRITIONAL SUPPORT ELECTROLYTE PRN ORDERS   Does Not Apply PRN Deri Bumps, DO            Allergies   Allergen Reactions    Codeine Nausea and Vomiting    Oxycodone Nausea and Vomiting       Review of Systems:  Could not obtain due to clinical condition        Objective:     Vitals:    07/31/18 1414 07/31/18 1437 07/31/18 1508 07/31/18 1515   BP: 146/55 124/58 144/69 145/70   Pulse: 81 81 87 87   Resp: 22 22 16 17   Temp: 98.2 °F (36.8 °C)      SpO2: 93% 96% 95% 96%   Weight:       Height:            Physical Exam:  General - Well developed, well nourished, in no apparent distress. Pleasant and conversent. HEENT - Normocephalic, atraumatic. Conjunctiva, tympanic membranes, and oropharynx are clear. Neck - Supple without masses, no bruits   Cardiovascular - Regular rate and rhythm. Normal S1, S2 without murmurs, rubs, or gallops. Lungs - Clear to auscultation. Abdomen - Soft, nontender with normal bowel sounds. Extremities - Peripheral pulses intact. No edema and no rashes. Neurological examination     She follows one-step commands but has difficulty with more complex commands. She is oriented to self and time but not place. She continually is asking for her  who is not in the room. Language and speech are normal.  . On cranial nerve examination pupils are equal, round, and reactive to light. Face is symmetric and sensation to light touch is intact. Palate rises symmetrically and tongue protrudes in the midline. Trapezius and sternocleidomastoid have full strength. On motor examination she has normal tone with exception to decrease tone in the left upper limb. On strength testing she has decreased power in the left upper limb and left lower limb, approximately 4 -/5. She has an upgoing toe on the left. Muscle stretch reflexes are hyperactive, relatively more brisk on the left compared to the right with bilateral crossed abductors.   Alin and Tromner's signs are present bilaterally, more prominent on the left. Lab Results   Component Value Date/Time    Cholesterol, total 190 07/30/2018 04:26 AM    HDL Cholesterol 69 (H) 07/30/2018 04:26 AM    LDL, calculated 108 (H) 07/30/2018 04:26 AM    VLDL, calculated 13 07/30/2018 04:26 AM    Triglyceride 65 07/30/2018 04:26 AM    CHOL/HDL Ratio 2.8 07/30/2018 04:26 AM        Lab Results   Component Value Date/Time    Hemoglobin A1c 5.6 06/12/2018 11:26 AM    Hemoglobin A1c, External 5.7 12/10/2014        CT Results (most recent): Personally Reviewed     Results from Hospital Encounter encounter on 07/29/18   CT HEAD WO CONT   Narrative Examination: CT scan of the brain without contrast.    History: Speech changes (or aphasia), new or progressive, 76 years Female Speech  changes, vomiting, altered    Technique: 5 mm axial imaging of the brain from the posterior fossa to the  vertex. Radiation dose reduction techniques were used for this study:  Our CT  scanners use one or all of the following: Automated exposure control, adjustment  of the mA and/or kVp according to patient's size, iterative reconstruction. Comparison:  CT brain July 30, 2018, MRI brain December 22, 2017    Findings:  Again visualized is the large complex arteriovenous malformation  centered in the right temporal lobe with associated dystrophic calcifications  and large draining veins along the left temporal and frontal lobes as seen on  prior MRI. Focal subacute hemorrhage centered in the right thalamus appears  relatively unchanged compared to most recent CT brain, measuring approximately  1.6 x 2.7 cm. Again visualized is mild vasogenic edema in the right thalamus  with mild leftward midline shift measuring approximately 5 mm relatively  unchanged since prior. The ventricles, sulci are otherwise age-appropriate. Basal cisterns are intact. The visualized paranasal sinuses and mastoid air  cells are clear.  The orbits, bones, and soft tissues are normal in appearance. Impression IMPRESSION:  No significant interval change in the small subacute right thalamic  intraparenchymal hemorrhage adjacent to the large right parietal lobe  arteriovenous malformation. Mild mass effect and mild leftward midline shift  unchanged. Results for orders placed or performed during the hospital encounter of 07/29/18   EKG, 12 LEAD, INITIAL   Result Value Ref Range    Ventricular Rate 70 BPM    Atrial Rate 68 BPM    P-R Interval 156 ms    QRS Duration 84 ms    Q-T Interval 378 ms    QTC Calculation (Bezet) 408 ms    Calculated P Axis 77 degrees    Calculated R Axis 57 degrees    Calculated T Axis 61 degrees    Diagnosis       !! AGE AND GENDER SPECIFIC ECG ANALYSIS !! Normal sinus rhythm  Septal infarct (cited on or before 29-JUL-2018)  Abnormal ECG  When compared with ECG of 16-JAN-2018 11:47,  No significant change was found  Confirmed by Eric Quiroz MD (), Anastacia Chang ([id number]) on 7/30/2018 7:12:50 AM          I did review the patient's CT scan of the head. There is a right thalamic hemorrhage with some associated edema that extends into the posterior limb of the internal capsule. She has a large complex AVM located in the right parietal temporal region. In the contralateral hemisphere, and close proximity to the cortex there may be an additional lesion at the gray-white junction. I would like to take a better look at this with the brain MRI. Assessment:   Intraparenchymal hemorrhage  ICH Management   Q1H neurological checks  Maintain SBP < 140 mmHg  STAT CT head for clinical worsening  Head of the bed at 30 degrees with neck in the neutral position  MRI to investigate underlying pathology such as neoplasms - in her left hemisphere at the gray-white junction I do see a potential area of concern that I would like to investigate further with a brain MRI.   Maintain normoglycemia and normothermia     Minimal mass effect: I do not suspect that the patient currently has increased intracranial pressure that necessitates treatment. If treatment is needed then I would follow the below recommendations. Management of ICP  · Avoid hypotonic saline   · Consider mannitol bolus of 0.5 to 1.5 g/kg (this may lead to considerable diuresis so careful monitoring of fluid balance and electrolytes is a priority). Contraindicated in congestive heart failure and severe renal disease. Or hypertonic saline 150 mL of 7.5%, 75 mL of 10%, or 30 mL of 23.4%    Seizures    Continue intravenous Keppra. Her thalamic infarct should not make her more predisposed to having seizures as it is subcortical.  We will continue Keppra for her known history of seizures which is secondary to her vascular malformation    I spent 55 minutes on the patient's care today, over half that time was counseling the patient regarding her current medical condition. I also reviewed her CT scan. Please see impression above.       Signed By: Rehana Smith DO     July 31, 2018

## 2018-07-31 NOTE — PROGRESS NOTES
Pt choked on saliva, drooling from R side of face, orally suctioned pt. Stated \"my right leg hurts. \" No other neurological changes noted. Dr. Mary Ibrahim notified at bedside to assess pt.  Verbal order received for STAT head CT w/o contrast.

## 2018-07-31 NOTE — PROGRESS NOTES
Problem: Falls - Risk of 
Goal: *Absence of Falls Document Roslyn Latin Fall Risk and appropriate interventions in the flowsheet. Outcome: Progressing Towards Goal 
Fall Risk Interventions: 
  
 
Mentation Interventions: Adequate sleep, hydration, pain control, Door open when patient unattended, More frequent rounding, Reorient patient, Room close to nurse's station, Toileting rounds, Update white board Medication Interventions: Evaluate medications/consider consulting pharmacy, Assess postural VS orthostatic hypotension Elimination Interventions: Call light in reach, Toileting schedule/hourly rounds History of Falls Interventions: Door open when patient unattended, Bed/chair exit alarm, Room close to nurse's station, Evaluate medications/consider consulting pharmacy Problem: Pressure Injury - Risk of 
Goal: *Prevention of pressure injury Document Asim Scale and appropriate interventions in the flowsheet. Outcome: Progressing Towards Goal 
Pressure Injury Interventions: 
Sensory Interventions: Float heels, Minimize linen layers, Maintain/enhance activity level, Keep linens dry and wrinkle-free, Pressure redistribution bed/mattress (bed type), Turn and reposition approx. every two hours (pillows and wedges if needed) Activity Interventions: Pressure redistribution bed/mattress(bed type) Mobility Interventions: HOB 30 degrees or less, Pressure redistribution bed/mattress (bed type), Turn and reposition approx. every two hours(pillow and wedges), Float heels Nutrition Interventions: Document food/fluid/supplement intake Friction and Shear Interventions: Feet elevated on foot rest, Foam dressings/transparent film/skin sealants, Minimize layers, Transfer aides:transfer board/Reyna lift/ceiling lift, Transferring/repositioning devices, Apply protective barrier, creams and emollients

## 2018-07-31 NOTE — CONSULTS
Physical Medicine & Rehabilitation Note-consult    Patient: Garima Baker MRN: [id number]  SSN: [id number]    YOB: 1950  Age: 76 y.o. Sex: female      Admit Date: 7/29/2018  Admitting Physician: Cathy Ng MD    Medical Decision Making/Plan/Recommend: Functional deficit, mobility, gait impairment. Acute PT, Ever begin gradual exercises to improve strength and balance and functional mobility. Will follow progress and functional level. The patient does not have ability to tolerate comprehensive acute inpatient rehabilitation program consisting of at least 3 hours of combined physical and occupational therapies. Will continue to follow recovery closely to consider admission to [facility].      Thank you for the opportunity to participate in the care of this patient. Thank you for the opportunity to participate in the care of this patient. Chief Complaint : Gait dysfunction secondary to below. Admit Diagnosis: Nontraumatic thalamic hemorrhage ([facility] [address] Hypertensive urgency  Nontraumatic right thalamic hemorrhage ([facility] [address] (7/30/2018)  left  Hemiparesis  Seizure disorder ([address] (1/22/2018)  Hypertensive urgency (7/30/2018)  Edema of both legs (1/19/2015)  Morbid obesity with BMI of 40.0-44.9, adult ([address] (1/19/2015)  Chronic pain of multiple sites (7/30/2018)  Cerebral AV malformation (7/30/2018)  UTI (urinary tract infection) (7/31/2018)  Pulmonary edema (7/31/2018)  weakness  Pain  DVT risk  Acute Rehab Dx:  Dysarthria  Dysphagia  Aphasia  Debility    deconditioning  Mobility and ambulation deficits  Self Care/ADL deficits    Medical Dx:  Past Medical History:   Diagnosis Date    Chronic pain     \"all over body\"-takes Zoloft daily    Chronic sinusitis 1/19/2015    Diverticulosis 2017    Edema 1/19/2015    Resolved    Former smoker     GERD (gastroesophageal reflux disease)     Headache 1/19/2015    Hearing loss 1/19/2015    \"some\"- no hearing aids    Heart murmur 1990    Hip osteoarthritis 1/19/2015    feet, hands    Hx of colonic polyps 2017    SSA    Hx of migraines     on medication    Hypercholesterolemia 1/19/2015    Hypertension     Impaired fasting glucose 1/19/2015    Joint disorder 1/19/2015    Morbid obesity (HCC)     BMI 40    Neuralgia 1/19/2015    Phlebitis and thrombophlebitis of superficial vessels of lower extremities 01/19/2015    pt denies     Psychiatric disorder     Restless leg syndrome 1/19/2015    Rheumatic fever     pt reports possibly R.F.    Seizures ([facility] [address]     seizures as a child, has NOT had one since she was 13years old    SOB (shortness of breath) on exertion     Stroke ([address]     at age 11 only residual poor peripheral vision    Tobacco abuse 1/19/2015    quit smoking 4/2015    Vascular anomalies, congenital     Vitamin D deficiency 1/19/2015     Subjective:     HPI: Sarah Vernon is a 76 y.o. female patient at [facility] who was admitted on 7/29/2018  by Idania Turner MD with below mentioned medical history ,is being seen for Physical Medicine and Rehabilitation consult. Sarah Vernon presented with acute onset  Leg weakness, difficulty walking. At admission patient had vague pain complaints, mild hypoxia and accelerated hypertension. Initial CT head confirmed an acute hemorrhage in the right thalamus. Patient has been admitted with diagnosis of acute CVA and started on medical management for acute ICH. The patient's admission course has been complicated by uncontrolled BP requiring in antihypertensives, continued ICU monitoring. Subsequent CT 7/31 2018 showed small subacute right thalamic intraparenchymal hemorrhage adjacent to the large right parietal lobe arteriovenous malformation. Mild mass effect and mild leftward midline shift unchanged. She is still very lethargic, difficult to arouse, but alertness is slowly improving. Today she is able to answer and follow simple commands.  She is able to  [facility] left toes and alyssa to tell me she able to sense soft touch in her left foot, however her she is unable to move her left arm, and unable to sense soft stimuli LUE. Mauricio Kirk Patient shows significant right sided deficits, limiting his mobility, ambulation and ADLs. Patient is managing her gait with minimal assist. We are consulted to assist with rehab needs and placement. Luke Gaines is seen and examined today. Medical Records reviewed. She was indepdent at home using a walker, but according to the  was limited by chronic debility. Patient has been unsteady, has had falls, seizures.      Current Level of Function:   bed mobility - dependent, transfers - NT, decreased balance , ambulation - NT'          Family History   Problem Relation Age of Onset    Hypertension Mother     Cancer Mother      Bladder    Depression Mother     Parkinson's Disease Father     Alcohol abuse Brother     Alcohol abuse Son     No Known Problems Sister     No Known Problems Brother     Malignant Hyperthermia Neg Hx     Pseudocholinesterase Deficiency Neg Hx     Delayed Awakening Neg Hx     Post-op Nausea/Vomiting Neg Hx     Emergence Delirium Neg Hx     Post-op Cognitive Dysfunction Neg Hx     Other Neg Hx       Social History   Substance Use Topics    Smoking status: Former Smoker     Packs/day: 1.00     Years: 50.00     Types: Cigarettes     Quit date: 3/29/2015    Smokeless tobacco: Never Used      Comment: quit 2 months and 2 weeks ago    Alcohol use No      Comment: Former- socially     Past Surgical History:   Procedure Laterality Date    COLONOSCOPY N/A 2/20/2017    Bjorn--appendiceal serrated sessile polyp, transverse and rectal hyperplastic--3 year recall    HX APPENDECTOMY  03/15/2017    HX CATARACT REMOVAL Bilateral 3/2016    HX CHOLECYSTECTOMY  1982    HX MOHS PROCEDURES Right 1995    pt denies    HX ROTATOR CUFF REPAIR Right 10/31/2016    x2    HX TUBAL LIGATION  1982    TOTAL HIP ARTHROPLASTY Right     and again 2015 and another revision      Prior to Admission medications    Medication Sig Start Date End Date Taking? Authorizing Provider   amLODIPine (NORVASC) 10 mg tablet Take 10 mg by mouth daily. Yes Mauri Thomas MD   oxyCODONE IR (ROXICODONE) 5 mg immediate release tablet Take 5 mg by mouth every six (6) hours as needed for Pain. Yes Phys Other, MD   mirabegron ER (MYRBETRIQ) 50 mg ER tablet Take 1 Tab by mouth daily. 18   Padmini Quintero DO   hydrALAZINE (APRESOLINE) 25 mg tablet TAKE ONE TABLET BY MOUTH TWICE A DAY FOR BLOOD PRESSURE 18   Padmini Quintero DO   losartan (COZAAR) 100 mg tablet Take 1 Tab by mouth daily. Indications: hypertension 18   Padmini Quinteor DO   levETIRAcetam 1,000 mg tablet Take 1,000 mg by mouth two (2) times a day. 18  Historical Provider   ergocalciferol (VITAMIN D2) 50,000 unit capsule TAKE 1 CAPSULE ONE TIME WEEKLY- takes on sat 3/1/18   Padmini Quintero DO   sertraline (ZOLOFT) 100 mg tablet Take 2 Tabs by mouth daily. 18   Padmini Quintero DO   ibuprofen (MOTRIN) 200 mg tablet Take 200 mg by mouth every eight (8) hours as needed for Pain. Historical Provider     Allergies   Allergen Reactions    Codeine Nausea and Vomiting    Oxycodone Nausea and Vomiting        Review of Systems: Denies chest pain, shortness of breath, cough, headache, visual problems, abdominal pain, dysurea, calf pain. Pertinent positives are as noted in the medical records and unremarkable otherwise. Objective:     Vitals:  Blood pressure 121/57, pulse 73, temperature 99.7 °F (37.6 °C), resp. rate 17, height 5' 4\" (1.626 m), weight 271 lb 9.7 oz (123.2 kg), SpO2 95 %.   Temp (24hrs), Av.9 °F (37.7 °C), Min:99.7 °F (37.6 °C), Max:100.1 °F (37.8 °C)    BMI (calculated): 46.6 (18 0110)   Intake and Output:   1901 -  0700  In: 5004.6 [I.V.:3876.8]  Out: 2175 [Urine:2175]    Physical Exam:   General: Alert, oriented to person, place. HEENT: Normocephalic, no scleral icterus  Oral mucosa moist without cyanosis, No bruit, No JVD. Lungs: Clear to auscultation anteriorly  Respiration even and unlabored   Heart: Regular rate and rhythm, S1, S2    Abdomen: Soft, non-tender, nondistended. Bowel sounds present. Genitourinary: wu   Neuromuscular:      PERRL, right gaze preference. Opens eyes, tracts, follow simple commands with repetition. LUE    Flaccid. RUE    Moves well spontaneously  LLE     Wiggles left foot, toes        RLE     Moves well. Sensory - intact LLE, insensate LUE     No atrophy, no fasciculations, no tremors. Skin/extremity: Calf non tender BLE. Labs/Studies:  Recent Results (from the past 72 hour(s))   CBC WITH AUTOMATED DIFF    Collection Time: 07/29/18  9:56 PM   Result Value Ref Range    WBC 7.1 4.3 - 11.1 K/uL    RBC 4.39 4.05 - 5.25 M/uL    HGB 12.8 11.7 - 15.4 g/dL    HCT 39.5 35.8 - 46.3 %    MCV 90.0 79.6 - 97.8 FL    MCH 29.2 26.1 - 32.9 PG    MCHC 32.4 31.4 - 35.0 g/dL    RDW 13.9 11.9 - 14.6 %    PLATELET 572 (L) 461 - 450 K/uL    MPV 9.9 (L) 10.8 - 14.1 FL    DF AUTOMATED      NEUTROPHILS 74 43 - 78 %    LYMPHOCYTES 16 13 - 44 %    MONOCYTES 8 4.0 - 12.0 %    EOSINOPHILS 2 0.5 - 7.8 %    BASOPHILS 0 0.0 - 2.0 %    IMMATURE GRANULOCYTES 0 0.0 - 5.0 %    ABS. NEUTROPHILS 5.2 1.7 - 8.2 K/UL    ABS. LYMPHOCYTES 1.1 0.5 - 4.6 K/UL    ABS. MONOCYTES 0.6 0.1 - 1.3 K/UL    ABS. EOSINOPHILS 0.2 0.0 - 0.8 K/UL    ABS. BASOPHILS 0.0 0.0 - 0.2 K/UL    ABS. IMM.  GRANS. 0.0 0.0 - 0.5 K/UL   METABOLIC PANEL, COMPREHENSIVE    Collection Time: 07/29/18  9:56 PM   Result Value Ref Range    Sodium 140 136 - 145 mmol/L    Potassium 4.2 3.5 - 5.1 mmol/L    Chloride 104 98 - 107 mmol/L    CO2 30 21 - 32 mmol/L    Anion gap 6 (L) 7 - 16 mmol/L    Glucose 107 (H) 65 - 100 mg/dL    BUN 14 8 - 23 MG/DL    Creatinine 1.29 (H) 0.6 - 1.0 MG/DL    GFR est AA 53 (L) >60 ml/min/1.73m2    GFR est non-AA 44 (L) >60 ml/min/1.73m2    Calcium 8.2 (L) 8.3 - 10.4 MG/DL    Bilirubin, total 0.3 0.2 - 1.1 MG/DL    ALT (SGPT) 19 12 - 65 U/L    AST (SGOT) 20 15 - 37 U/L    Alk. phosphatase 140 (H) 50 - 136 U/L    Protein, total 7.2 6.3 - 8.2 g/dL    Albumin 3.4 3.2 - 4.6 g/dL    Globulin 3.8 (H) 2.3 - 3.5 g/dL    A-G Ratio 0.9 (L) 1.2 - 3.5     EKG, 12 LEAD, INITIAL    Collection Time: 07/29/18 10:01 PM   Result Value Ref Range    Ventricular Rate 70 BPM    Atrial Rate 68 BPM    P-R Interval 156 ms    QRS Duration 84 ms    Q-T Interval 378 ms    QTC Calculation (Bezet) 408 ms    Calculated P Axis 77 degrees    Calculated R Axis 57 degrees    Calculated T Axis 61 degrees    Diagnosis       !! AGE AND GENDER SPECIFIC ECG ANALYSIS !!   Normal sinus rhythm  Septal infarct (cited on or before 29-JUL-2018)  Abnormal ECG  When compared with ECG of 16-JAN-2018 11:47,  No significant change was found  Confirmed by [facility]  MD (), NANCY CORBIN ([id number]) on 7/30/2018 7:12:50 AM     URINE MICROSCOPIC    Collection Time: 07/29/18 11:50 PM   Result Value Ref Range    WBC 20-50 0 /hpf    RBC 0-3 0 /hpf    Epithelial cells 3-5 0 /hpf    Bacteria 0 0 /hpf    Casts 0 0 /lpf    Crystals, urine 0 0 /LPF    Mucus 0 0 /lpf    Other observations RESULTS VERIFIED MANUALLY     PLATELETS, ALLOCATE    Collection Time: 07/30/18  3:15 AM   Result Value Ref Range    Unit number [id number]     Blood component type PLTPH LR,1     Unit division 00     Status of unit TRANSFUSED    TYPE & SCREEN    Collection Time: 07/30/18  4:26 AM   Result Value Ref Range    Crossmatch Expiration 08/02/2018     ABO/Rh(D) A NEGATIVE     Antibody screen NEG    METABOLIC PANEL, COMPREHENSIVE    Collection Time: 07/30/18  4:26 AM   Result Value Ref Range    Sodium 140 136 - 145 mmol/L    Potassium 4.1 3.5 - 5.1 mmol/L    Chloride 106 98 - 107 mmol/L    CO2 30 21 - 32 mmol/L    Anion gap 4 (L) 7 - 16 mmol/L    Glucose 147 (H) 65 - 100 mg/dL    BUN 13 8 - 23 MG/DL    Creatinine 1.04 (H) 0.6 - 1.0 MG/DL    GFR est AA >60 >60 ml/min/1.73m2    GFR est non-AA 56 (L) >60 ml/min/1.73m2    Calcium 8.0 (L) 8.3 - 10.4 MG/DL    Bilirubin, total 0.4 0.2 - 1.1 MG/DL    ALT (SGPT) 18 12 - 65 U/L    AST (SGOT) 14 (L) 15 - 37 U/L    Alk. phosphatase 125 50 - 136 U/L    Protein, total 6.7 6.3 - 8.2 g/dL    Albumin 3.1 (L) 3.2 - 4.6 g/dL    Globulin 3.6 (H) 2.3 - 3.5 g/dL    A-G Ratio 0.9 (L) 1.2 - 3.5     LIPID PANEL    Collection Time: 07/30/18  4:26 AM   Result Value Ref Range    LIPID PROFILE          Cholesterol, total 190 <200 MG/DL    Triglyceride 65 35 - 150 MG/DL    HDL Cholesterol 69 (H) 40 - 60 MG/DL    LDL, calculated 108 (H) <100 MG/DL    VLDL, calculated 13 6.0 - 23.0 MG/DL    CHOL/HDL Ratio 2.8     MAGNESIUM    Collection Time: 07/30/18  4:26 AM   Result Value Ref Range    Magnesium 2.2 1.8 - 2.4 mg/dL   PHOSPHORUS    Collection Time: 07/30/18  4:26 AM   Result Value Ref Range    Phosphorus 3.6 2.3 - 3.7 MG/DL   CBC WITH AUTOMATED DIFF    Collection Time: 07/30/18  4:26 AM   Result Value Ref Range    WBC 6.5 4.3 - 11.1 K/uL    RBC 4.17 4.05 - 5.25 M/uL    HGB 11.9 11.7 - 15.4 g/dL    HCT 37.7 35.8 - 46.3 %    MCV 90.4 79.6 - 97.8 FL    MCH 28.5 26.1 - 32.9 PG    MCHC 31.6 31.4 - 35.0 g/dL    RDW 13.9 11.9 - 14.6 %    PLATELET 265 (L) 476 - 450 K/uL    MPV 10.0 (L) 10.8 - 14.1 FL    DF AUTOMATED      NEUTROPHILS 84 (H) 43 - 78 %    LYMPHOCYTES 9 (L) 13 - 44 %    MONOCYTES 6 4.0 - 12.0 %    EOSINOPHILS 1 0.5 - 7.8 %    BASOPHILS 0 0.0 - 2.0 %    IMMATURE GRANULOCYTES 0 0.0 - 5.0 %    ABS. NEUTROPHILS 5.5 1.7 - 8.2 K/UL    ABS. LYMPHOCYTES 0.6 0.5 - 4.6 K/UL    ABS. MONOCYTES 0.4 0.1 - 1.3 K/UL    ABS. EOSINOPHILS 0.0 0.0 - 0.8 K/UL    ABS. BASOPHILS 0.0 0.0 - 0.2 K/UL    ABS. IMM.  GRANS. 0.0 0.0 - 0.5 K/UL   PROTHROMBIN TIME + INR    Collection Time: 07/30/18  4:26 AM   Result Value Ref Range    Prothrombin time 13.5 11.5 - 14.5 sec INR 1.1     PTT    Collection Time: 07/30/18  4:26 AM   Result Value Ref Range    aPTT 29.8 23.2 - 35.3 SEC   PLEASE READ & DOCUMENT PPD TEST IN 24 HRS    Collection Time: 07/31/18 12:00 AM   Result Value Ref Range    PPD Negative Negative    mm Induration 0 mm   PLEASE READ & DOCUMENT PPD TEST IN 48 HRS    Collection Time: 07/31/18 12:00 AM   Result Value Ref Range    PPD negative Negative    mm Induration 0 mm   METABOLIC PANEL, COMPREHENSIVE    Collection Time: 07/31/18  4:28 AM   Result Value Ref Range    Sodium 141 136 - 145 mmol/L    Potassium 4.5 3.5 - 5.1 mmol/L    Chloride 104 98 - 107 mmol/L    CO2 30 21 - 32 mmol/L    Anion gap 7 7 - 16 mmol/L    Glucose 140 (H) 65 - 100 mg/dL    BUN 11 8 - 23 MG/DL    Creatinine 0.85 0.6 - 1.0 MG/DL    GFR est AA >60 >60 ml/min/1.73m2    GFR est non-AA >60 >60 ml/min/1.73m2    Calcium 8.0 (L) 8.3 - 10.4 MG/DL    Bilirubin, total 0.4 0.2 - 1.1 MG/DL    ALT (SGPT) 17 12 - 65 U/L    AST (SGOT) 14 (L) 15 - 37 U/L    Alk. phosphatase 122 50 - 136 U/L    Protein, total 6.4 6.3 - 8.2 g/dL    Albumin 3.0 (L) 3.2 - 4.6 g/dL    Globulin 3.4 2.3 - 3.5 g/dL    A-G Ratio 0.9 (L) 1.2 - 3.5     CBC WITH AUTOMATED DIFF    Collection Time: 07/31/18  4:28 AM   Result Value Ref Range    WBC 7.7 4.3 - 11.1 K/uL    RBC 3.92 (L) 4.05 - 5.25 M/uL    HGB 11.2 (L) 11.7 - 15.4 g/dL    HCT 36.6 35.8 - 46.3 %    MCV 93.4 79.6 - 97.8 FL    MCH 28.6 26.1 - 32.9 PG    MCHC 30.6 (L) 31.4 - 35.0 g/dL    RDW 14.1 11.9 - 14.6 %    PLATELET 960 (L) 767 - 450 K/uL    MPV 9.7 (L) 10.8 - 14.1 FL    DF AUTOMATED      NEUTROPHILS 86 (H) 43 - 78 %    LYMPHOCYTES 6 (L) 13 - 44 %    MONOCYTES 7 4.0 - 12.0 %    EOSINOPHILS 0 (L) 0.5 - 7.8 %    BASOPHILS 0 0.0 - 2.0 %    IMMATURE GRANULOCYTES 1 0.0 - 5.0 %    ABS. NEUTROPHILS 6.7 1.7 - 8.2 K/UL    ABS. LYMPHOCYTES 0.4 (L) 0.5 - 4.6 K/UL    ABS. MONOCYTES 0.5 0.1 - 1.3 K/UL    ABS. EOSINOPHILS 0.0 0.0 - 0.8 K/UL    ABS. BASOPHILS 0.0 0.0 - 0.2 K/UL    ABS. IMM. GRANS. 0.1 0.0 - 0.5 K/UL   URINALYSIS W/ RFLX MICROSCOPIC    Collection Time: 07/31/18  8:38 AM   Result Value Ref Range    Color YELLOW      Appearance CLOUDY      Specific gravity 1.023 1.001 - 1.023      pH (UA) 6.0 5.0 - 9.0      Protein 30 (A) NEG mg/dL    Glucose NEGATIVE  mg/dL    Ketone NEGATIVE  NEG mg/dL    Bilirubin NEGATIVE  NEG      Blood NEGATIVE  NEG      Urobilinogen 0.2 0.2 - 1.0 EU/dL    Nitrites POSITIVE (A) NEG      Leukocyte Esterase MODERATE (A) NEG      WBC >100 (H) 0 /hpf    RBC 3-5 0 /hpf    Epithelial cells 0 0 /hpf    Bacteria 4+ (H) 0 /hpf    Casts 3-5 0 /lpf       Functional Assessment:  Reviewed participation and progress in therapies                                Ambulation:       Impression/Plan:     Principal Problem:    Nontraumatic thalamic hemorrhage ([facility] (7/30/2018)    Active Problems:    Edema of both legs (1/19/2015)      Morbid obesity with BMI of 40.0-44.9, adult ([facility] (1/19/2015)      Seizure disorder ([facility] (1/22/2018)      Hypertensive urgency (7/30/2018)      Chronic pain of multiple sites (7/30/2018)      Cerebral AV malformation (7/30/2018)      UTI (urinary tract infection) (7/31/2018)      Pulmonary edema (7/31/2018)        Current Facility-Administered Medications   Medication Dose Route Frequency Provider Last Rate Last Dose    ondansetron (ZOFRAN) injection 4 mg  4 mg IntraVENous Q4H PRN Jannie Florian DO   4 mg at 07/31/18 1000    dexamethasone (DECADRON) 4 mg/mL injection 4 mg  4 mg IntraVENous Q6H Jannie Florian DO        cefTRIAXone (ROCEPHIN) 1 g in 0.9% sodium chloride (MBP/ADV) 50 mL  1 g IntraVENous Q24H Jannie Florian  mL/hr at 07/31/18 1050 1 g at 07/31/18 1050    traMADol (ULTRAM) tablet 50 mg  50 mg Oral Q6H PRN Jannie Florian DO        hydrALAZINE (APRESOLINE) tablet 50 mg  50 mg Oral TID Jannie Florian DO        hydrALAZINE (APRESOLINE) 20 mg/mL injection 20 mg  20 mg IntraVENous Q6H PRN Jannie Florian DO        niCARdipine in Saline (CARDENE) 25 MG/250 mL infusion kit  5-15 mg/hr IntraVENous TITRATE Wojciech Card MD   Stopped at 07/31/18 1045    amLODIPine (NORVASC) tablet 10 mg  10 mg Oral DAILY Dallas Mills MD   10 mg at 07/31/18 0805    losartan (COZAAR) tablet 100 mg  100 mg Oral DAILY Dallas Mills MD   100 mg at 07/31/18 0805    mirabegron ER (MYRBETRIQ) tablet 50 mg (Patient Supplied)  50 mg Oral DAILY Dallas Mills MD   Stopped at 07/30/18 0900    sertraline (ZOLOFT) tablet 200 mg  200 mg Oral DAILY Dallas Mills MD   200 mg at 07/31/18 0805    sodium chloride (NS) flush 5-10 mL  5-10 mL IntraVENous Q8H Dallas Mills MD   10 mL at 07/31/18 0508    sodium chloride (NS) flush 5-10 mL  5-10 mL IntraVENous PRN Dallas Mills MD        acetaminophen (TYLENOL) tablet 650 mg  650 mg Oral Q4H PRN Dallas Mills MD   650 mg at 07/31/18 0327    naloxone [facility]) injection 0.4 mg  0.4 mg IntraVENous PRN Dallas Mills MD        0.9% sodium chloride infusion 250 mL  250 mL IntraVENous PRN Dallas Mills MD        LORazepam (ATIVAN) injection 1 mg  1 mg IntraVENous EVERY 2 MINUTES AS NEEDED Dallas Mills MD        0.9% sodium chloride infusion  50 mL/hr IntraVENous CONTINUOUS Mary Roland, DO 50 mL/hr at 07/31/18 1030 50 mL/hr at 07/31/18 1030    levETIRAcetam (KEPPRA) 1,000 mg in 0.9% sodium chloride 100 mL IVPB  1,000 mg IntraVENous Q12H Mary Roland  mL/hr at 07/30/18 2214 1,000 mg at 07/31/18 1030    NUTRITIONAL SUPPORT ELECTROLYTE PRN ORDERS   Does Not Apply PRN Mary Roland DO               Thank you for the opportunity to participate in the care of this patient.     Signed By: Michaela Vargas MD

## 2018-07-31 NOTE — PROGRESS NOTES
Order received, chart reviewed. Pt is hypoxic with increased O2 demand and with decline in medical status, will hold evaluation at this time and re-attempt when pt is medically stable to participate.   
 
Diana Luna, OT

## 2018-07-31 NOTE — PROGRESS NOTES
SPEECH PATHOLOGY NOTE: 
 
Attempted to see for bedside swallow assessment. Preparing to go off the floor for CT. Will re-attempt at later time today. Addendum: Speech therapy re-attempt. Patient with multiple episodes of vomiting this morning. RN also reports strong coughing on secretions. NG tube in place. Will hold speech therapy at this time and re-attempt at later time/date as patient is appropriate for po trials.   
 
Liss [address] CCC-SLP

## 2018-07-31 NOTE — PROGRESS NOTES
Hospitalist Progress Note 2018 Admit Date: 2018  9:55 PM  
NAME: Jennifer Dooley :  1950 MRN:  [id number] Attending: Nadira Day MD 
PCP:  Walter Bravo DO 
 
SUBJECTIVE:  
Patient 62W with pmhx of cva, tob abuse, htn, RLS and depression called EMS for LE weakness and was transported to ED. On ED arrival o2 sat was 85% on room air as well as accelerated HTN. CT unfortunately showed right thalamic bleed. Has hx of complex AVM of brain which appears stable. Er contacted neurosx who recommended bp control and supportive care. Spoke with Dr Karli Gaxiola - no indication for treatment of AVM as mortality rates for correction are very high.  - repeat CT head this AM unchanged but physical exam worsening overnight. Pt now responsive to physical stimuli but not conversant. Intermittently following commands with left sided weakness. - conversant this AM. Has been vomiting. More alert. UA notable for UTI, will start rocephin. Review of Systems negative with exception of pertinent positives noted above PHYSICAL EXAM  
 
Visit Vitals  /57 (BP 1 Location: Left arm, BP Patient Position: At rest)  Pulse 73  Temp 99.7 °F (37.6 °C)  Resp 17  Ht 5' 4\" (1.626 m)  Wt 123.2 kg (271 lb 9.7 oz)  SpO2 95%  BMI 46.62 kg/m2 Temp (24hrs), Av.9 °F (37.7 °C), Min:99.7 °F (37.6 °C), Max:100.1 °F (37.8 °C) Oxygen Therapy O2 Sat (%): 95 % (18) Pulse via Oximetry: 73 beats per minute (18) O2 Device: Nasal cannula (18) O2 Flow Rate (L/min): 5 l/min (18) Intake/Output Summary (Last 24 hours) at 18 1039 Last data filed at 18 1025 Gross per 24 hour Intake          3442.17 ml Output             1950 ml Net          1492.17 ml General: No acute distress   
Lungs:  CTA Bilaterally. Heart:  Regular rate and rhythm,  No murmur, rub, or gallop Abdomen: Soft, Non distended, Non tender, Positive bowel sounds Extremities: No cyanosis, clubbing or edema Neurologic:  Left hemiparesis. Intermittently following commands ASSESSMENT Active Hospital Problems Diagnosis Date Noted  Nontraumatic thalamic hemorrhage ([facility] 07/30/2018  Hypertensive urgency 07/30/2018  Chronic pain of multiple sites 07/30/2018  Cerebral AV malformation 07/30/2018  Seizure disorder ([facility] 01/22/2018  Morbid obesity with BMI of 40.0-44.9, [facility] 01/19/2015  Edema of both legs 01/19/2015 A/p 
- Non traumatic thalamic hemorrhage - BP goal <140/90, failed swallow eval. NGT w/ feeds placed 7/30. PC has seen. Repeat CT head with continued vasogenic edema and 5mm shift, will add decadron. Consult neuro - Left frontal grade 5 AVM - no indication for intervention as per convo w/ Dr Bryant Holland 
- HTN emergency - BP trending down, still needing intermittent cardene gtt. Increase oral hydralazine by NGT and add prn iv hydralazine - Seizue d/o - IV keppra - Chronic pain - changed to ultram.  
- UTI - add rocephin, follow cultures - pulm edema - with increased o2 requirement. Decrease ivf, lasix x 1 
 
DVT Prophylaxis: SCD Signed By: Sha Avila DO   
 July 31, 2018

## 2018-07-31 NOTE — PROGRESS NOTES
Report received from Rufus, [facility]. Pt opens eyes to voice and follows commands, oriented to person and place. Pupils rounds but sluggish. Slight left sided facial droop.  systolic. Pt in bed sleeping.

## 2018-07-31 NOTE — PROGRESS NOTES
Physical Therapy Note: 
 
Chart reviewed for physical therapy evaluation and eval attempted. Patient currently hypoxic and experiencing increased O2 needs this AM. Will hold today and attempt initial evaluation tomorrow, 8/1/18. Thank you, JAVON SwartzT

## 2018-07-31 NOTE — PROGRESS NOTES
Pt vomited during CT scan. Orally suctioned, yellow/ green bile. VS stable, no neurological changes or respatory distress. Pt with emesis x1, has mad Dr. Jay Ann aware. Received order 4 mg IV Zofran Q4H PRN. Zofran administered with relief of symptoms. Cardene restarted, systolic BP 709Z. Tube feedings stopped due to emesis, IVF restarted at 100 cc/hr per order.

## 2018-07-31 NOTE — PROGRESS NOTES
Shift Summary. Patient BP maintained within parameters, SBP <140. Cardene gtt was titrated on and off during night, see MAR currently off at this time. TMax 100.1; PRN tylenol given, temp down to 100.0 oral.  
NO new neurological changes noted. Patient follows simple commands with right upper and lower ext, alert to person. Remains drowsy but awakes to speech. Pupils equal,  sluggish and reactive. Tube feeds increased per MD order, at 30ml/hr currently. Turned y2pxfhy. Safety and fall precautions maintained.

## 2018-07-31 NOTE — PROGRESS NOTES
MRI on hold until tomorrow per Brigitte Caceres. Pt got sick in CT today, needs time to recuperate before attempting more imaging

## 2018-07-31 NOTE — PROGRESS NOTES
Report given to Helen Mcgee RN and Dustin Jiménez RN. Pt neuro assessment completed in room. Am labs reviewed. Chem still pending. gtts and Vitals reviewed.

## 2018-08-01 ENCOUNTER — APPOINTMENT (OUTPATIENT)
Dept: MRI IMAGING | Age: 68
DRG: 064 | End: 2018-08-01
Attending: PSYCHIATRY & NEUROLOGY
Payer: MEDICARE

## 2018-08-01 PROBLEM — G93.5 BRAIN COMPRESSION (HCC): Status: ACTIVE | Noted: 2018-08-01

## 2018-08-01 PROBLEM — G93.6 CEREBRAL EDEMA (HCC): Status: ACTIVE | Noted: 2018-08-01

## 2018-08-01 LAB
ALBUMIN SERPL-MCNC: 3.1 G/DL (ref 3.2–4.6)
ALBUMIN/GLOB SERPL: 0.8 {RATIO} (ref 1.2–3.5)
ALP SERPL-CCNC: 128 U/L (ref 50–136)
ALT SERPL-CCNC: 19 U/L (ref 12–65)
ANION GAP SERPL CALC-SCNC: 9 MMOL/L (ref 7–16)
AST SERPL-CCNC: 14 U/L (ref 15–37)
BASOPHILS # BLD: 0 K/UL (ref 0–0.2)
BASOPHILS NFR BLD: 0 % (ref 0–2)
BILIRUB SERPL-MCNC: 0.4 MG/DL (ref 0.2–1.1)
BUN SERPL-MCNC: 16 MG/DL (ref 8–23)
CALCIUM SERPL-MCNC: 8.5 MG/DL (ref 8.3–10.4)
CHLORIDE SERPL-SCNC: 100 MMOL/L (ref 98–107)
CO2 SERPL-SCNC: 32 MMOL/L (ref 21–32)
CREAT SERPL-MCNC: 0.83 MG/DL (ref 0.6–1)
DIFFERENTIAL METHOD BLD: ABNORMAL
EOSINOPHIL # BLD: 0 K/UL (ref 0–0.8)
EOSINOPHIL NFR BLD: 0 % (ref 0.5–7.8)
ERYTHROCYTE [DISTWIDTH] IN BLOOD BY AUTOMATED COUNT: 13.8 % (ref 11.9–14.6)
GLOBULIN SER CALC-MCNC: 3.9 G/DL (ref 2.3–3.5)
GLUCOSE SERPL-MCNC: 164 MG/DL (ref 65–100)
HCT VFR BLD AUTO: 39.6 % (ref 35.8–46.3)
HGB BLD-MCNC: 12.6 G/DL (ref 11.7–15.4)
IMM GRANULOCYTES # BLD: 0.1 K/UL (ref 0–0.5)
IMM GRANULOCYTES NFR BLD AUTO: 1 % (ref 0–5)
LEVETIRACETAM SERPL-MCNC: 48.6 UG/ML (ref 10–40)
LYMPHOCYTES # BLD: 0.3 K/UL (ref 0.5–4.6)
LYMPHOCYTES NFR BLD: 4 % (ref 13–44)
MCH RBC QN AUTO: 29.1 PG (ref 26.1–32.9)
MCHC RBC AUTO-ENTMCNC: 31.8 G/DL (ref 31.4–35)
MCV RBC AUTO: 91.5 FL (ref 79.6–97.8)
MM INDURATION POC: 0 MM (ref 0–5)
MONOCYTES # BLD: 0.1 K/UL (ref 0.1–1.3)
MONOCYTES NFR BLD: 2 % (ref 4–12)
NEUTS SEG # BLD: 6.8 K/UL (ref 1.7–8.2)
NEUTS SEG NFR BLD: 93 % (ref 43–78)
PLATELET # BLD AUTO: 125 K/UL (ref 150–450)
PMV BLD AUTO: 9.6 FL (ref 10.8–14.1)
POTASSIUM SERPL-SCNC: 4.4 MMOL/L (ref 3.5–5.1)
PPD POC: NEGATIVE NEGATIVE
PROT SERPL-MCNC: 7 G/DL (ref 6.3–8.2)
RBC # BLD AUTO: 4.33 M/UL (ref 4.05–5.25)
SODIUM SERPL-SCNC: 141 MMOL/L (ref 136–145)
WBC # BLD AUTO: 7.3 K/UL (ref 4.3–11.1)

## 2018-08-01 PROCEDURE — 74011000250 HC RX REV CODE- 250: Performed by: EMERGENCY MEDICINE

## 2018-08-01 PROCEDURE — 97535 SELF CARE MNGMENT TRAINING: CPT

## 2018-08-01 PROCEDURE — 74011250637 HC RX REV CODE- 250/637: Performed by: FAMILY MEDICINE

## 2018-08-01 PROCEDURE — 74011000250 HC RX REV CODE- 250: Performed by: FAMILY MEDICINE

## 2018-08-01 PROCEDURE — 85025 COMPLETE CBC W/AUTO DIFF WBC: CPT | Performed by: INTERNAL MEDICINE

## 2018-08-01 PROCEDURE — 74011250636 HC RX REV CODE- 250/636: Performed by: FAMILY MEDICINE

## 2018-08-01 PROCEDURE — 77030018798 HC PMP KT ENTRL FED COVD -A

## 2018-08-01 PROCEDURE — 97163 PT EVAL HIGH COMPLEX 45 MIN: CPT

## 2018-08-01 PROCEDURE — 36415 COLL VENOUS BLD VENIPUNCTURE: CPT | Performed by: INTERNAL MEDICINE

## 2018-08-01 PROCEDURE — 77030019605

## 2018-08-01 PROCEDURE — C8929 TTE W OR WO FOL WCON,DOPPLER: HCPCS

## 2018-08-01 PROCEDURE — A9575 INJ GADOTERATE MEGLUMI 0.1ML: HCPCS | Performed by: FAMILY MEDICINE

## 2018-08-01 PROCEDURE — 74011250637 HC RX REV CODE- 250/637: Performed by: INTERNAL MEDICINE

## 2018-08-01 PROCEDURE — 74011250636 HC RX REV CODE- 250/636: Performed by: INTERNAL MEDICINE

## 2018-08-01 PROCEDURE — 70553 MRI BRAIN STEM W/O & W/DYE: CPT

## 2018-08-01 PROCEDURE — 74011000258 HC RX REV CODE- 258: Performed by: INTERNAL MEDICINE

## 2018-08-01 PROCEDURE — 80053 COMPREHEN METABOLIC PANEL: CPT | Performed by: INTERNAL MEDICINE

## 2018-08-01 PROCEDURE — 97166 OT EVAL MOD COMPLEX 45 MIN: CPT

## 2018-08-01 PROCEDURE — 92610 EVALUATE SWALLOWING FUNCTION: CPT

## 2018-08-01 PROCEDURE — 77030020263 HC SOL INJ SOD CL0.9% LFCR 1000ML

## 2018-08-01 PROCEDURE — 99231 SBSQ HOSP IP/OBS SF/LOW 25: CPT | Performed by: PSYCHIATRY & NEUROLOGY

## 2018-08-01 PROCEDURE — 65660000000 HC RM CCU STEPDOWN

## 2018-08-01 RX ORDER — HYDRALAZINE HYDROCHLORIDE 50 MG/1
100 TABLET, FILM COATED ORAL 3 TIMES DAILY
Status: DISCONTINUED | OUTPATIENT
Start: 2018-08-01 | End: 2018-08-06 | Stop reason: HOSPADM

## 2018-08-01 RX ORDER — SERTRALINE HYDROCHLORIDE 100 MG/1
200 TABLET, FILM COATED ORAL DAILY
Status: DISCONTINUED | OUTPATIENT
Start: 2018-08-02 | End: 2018-08-06 | Stop reason: HOSPADM

## 2018-08-01 RX ORDER — FUROSEMIDE 40 MG/1
40 TABLET ORAL DAILY
Status: DISCONTINUED | OUTPATIENT
Start: 2018-08-02 | End: 2018-08-06 | Stop reason: HOSPADM

## 2018-08-01 RX ORDER — FUROSEMIDE 10 MG/ML
40 INJECTION INTRAMUSCULAR; INTRAVENOUS ONCE
Status: COMPLETED | OUTPATIENT
Start: 2018-08-01 | End: 2018-08-01

## 2018-08-01 RX ORDER — ACETAMINOPHEN 325 MG/1
650 TABLET ORAL
Status: DISCONTINUED | OUTPATIENT
Start: 2018-08-01 | End: 2018-08-01

## 2018-08-01 RX ORDER — LOSARTAN POTASSIUM 50 MG/1
100 TABLET ORAL DAILY
Status: DISCONTINUED | OUTPATIENT
Start: 2018-08-02 | End: 2018-08-06 | Stop reason: HOSPADM

## 2018-08-01 RX ORDER — SODIUM CHLORIDE 0.9 % (FLUSH) 0.9 %
10 SYRINGE (ML) INJECTION
Status: COMPLETED | OUTPATIENT
Start: 2018-08-01 | End: 2018-08-01

## 2018-08-01 RX ORDER — HYDRALAZINE HYDROCHLORIDE 50 MG/1
50 TABLET, FILM COATED ORAL 3 TIMES DAILY
Status: DISCONTINUED | OUTPATIENT
Start: 2018-08-01 | End: 2018-08-01

## 2018-08-01 RX ORDER — AMLODIPINE BESYLATE 10 MG/1
10 TABLET ORAL DAILY
Status: DISCONTINUED | OUTPATIENT
Start: 2018-08-02 | End: 2018-08-06 | Stop reason: HOSPADM

## 2018-08-01 RX ORDER — GADOTERATE MEGLUMINE 376.9 MG/ML
20 INJECTION INTRAVENOUS
Status: COMPLETED | OUTPATIENT
Start: 2018-08-01 | End: 2018-08-01

## 2018-08-01 RX ADMIN — HYDRALAZINE HYDROCHLORIDE 20 MG: 20 INJECTION INTRAMUSCULAR; INTRAVENOUS at 17:27

## 2018-08-01 RX ADMIN — Medication 10 ML: at 14:00

## 2018-08-01 RX ADMIN — SODIUM CHLORIDE 1000 MG: 900 INJECTION, SOLUTION INTRAVENOUS at 10:03

## 2018-08-01 RX ADMIN — HYDRALAZINE HYDROCHLORIDE 20 MG: 20 INJECTION INTRAMUSCULAR; INTRAVENOUS at 03:47

## 2018-08-01 RX ADMIN — SODIUM CHLORIDE 1000 MG: 900 INJECTION, SOLUTION INTRAVENOUS at 21:39

## 2018-08-01 RX ADMIN — FUROSEMIDE 40 MG: 10 INJECTION, SOLUTION INTRAMUSCULAR; INTRAVENOUS at 15:08

## 2018-08-01 RX ADMIN — CEFTRIAXONE SODIUM 1 G: 1 INJECTION, POWDER, FOR SOLUTION INTRAMUSCULAR; INTRAVENOUS at 11:17

## 2018-08-01 RX ADMIN — DEXAMETHASONE SODIUM PHOSPHATE 4 MG: 4 INJECTION, SOLUTION INTRAMUSCULAR; INTRAVENOUS at 05:35

## 2018-08-01 RX ADMIN — Medication 10 ML: at 14:36

## 2018-08-01 RX ADMIN — PERFLUTREN 1 ML: 6.52 INJECTION, SUSPENSION INTRAVENOUS at 15:00

## 2018-08-01 RX ADMIN — HYDRALAZINE HYDROCHLORIDE 100 MG: 50 TABLET, FILM COATED ORAL at 21:37

## 2018-08-01 RX ADMIN — HYDRALAZINE HYDROCHLORIDE 50 MG: 50 TABLET, FILM COATED ORAL at 15:08

## 2018-08-01 RX ADMIN — OXYCODONE HYDROCHLORIDE 2.5 MG: 5 TABLET ORAL at 03:47

## 2018-08-01 RX ADMIN — Medication 10 ML: at 05:36

## 2018-08-01 RX ADMIN — DEXAMETHASONE SODIUM PHOSPHATE 4 MG: 4 INJECTION, SOLUTION INTRAMUSCULAR; INTRAVENOUS at 11:32

## 2018-08-01 RX ADMIN — Medication 10 ML: at 21:38

## 2018-08-01 RX ADMIN — HYDRALAZINE HYDROCHLORIDE 50 MG: 50 TABLET, FILM COATED ORAL at 05:35

## 2018-08-01 RX ADMIN — NICARDIPINE HYDROCHLORIDE 5 MG/HR: 25 INJECTION INTRAVENOUS at 00:20

## 2018-08-01 RX ADMIN — LOSARTAN POTASSIUM 100 MG: 50 TABLET ORAL at 08:00

## 2018-08-01 RX ADMIN — SODIUM CHLORIDE 50 ML/HR: 900 INJECTION, SOLUTION INTRAVENOUS at 03:48

## 2018-08-01 RX ADMIN — AMLODIPINE BESYLATE 10 MG: 10 TABLET ORAL at 08:00

## 2018-08-01 RX ADMIN — OXYCODONE HYDROCHLORIDE 2.5 MG: 5 TABLET ORAL at 15:08

## 2018-08-01 RX ADMIN — GADOTERATE MEGLUMINE 20 ML: 376.9 INJECTION INTRAVENOUS at 14:35

## 2018-08-01 RX ADMIN — SERTRALINE HYDROCHLORIDE 200 MG: 50 TABLET ORAL at 08:00

## 2018-08-01 NOTE — INTERDISCIPLINARY ROUNDS
Interdisciplinary team rounds were held 8/1/2018 with the following team members:Cardiac Rehab and Wellness, Care Management, Nursing, Nurse Practitioner, Nutrition, Palliative Care, Pharmacy, Physical Therapy, Physician, [de-identified] Assistant, Respiratory Therapy, Speech Therapy and Clinical Coordinator and the patient. Plan of care discussed. See clinical pathway and/or care plan for interventions and desired outcomes.

## 2018-08-01 NOTE — PROGRESS NOTES
Initial visit was made, emotional support and a spiritual presence was provided.  card was left with the patient. Patient was gone for a procedure. Billy Dumont

## 2018-08-01 NOTE — PROGRESS NOTES
Bedside report received from Debbie Sinha, [facility]. Dual neuro assessment completed. Patient drowsy but oriented x4, opens eyes to voice. Stated she is in the hospital for \"seizures. \"  unequal R > L. Patient able to move left arm with encouragement. TF infusing via NGT, no residuals. Cardene gtt off since 0400 today. Denies pain or needs at this time.

## 2018-08-01 NOTE — PROGRESS NOTES
Palliative Care Progress Note Patient: Jennifer Vera MRN: [id number]  SSN: [id number] YOB: 1950  Age: 76 y.o. Sex: female Assessment/Plan: Chief Complaint/Interval History: reports right leg pain Principal Diagnosis: · Debility, Unspecified  R53.81 Additional Diagnoses: · Fatigue, Lethargy  R53.83 
· Frailty  R54 
· Pain, limb  M79.609 · Counseling, Encounter for Medical Advice  Z71.9 
· Encounter for Palliative Care  Z51.5 Palliative Performance Scale (PPS) PPS: 30 Medical Decision Making:  
Reviewed and summarized notes over last 24 hours. Discussed case with appropriate providers: ICU IDT; See Guevara RN Reviewed laboratory and x-ray data- CBC, CMP Pt resting in bed, no family at bedside. She is oriented at present, and reports right leg pain. She has been receiving Oxycodone 2.5 mg PO as needed. Repeat CT scan of the brain has remained stable. Neurology has requested an MRI of the brain, which will be completed today. SLP to evaluated swallowing today. Will try to speak with  when he arrives. Discussed with See Guevara RN. Will continue to follow. Will discuss findings with members of the interdisciplinary team.   
 
   
More than 50% of this 15 minute visit was spent counseling and coordination of care as outlined above. Subjective:  
 
Review of Systems: A comprehensive review of systems was negative except for:  
Constitutional: Positive for malaise, fatigue. Musculoskeletal: Positive for discomfort in right leg. Objective:  
 
Visit Vitals  /61  Pulse 82  Temp 99 °F (37.2 °C)  Resp 19  
 Ht 5' 4\" (1.626 m)  Wt 123.2 kg (271 lb 9.7 oz)  SpO2 94%  BMI 46.62 kg/m2 Physical Exam: 
 
General:  Cooperative. No acute distress. Eyes:  Conjunctivae/corneas clear Nose: Nares normal. Septum midline. DH feeding tube Neck: Supple, symmetrical, trachea midline Lungs:   Clear to auscultation bilaterally, unlabored Heart:  Regular rate and rhythm Abdomen:   Soft, non-tender, non-distended Extremities: Normal, atraumatic, no cyanosis or edema Skin: Skin color, texture, turgor normal. Scattered bruising Neurologic: Nonfocal.  Left arm weakness Psych: Alert and oriented Signed By: Sami Smith NP August 1, 2018

## 2018-08-01 NOTE — PROGRESS NOTES
Late entry: Discussed with pt's spouse STR at SNF. States pt was previously at [facility] and would like for referral to be sent to them. Awaiting PT/OT, will need precert with [facility].

## 2018-08-01 NOTE — PROGRESS NOTES
Problem: Nutrition Deficit Goal: *Optimize nutritional status Nutrition: 
Reason for assessment: TF management per nutritional support protocols. (Dr. Jessica Roberson) Assessment:  
Food/Nutrition History: Pt remains on enteral nutrition via NGT S/P SLP evaluation with recommendations for strict NPO today. TF was held yesterday d/t emesis and restarted today with good tolerance noted, minimal GRVs.  Abdomen obese/pannus with hypoactive bowel sounds. Date of Last BM: unknown. Pt may need to be checked for an impaction and/or given dulcolax. IVF: NS @ 50ml/hr Labs remarkable for K 4.0, glucose 162 (started on 4 mg decadron q6 hours), Na 141 Anthropometrics:Height: 5' 4\" (162.6 cm), Weight Source: Bed, Weight: 117.1 kg (258 lb 2.5 oz) Edema: 1+ non-pitting to BUEs Macronutrient needs: EER:  2118-5782 kcal /day (12-14 kcal/kg listed BW) EPR:  55-66 grams protein/day (1-1.2 grams/kg IBW)(GFR >60) Max CHO:  225 grams/day (55% kcal) Fluid:  1ml/kcal 
Intake/Comparative standards: TF of Osmolite at goal rate of 50ml/hr with 50ml water q 1hr provides 1440 kcal/day (100% of needs), 67 grams protein/day (100% of needs), 190 grams CHO/day (does not exceed max CHO),  and ~ 984ml free water/day for a total of 2184 ml fluid/day (100% of needs). Intervention: 
Meals and snacks: NPO 
EN:Continue TF of Osmolite and increase by 10 ml/hr q 6 hrs as tolerated to goal rate of 50ml/hr with 50ml water q 1hr. Nutrition related medication recommendation: Pt may need dulcolax. Nutrition Supplement Therapy: Electrolyte replacement per nutritional support protocols are active on MAR. IV: Per MD 
Coordination of nutrition care: AM ICU interdisciplinary rounds Discharge nutrition plan: Too soon to determine. May need PEG. [address], [address] [place] [address], [address] [place] [address], [phone]

## 2018-08-01 NOTE — PROGRESS NOTES
Problem: Self Care Deficits Care Plan (Adult) Goal: *Acute Goals and Plan of Care (Insert Text) 1. Patient will complete grooming and hygiene with CGA following set up 2. Patient will complete upper body dressing and bathing with min assist  and adaptive equipment as needed. 3. Patient complete HEP with min cues to promote >  BUE strength, coordination, , and functional use for ADLs 4. Pt will demonstrate improved cognition to follow 100% basic directions with min or fewer cues to promote > participation in ADLs for increased independence 5. Patient will complete bed mobility with mod assist in prep for ADLs Timeframe: 7 visits OCCUPATIONAL THERAPY: Initial Assessment, Treatment Day: Day of Assessment and AM 8/1/2018 INPATIENT: Hospital Day: 4 Payor: [address] [place] [address] / Plan: [facility] MEDICARE COMPLETE / Product Type: [employer] Medicare /  
  
NAME/AGE/GENDER: Todd Garcia is a 76 y.o. female PRIMARY DIAGNOSIS:  Nontraumatic thalamic hemorrhage ([address] Hypertensive urgency Nontraumatic thalamic hemorrhage (HCC) Nontraumatic thalamic hemorrhage ([address] ICD-10: Treatment Diagnosis:  
 · Hemiplegia and hemiparesis following cerebral infarction affecting left non-dominant side (E66.591) Precautions/Allergies: 
  falls Codeine and Oxycodone ASSESSMENT:  
 
Ms. Bethanie Spence was admitted with non-traumatic thalamic hemorrhage with midline shift. Pt lives with her  in a one level house with a tub shower and was independent with ADLs at baseline, uses a rollator for mobility. This session, pt presented supine in ICU bed, agreeable to OT session. Pt was very lethargic and required constant cues to keep eyes open. Pt was able to follow basic commands but required max repetition of cues to initiate and was unable to sustain attention, presented with decreased processing and required max extra time for all tasks.  Pt has L side weakness (grossly 2+/5 shoulder, 3+/5 elbow, wrist, and hand) and impaired [facility], and sensation on L side. Pt has RUE weakness as well, grossly 4- to 4/5, and c/o chronic pain in R shoulder. Pt has impaired L side attention and required max cues to look to L side and extra time to fixate on objects on the L. Pt did not smoothly visually track in any direction, likely attributed to poor attention and somnolence. Pt required mod assistance to wash face and to complete oral cares using a swab with assistance for set up and mod cues and extra time to initiate tasks and reach for ADL items, guidance to successfully aim for face and mouth. Pt presented well below her functional baseline and would benefit from skilled OT services to address deficits. This section established at most recent assessment PROBLEM LIST (Impairments causing functional limitations): 1. Decreased Strength 2. Decreased ADL/Functional Activities 3. Decreased Transfer Abilities 4. Decreased Balance 5. Decreased Activity Tolerance 6. Increased Fatigue 7. Decreased Cognition INTERVENTIONS PLANNED: (Benefits and precautions of occupational therapy have been discussed with the patient.) 1. Activities of daily living training 2. Adaptive equipment training 3. Balance training 4. Clothing management 5. Cognitive training 6. Donning&doffing training 7. Group therapy 8. Villa tech training 9. Hygiene training 10. Neuromuscular re-eduation 11. Therapeutic activity 12. Therapeutic exercise TREATMENT PLAN: Frequency/Duration: Follow patient 3 times/ week to address above goals. Rehabilitation Potential For Stated Goals: Good RECOMMENDED REHABILITATION/EQUIPMENT: (at time of discharge pending progress): Due to the probability of continued deficits (see above) this patient will likely need continued skilled occupational therapy after discharge. Equipment:  
 None at this time OCCUPATIONAL PROFILE AND HISTORY:  
History of Present Injury/Illness (Reason for Referral): Thalamic non-traumatic infarct Past Medical History/Comorbidities: Ms. Antonio Blank  has a past medical history of Chronic pain; Chronic sinusitis (1/19/2015); Diverticulosis (2017); Edema (1/19/2015); Former smoker; GERD (gastroesophageal reflux disease); Headache (1/19/2015); Hearing loss (1/19/2015); Heart murmur (1990); Hip osteoarthritis (1/19/2015); colonic polyps (2017); migraines; Hypercholesterolemia (1/19/2015); Hypertension; Impaired fasting glucose (1/19/2015); Joint disorder (1/19/2015); Morbid obesity ([address] Neuralgia (1/19/2015); Phlebitis and thrombophlebitis of superficial vessels of lower extremities (01/19/2015); Psychiatric disorder; Restless leg syndrome (1/19/2015); Rheumatic fever; Seizures ([address] SOB (shortness of breath) on exertion; Stroke ([address] Tobacco abuse (1/19/2015); Vascular anomalies, congenital; and Vitamin D deficiency (1/19/2015). She also has no past medical history of Adverse effect of anesthesia; Aneurysm ([address] Arrhythmia; Asthma; Autoimmune disease ([address] CAD (coronary artery disease); Cancer ([address] Chronic kidney disease; Chronic obstructive pulmonary disease ([address] Coagulation disorder ([address] Diabetes ([address] Difficult intubation; Endocarditis; Heart failure ([address] Liver disease; Malignant hyperthermia due to anesthesia; Nausea & vomiting; Pseudocholinesterase deficiency; PUD (peptic ulcer disease); Sleep apnea; Thromboembolus ([address] or Thyroid disease. Ms. Antonio Blank  has a past surgical history that includes hx mohs procedure (Right, 1995); hx cholecystectomy (1982); colonoscopy (N/A, 2/20/2017); pr total hip arthroplasty (Right, 2005); hx rotator cuff repair (Right, 10/31/2016); hx cataract removal (Bilateral, 3/2016); hx tubal ligation (1982); and hx appendectomy (03/15/2017). Social History/Living Environment:  
Home Environment: Private residence # Steps to Enter: 0 One/Two Story Residence: One story Living Alone: No 
Support Systems: Spouse/Significant Other/Partner Patient Expects to be Discharged to[de-identified] Unknown Current DME Used/Available at Home: Walker, rollator, Grab bars, Raised toilet seat Tub or Shower Type: Tub/Shower combination Prior Level of Function/Work/Activity: 
Lives with , independent with ADLs, uses a rollator. Per RN,  is in a WC Number of Personal Factors/Comorbidities that affect the Plan of Care: Expanded review of therapy/medical records (1-2):  MODERATE COMPLEXITY ASSESSMENT OF OCCUPATIONAL PERFORMANCE[de-identified]  
Activities of Daily Living:  
Basic ADLs (From Assessment) Complex ADLs (From Assessment) Feeding: Moderate assistance Oral Facial Hygiene/Grooming: Moderate assistance Bathing: Maximum assistance Upper Body Dressing: Maximum assistance Lower Body Dressing: Total assistance Toileting: Maximum assistance Instrumental ADL Meal Preparation: Total assistance Homemaking: Total assistance Medication Management: Total assistance Financial Management: Maximum assistance Grooming/Bathing/Dressing Activities of Daily Living Grooming Washing Face: Moderate assistance Brushing Teeth: Moderate assistance Cognitive Retraining Safety/Judgement: Fall prevention Bed/Mat Mobility Rolling: Total assistance Most Recent Physical Functioning:  
Gross Assessment: 
AROM:  (WFL on R, impaired on L ) PROM: Within functional limits Strength:  (WFL on R, impaired on L ) Coordination: Generally decreased, functional (impaired on L ) Tone: Abnormal 
         
  
Posture: 
Posture (WDL): Exceptions to [facility] Posture Assessment: Forward head Balance: 
  Bed Mobility: 
Rolling: Total assistance Wheelchair Mobility: 
  
Transfers: 
   
 
    
 
Patient Vitals for the past 6 hrs: 
 BP BP Patient Position SpO2 O2 Flow Rate (L/min) Pulse 08/01/18 0353 140/56 - 93 % - 87  
08/01/18 0408 132/55 - 96 % - 85  
08/01/18 0423 130/56 - 96 % - 83  
08/01/18 0438 135/54 - 96 % - 86  
08/01/18 0453 131/55 - 96 % - 83  
18 0507 136/48 - 97 % - 83  
18 0522 134/55 - 97 % - 84  
18 0535 134/55 - - - -  
18 0537 136/62 - 96 % - 82  
18 0554 119/59 - 94 % - 85  
18 0608 143/57 - 97 % - 88  
18 0623 143/60 - 97 % - 87  
18 0707 144/65 - 97 % - 90  
18 0738 150/71 At rest 95 % 7 l/min 86  
18 0808 140/55 - 94 % - 81  
18 0823 134/54 - 94 % - 83  
18 0853 135/57 - 94 % - 81  
18 0923 134/61 - 94 % - 82 Mental Status Neurologic State: Drowsy, Eyes open to voice Orientation Level: Oriented to person, Oriented to place Cognition: Decreased command following, Decreased attention/concentration Perception: Cues to attend left visual field Perseveration: No perseveration noted Safety/Judgement: Fall prevention Physical Skills Involved: 1. Range of Motion 2. Balance 3. Strength 4. Activity Tolerance 5. Sensation 6. Fine Motor Control 7. Gross Motor Control 8. Vision 9. Pain (Chronic) Cognitive Skills Affected (resulting in the inability to perform in a timely and safe manner): 1. Perception 2. Executive Function 3. Immediate Memory 4. Short Term Recall 5. Long Term Memory 6. Sustained Attention 7. Divided Attention 8. Comprehension Psychosocial Skills Affected: 1. Habits/Routines 2. Environmental Adaptation 3. Social Interaction 4. Social Roles Number of elements that affect the Plan of Care: 5+:  HIGH COMPLEXITY CLINICAL DECISION MAKIN [facility] [address] [address] AM-PAC 6 Clicks Daily Activity Inpatient Short Form How much help from another person does the patient currently need. .. Total A Lot A Little None 1. Putting on and taking off regular lower body clothing? [x] 1   [] 2   [] 3   [] 4  
2. Bathing (including washing, rinsing, drying)? [] 1   [x] 2   [] 3   [] 4  
3. Toileting, which includes using toilet, bedpan or urinal?   [x] 1   [] 2   [] 3   [] 4  
4.   Putting on and taking off regular upper body clothing? [] 1   [x] 2   [] 3   [] 4  
5. Taking care of personal grooming such as brushing teeth? [] 1   [x] 2   [] 3   [] 4  
6. Eating meals? [] 1   [x] 2   [] 3   [] 4  
© 2007, Trustees of [address] [place] [address] [address] [address], under license to [employer]. All rights reserved Score:  Initial: 10 Most Recent: X (Date: -- ) Interpretation of Tool:  Represents activities that are increasingly more difficult (i.e. Bed mobility, Transfers, Gait). Score 24 23 22-20 19-15 14-10 9-7 6 Modifier CH CI CJ CK CL CM CN   
 
? Self Care:  
  - CURRENT STATUS: CL - 60%-79% impaired, limited or restricted  - GOAL STATUS: CK - 40%-59% impaired, limited or restricted  - D/C STATUS:  ---------------To be determined--------------- Payor: Umm Perez / Plan: [facility] MEDICARE COMPLETE / Product Type: Managed Care Medicare /   
 
Medical Necessity:    
· Patient is expected to demonstrate progress in strength, range of motion, balance, coordination and functional technique to increase independence with ADLs. Reason for Services/Other Comments: 
· Patient continues to require present interventions due to patient's inability to independently complete ADLs. Use of outcome tool(s) and clinical judgement create a POC that gives a: MODERATE COMPLEXITY  
 
 
 
TREATMENT:  
(In addition to Assessment/Re-Assessment sessions the following treatments were rendered) Pre-treatment Symptoms/Complaints:   
Pain: Initial:  
Pain Intensity 1: 4 Pain Location 1: Leg 
Pain Intervention(s) 1:  (pre-medicated)  Post Session:  same Self Care: (8 min): Procedure(s) (per grid) utilized to improve and/or restore self-care/home management as related to grooming. Required maximal visual, verbal, manual and tactile cueing to facilitate activities of daily living skills and compensatory activities. Assessment/Reassessment Braces/Orthotics/Lines/Etc:  
· wu catheter · nasogastric tube 
· O2 Device: Nasal cannula Treatment/Session Assessment:   
· Response to Treatment:  No adverse reaction · Interdisciplinary Collaboration:  
o Physical Therapist 
o Occupational Therapist 
o Registered Nurse 
o Physician · After treatment position/precautions:  
o Supine in bed 
o Bed/Chair-wheels locked 
o Bed in low position 
o Call light within reach 
o RN notified 
o Side rails x 3  
· Compliance with Program/Exercises: Will assess as treatment progresses. · Recommendations/Intent for next treatment session: \"Next visit will focus on advancements to more challenging activities and reduction in assistance provided\". Total Treatment Duration: OT Patient Time In/Time Out Time In: 6850 Time Out: 1216 Daokta Mercado, OT

## 2018-08-01 NOTE — PROGRESS NOTES
Call back from [facility] [address] with [facility] and offered bed for pt when ready for d/c. Will notify pt and spouse.

## 2018-08-01 NOTE — PROGRESS NOTES
Problem: Mobility Impaired (Adult and Pediatric) Goal: *Acute Goals and Plan of Care (Insert Text) STG: 
(1.)Ms. Herron will move from supine to sit and sit to supine , scoot up and down and roll side to side with MAXIMAL ASSIST within 5 treatment day(s). (2.)Ms. Herron will tolerate 1 hour of chair position with stable vital signs within 5 treatment days to improve activity tolerance for functional mobility. (3.)Ms. Herron will perform LE exercises with 3 to 5 cues for form within 5 days to improve strength for functional transfers. LTG: 
(1.)Ms. Herron will move from supine to sit and sit to supine , scoot up and down and roll side to side in bed with MODERATE ASSIST within 10 treatment day(s). (2.)Ms. Herron will transfer from bed to chair and chair to bed with MAXIMAL ASSIST using the least restrictive device within 10 treatment day(s). (3.)Ms. Herron will perform sit to stand transfer with MAXIMAL ASSIST to prepare for ambulation within 10 treatment days. (4.)Ms. Herron will ambulate with MAXIMAL ASSIST for 3 feet with the least restrictive device within 10 treatment day(s). Will update goals as patient is able. ________________________________________________________________________________________________ PHYSICAL THERAPY: Initial Assessment, AM 8/1/2018 INPATIENT: Hospital Day: 4 Payor: Annie Bright / Plan: [facility] MEDICARE COMPLETE / Product Type: Managed Care Medicare /  
  
NAME/AGE/GENDER: Rupal Salgado is a 76 y.o. female PRIMARY DIAGNOSIS: Nontraumatic thalamic hemorrhage ([facility] [address] Hypertensive urgency Nontraumatic thalamic hemorrhage (HCC) Nontraumatic thalamic hemorrhage ([facility] [address] ICD-10: Treatment Diagnosis:  
 · Generalized Muscle Weakness (M62.81) · Difficulty in walking, Not elsewhere classified (R26.2) · Hemiplegia and hemiparesis following cerebral infarction affecting left non-dominant side (Z42.252) Precaution/Allergies: 
Codeine and Oxycodone ASSESSMENT:  
 
Ms. Primitivo Cheema is a 76 y.o. female admitted for hypertensive urgency and nontraumatic thalamic hemorrhage. She presents in ICU, drowsy and agreeable to physical therapy evaluation. Noted she is hard of hearing. She is on 7 L/min O2 on contact. She reports she lives with her  and \"doesn't walk much,\" when she does she furniture cruises or uses her rollator walker. Per RN, patient's  is wheelchair bound. Patient exhibits decreased active movement overall of LLE with increased tone and 2-/5 strength throughout LLE. RLE grossly 3+/5. Decreased attention to L visual field and L side of body with max cues to move L side. Decreased command following and attention noted with delayed responses, however patient is following commands with additional time. Required total assist to roll to L side, however did attempt to help by reaching RUE. Bed placed in partial chair position, but supine to sitting transfer not attempted due to patient's drowsiness. Georgette Lucas is currently functioning below her baseline and would benefit from skilled PT during acute care stay to maximize safety and independence with functional mobility. This section established at most recent assessment PROBLEM LIST (Impairments causing functional limitations): 1. Decreased Strength 2. Decreased ADL/Functional Activities 3. Decreased Transfer Abilities 4. Decreased Ambulation Ability/Technique 5. Decreased Balance 6. Increased Pain 7. Decreased Activity Tolerance 8. Decreased Pacing Skills 9. Decreased Knowledge of Precautions 10. Decreased [place] with Home Exercise Program 
11. Decreased Cognition INTERVENTIONS PLANNED: (Benefits and precautions of physical therapy have been discussed with the patient.) 1. Balance Exercise 2. Bed Mobility 3. Family Education 4. Gait Training 5. Home Exercise Program (HEP) 6. Therapeutic Activites 7. Therapeutic Exercise/Strengthening 8.  Transfer Training 9. Patient Education 10. Group Therapy TREATMENT PLAN: Frequency/Duration: 3 times a week for duration of hospital stay Rehabilitation Potential For Stated Goals: Good RECOMMENDED REHABILITATION/EQUIPMENT: (at time of discharge pending progress): Due to the probability of continued deficits (see above) this patient will likely need continued skilled physical therapy after discharge. Equipment:  
 None at this time HISTORY:  
History of Present Injury/Illness (Reason for Referral): 
Patient history was obtained from the ER provider prior to seeing the patient. 
  
Patient is a 76 y.o. female who presents to the ER due to legs \"feeling like jello\". She reported jittery legs and weakness to ER staff. She has had increased difficulty walking but no fall reported. EMS was called; they reported an O2 sat of 85% on room air. Pt has complained of multiple sites of pain and some vague symptoms while in ER. Difficult to determine what is new vs baseline, as she does have chronic pain of multiple sites. ER workup shows continued mild hypoxia and accelerated HTN. Head CT unfortunately shows a rt thalamic bleed. 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 ER contacted neurosurgery, who recommended BP control and supportive care. There is no recommended surgical intervention. Denies fever/chills, n/v/d, SOB, cough/congestion Pt states that she is going to die. She is offered reassurance. Past Medical History/Comorbidities: Ms. Janet Saldaña  has a past medical history of Chronic pain; Chronic sinusitis (1/19/2015); Diverticulosis (2017); Edema (1/19/2015); Former smoker; GERD (gastroesophageal reflux disease); Headache (1/19/2015); Hearing loss (1/19/2015); Heart murmur (1990); Hip osteoarthritis (1/19/2015); colonic polyps (2017); migraines; Hypercholesterolemia (1/19/2015); Hypertension; Impaired fasting glucose (1/19/2015); Joint disorder (1/19/2015);  Morbid obesity ([address] Neuralgia (1/19/2015); Phlebitis and thrombophlebitis of superficial vessels of lower extremities (01/19/2015); Psychiatric disorder; Restless leg syndrome (1/19/2015); Rheumatic fever; Seizures ([address] SOB (shortness of breath) on exertion; Stroke ([address] Tobacco abuse (1/19/2015); Vascular anomalies, congenital; and Vitamin D deficiency (1/19/2015). She also has no past medical history of Adverse effect of anesthesia; Aneurysm ([address] Arrhythmia; Asthma; Autoimmune disease ([address] CAD (coronary artery disease); Cancer ([address] Chronic kidney disease; Chronic obstructive pulmonary disease ([address] Coagulation disorder ([address] Diabetes ([address] Difficult intubation; Endocarditis; Heart failure ([facility] [address] Liver disease; Malignant hyperthermia due to anesthesia; Nausea & vomiting; Pseudocholinesterase deficiency; PUD (peptic ulcer disease); Sleep apnea; Thromboembolus ([address] or Thyroid disease. Ms. Jules Mayer  has a past surgical history that includes hx mohs procedure (Right, 1995); hx cholecystectomy (1982); colonoscopy (N/A, 2/20/2017); pr total hip arthroplasty (Right, 2005); hx rotator cuff repair (Right, 10/31/2016); hx cataract removal (Bilateral, 3/2016); hx tubal ligation (1982); and hx appendectomy (03/15/2017). Social History/Living Environment:  
Home Environment: Private residence # Steps to Enter: 0 One/Two Story Residence: One story Living Alone: No 
Support Systems: Spouse/Significant Other/Partner Patient Expects to be Discharged to[de-identified] Private residence Current DME Used/Available at Home: Braden Canavan, rollator Prior Level of Function/Work/Activity: 
Patient ambulates with a rollator walker, but reports she \"does not walk much. \" 
  
Number of Personal Factors/Comorbidities that affect the Plan of Care: 3+: HIGH COMPLEXITY EXAMINATION:  
Most Recent Physical Functioning:  
Gross Assessment: 
AROM: Generally decreased, functional (grossly decreased LLE) PROM: Generally decreased, functional (grossly decreased LLE) Strength: Generally decreased, functional (grossly decreased LLE) Coordination: Generally decreased, functional (grossly decreased LLE) Tone: Abnormal 
         
  
Posture: 
Posture (WDL): Exceptions to [facility] Posture Assessment: Forward head Balance: 
  Bed Mobility: 
Rolling: Total assistance Wheelchair Mobility: 
  
Transfers: 
  
Gait: 
  
   
  
Body Structures Involved: 1. Nerves 2. Muscles Body Functions Affected: 1. Mental 
2. Sensory/Pain 3. Respiratory 4. Neuromusculoskeletal 
5. Movement Related Activities and Participation Affected: 1. Learning and Applying Knowledge 2. General Tasks and Demands 3. Communication 4. Mobility 5. Self Care 6. Domestic Life 7. Interpersonal Interactions and Relationships 8. Community, Social and [place] Number of elements that affect the Plan of Care: 4+: HIGH COMPLEXITY CLINICAL PRESENTATION:  
Presentation: Evolving clinical presentation with unstable and unpredictable characteristics: HIGH COMPLEXITY CLINICAL DECISION MAKIN[address] [place] [id number] AM-PAC 6 Clicks Basic Mobility Inpatient Short Form How much difficulty does the patient currently have. .. Unable A Lot A Little None 1. Turning over in bed (including adjusting bedclothes, sheets and blankets)? [] 1   [x] 2   [] 3   [] 4  
2. Sitting down on and standing up from a chair with arms ( e.g., wheelchair, bedside commode, etc.)   [x] 1   [] 2   [] 3   [] 4  
3. Moving from lying on back to sitting on the side of the bed? [] 1   [x] 2   [] 3   [] 4 How much help from another person does the patient currently need. .. Total A Lot A Little None 4. Moving to and from a bed to a chair (including a wheelchair)? [x] 1   [] 2   [] 3   [] 4  
5. Need to walk in hospital room? [x] 1   [] 2   [] 3   [] 4  
6. Climbing 3-5 steps with a railing? [x] 1   [] 2   [] 3   [] 4  
© , Trustees of [address] [place] [address] [address] [id number], under license to [employer].  All rights reserved Score:  Initial: 8 Most Recent: X (Date: -- ) Interpretation of Tool:  Represents activities that are increasingly more difficult (i.e. Bed mobility, Transfers, Gait). Score 24 23 22-20 19-15 14-10 9-7 6 Modifier CH CI CJ CK CL CM CN   
 
? Mobility - Walking and Moving Around:  
  - CURRENT STATUS: CM - 80%-99% impaired, limited or restricted  - GOAL STATUS: CL - 60%-79% impaired, limited or restricted  - D/C STATUS:  ---------------To be determined--------------- Payor: Anil Horne / Plan: [facility] MEDICARE COMPLETE / Product Type: [employer] Medicare /   
 
Medical Necessity:    
· Patient demonstrates good rehab potential due to higher previous functional level. Reason for Services/Other Comments: 
· Patient continues to require modification of therapeutic interventions to increase complexity of exercises. Use of outcome tool(s) and clinical judgement create a POC that gives a: Difficult prediction of patient's progress: HIGH COMPLEXITY  
  
 
 
 
TREATMENT:  
(In addition to Assessment/Re-Assessment sessions the following treatments were rendered) Pre-treatment Symptoms/Complaints:  Generalized weakness c/o throughout mobility of R shoulder and leg. Pain: Initial:  
Pain Intensity 1: 0  Post Session:  3/10, RN notified. Assessment/Reassessment only, no treatment provided today Braces/Orthotics/Lines/Etc:  
· IV 
· wu catheter · nasogastric tube · ICU monitors · O2 Device: Nasal cannula Treatment/Session Assessment:   
· Response to Treatment:  Patient tolerated well with no medical complications. · Interdisciplinary Collaboration:  
o Physical Therapist 
o Occupational Therapist 
o Registered Nurse · After treatment position/precautions:  
o Supine in bed 
o Bed/Chair-wheels locked 
o Bed in low position 
o Call light within reach 
o RN notified 
o head of bed raised >30 degrees · Compliance with Program/Exercises:  Will assess as treatment progresses. · Recommendations/Intent for next treatment session: \"Next visit will focus on advancements to more challenging activities and reduction in assistance provided\". Total Treatment Duration: PT Patient Time In/Time Out Time In: 1091 Time Out: 0901 Woody Bustillos DPT

## 2018-08-01 NOTE — PROGRESS NOTES
LTG: Patient will tolerate least restrictive diet without overt signs or symptoms of airway compromise. STG: Patient will tolerate po trials with ST only without overt signs or symptoms of airway compromise to determine least restrictive diet. Speech language pathology: bedside swallow note: Initial Assessment NAME/AGE/GENDER: Freddy Zavala is a 76 y.o. female DATE: 8/1/2018 PRIMARY DIAGNOSIS: Nontraumatic thalamic hemorrhage ([facility]) Hypertensive urgency ICD-10: Treatment Diagnosis: R13.12 Oropharyngeal Dysphagia. INTERDISCIPLINARY COLLABORATION: Registered Nurse PRECAUTIONS/ALLERGIES: Codeine and Oxycodone ASSESSMENT:Based on the objective data described below, Ms. Shirley Anne presents with severe oropharyngeal dysphagia. Eyes remained closed during majority of session, but patient responding verbally and following most commands. When asked about swallow function she stated \"I get strangled all the time\". Patient was presented with ice chip x1, thin via tsp, nectar via tsp and cup, and honey via cup. She exhibited impaired swallow initiation and coordination upon palpation. Multiple, incoordinated, audible swallows with immediate coughing on thin liquids by tsp and nectar via single cup sip. Also + for facial redness and watery eyes. Multiple swallows palpated with honey thick liquids. Wet vocal quality with delayed coughing noted. Recommend strict NPO with alternative means of nutrition and hydration. Concern for patient's ability to resume po diet based on location of hemorrhage and reports of dysphagia at baseline. Speech will follow for continued po trials and laryngeal exercises in attempt to resume po diet. Patient will also benefit from assessment of cognitive-linguistic abilities. Patient will benefit from skilled intervention to address the below impairments. ?????? ? ? This section established at most recent assessment?????????? 
PROBLEM LIST (Impairments causing functional limitations): 1. Oropharyngeal dysphagia REHABILITATION POTENTIAL FOR STATED GOALS: FAIR 
PLAN OF CARE:  
Patient will benefit from skilled intervention to address the following impairments. RECOMMENDATIONS AND PLANNED INTERVENTIONS (Benefits and precautions of therapy have been discussed with the patient.): 
· NPO with alternative means of nutrition MEDICATIONS: 
· Non-oral 
COMPENSATORY STRATEGIES/MODIFICATIONS INCLUDING: 
· None OTHER RECOMMENDATIONS (including follow up treatment recommendations):  
· Patient education RECOMMENDED DIET MODIFICATIONS DISCUSSED WITH: 
· Nursing · Patient FREQUENCY/DURATION: Continue to follow patient 3 times a week for duration of hospital stay to address above goals. RECOMMENDED REHABILITATION/EQUIPMENT: (at time of discharge pending progress): Due to the probability of continued deficits (see above) this patient will likely need continued skilled speech therapy after discharge. SUBJECTIVE:  
Awake, but eyes closed throughout session. Decreased command following and easily distracted by internal thoughts. History of Present Injury/Illness: Ms. Hailey Reynolds  has a past medical history of Chronic pain; Chronic sinusitis (1/19/2015); Diverticulosis (2017); Edema (1/19/2015); Former smoker; GERD (gastroesophageal reflux disease); Headache (1/19/2015); Hearing loss (1/19/2015); Heart murmur (1990); Hip osteoarthritis (1/19/2015); colonic polyps (2017); migraines; Hypercholesterolemia (1/19/2015); Hypertension; Impaired fasting glucose (1/19/2015); Joint disorder (1/19/2015); Morbid obesity ([address] Neuralgia (1/19/2015); Phlebitis and thrombophlebitis of superficial vessels of lower extremities (01/19/2015); Psychiatric disorder; Restless leg syndrome (1/19/2015); Rheumatic fever; Seizures ([address] SOB (shortness of breath) on exertion; Stroke ([address] Tobacco abuse (1/19/2015); Vascular anomalies, congenital; and Vitamin D deficiency (1/19/2015).  She also has no past medical history of Adverse effect of anesthesia; Aneurysm ([facility] [address] Arrhythmia; Asthma; Autoimmune disease ([facility] [address] CAD (coronary artery disease); Cancer ([facility] [address] Chronic kidney disease; Chronic obstructive pulmonary disease ([facility] [address] Coagulation disorder ([facility] [address] Diabetes ([facility] [address] Difficult intubation; Endocarditis; Heart failure ([facility] [address] Liver disease; Malignant hyperthermia due to anesthesia; Nausea & vomiting; Pseudocholinesterase deficiency; PUD (peptic ulcer disease); Sleep apnea; Thromboembolus ([facility] [address] or Thyroid disease. . She also  has a past surgical history that includes hx mohs procedure (Right, 1995); hx cholecystectomy (1982); colonoscopy (N/A, 2/20/2017); pr total hip arthroplasty (Right, 2005); hx rotator cuff repair (Right, 10/31/2016); hx cataract removal (Bilateral, 3/2016); hx tubal ligation (1982); and hx appendectomy (03/15/2017). Present Symptoms: Oropharyngeal dysphagia Pain Intensity 1: 0 Pain Location 1: Leg 
Pain Orientation 1: Mid 
Pain Intervention(s) 1:  (pre-medicated) Current Medications: No current facility-administered medications on file prior to encounter. Current Outpatient Prescriptions on File Prior to Encounter Medication Sig Dispense Refill  mirabegron ER (MYRBETRIQ) 50 mg ER tablet Take 1 Tab by mouth daily. 14 Tab 0  
 hydrALAZINE (APRESOLINE) 25 mg tablet TAKE ONE TABLET BY MOUTH TWICE A DAY FOR BLOOD PRESSURE 180 Tab 3  
 losartan (COZAAR) 100 mg tablet Take 1 Tab by mouth daily. Indications: hypertension 30 Tab 11  
 levETIRAcetam 1,000 mg tablet Take 1,000 mg by mouth two (2) times a day.  ergocalciferol (VITAMIN D2) 50,000 unit capsule TAKE 1 CAPSULE ONE TIME WEEKLY- takes on sat 5 Cap 11  
 sertraline (ZOLOFT) 100 mg tablet Take 2 Tabs by mouth daily. 180 Tab 3  ibuprofen (MOTRIN) 200 mg tablet Take 200 mg by mouth every eight (8) hours as needed for Pain. Current Dietary Status: NPO Social History/Home Situation:   
Home Environment: Private residence # Steps to Enter: 0 One/Two Story Residence: One story Living Alone: No 
Support Systems: Spouse/Significant Other/Partner Patient Expects to be Discharged to[de-identified] Unknown Current DME Used/Available at Home: Walker, rollator, Grab bars, Raised toilet seat Tub or Shower Type: Tub/Shower combination OBJECTIVE:  
Respiratory Status:  Nasal cannula  5 l/min (titrated from 7L NC) Oral Motor Structure/Speech:  Oral-Motor Structure/Motor Speech Labial: Decreased rate, Decreased seal 
Dentition: Upper dentures Lingual: Decreased rate, Decreased strength, Incoordinated Cognitive and Communication Status: 
Neurologic State: Eyes open to voice Orientation Level: Oriented to person;Oriented to place Cognition: Decreased attention/concentration;Decreased command following Perception: Cues to attend left visual field Perseveration: No perseveration noted Safety/Judgement: Decreased insight into deficits BEDSIDE SWALLOW EVALUATION Oral Assessment: 
Oral Assessment Labial: Decreased rate;Decreased seal 
Dentition: Upper dentures Lingual: Decreased rate;Decreased strength; Incoordinated P.O. Trials: 
Patient Position: Upright in bed The patient was given tsp-small bite amounts of the following:  
Consistency Presented: Thin liquid; Ice chips;Honey thick liquid; Nectar thick liquid How Presented: SLP-fed/presented;Cup/sip;Spoon ORAL PHASE: 
Bolus Acceptance: No impairment Bolus Formation/Control: Impaired Propulsion: Delayed (# of seconds); Discoordination Type of Impairment: Delayed Oral Residue: None PHARYNGEAL PHASE: 
Initiation of Swallow: Delayed (# of seconds) Laryngeal Elevation: Decreased;Weak Aspiration Signs/Symptoms: Strong cough; Facial redness; Watery eyes Vocal Quality: No impairment Pharyngeal Phase Characteristics: Multiple swallows OTHER OBSERVATIONS: 
Rate/bite size: Impaired Endurance:  Impaired Comments: Tool Used: Dysphagia Outcome and Severity Scale (KAROLINA)  Score Comments Normal Diet  [] 7 With no strategies or extra time needed Functional Swallow  [] 6 May have mild oral or pharyngeal delay Mild Dysphagia 
  [] 5 Which may require one diet consistency restricted (those who demonstrate penetration which is entirely cleared on MBS would be included) Mild-Moderate Dysphagia  [] 4 With 1-2 diet consistencies restricted Moderate Dysphagia  [] 3 With 2 or more diet consistencies restricted Moderately Severe Dysphagia  [] 2 With partial PO strategies (trials with ST only) Severe Dysphagia  [x] 1 With inability to tolerate any PO safely Score:  Initial: 1 Most Recent: X (Date: -- ) Interpretation of Tool: The Dysphagia Outcome and Severity Scale (KAROLINA) is a simple, easy-to-use, 7-point scale developed to systematically rate the functional severity of dysphagia based on objective assessment and make recommendations for diet level, independence level, and type of nutrition. Score 7 6 5 4 3 2 1 Modifier CH CI CJ CK CL CM CN ? Swallowing:  
  - CURRENT STATUS: CN - 100% impaired, limited or restricted  - GOAL STATUS:  CL - 60%-79% impaired, limited or restricted  - D/C STATUS:  ---------------To be determined--------------- Payor: Fátima Oneil / Plan: [facility] MEDICARE COMPLETE / Product Type: Managed Care Medicare /  
 
TREATMENT:  
 (In addition to Assessment/Re-Assessment sessions the following treatments were rendered) Assessment/Reassessment only, no treatment provided today MODALITIES:  
  
  
  
  
  
  
  
  
  
  
    
  
  
  
  
  
  
  
  
   
 
ORAL MOTOR  EXERCISES: 
  
  
  
  
  
  
  
  
  
  
  
  
  
  
  
  
  
  
  
  
  
  
  
  
  
  
    
  
  
  
  
  
  
  
  
  
  
  
  
  
  
  
  
  
  
  
  
  
  
  
  
  
   
 
LARYNGEAL / PHARYNGEAL EXERCISES: 
  
  
  
  
  
  
  
  
  
  
  
  
  
  
  
  
  
  
  
  
  
    
  
  
  
  
  
  
  
  
  
  
  
  
  
  
  
  
  
  
  
 __________________________________________________________________________________________________ Safety: After treatment position/precautions: · Upright in Bed. Progression/Medical Necessity:  
· Patient is expected to demonstrate progress in swallow strength, swallow timeliness, swallow function, diet tolerance and swallow safety to improve swallow safety, work toward diet advancement and decrease aspiration risk. Compliance with Program/Exercises: Will assess as treatment progresses. Reason for Continuation of Services/Other Comments: 
· Patient continues to require skilled intervention due to oropharyngeal dysphagia. Recommendations/Intent for next treatment session: \"Treatment next visit will focus on po trials\". Total Treatment Duration: 
Time In: 8418 Time Out: 1000 [address] CCC-SLP

## 2018-08-01 NOTE — PROGRESS NOTES
Referral sent to [facility] Spoke with kwesi Forrest and he is aware of referral.  PT/OT notes are in. CM to follow.

## 2018-08-01 NOTE — CONSULTS
Consult    Patient: Polina Murrieta MRN: [id number]     YOB: 1950  Age: 76 y.o. Sex: female      Subjective: Interval history:    The patient is doing about the same today. She is still mildly encephalopathic. No new issues since I last saw the patient from a neurological standpoint. She knows that she is in the hospital and is able to tell me autobiographical information but remains somewhat disoriented.        Current Facility-Administered Medications   Medication Dose Route Frequency Provider Last Rate Last Dose    [START ON 8/2/2018] amLODIPine (NORVASC) tablet 10 mg  10 mg Per NG tube DAILY Meghan Coe DO        hydrALAZINE (APRESOLINE) tablet 50 mg  50 mg Per NG tube TID Meghan Coe DO        [START ON 8/2/2018] losartan (COZAAR) tablet 100 mg  100 mg Per NG tube DAILY Meghan Coe DO        [START ON 8/2/2018] sertraline (ZOLOFT) tablet 200 mg  200 mg Per NG tube DAILY Meghan Coe DO        acetaminophen (TYLENOL) solution 650 mg  650 mg Per NG tube Q4H PRN Meghan Coe DO        [facility]ansetron [facility]) injection 4 mg  4 mg IntraVENous Q4H PRN Meghan Coe DO   4 mg at 07/31/18 1000    dexamethasone (DECADRON) 4 mg/mL injection 4 mg  4 mg IntraVENous Q6H Meghan Coe DO   4 mg at 08/01/18 1132    cefTRIAXone (ROCEPHIN) 1 g in 0.9% sodium chloride (MBP/ADV) 50 mL  1 g IntraVENous Q24H Meghan Coe  mL/hr at 08/01/18 1117 1 g at 08/01/18 1117    hydrALAZINE (APRESOLINE) 20 mg/mL injection 20 mg  20 mg IntraVENous Q6H PRN Meghan Coe DO   20 mg at 08/01/18 0347    oxyCODONE IR (ROXICODONE) tablet 2.5 mg  2.5 mg Per NG tube Q12H PRN Meghan Coe DO   2.5 mg at 08/01/18 0347    niCARdipine in Saline (CARDENE) 25 MG/250 mL infusion kit  5-15 mg/hr IntraVENous TITRATE Va Santo MD   Stopped at 08/01/18 0408    mirabegron ER (MYRBETRIQ) tablet 50 mg (Patient Supplied)  50 mg Oral DAILY Mert Ledezma MD Stopped at 07/30/18 0900    sodium chloride (NS) flush 5-10 mL  5-10 mL IntraVENous Q8H Lakisha Richter MD   10 mL at 08/01/18 0536    sodium chloride (NS) flush 5-10 mL  5-10 mL IntraVENous PRN Lakisha Richter MD        naloxone [facility]) injection 0.4 mg  0.4 mg IntraVENous PRN Lakisha Richter MD        0.9% sodium chloride infusion 250 mL  250 mL IntraVENous PRN Lakisha Richter MD        LORazepam (ATIVAN) injection 1 mg  1 mg IntraVENous EVERY 2 MINUTES AS NEEDED Lakisha Richter MD        0.9% sodium chloride infusion  50 mL/hr IntraVENous CONTINUOUS Sha Avila DO 50 mL/hr at 08/01/18 0348 50 mL/hr at 08/01/18 0348    levETIRAcetam (KEPPRA) 1,000 mg in 0.9% sodium chloride 100 mL IVPB  1,000 mg IntraVENous Q12H Sha Avila  mL/hr at 07/30/18 2214 1,000 mg at 08/01/18 1003    NUTRITIONAL SUPPORT ELECTROLYTE PRN ORDERS   Does Not Apply PRN Sha Avila DO            Allergies   Allergen Reactions    Codeine Nausea and Vomiting    Oxycodone Nausea and Vomiting       Review of Systems:  Could not obtain due to clinical condition        Objective:     Vitals:    08/01/18 0923 08/01/18 0938 08/01/18 1023 08/01/18 1112   BP: 134/61 136/59 131/52    Pulse: 82 81 81    Resp: 19 17 17    Temp:    99.6 °F (37.6 °C)   SpO2: 94% 96% 96%    Weight:       Height:            Physical Exam:    Neurological examination     She follows one-step commands but has difficulty with more complex commands. She is oriented to self and time but not place. She continually is asking for her  who is not in the room. Language and speech are normal.  . On cranial nerve examination pupils are equal, round, and reactive to light. Face is symmetric and sensation to light touch is intact. Palate rises symmetrically and tongue protrudes in the midline. Trapezius and sternocleidomastoid have full strength.   On motor examination she has normal tone with exception to decrease tone in the left upper limb. On strength testing she has decreased power in the left upper limb and left lower limb, approximately 4 -/5. She has an upgoing toe on the left. Muscle stretch reflexes are hyperactive, relatively more brisk on the left compared to the right with bilateral crossed abductors. Alin and Tromner's signs are present bilaterally, more prominent on the left. Lab Results   Component Value Date/Time    Cholesterol, total 190 07/30/2018 04:26 AM    HDL Cholesterol 69 (H) 07/30/2018 04:26 AM    LDL, calculated 108 (H) 07/30/2018 04:26 AM    VLDL, calculated 13 07/30/2018 04:26 AM    Triglyceride 65 07/30/2018 04:26 AM    CHOL/HDL Ratio 2.8 07/30/2018 04:26 AM        Lab Results   Component Value Date/Time    Hemoglobin A1c 5.6 06/12/2018 11:26 AM    Hemoglobin A1c, External 5.7 12/10/2014        CT Results (most recent): Personally Reviewed     Results from Hospital Encounter encounter on 07/29/18   CT HEAD WO CONT   Narrative Examination: CT scan of the brain without contrast.    History: Speech changes (or aphasia), new or progressive, 76 years Female Speech  changes, vomiting, altered    Technique: 5 mm axial imaging of the brain from the posterior fossa to the  vertex. Radiation dose reduction techniques were used for this study:  Our CT  scanners use one or all of the following: Automated exposure control, adjustment  of the mA and/or kVp according to patient's size, iterative reconstruction. Comparison:  CT brain July 30, 2018, MRI brain December 22, 2017    Findings:  Again visualized is the large complex arteriovenous malformation  centered in the right temporal lobe with associated dystrophic calcifications  and large draining veins along the left temporal and frontal lobes as seen on  prior MRI. Focal subacute hemorrhage centered in the right thalamus appears  relatively unchanged compared to most recent CT brain, measuring approximately  1.6 x 2.7 cm.   Again visualized is mild vasogenic edema in the right thalamus  with mild leftward midline shift measuring approximately 5 mm relatively  unchanged since prior. The ventricles, sulci are otherwise age-appropriate. Basal cisterns are intact. The visualized paranasal sinuses and mastoid air  cells are clear. The orbits, bones, and soft tissues are normal in appearance. Impression IMPRESSION:  No significant interval change in the small subacute right thalamic  intraparenchymal hemorrhage adjacent to the large right parietal lobe  arteriovenous malformation. Mild mass effect and mild leftward midline shift  unchanged. Results for orders placed or performed during the hospital encounter of 07/29/18   EKG, 12 LEAD, INITIAL   Result Value Ref Range    Ventricular Rate 70 BPM    Atrial Rate 68 BPM    P-R Interval 156 ms    QRS Duration 84 ms    Q-T Interval 378 ms    QTC Calculation (Bezet) 408 ms    Calculated P Axis 77 degrees    Calculated R Axis 57 degrees    Calculated T Axis 61 degrees    Diagnosis       !! AGE AND GENDER SPECIFIC ECG ANALYSIS !! Normal sinus rhythm  Septal infarct (cited on or before 29-JUL-2018)  Abnormal ECG  When compared with ECG of 16-JAN-2018 11:47,  No significant change was found  Confirmed by Stevie Stokes MD (), Christofer Mendez ([id number]) on 7/30/2018 7:12:50 AM          I did review the patient's CT scan of the head. There is a right thalamic hemorrhage with some associated edema that extends into the posterior limb of the internal capsule. She has a large complex AVM located in the right parietal temporal region. In the contralateral hemisphere, and close proximity to the cortex there may be an additional lesion at the gray-white junction. I would like to take a better look at this with the brain MRI.       Assessment:     Intraparenchymal hemorrhage  ICH Management   Q1H neurological checks  Maintain SBP < 140 mmHg  STAT CT head for clinical worsening  Head of the bed at 30 degrees with neck in the neutral position  MRI to investigate underlying pathology such as neoplasms - in her left hemisphere at the gray-white junction I do see a potential area of concern that I would like to investigate further with a brain MRI. Maintain normoglycemia and normothermia     Minimal mass effect: I do not suspect that the patient currently has increased intracranial pressure that necessitates treatment. If treatment is needed then I would follow the below recommendations. Management of ICP  · Avoid hypotonic saline   · Consider mannitol bolus of 0.5 to 1.5 g/kg (this may lead to considerable diuresis so careful monitoring of fluid balance and electrolytes is a priority). Contraindicated in congestive heart failure and severe renal disease. Or hypertonic saline 150 mL of 7.5%, 75 mL of 10%, or 30 mL of 23.4%    Seizures    Continue intravenous Keppra. Her thalamic infarct should not make her more predisposed to having seizures as it is subcortical.  We will continue Keppra for her known history of seizures which is secondary to her vascular malformation    Management of Delirium     Non-pharmacological intervention  · Reorient the patient throughout the day  · Window open and lights on during the day. Lights off, television off, noises down during the night. If able, decrease nursing checks at night  · Therapies as often as possible  · Avoid restraints to the best of your ability   · Avoid sensory deprivation by using glasses and hearing aids, if applicable       Pharmacological intervention  · Replete electrolyte abnormalities and correct metabolic abnormalities  · Limit benzodiazepines, antihistamines, narcotics, anticholinergics. Preference towards Precedex for sedation over fentanyl and benzodiazepines/GABAa agonists. I spent 25 minutes on the patient's care today, over half that time was counseling the patient regarding her current medical condition.  I spoke about her case today with Dr. Faheem Castaneda and assisted in coordinating her care. I also reviewed her CT scan. Please see impression above.     Signed By: Ellie Wells DO     August 1, 2018

## 2018-08-01 NOTE — PROGRESS NOTES
Hospitalist Progress Note 2018 Admit Date: 2018  9:55 PM  
NAME: Eric Goodwin :  1950 MRN:  [id number] Attending: Li Cali MD 
PCP:  Mekhi Flores DO 
 
SUBJECTIVE:  
Patient 61E with pmhx of cva, tob abuse, htn, RLS and depression called EMS for LE weakness and was transported to ED. On ED arrival o2 sat was 85% on room air as well as accelerated HTN. CT unfortunately showed right thalamic bleed. Has hx of complex AVM of brain which appears stable. Er contacted neurosx who recommended bp control and supportive care. Spoke with Dr Maria Gong - no indication for treatment of AVM as mortality rates for correction are very high. NGT placed for feeds/meds  - about the same as yesterday. Intermittently conversant but lethargic appearing. Review of Systems negative with exception of pertinent positives noted above PHYSICAL EXAM  
 
Visit Vitals  BP (!) 148/95  Pulse 82  Temp 98.5 °F (36.9 °C)  Resp 16  
 Ht 5' 4\" (1.626 m)  Wt 123.2 kg (271 lb 9.7 oz)  SpO2 95%  BMI 46.62 kg/m2 Temp (24hrs), Av.9 °F (37.2 °C), Min:98.5 °F (36.9 °C), Max:99.6 °F (37.6 °C) Oxygen Therapy O2 Sat (%): 95 % (18 1632) Pulse via Oximetry: 83 beats per minute (18 1632) O2 Device: Nasal cannula (18 1037) O2 Flow Rate (L/min): 5 l/min (titrated from 7L NC) (18 1037) Intake/Output Summary (Last 24 hours) at 18 1728 Last data filed at 18 1721 Gross per 24 hour Intake          3412.08 ml Output             3190 ml Net           222.08 ml General: No acute distress   
Lungs:  CTA Bilaterally. Heart:  Regular rate and rhythm,  No murmur, rub, or gallop Abdomen: Soft, Non distended, Non tender, Positive bowel sounds Extremities: No cyanosis, clubbing or edema Neurologic:  Left sided weakness. Follows commands ASSESSMENT Active Hospital Problems Diagnosis Date Noted  Cerebral edema ([facility] [address] 08/01/2018  Brain compression ([facility] 08/01/2018  UTI (urinary tract infection) 07/31/2018  Pulmonary edema 07/31/2018  Nontraumatic thalamic hemorrhage ([facility] 07/30/2018  Hypertensive urgency 07/30/2018  Chronic pain of multiple sites 07/30/2018  Cerebral AV malformation 07/30/2018  Seizure disorder ([facility] 01/22/2018  Morbid obesity with BMI of 40.0-44.9, adult ([facility] 01/19/2015  Edema of both legs 01/19/2015 A/p 
- Non traumatic thalamic hemorrhage - BP goal <140/90, failed swallow eval. NGT w/ feeds placed 7/30. Neuro has seen. MRI consistent with CT head, right thalamic hemorrhage and large AVM. no indication for intervention as per convo w/ Dr Maki Soliman 
- dysphagia - GI consulted for PEG. 
- HTN emergency -Still borderline for goal. Cardizem briefly last night. Add lasix daily and increase hydralazine - Seizue d/o - IV keppra - Chronic pain - low dose oxy prn, takes at home  
- UTI - rocephin, follow cultures - pulm edema -with increased o2 requirement. Stop IVF, reduce IV lasix, start daily oral lasix 40mg in AM. Echo EF 55% with Grade 1DD. DVT Prophylaxis: SCD Dispo - has bed ready at [facility] when medically ready. Signed By: Anum Cardona DO August 1, 2018

## 2018-08-02 ENCOUNTER — ANESTHESIA EVENT (OUTPATIENT)
Dept: ENDOSCOPY | Age: 68
DRG: 064 | End: 2018-08-02
Payer: MEDICARE

## 2018-08-02 LAB
ALBUMIN SERPL-MCNC: 3.1 G/DL (ref 3.2–4.6)
ALBUMIN/GLOB SERPL: 1 {RATIO} (ref 1.2–3.5)
ALP SERPL-CCNC: 111 U/L (ref 50–136)
ALT SERPL-CCNC: 18 U/L (ref 12–65)
ANION GAP SERPL CALC-SCNC: 7 MMOL/L (ref 7–16)
AST SERPL-CCNC: 14 U/L (ref 15–37)
BASOPHILS # BLD: 0 K/UL (ref 0–0.2)
BASOPHILS NFR BLD: 0 % (ref 0–2)
BILIRUB SERPL-MCNC: 0.4 MG/DL (ref 0.2–1.1)
BUN SERPL-MCNC: 26 MG/DL (ref 8–23)
CALCIUM SERPL-MCNC: 8.7 MG/DL (ref 8.3–10.4)
CHLORIDE SERPL-SCNC: 96 MMOL/L (ref 98–107)
CO2 SERPL-SCNC: 34 MMOL/L (ref 21–32)
CREAT SERPL-MCNC: 0.79 MG/DL (ref 0.6–1)
DIFFERENTIAL METHOD BLD: ABNORMAL
EOSINOPHIL # BLD: 0 K/UL (ref 0–0.8)
EOSINOPHIL NFR BLD: 0 % (ref 0.5–7.8)
ERYTHROCYTE [DISTWIDTH] IN BLOOD BY AUTOMATED COUNT: 13.9 % (ref 11.9–14.6)
GLOBULIN SER CALC-MCNC: 3.1 G/DL (ref 2.3–3.5)
GLUCOSE SERPL-MCNC: 139 MG/DL (ref 65–100)
HCT VFR BLD AUTO: 40.3 % (ref 35.8–46.3)
HGB BLD-MCNC: 13.2 G/DL (ref 11.7–15.4)
IMM GRANULOCYTES # BLD: 0.1 K/UL (ref 0–0.5)
IMM GRANULOCYTES NFR BLD AUTO: 1 % (ref 0–5)
LYMPHOCYTES # BLD: 1.6 K/UL (ref 0.5–4.6)
LYMPHOCYTES NFR BLD: 12 % (ref 13–44)
MCH RBC QN AUTO: 29.5 PG (ref 26.1–32.9)
MCHC RBC AUTO-ENTMCNC: 32.8 G/DL (ref 31.4–35)
MCV RBC AUTO: 90.2 FL (ref 79.6–97.8)
MM INDURATION POC: 0 MM (ref 0–5)
MONOCYTES # BLD: 0.4 K/UL (ref 0.1–1.3)
MONOCYTES NFR BLD: 3 % (ref 4–12)
NEUTS SEG # BLD: 11.9 K/UL (ref 1.7–8.2)
NEUTS SEG NFR BLD: 84 % (ref 43–78)
PLATELET # BLD AUTO: 181 K/UL (ref 150–450)
PMV BLD AUTO: 10.2 FL (ref 10.8–14.1)
POTASSIUM SERPL-SCNC: 4.1 MMOL/L (ref 3.5–5.1)
PPD POC: NEGATIVE NEGATIVE
PROT SERPL-MCNC: 6.2 G/DL (ref 6.3–8.2)
RBC # BLD AUTO: 4.47 M/UL (ref 4.05–5.25)
SODIUM SERPL-SCNC: 137 MMOL/L (ref 136–145)
WBC # BLD AUTO: 14 K/UL (ref 4.3–11.1)

## 2018-08-02 PROCEDURE — 97110 THERAPEUTIC EXERCISES: CPT

## 2018-08-02 PROCEDURE — 99232 SBSQ HOSP IP/OBS MODERATE 35: CPT | Performed by: PSYCHIATRY & NEUROLOGY

## 2018-08-02 PROCEDURE — 77030018798 HC PMP KT ENTRL FED COVD -A

## 2018-08-02 PROCEDURE — 97535 SELF CARE MNGMENT TRAINING: CPT

## 2018-08-02 PROCEDURE — 97112 NEUROMUSCULAR REEDUCATION: CPT

## 2018-08-02 PROCEDURE — 85025 COMPLETE CBC W/AUTO DIFF WBC: CPT

## 2018-08-02 PROCEDURE — 74011250636 HC RX REV CODE- 250/636: Performed by: INTERNAL MEDICINE

## 2018-08-02 PROCEDURE — 65660000000 HC RM CCU STEPDOWN

## 2018-08-02 PROCEDURE — 74011000258 HC RX REV CODE- 258: Performed by: INTERNAL MEDICINE

## 2018-08-02 PROCEDURE — 77030020263 HC SOL INJ SOD CL0.9% LFCR 1000ML

## 2018-08-02 PROCEDURE — 92526 ORAL FUNCTION THERAPY: CPT

## 2018-08-02 PROCEDURE — 36415 COLL VENOUS BLD VENIPUNCTURE: CPT

## 2018-08-02 PROCEDURE — 97530 THERAPEUTIC ACTIVITIES: CPT

## 2018-08-02 PROCEDURE — 80053 COMPREHEN METABOLIC PANEL: CPT

## 2018-08-02 PROCEDURE — 74011250637 HC RX REV CODE- 250/637: Performed by: INTERNAL MEDICINE

## 2018-08-02 RX ORDER — CEFAZOLIN SODIUM/WATER 2 G/20 ML
2 SYRINGE (ML) INTRAVENOUS
Status: COMPLETED | OUTPATIENT
Start: 2018-08-02 | End: 2018-08-03

## 2018-08-02 RX ORDER — METRONIDAZOLE 500 MG/100ML
500 INJECTION, SOLUTION INTRAVENOUS
Status: COMPLETED | OUTPATIENT
Start: 2018-08-02 | End: 2018-08-03

## 2018-08-02 RX ADMIN — AMLODIPINE BESYLATE 10 MG: 10 TABLET ORAL at 08:12

## 2018-08-02 RX ADMIN — Medication 5 ML: at 23:01

## 2018-08-02 RX ADMIN — CEFTRIAXONE SODIUM 1 G: 1 INJECTION, POWDER, FOR SOLUTION INTRAMUSCULAR; INTRAVENOUS at 10:32

## 2018-08-02 RX ADMIN — LOSARTAN POTASSIUM 100 MG: 50 TABLET ORAL at 08:12

## 2018-08-02 RX ADMIN — Medication 10 ML: at 15:03

## 2018-08-02 RX ADMIN — SERTRALINE HYDROCHLORIDE 200 MG: 100 TABLET ORAL at 08:12

## 2018-08-02 RX ADMIN — OXYCODONE HYDROCHLORIDE 2.5 MG: 5 TABLET ORAL at 05:45

## 2018-08-02 RX ADMIN — HYDRALAZINE HYDROCHLORIDE 100 MG: 50 TABLET, FILM COATED ORAL at 15:12

## 2018-08-02 RX ADMIN — HYDRALAZINE HYDROCHLORIDE 100 MG: 50 TABLET, FILM COATED ORAL at 22:16

## 2018-08-02 RX ADMIN — SODIUM CHLORIDE 1000 MG: 900 INJECTION, SOLUTION INTRAVENOUS at 10:23

## 2018-08-02 RX ADMIN — FUROSEMIDE 40 MG: 40 TABLET ORAL at 08:12

## 2018-08-02 RX ADMIN — HYDRALAZINE HYDROCHLORIDE 100 MG: 50 TABLET, FILM COATED ORAL at 05:20

## 2018-08-02 RX ADMIN — Medication 10 ML: at 05:20

## 2018-08-02 RX ADMIN — SODIUM CHLORIDE 1000 MG: 900 INJECTION, SOLUTION INTRAVENOUS at 23:01

## 2018-08-02 NOTE — CONSULTS
[facility] Consult Note       Primary GI Physician: Dr. Kj Ware    Referring Physician:  Dr. Jaquan Alva    Consult Date:  8/2/2018    Admit Date:  7/29/2018    Chief Complaint:  Oropharyngeal Dysphagia, PEG     Subjective:     History of Present Illness:  Patient is a 76 y.o. female with PMH of but not limited to GERD, chronic pain, osteoarthritis,RLS, migraines, who is seen in consultation at the request of Dr. Jaquan Alva for oropharyngeal dysphagia and request for PEG. Mrs. She Stewart was admitted on 7/30/18 with findings of small right thalmic intracranial hemorrhage and large AVM in frontal areas. Dr. Sobeida Amin at [facility] was consulted and did not recommend surgery. During admission she was also noted to have a UTI and was started on Rocephin. She had speech evaluation on 8/1/18 and failed with recommendations for strict NPO. We are asked to see her for possible PEG placement. Mrs. She Stewart is awake and participating with speech therapy. However, she is still not doing well with swallowing as she has a lot of throat clearing. Mrs. She Stewart is able to tell me that this is August 2018 and that she is at [address] [place] [address]. She denies any abdominal pain, nausea, or vomiting. She is aware that she has a NGT in currently for nutrition and that she is not doing well swallowing safely. She denies any lower GI complaints. She recalls having a colonoscopy in the recent past. She asks that I call and speak with her  as well about possible PEG. Currently, receiving nutrition via NGT with feedings currently infusing.      PMH:  Past Medical History:   Diagnosis Date    Chronic pain     \"all over body\"-takes Zoloft daily    Chronic sinusitis 1/19/2015    Diverticulosis 2017    Edema 1/19/2015    Resolved    Former smoker     GERD (gastroesophageal reflux disease)     Headache 1/19/2015    Hearing loss 1/19/2015    \"some\"- no hearing aids    Heart murmur 1990    Hip osteoarthritis 1/19/2015    feet, hands    Hx of colonic polyps 2017    SSA    Hx of migraines     on medication    Hypercholesterolemia 1/19/2015    Hypertension     Impaired fasting glucose 1/19/2015    Joint disorder 1/19/2015    Morbid obesity (HCC)     BMI 40    Neuralgia 1/19/2015    Phlebitis and thrombophlebitis of superficial vessels of lower extremities 01/19/2015    pt denies     Psychiatric disorder     Restless leg syndrome 1/19/2015    Rheumatic fever     pt reports possibly R.F.    Seizures ([facility] [address]     seizures as a child, has NOT had one since she was 13years old    SOB (shortness of breath) on exertion     Stroke ([facility] [address]     at age 11 only residual poor peripheral vision    Tobacco abuse 1/19/2015    quit smoking 4/2015    Vascular anomalies, congenital     Vitamin D deficiency 1/19/2015       PSH:  Past Surgical History:   Procedure Laterality Date    COLONOSCOPY N/A 2/20/2017    Bjorn--appendiceal serrated sessile polyp, transverse and rectal hyperplastic--3 year recall    HX APPENDECTOMY  03/15/2017    HX CATARACT REMOVAL Bilateral 3/2016    HX CHOLECYSTECTOMY  1982    HX MOHS PROCEDURES Right 1995    pt denies    HX ROTATOR CUFF REPAIR Right 10/31/2016    x2    HX TUBAL LIGATION  1982    TOTAL HIP ARTHROPLASTY Right 2005    and again 5/2015 and another revision       Allergies: Allergies   Allergen Reactions    Codeine Nausea and Vomiting    Oxycodone Nausea and Vomiting       Home Medications:  Prior to Admission medications    Medication Sig Start Date End Date Taking? Authorizing Provider   amLODIPine (NORVASC) 10 mg tablet Take 10 mg by mouth daily. Yes Phys MD William   oxyCODONE IR (ROXICODONE) 5 mg immediate release tablet Take 5 mg by mouth every six (6) hours as needed for Pain. Yes Phys MD William   mirabegron ER (MYRBETRIQ) 50 mg ER tablet Take 1 Tab by mouth daily.  6/28/18   Jennifer Cruz DO   hydrALAZINE (APRESOLINE) 25 mg tablet TAKE ONE TABLET BY MOUTH TWICE A DAY FOR BLOOD PRESSURE 6/26/18 Wendy Palacios DO   losartan (COZAAR) 100 mg tablet Take 1 Tab by mouth daily. Indications: hypertension 6/18/18   Wendy Palacios DO   levETIRAcetam 1,000 mg tablet Take 1,000 mg by mouth two (2) times a day. 2/7/18 6/28/18  Historical Provider   ergocalciferol (VITAMIN D2) 50,000 unit capsule TAKE 1 CAPSULE ONE TIME WEEKLY- takes on sat 3/1/18   Wendy Palacios DO   sertraline (ZOLOFT) 100 mg tablet Take 2 Tabs by mouth daily. 1/25/18   Wendy Palacios DO   ibuprofen (MOTRIN) 200 mg tablet Take 200 mg by mouth every eight (8) hours as needed for Pain.     Historical Provider       Hospital Medications:  Current Facility-Administered Medications   Medication Dose Route Frequency    amLODIPine (NORVASC) tablet 10 mg  10 mg Per NG tube DAILY    losartan (COZAAR) tablet 100 mg  100 mg Per NG tube DAILY    sertraline (ZOLOFT) tablet 200 mg  200 mg Per NG tube DAILY    acetaminophen (TYLENOL) solution 650 mg  650 mg Per NG tube Q4H PRN    furosemide (LASIX) tablet 40 mg  40 mg Oral DAILY    hydrALAZINE (APRESOLINE) tablet 100 mg  100 mg Per NG tube TID    ondansetron (ZOFRAN) injection 4 mg  4 mg IntraVENous Q4H PRN    cefTRIAXone (ROCEPHIN) 1 g in 0.9% sodium chloride (MBP/ADV) 50 mL  1 g IntraVENous Q24H    hydrALAZINE (APRESOLINE) 20 mg/mL injection 20 mg  20 mg IntraVENous Q6H PRN    oxyCODONE IR (ROXICODONE) tablet 2.5 mg  2.5 mg Per NG tube Q12H PRN    niCARdipine in Saline (CARDENE) 25 MG/250 mL infusion kit  5-15 mg/hr IntraVENous TITRATE    mirabegron ER (MYRBETRIQ) tablet 50 mg (Patient Supplied)  50 mg Oral DAILY    sodium chloride (NS) flush 5-10 mL  5-10 mL IntraVENous Q8H    sodium chloride (NS) flush 5-10 mL  5-10 mL IntraVENous PRN    naloxone (NARCAN) injection 0.4 mg  0.4 mg IntraVENous PRN    0.9% sodium chloride infusion 250 mL  250 mL IntraVENous PRN    LORazepam (ATIVAN) injection 1 mg  1 mg IntraVENous EVERY 2 MINUTES AS NEEDED    levETIRAcetam (KEPPRA) 1,000 mg in 0.9% sodium chloride 100 mL IVPB  1,000 mg IntraVENous Q12H    NUTRITIONAL SUPPORT ELECTROLYTE PRN ORDERS   Does Not Apply PRN       Social History:  Social History   Substance Use Topics    Smoking status: Former Smoker     Packs/day: 1.00     Years: 50.00     Types: Cigarettes     Quit date: 3/29/2015    Smokeless tobacco: Never Used      Comment: quit 2 months and 2 weeks ago    Alcohol use No      Comment: Former- socially           Family History:  Family History   Problem Relation Age of Onset    Hypertension Mother     Cancer Mother      Bladder    Depression Mother     Parkinson's Disease Father     Alcohol abuse Brother     Alcohol abuse Son     No Known Problems Sister     No Known Problems Brother     Malignant Hyperthermia Neg Hx     Pseudocholinesterase Deficiency Neg Hx     Delayed Awakening Neg Hx     Post-op Nausea/Vomiting Neg Hx     Emergence Delirium Neg Hx     Post-op Cognitive Dysfunction Neg Hx     Other Neg Hx        Review of Systems:  Unable to obtain    Diet:  Tube feeds    Objective:     Physical Exam:  Vitals:  Visit Vitals    /73 (BP 1 Location: Left arm, BP Patient Position: At rest)    Pulse 72    Temp 97.7 °F (36.5 °C)    Resp 16    Ht 5' 4\" (1.626 m)    Wt 123.2 kg (271 lb 9.7 oz)    SpO2 94%    BMI 46.62 kg/m2     Gen:  Pt is drowsy, cooperative, no acute distress  Skin:  Extremities and face reveal no rashes. HEENT: Sclerae anicteric. Extra-occular muscles are intact. No oral ulcers. NGT in place. No abnormal pigmentation of the lips. The neck is supple. Cardiovascular: Regular rate and rhythm. No murmurs, gallops, or rubs. Respiratory:  Comfortable breathing with no accessory muscle use. Clear breath sounds anteriorly with no wheezes, rales, or rhonchi. GI:  Abdomen nondistended, soft, and nontender. Surgical scar noted across RUQ. Normal active bowel sounds. No enlargement of the liver or spleen. No masses palpable.   Rectal: Deferred  Musculoskeletal:  No pitting edema of the lower legs. Neurological:  Gross memory appears intact. Oriented to self, situation, date. Psychiatric:  Mood appears appropriate with judgement intact. Lymphatic:  No cervical or supraclavicular adenopathy. Laboratory:    Recent Labs      08/02/18   0756  08/02/18   0457  08/01/18   0324  07/31/18   0428   WBC  14.0*   --   7.3  7.7   HGB  13.2   --   12.6  11.2*   HCT  40.3   --   39.6  36.6   PLT  181   --   125*  142*   MCV  90.2   --   91.5  93.4   NA   --   137  141  141   K   --   4.1  4.4  4.5   CL   --   96*  100  104   CO2   --   34*  32  30   BUN   --   26*  16  11   CREA   --   0.79  0.83  0.85   CA   --   8.7  8.5  8.0*   GLU   --   139*  164*  140*   AP   --   111  128  122   SGOT   --   14*  14*  14*   ALT   --   18  19  17   TBILI   --   0.4  0.4  0.4   ALB   --   3.1*  3.1*  3.0*   TP   --   6.2*  7.0  6.4          Assessment:     Principal Problem:    Nontraumatic thalamic hemorrhage (HCC) (7/30/2018)    Active Problems:    Edema of both legs (1/19/2015)      Morbid obesity with BMI of 40.0-44.9, adult (HCC) (1/19/2015)      Seizure disorder (HCC) (1/22/2018)      Hypertensive urgency (7/30/2018)      Chronic pain of multiple sites (7/30/2018)      Cerebral AV malformation (7/30/2018)      UTI (urinary tract infection) (7/31/2018)      Pulmonary edema (7/31/2018)      Cerebral edema (HCC) (8/1/2018)      Brain compression ([address] (8/1/2018)       Patient is a 76 y.o. female with PMH of but not limited to GERD, chronic pain, osteoarthritis,RLS, migraines, who is seen in consultation at the request of Dr. Johanne Moyer for oropharyngeal dysphagia and request for PEG. Mrs. Kenia Peralta was admitted on 7/30/18 with findings of small right thalmic intracranial hemorrhage and large AVM in frontal areas. She has failed swallow evaluation and is strictly NPO. We are asked to evaluate for possible PEG placement.  Kenia Kathryn is agreeable to PEG tube placement.  I have called and spoke with her  as well, per her request, who is also in agreement. I have discussed possible complications and risks of PEG placement as well as the fact that the PEG tube has to remain in for at least 8 weeks prior to being removed. They both voiced understanding and wish to plan for PEG placement. Plan: We will plan for EGD with PEG placement on 8/3/18 with Dr. Victorino Jordan. Antibiotics: Ancef 2 gram and Flagyl 500mg on call to the GI lab    Thank you for this kind consultation. We will follow along with you. Dagoberto Rai NP    Patient is seen and examined in collaboration with Dr. Victorino Jordan. Assessment and plan as per Dr. Victorino Jordan.

## 2018-08-02 NOTE — PROGRESS NOTES
LTG: Patient will tolerate least restrictive diet without overt signs or symptoms of airway compromise. STG: Patient will tolerate po trials with ST only without overt signs or symptoms of airway compromise to determine least restrictive diet. Speech language pathology: bedside swallow note: Daily Note 1 NAME/AGE/GENDER: Marli Parra is a 76 y.o. female DATE: 8/2/2018 PRIMARY DIAGNOSIS: Nontraumatic thalamic hemorrhage ([facility] [address] Hypertensive urgency ICD-10: Treatment Diagnosis: R13.12 Oropharyngeal Dysphagia. INTERDISCIPLINARY COLLABORATION: Registered Nurse PRECAUTIONS/ALLERGIES: Codeine and Oxycodone ASSESSMENT:Patient seen for po trials this AM. NG tube in place for nutrition/hydration/medication. Initially drowsy, but easily woke with verbal prompts. She reports chronic swallowing difficulty at home prior to this admission. She states she \"gets strangled all the time and I cough on small foods like peas\". Patient was presented with ice chip x1 and thin/nectar/honey via tsp sips. Incoordinated swallow upon palpation with multiple swallows per bolus, concerning for pharyngeal reside. Strong cough following thin liquids. Immediate weak throat clearing with nectar and honey thick liquids. Throat clearing appeared to increased as consistencies were thickened. She required verbal cues for cough following honey thick trials, which appeared to clear suspected pharyngeal residue. Recommend strict NPO with alternative means of nutrition and hydration. GI present in room at conclusion of session to discuss possible PEG tube placement. Speech will follow up at later time for assessment of cognitive-linguistic abilities. Patient will benefit from skilled intervention to address the below impairments. ?????? ? ? This section established at most recent assessment?????????? 
PROBLEM LIST (Impairments causing functional limitations): 1. Oropharyngeal dysphagia REHABILITATION POTENTIAL FOR STATED GOALS: FAIR 
PLAN OF CARE:  
Patient will benefit from skilled intervention to address the following impairments. RECOMMENDATIONS AND PLANNED INTERVENTIONS (Benefits and precautions of therapy have been discussed with the patient.): 
· NPO with alternative means of nutrition MEDICATIONS: 
· Non-oral 
COMPENSATORY STRATEGIES/MODIFICATIONS INCLUDING: 
· None OTHER RECOMMENDATIONS (including follow up treatment recommendations):  
· Patient education RECOMMENDED DIET MODIFICATIONS DISCUSSED WITH: 
· Nursing · Patient FREQUENCY/DURATION: Continue to follow patient 3 times a week for duration of hospital stay to address above goals. RECOMMENDED REHABILITATION/EQUIPMENT: (at time of discharge pending progress): Due to the probability of continued deficits (see above) this patient will likely need continued skilled speech therapy after discharge. SUBJECTIVE:  
\"I get strangled all the time\" History of Present Injury/Illness: Ms. Marjorie Dale  has a past medical history of Chronic pain; Chronic sinusitis (1/19/2015); Diverticulosis (2017); Edema (1/19/2015); Former smoker; GERD (gastroesophageal reflux disease); Headache (1/19/2015); Hearing loss (1/19/2015); Heart murmur (1990); Hip osteoarthritis (1/19/2015); colonic polyps (2017); migraines; Hypercholesterolemia (1/19/2015); Hypertension; Impaired fasting glucose (1/19/2015); Joint disorder (1/19/2015); Morbid obesity ([address] Neuralgia (1/19/2015); Phlebitis and thrombophlebitis of superficial vessels of lower extremities (01/19/2015); Psychiatric disorder; Restless leg syndrome (1/19/2015); Rheumatic fever; Seizures ([address] SOB (shortness of breath) on exertion; Stroke ([address] Tobacco abuse (1/19/2015); Vascular anomalies, congenital; and Vitamin D deficiency (1/19/2015). She also has no past medical history of Adverse effect of anesthesia; Aneurysm ([address] Arrhythmia; Asthma; Autoimmune disease ([address] CAD (coronary artery disease); Cancer ([address]  Chronic kidney disease; Chronic obstructive pulmonary disease ([facility] [address] Coagulation disorder ([facility] [address] Diabetes ([facility] [address] Difficult intubation; Endocarditis; Heart failure ([facility] [address] Liver disease; Malignant hyperthermia due to anesthesia; Nausea & vomiting; Pseudocholinesterase deficiency; PUD (peptic ulcer disease); Sleep apnea; Thromboembolus ([facility] [address] or Thyroid disease. . She also  has a past surgical history that includes hx mohs procedure (Right, 1995); hx cholecystectomy (1982); colonoscopy (N/A, 2/20/2017); pr total hip arthroplasty (Right, 2005); hx rotator cuff repair (Right, 10/31/2016); hx cataract removal (Bilateral, 3/2016); hx tubal ligation (1982); and hx appendectomy (03/15/2017). Present Symptoms: Oropharyngeal dysphagia Pain Intensity 1: 0 Pain Location 1: Leg, Generalized Pain Orientation 1: Other (comment) (\"everywhere\") Pain Intervention(s) 1: Medication (see MAR), Repositioned Current Medications: No current facility-administered medications on file prior to encounter. Current Outpatient Prescriptions on File Prior to Encounter Medication Sig Dispense Refill  mirabegron ER (MYRBETRIQ) 50 mg ER tablet Take 1 Tab by mouth daily. 14 Tab 0  
 hydrALAZINE (APRESOLINE) 25 mg tablet TAKE ONE TABLET BY MOUTH TWICE A DAY FOR BLOOD PRESSURE 180 Tab 3  
 losartan (COZAAR) 100 mg tablet Take 1 Tab by mouth daily. Indications: hypertension 30 Tab 11  
 levETIRAcetam 1,000 mg tablet Take 1,000 mg by mouth two (2) times a day.  ergocalciferol (VITAMIN D2) 50,000 unit capsule TAKE 1 CAPSULE ONE TIME WEEKLY- takes on sat 5 Cap 11  
 sertraline (ZOLOFT) 100 mg tablet Take 2 Tabs by mouth daily. 180 Tab 3  ibuprofen (MOTRIN) 200 mg tablet Take 200 mg by mouth every eight (8) hours as needed for Pain. Current Dietary Status: NPO Social History/Home Situation:   
Home Environment: Private residence # Steps to Enter: 0 One/Two Story Residence: One story Living Alone: No 
Support Systems: Spouse/Significant Other/Partner Patient Expects to be Discharged to[de-identified] Unknown Current DME Used/Available at Home: Walker, rollator, Grab bars, Raised toilet seat Tub or Shower Type: Tub/Shower combination OBJECTIVE:  
Respiratory Status:       
Oral Motor Structure/Speech:  Oral-Motor Structure/Motor Speech Labial: Decreased rate, Decreased seal 
Dentition: Upper dentures Lingual: Decreased rate, Decreased strength Cognitive and Communication Status: 
Neurologic State: Drowsy; Eyes open to voice Orientation Level: Oriented to person;Oriented to place Cognition: Decreased attention/concentration;Decreased command following Perception: Cues to attend left visual field Perseveration: No perseveration noted Safety/Judgement: Decreased insight into deficits BEDSIDE SWALLOW EVALUATION Oral Assessment: 
Oral Assessment Labial: Decreased rate;Decreased seal 
Dentition: Upper dentures Lingual: Decreased rate;Decreased strength P.O. Trials: 
Patient Position: Upright in bed The patient was given tsp-small bite amounts of the following:  
Consistency Presented: Ice chips; Thin liquid; Nectar thick liquid;Honey thick liquid How Presented: Areli Oneill ORAL PHASE: 
Bolus Acceptance: No impairment Bolus Formation/Control: Impaired Propulsion: No impairment Type of Impairment: Delayed Oral Residue: None PHARYNGEAL PHASE: 
Initiation of Swallow: Delayed (# of seconds) Laryngeal Elevation: Decreased;Weak Aspiration Signs/Symptoms: Clear throat Vocal Quality: No impairment Effective Modifications: None Pharyngeal Phase Characteristics: Multiple swallows OTHER OBSERVATIONS: 
Rate/bite size: Impaired Endurance:  Impaired Comments: Tool Used: Dysphagia Outcome and Severity Scale (KAROLINA) Score Comments Normal Diet  [] 7 With no strategies or extra time needed Functional Swallow  [] 6 May have mild oral or pharyngeal delay Mild Dysphagia 
  [] 5 Which may require one diet consistency restricted (those who demonstrate penetration which is entirely cleared on MBS would be included) Mild-Moderate Dysphagia  [] 4 With 1-2 diet consistencies restricted Moderate Dysphagia  [] 3 With 2 or more diet consistencies restricted Moderately Severe Dysphagia  [] 2 With partial PO strategies (trials with ST only) Severe Dysphagia  [x] 1 With inability to tolerate any PO safely Score:  Initial: 1 Most Recent: X (Date: -- ) Interpretation of Tool: The Dysphagia Outcome and Severity Scale (KAROLINA) is a simple, easy-to-use, 7-point scale developed to systematically rate the functional severity of dysphagia based on objective assessment and make recommendations for diet level, independence level, and type of nutrition. Score 7 6 5 4 3 2 1 Modifier CH CI CJ CK CL CM CN ? Swallowing:  
  - CURRENT STATUS: CN - 100% impaired, limited or restricted  - GOAL STATUS:  CL - 60%-79% impaired, limited or restricted  - D/C STATUS:  ---------------To be determined--------------- Payor: Fátima Oneil / [facility] / Product Type: Managed Care Medicare /  
 
TREATMENT:  
 (In addition to Assessment/Re-Assessment sessions the following treatments were rendered) Assessment/Reassessment only, no treatment provided today MODALITIES:  
  
  
  
  
  
  
  
  
  
  
    
  
  
  
  
  
  
  
  
   
 
ORAL MOTOR  EXERCISES: 
  
  
  
  
  
  
  
  
  
  
  
  
  
  
  
  
  
  
  
  
  
  
  
  
  
  
    
  
  
  
  
  
  
  
  
  
  
  
  
  
  
  
  
  
  
  
  
  
  
  
  
  
   
 
LARYNGEAL / PHARYNGEAL EXERCISES: 
  
  
  
  
  
  
  
  
  
  
  
  
  
  
  
  
  
  
  
  
  
    
  
  
  
  
  
  
  
  
  
  
  
  
  
  
  
  
  
  
  
   
 
__________________________________________________________________________________________________ Safety: After treatment position/precautions: · Upright in Bed.  
Progression/Medical Necessity:  
· Patient is expected to demonstrate progress in swallow strength, swallow timeliness, swallow function, diet tolerance and swallow safety to improve swallow safety, work toward diet advancement and decrease aspiration risk. Compliance with Program/Exercises: Will assess as treatment progresses. Reason for Continuation of Services/Other Comments: 
· Patient continues to require skilled intervention due to oropharyngeal dysphagia. Recommendations/Intent for next treatment session: \"Treatment next visit will focus on po trials\". Total Treatment Duration: 
Time In: 2701 Time Out: [address] CCC-SLP

## 2018-08-02 NOTE — PROGRESS NOTES
CM spoke to  regarding discharge plan and confirmed that they are requesting [facility] for STR once patient is medically cleared for transfer. Pending GI consult for possible PEG tube placement. CM will continue to follow for any further needs.

## 2018-08-02 NOTE — PROGRESS NOTES
PT Note: 
 
Chart reviewed and attempted PT treatment this AM. Pt is currently working with Speech Therapy. Will check back later time/date as schedule allows.  
 
Thanks, 
Suze Downs DPT

## 2018-08-02 NOTE — PROGRESS NOTES
Problem: Mobility Impaired (Adult and Pediatric) Goal: *Acute Goals and Plan of Care (Insert Text) STG: 
(1.)Ms. Herron will move from supine to sit and sit to supine , scoot up and down and roll side to side with MAXIMAL ASSIST within 5 treatment day(s). (2.)Ms. Herron will tolerate 1 hour of chair position with stable vital signs within 5 treatment days to improve activity tolerance for functional mobility. (3.)Ms. Herron will perform LE exercises with 3 to 5 cues for form within 5 days to improve strength for functional transfers. LTG: 
(1.)Ms. Herron will move from supine to sit and sit to supine , scoot up and down and roll side to side in bed with MODERATE ASSIST within 10 treatment day(s). (2.)Ms. Herron will transfer from bed to chair and chair to bed with MAXIMAL ASSIST using the least restrictive device within 10 treatment day(s). (3.)Ms. Herron will perform sit to stand transfer with MAXIMAL ASSIST to prepare for ambulation within 10 treatment days. (4.)Ms. Herron will ambulate with MAXIMAL ASSIST for 3 feet with the least restrictive device within 10 treatment day(s). Will update goals as patient is able. ________________________________________________________________________________________________ PHYSICAL THERAPY: Daily Note, Treatment Day: 1st, PM 8/2/2018 INPATIENT: Hospital Day: 5 Payor: [address] [place] [address] / Plan: [facility] [employer] MEDICARE COMPLETE / Product Type: [employer] Care Medicare /  
  
NAME/AGE/GENDER: Christiano Washington is a 76 y.o. female PRIMARY DIAGNOSIS: Other dysphagia [R13.19] Nontraumatic thalamic hemorrhage (HCC) Nontraumatic thalamic hemorrhage (HCC) Procedure(s) (LRB): ESOPHAGOGASTRODUODENOSCOPY (EGD) / BMI=46 / ROOM 708 (N/A) PERCUTANEOUS ENDOSCOPIC GASTROSTOMY TUBE INSERTION (N/A) ICD-10: Treatment Diagnosis:  
 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 
Codeine and Oxycodone ASSESSMENT:  
 
Ms. Dyana Nguyen is sleeping and supine in bed upon contact. Pt is lethargic but alerts to voice and touch, is oriented to person and time but not place (\"I'm home\"), and agreeable to PT treatment. Pt is able to follow simple commands ~90% of the time. PT co-treated with OT this session, with PT addressing functional sitting balance and OT addressing ADLs in sitting. Pt required maxA x 2 and additional time to transfer from supine to sitting EOB with verbal/visual/tactile cuing for sequencing and hand placement. Pt's alertness improved once in sitting. Pt demonstrated poor sitting balance initially, requiring frequent verbal/visual/verbal cuing for maintaining midline in sitting while performing ADLs. Sitting balance improved over course of session with the following maximal manual/verbal/visual cuing at first, tapering to minimal cuing as balance improved from poor to fair: increased thoracic extension, upright posture, decrease R lateral lean with trunk and head, WB through UE. Pt was able to self correct by end of session and maintain balance with prop sitting with no assistance. Pt required MaxA x 2 to return to supine in bed. Pt left supine in bed with all needs met and within reach and  at bedside. Pt is making progress towards goals as evident by improved sitting balance during treatment. Pt is limited by lethargy and general deconditioning. This section established at most recent assessment PROBLEM LIST (Impairments causing functional limitations): 1. Decreased Strength 2. Decreased ADL/Functional Activities 3. Decreased Transfer Abilities 4. Decreased Ambulation Ability/Technique 5. Decreased Balance 6. Increased Pain 7. Decreased Activity Tolerance 8. Decreased Pacing Skills 9. Decreased Knowledge of Precautions 10.  Decreased Harvey with Home Exercise Program 
11. Decreased Cognition INTERVENTIONS PLANNED: (Benefits and precautions of physical therapy have been discussed with the patient.) 1. Balance Exercise 2. Bed Mobility 3. Family Education 4. Gait Training 5. Home Exercise Program (HEP) 6. Therapeutic Activites 7. Therapeutic Exercise/Strengthening 8. Transfer Training 9. Patient Education 10. Group Therapy TREATMENT PLAN: Frequency/Duration: 3 times a week for duration of hospital stay Rehabilitation Potential For Stated Goals: Good RECOMMENDED REHABILITATION/EQUIPMENT: (at time of discharge pending progress): Due to the probability of continued deficits (see above) this patient will likely need continued skilled physical therapy after discharge. Equipment:  
 None at this time HISTORY:  
History of Present Injury/Illness (Reason for Referral): 
Patient history was obtained from the ER provider prior to seeing the patient. 
  
Patient is a 76 y.o. female who presents to the ER due to legs \"feeling like jello\". She reported jittery legs and weakness to ER staff. She has had increased difficulty walking but no fall reported. EMS was called; they reported an O2 sat of 85% on room air. Pt has complained of multiple sites of pain and some vague symptoms while in ER. Difficult to determine what is new vs baseline, as she does have chronic pain of multiple sites. ER workup shows continued mild hypoxia and accelerated HTN. Head CT unfortunately shows a rt thalamic bleed. 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 ER contacted neurosurgery, who recommended BP control and supportive care. There is no recommended surgical intervention. Denies fever/chills, n/v/d, SOB, cough/congestion Pt states that she is going to die. She is offered reassurance. Past Medical History/Comorbidities: Ms. Albert Lane  has a past medical history of Chronic pain; Chronic sinusitis (1/19/2015);  Diverticulosis (2017); Edema (1/19/2015); Former smoker; GERD (gastroesophageal reflux disease); Headache (1/19/2015); Hearing loss (1/19/2015); Heart murmur (1990); Hip osteoarthritis (1/19/2015); colonic polyps (2017); migraines; Hypercholesterolemia (1/19/2015); Hypertension; Impaired fasting glucose (1/19/2015); Joint disorder (1/19/2015); Morbid obesity ([address] Neuralgia (1/19/2015); Phlebitis and thrombophlebitis of superficial vessels of lower extremities (01/19/2015); Psychiatric disorder; Restless leg syndrome (1/19/2015); Rheumatic fever; Seizures ([address] SOB (shortness of breath) on exertion; Stroke ([address] Tobacco abuse (1/19/2015); Vascular anomalies, congenital; and Vitamin D deficiency (1/19/2015). She also has no past medical history of Adverse effect of anesthesia; Aneurysm ([address] Arrhythmia; Asthma; Autoimmune disease ([address] CAD (coronary artery disease); Cancer ([address] Chronic kidney disease; Chronic obstructive pulmonary disease ([address] Coagulation disorder ([address] Diabetes ([address] Difficult intubation; Endocarditis; Heart failure ([address] Liver disease; Malignant hyperthermia due to anesthesia; Nausea & vomiting; Pseudocholinesterase deficiency; PUD (peptic ulcer disease); Sleep apnea; Thromboembolus ([address] or Thyroid disease. Ms. Guru Smith  has a past surgical history that includes hx mohs procedure (Right, 1995); hx cholecystectomy (1982); colonoscopy (N/A, 2/20/2017); pr total hip arthroplasty (Right, 2005); hx rotator cuff repair (Right, 10/31/2016); hx cataract removal (Bilateral, 3/2016); hx tubal ligation (1982); and hx appendectomy (03/15/2017). Social History/Living Environment:  
Home Environment: Private residence # Steps to Enter: 0 One/Two Story Residence: One story Living Alone: No 
Support Systems: Spouse/Significant Other/Partner Patient Expects to be Discharged to[de-identified] Unknown Current DME Used/Available at Home: Walker, rollator, Grab bars, Raised toilet seat Tub or Shower Type: Tub/Shower combination Prior Level of Function/Work/Activity: 
Patient ambulates with a rollator walker, but reports she \"does not walk much. \" 
  
Number of Personal Factors/Comorbidities that affect the Plan of Care: 3+: HIGH COMPLEXITY EXAMINATION:  
Most Recent Physical Functioning:  
Gross Assessment: 
  
         
  
Posture: 
  
Balance: 
Sitting: Impaired Sitting - Static: Prop sitting; Other (comment) (improved poor to fair with tactile and verbal cuing and time) Sitting - Dynamic: Poor (constant support) Bed Mobility: 
Rolling: Maximum assistance;Assist x2 Supine to Sit: Maximum assistance;Assist x2 Sit to Supine: Maximum assistance;Assist x2 Scooting: Maximum assistance;Assist x2 Interventions: Verbal cues; Tactile cues; Visual cues Wheelchair Mobility: 
  
Transfers: 
  
Gait: 
  
   
  
Body Structures Involved: 1. Nerves 2. Muscles Body Functions Affected: 1. Mental 
2. Sensory/Pain 3. Respiratory 4. Neuromusculoskeletal 
5. Movement Related Activities and Participation Affected: 1. Learning and Applying Knowledge 2. General Tasks and Demands 3. Communication 4. Mobility 5. Self Care 6. Domestic Life 7. Interpersonal Interactions and Relationships 8. Community, Social and [place] Number of elements that affect the Plan of Care: 4+: HIGH COMPLEXITY CLINICAL PRESENTATION:  
Presentation: Evolving clinical presentation with unstable and unpredictable characteristics: HIGH COMPLEXITY CLINICAL DECISION MAKING:  
[employer] 6 Clicks Basic Mobility Inpatient Short Form How much difficulty does the patient currently have. .. Unable A Lot A Little None 1. Turning over in bed (including adjusting bedclothes, sheets and blankets)? [] 1   [x] 2   [] 3   [] 4  
2. Sitting down on and standing up from a chair with arms ( e.g., wheelchair, bedside commode, etc.)   [x] 1   [] 2   [] 3   [] 4  
3. Moving from lying on back to sitting on the side of the bed? [] 1   [x] 2   [] 3   [] 4 How much help from another person does the patient currently need. .. Total A Lot A Little None 4. Moving to and from a bed to a chair (including a wheelchair)? [x] 1   [] 2   [] 3   [] 4  
5. Need to walk in hospital room? [x] 1   [] 2   [] 3   [] 4  
6. Climbing 3-5 steps with a railing? [x] 1   [] 2   [] 3   [] 4  
© 2007, Trustees of [facility] [employer], under license to [employer]. All rights reserved Score:  Initial: 8 Most Recent: X (Date: -- ) Interpretation of Tool:  Represents activities that are increasingly more difficult (i.e. Bed mobility, Transfers, Gait). Score 24 23 22-20 19-15 14-10 9-7 6 Modifier CH CI CJ CK CL CM CN   
 
? Mobility - Walking and Moving Around:  
  - CURRENT STATUS: CM - 80%-99% impaired, limited or restricted  - GOAL STATUS: CL - 60%-79% impaired, limited or restricted  - D/C STATUS:  ---------------To be determined--------------- Payor: Todd Navas / Plan: [facility] MEDICARE COMPLETE / Product Type: Managed Care Medicare /   
 
Medical Necessity:    
· Patient demonstrates good rehab potential due to higher previous functional level. Reason for Services/Other Comments: 
· Patient continues to require modification of therapeutic interventions to increase complexity of exercises. Use of outcome tool(s) and clinical judgement create a POC that gives a: Difficult prediction of patient's progress: HIGH COMPLEXITY  
  
 
 
 
TREATMENT:  
(In addition to Assessment/Re-Assessment sessions the following treatments were rendered) Pre-treatment Symptoms/Complaints:  \"Not doing well\" Pain: Initial:  
Pain Intensity 1: 0  Post Session:  0/10, unchanged with mobility Therapeutic Activity: (    15 minutes): Therapeutic activities including Bed transfers and education on bed mobility to improve mobility, strength, balance and coordination.   Required maximal   to promote coordination of bilateral, upper extremity(s), lower extremity(s). Neuromuscular Re-education: ( 18 minutes):  Exercise/activities per grid below to improve balance, coordination, posture and proprioception. Required maximal visual, verbal and manual cues to maintain sitting balance and posture during ADLs. · increased thoracic extension · upright posture · decrease R lateral lean with trunk and head · WB through UE's 
       
 
 
 
Braces/Orthotics/Lines/Etc:  
· IV 
· wu catheter · nasogastric tube · ICU monitors · O2 Device: Nasal cannula Treatment/Session Assessment:   
· Response to Treatment:  See above · Interdisciplinary Collaboration:  
o Physical Therapist 
o Occupational Therapist 
o Registered Nurse · After treatment position/precautions:  
o Supine in bed 
o Bed/Chair-wheels locked 
o Bed in low position 
o Call light within reach 
o RN notified 
o head of bed raised >30 degrees · Compliance with Program/Exercises: Will assess as treatment progresses. · Recommendations/Intent for next treatment session: \"Next visit will focus on advancements to more challenging activities and reduction in assistance provided\". Total Treatment Duration: PT Patient Time In/Time Out Time In: 5544 Time Out: 1401 Luis Edgar PT

## 2018-08-02 NOTE — PROGRESS NOTES
Problem: Self Care Deficits Care Plan (Adult) Goal: *Acute Goals and Plan of Care (Insert Text) 1. Patient will complete grooming and hygiene with CGA following set up 2. Patient will complete upper body dressing and bathing with min assist  and adaptive equipment as needed. 3. Patient complete HEP with min cues to promote >  BUE strength, coordination, , and functional use for ADLs 4. Pt will demonstrate improved cognition to follow 100% basic directions with min or fewer cues to promote > participation in ADLs for increased independence 5. Patient will complete bed mobility with mod assist in prep for ADLs Timeframe: 7 visits OCCUPATIONAL THERAPY: Daily Note, Treatment Day: 1st and AM 8/2/2018 INPATIENT: Hospital Day: 5 Payor: Devaughn Tirado / Plan: [facility] MEDICARE COMPLETE / Product Type: [email or web address] Care Medicare /  
  
NAME/AGE/GENDER: Maki Snider is a 76 y.o. 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  
 · Hemiplegia and hemiparesis following cerebral infarction affecting left non-dominant side (A62.187) Precautions/Allergies: 
  falls Codeine and Oxycodone ASSESSMENT:  
 
Ms. Garvey [place] agreeable to PT and OT co-treament today. While PT addressed edge of bed static/dynamic sitting balance, OT addressed self-grooming ADLs and LUE weakness. Pt was mod A x 2 for bed mobility and supine to sit to edge of bed today. Despite sitting upright, pt very drowsy and lethargic, however would follow simple commands. Pt completed self-grooming seated edge of bed, including washing face and oral care. Pt also completed ~15 reps of LUE AROM to ~90 degrees. Pt tolerated LUE PROM to end range (~120 degrees).  Pt also participated in L  strengthening using hand towel mold. Pt returned to supine in bed with mod A x 2. Pt left with needs met, call light within reach, family at bedside, and 7th floor rounds attending to pt. Pt would continue to benefit from OT to address goals and POC. This section established at most recent assessment PROBLEM LIST (Impairments causing functional limitations): 1. Decreased Strength 2. Decreased ADL/Functional Activities 3. Decreased Transfer Abilities 4. Decreased Balance 5. Decreased Activity Tolerance 6. Increased Fatigue 7. Decreased Cognition INTERVENTIONS PLANNED: (Benefits and precautions of occupational therapy have been discussed with the patient.) 1. Activities of daily living training 2. Adaptive equipment training 3. Balance training 4. Clothing management 5. Cognitive training 6. Donning&doffing training 7. Group therapy 8. Villa tech training 9. Hygiene training 10. Neuromuscular re-eduation 11. Therapeutic activity 12. Therapeutic exercise TREATMENT PLAN: Frequency/Duration: Follow patient 3 times/ week to address above goals. Rehabilitation Potential For Stated Goals: Good RECOMMENDED REHABILITATION/EQUIPMENT: (at time of discharge pending progress): Due to the probability of continued deficits (see above) this patient will likely need continued skilled occupational therapy after discharge. Equipment:  
 None at this time OCCUPATIONAL PROFILE AND HISTORY:  
History of Present Injury/Illness (Reason for Referral): Thalamic non-traumatic infarct Past Medical History/Comorbidities: Ms. Dimas Conway  has a past medical history of Chronic pain; Chronic sinusitis (1/19/2015); Diverticulosis (2017); Edema (1/19/2015); Former smoker; GERD (gastroesophageal reflux disease); Headache (1/19/2015); Hearing loss (1/19/2015); Heart murmur (1990); Hip osteoarthritis (1/19/2015); colonic polyps (2017); migraines; Hypercholesterolemia (1/19/2015);  Hypertension; Impaired fasting glucose (1/19/2015); Joint disorder (1/19/2015); Morbid obesity ([address] Neuralgia (1/19/2015); Phlebitis and thrombophlebitis of superficial vessels of lower extremities (01/19/2015); Psychiatric disorder; Restless leg syndrome (1/19/2015); Rheumatic fever; Seizures ([address] SOB (shortness of breath) on exertion; Stroke ([address] Tobacco abuse (1/19/2015); Vascular anomalies, congenital; and Vitamin D deficiency (1/19/2015). She also has no past medical history of Adverse effect of anesthesia; Aneurysm ([address] Arrhythmia; Asthma; Autoimmune disease ([address] CAD (coronary artery disease); Cancer ([address] Chronic kidney disease; Chronic obstructive pulmonary disease ([address] Coagulation disorder ([address] Diabetes ([address] Difficult intubation; Endocarditis; Heart failure ([address] Liver disease; Malignant hyperthermia due to anesthesia; Nausea & vomiting; Pseudocholinesterase deficiency; PUD (peptic ulcer disease); Sleep apnea; Thromboembolus ([address] or Thyroid disease. Ms. Tonja Soliz  has a past surgical history that includes hx mohs procedure (Right, 1995); hx cholecystectomy (1982); colonoscopy (N/A, 2/20/2017); pr total hip arthroplasty (Right, 2005); hx rotator cuff repair (Right, 10/31/2016); hx cataract removal (Bilateral, 3/2016); hx tubal ligation (1982); and hx appendectomy (03/15/2017). Social History/Living Environment:  
Home Environment: Private residence # Steps to Enter: 0 One/Two Story Residence: One story Living Alone: No 
Support Systems: Spouse/Significant Other/Partner Patient Expects to be Discharged to[de-identified] Unknown Current DME Used/Available at Home: Walker, rollator, Grab bars, Raised toilet seat Tub or Shower Type: Tub/Shower combination Prior Level of Function/Work/Activity: 
Lives with , independent with ADLs, uses a rollator. Per RN,  is in a WC Number of Personal Factors/Comorbidities that affect the Plan of Care: Expanded review of therapy/medical records (1-2):  MODERATE COMPLEXITY ASSESSMENT OF OCCUPATIONAL PERFORMANCE[de-identified]  
Activities of Daily Living:  
Basic ADLs (From Assessment) Complex ADLs (From Assessment) Feeding: Moderate assistance Oral Facial Hygiene/Grooming: Moderate assistance Bathing: Maximum assistance Upper Body Dressing: Maximum assistance Lower Body Dressing: Total assistance Toileting: Maximum assistance Instrumental ADL Meal Preparation: Total assistance Homemaking: Total assistance Medication Management: Total assistance Financial Management: Maximum assistance Grooming/Bathing/Dressing Activities of Daily Living Cognitive Retraining Safety/Judgement: Decreased insight into deficits Bed/Mat Mobility Rolling: Maximum assistance;Assist x2 Supine to Sit: Maximum assistance;Assist x2 Sit to Supine: Maximum assistance;Assist x2 Scooting: Maximum assistance;Assist x2 Most Recent Physical Functioning:  
Gross Assessment: 
  
         
  
Posture: 
Posture (WDL): Exceptions to [facility] Posture Assessment: Forward head Balance: 
Sitting: Impaired Sitting - Static: Prop sitting; Other (comment) (improved poor to fair with tactile and verbal cuing and time) Sitting - Dynamic: Poor (constant support) Bed Mobility: 
Rolling: Maximum assistance;Assist x2 Supine to Sit: Maximum assistance;Assist x2 Sit to Supine: Maximum assistance;Assist x2 Scooting: Maximum assistance;Assist x2 Interventions: Verbal cues; Tactile cues; Visual cues Wheelchair Mobility: 
  
Transfers: 
   
 
    
 
Patient Vitals for the past 6 hrs: 
 BP BP Patient Position SpO2 Pulse 08/02/18 1200 114/59 At rest 92 % 74 Mental Status Neurologic State: Drowsy, Eyes open to voice Orientation Level: Disoriented to place, Oriented to person, Oriented to situation, Oriented to time Cognition: Decreased attention/concentration Perception: Cues to maintain midline in sitting, Cues to attend left visual field Perseveration: No perseveration noted Safety/Judgement: Decreased insight into deficits Physical Skills Involved: 1. Range of Motion 2. Balance 3. Strength 4. Activity Tolerance 5. Sensation 6. Fine Motor Control 7. Gross Motor Control 8. Vision 9. Pain (Chronic) Cognitive Skills Affected (resulting in the inability to perform in a timely and safe manner): 1. Perception 2. Executive Function 3. Immediate Memory 4. Short Term Recall 5. Long Term Memory 6. Sustained Attention 7. Divided Attention 8. Comprehension Psychosocial Skills Affected: 1. Habits/Routines 2. Environmental Adaptation 3. Social Interaction 4. Social Roles Number of elements that affect the Plan of Care: 5+:  HIGH COMPLEXITY CLINICAL DECISION MAKIN[address] [place] [address] AM-PAC 6 Clicks Daily Activity Inpatient Short Form How much help from another person does the patient currently need. .. Total A Lot A Little None 1. Putting on and taking off regular lower body clothing? [x] 1   [] 2   [] 3   [] 4  
2. Bathing (including washing, rinsing, drying)? [] 1   [x] 2   [] 3   [] 4  
3. Toileting, which includes using toilet, bedpan or urinal?   [x] 1   [] 2   [] 3   [] 4  
4. Putting on and taking off regular upper body clothing? [] 1   [x] 2   [] 3   [] 4  
5. Taking care of personal grooming such as brushing teeth? [] 1   [x] 2   [] 3   [] 4  
6. Eating meals? [] 1   [x] 2   [] 3   [] 4  
© , Trustees of [address] [place] [address], under license to [employer]. All rights reserved Score:  Initial: 10 Most Recent: X (Date: -- ) Interpretation of Tool:  Represents activities that are increasingly more difficult (i.e. Bed mobility, Transfers, Gait). Score 24 23 22-20 19-15 14-10 9-7 6 Modifier CH CI CJ CK CL CM CN   
 
? Self Care:  
  - CURRENT STATUS: CL - 60%-79% impaired, limited or restricted  - GOAL STATUS: CK - 40%-59% impaired, limited or restricted   - D/C STATUS:  ---------------To be determined--------------- Payor: Pedro Hoover / Plan: RASHELAMBIKA OLIVO MEDICARE COMPLETE / Product Type: Managed Care Medicare /   
 
Medical Necessity:    
· Patient is expected to demonstrate progress in strength, range of motion, balance, coordination and functional technique to increase independence with ADLs. Reason for Services/Other Comments: 
· Patient continues to require present interventions due to patient's inability to independently complete ADLs. Use of outcome tool(s) and clinical judgement create a POC that gives a: MODERATE COMPLEXITY  
 
 
 
TREATMENT:  
(In addition to Assessment/Re-Assessment sessions the following treatments were rendered) Pre-treatment Symptoms/Complaints:  \"I'm sore right now. \" 
Pain: Initial:  
Pain Intensity 1: 0  Post Session:  same CO_TREAT with PT today Self Care: (10 min): Procedure(s) (per grid) utilized to improve and/or restore self-care/home management as related to grooming. Required maximal visual, verbal, manual and tactile cueing to facilitate activities of daily living skills and compensatory activities. While seated edge of bed, pt completed self-grooming, including washing face and oral care with supervision after set-up. Therapeutic Exercise: ( 15 min):  Exercises per grid below to improve mobility and strength. Required moderate verbal and manual cues to promote proper body posture and promote proper body mechanics. Progressed resistance, range, repetitions and complexity of movement as indicated. Date: 
8/2/18 Date: 
 Date: Activity/Exercise Parameters Parameters Parameters L  strength (w/ use of hand towel mold to ) 20 reps w/ L hand L UE AROM 10 reps to ~90 degrees L UE PROM 10 reps to end range Braces/Orthotics/Lines/Etc:  
· wu catheter · nasogastric tube · O2 Device: Nasal cannula Treatment/Session Assessment:   
· Response to Treatment:  No adverse reaction · Interdisciplinary Collaboration:  
o Physical Therapist 
o Occupational Therapist 
o Registered Nurse 
o Physician · After treatment position/precautions:  
o Supine in bed 
o Bed/Chair-wheels locked 
o Bed in low position 
o Call light within reach 
o Family at bedside 
o Side rails x 3  
· Compliance with Program/Exercises: Will assess as treatment progresses. · Recommendations/Intent for next treatment session: \"Next visit will focus on advancements to more challenging activities and reduction in assistance provided\". Total Treatment Duration: OT Patient Time In/Time Out Time In: 1396 Time Out: 1402 Charlanne Bumpers, OT

## 2018-08-02 NOTE — PROGRESS NOTES
Problem: Falls - Risk of 
Goal: *Absence of Falls Document Kermit Mehta Fall Risk and appropriate interventions in the flowsheet. Outcome: Progressing Towards Goal 
Fall Risk Interventions: 
  
 
Mentation Interventions: Adequate sleep, hydration, pain control, Bed/chair exit alarm, Door open when patient unattended, Evaluate medications/consider consulting pharmacy, Eyeglasses and hearing aids, Increase mobility, More frequent rounding, Reorient patient, Toileting rounds, Update white board Medication Interventions: Bed/chair exit alarm, Evaluate medications/consider consulting pharmacy Elimination Interventions: Bed/chair exit alarm, Call light in reach, Patient to call for help with toileting needs, Toilet paper/wipes in reach, Toileting schedule/hourly rounds History of Falls Interventions: Bed/chair exit alarm, Consult care management for discharge planning, Door open when patient unattended Problem: Pressure Injury - Risk of 
Goal: *Prevention of pressure injury Document Asim Scale and appropriate interventions in the flowsheet. Pressure Injury Interventions: 
Sensory Interventions: Assess need for specialty bed, Assess changes in LOC, Float heels, Keep linens dry and wrinkle-free, Maintain/enhance activity level, Minimize linen layers, Monitor skin under medical devices, Pad between skin to skin, Suspension boots Moisture Interventions: Absorbent underpads, Apply protective barrier, creams and emollients, Check for incontinence Q2 hours and as needed, Limit adult briefs, Maintain skin hydration (lotion/cream), Minimize layers, Moisture barrier Activity Interventions: Assess need for specialty bed, Increase time out of bed, Pressure redistribution bed/mattress(bed type), PT/OT evaluation Mobility Interventions: Assess need for specialty bed, Float heels, Pressure redistribution bed/mattress (bed type), PT/OT evaluation, Suspension boots Nutrition Interventions: Document food/fluid/supplement intake, Offer support with meals,snacks and hydration Friction and Shear Interventions: Apply protective barrier, creams and emollients, Feet elevated on foot rest, Foam dressings/transparent film/skin sealants, Minimize layers

## 2018-08-02 NOTE — PROGRESS NOTES
Critical Care Outreach Nurse Progress Report: 
 
Subjective: In to assess pt secondary to recent tx from ICU. MEWS Score: 1 (08/02/18 0400) Vitals:  
 08/01/18 2259 08/02/18 0400 08/02/18 0520 08/02/18 0800 BP:  104/65 128/71 131/73 Pulse:  78 78 72 Resp: 20 18  16 Temp: 98.7 °F (37.1 °C) 98 °F (36.7 °C)  97.7 °F (36.5 °C) SpO2: 95% 97%  94% Weight:      
Height:      
  
 
Objective: Adult female pt lying in bed. Pain Intensity 1: 0 (08/02/18 0640) Pain Location 1: Leg, Generalized Pain Intervention(s) 1: Medication (see MAR), Repositioned Patient Stated Pain Goal: 0 Assessment: Adult female pt lying in bed with eyes open. Primary RN at bedside. Pt is alert and oriented at this time. Pt can state her name, location, date, and situation. No distress noted. BP noted. Stable at this time. Plan: Will continue to follow per outreach protocol.

## 2018-08-02 NOTE — PROGRESS NOTES
[facility] OUTREACH NURSE PROGRESS REPORT SUBJECTIVE: Called to assess patient secondary to outreach protocol. MEWS Score: 1 (08/01/18 2259) Vitals:  
 08/01/18 2132 08/01/18 2137 08/01/18 2203 08/01/18 2259 BP: 145/56 145/56 136/75 135/73 Pulse: 76 81 80 82 Resp: 16  19 20 Temp:    98.7 °F (37.1 °C) SpO2: 94%  95% 95% Weight:      
Height:      
  
 
LAB DATA: 
 
Recent Labs 08/01/18 
 7993  07/31/18 
 2421  07/30/18 
 5024 NA  141  141  140  
K  4.4  4.5  4.1 CL  100  104  106 CO2  32  30  30 AGAP  9  7  4* GLU  164*  140*  147* BUN  16  11  13 CREA  0.83  0.85  1.04* GFRAA  >60  >60  >60 GFRNA  >60  >60  56*  
CA  8.5  8.0*  8.0*  
MG   --    --   2.2 PHOS   --    --   3.6 ALB  3.1*  3.0*  3.1* TP  7.0  6.4  6.7 GLOB  3.9*  3.4  3.6* AGRAT  0.8*  0.9*  0.9* ALT  19  17  18 Recent Labs 08/01/18 
 2526  07/31/18 
 0486  07/30/18 
 5612 WBC  7.3  7.7  6.5 HGB  12.6  11.2*  11.9 HCT  39.6  36.6  37.7 PLT  125*  142*  127* OBJECTIVE: On arrival to room, I found patient to be sleeping in bed. Pain Assessment Pain Intensity 1: 0 (08/01/18 2238) Pain Location 1: Generalized Pain Intervention(s) 1: Medication (see MAR) Patient Stated Pain Goal: 0 
 
  
  
  
  
 
  
  
  
   
 
ASSESSMENT:  Patient is sleeping. HR= 80 and O2 sat on 4L NC= 93%. Lungs are diminished in bases. No critical needs noted at this time. PLAN:  Will continue to round per protocol.

## 2018-08-02 NOTE — PROGRESS NOTES
TRANSFER - OUT REPORT: 
 
Verbal report given to Mesha Ordonez RN(name) on Jennifer Vera  being transferred to 708(unit) for routine progression of care Report consisted of patients Situation, Background, Assessment and  
Recommendations(SBAR). Information from the following report(s) SBAR, Kardex, ED Summary, Procedure Summary, Intake/Output, MAR, Recent Results and Cardiac Rhythm NSR with PAC/PVC was reviewed with the receiving nurse. Lines:  
Peripheral IV 07/29/18 Right Antecubital (Active) Site Assessment Drainage (comment) 8/1/2018  7:32 PM  
Phlebitis Assessment 0 8/1/2018  7:32 PM  
Infiltration Assessment 0 8/1/2018  7:32 PM  
Dressing Status Old drainage 8/1/2018  7:32 PM  
Dressing Type Transparent;Tape 8/1/2018  7:32 PM  
Hub Color/Line Status Pink;Patent;Capped 8/1/2018  7:32 PM  
Alcohol Cap Used No 8/1/2018  7:32 PM  
   
Peripheral IV 07/30/18 Right Wrist (Active) Site Assessment Drainage (comment) 8/1/2018  7:32 PM  
Phlebitis Assessment 0 8/1/2018  7:32 PM  
Infiltration Assessment 0 8/1/2018  7:32 PM  
Dressing Status Old drainage 8/1/2018  7:32 PM  
Dressing Type Transparent;Tape 8/1/2018  7:32 PM  
Hub Color/Line Status Blue;Patent;Capped 8/1/2018  7:32 PM  
Alcohol Cap Used No 8/1/2018  7:32 PM  
   
Peripheral IV 07/31/18 Left Antecubital (Active) Site Assessment Drainage (comment) 8/1/2018  7:32 PM  
Phlebitis Assessment 0 8/1/2018  7:32 PM  
Infiltration Assessment 0 8/1/2018  7:32 PM  
Dressing Status Old drainage 8/1/2018  7:32 PM  
Dressing Type Transparent;Tape 8/1/2018  7:32 PM  
Hub Color/Line Status Pink;Patent;Capped 8/1/2018  7:32 PM  
Alcohol Cap Used No 8/1/2018  7:32 PM  
  
 
Opportunity for questions and clarification was provided. Patient transported with: 
 O2 @ 4 liters Telebox, and pt belongings including glasses, dentures, and phone

## 2018-08-02 NOTE — PROGRESS NOTES
TRANSFER - IN REPORT: 
 
Verbal report received from JEFE REYNOLDS [facility] on Hetaher Stokes  being received from ICU for routine progression of care Report consisted of patients Situation, Background, Assessment and  
Recommendations(SBAR). Information from the following report(s) SBAR, Kardex, ED Summary, Procedure Summary, Intake/Output, MAR and Recent Results was reviewed with the receiving nurse. Opportunity for questions and clarification was provided. Assessment completed upon patients arrival to unit and care assumed.

## 2018-08-02 NOTE — CONSULTS
Consult    Patient: Eric Goodwin MRN: [id number]     YOB: 1950  Age: 76 y.o. Sex: female      Subjective: Interval history:    The patient is doing about the same today. She continues to be disoriented. She tells me that she is at home. She tells me that she is in pain.      Current Facility-Administered Medications   Medication Dose Route Frequency Provider Last Rate Last Dose    metroNIDAZOLE (FLAGYL) IVPB premix 500 mg  500 mg IntraVENous ENDO ONCE Emmanuel Zayas NP        ceFAZolin (ANCEF) 2 g/20 mL in sterile water IV syringe  2 g IntraVENous ENDO ONCE Emmanuel Zayas NP        amLODIPine (NORVASC) tablet 10 mg  10 mg Per NG tube DAILY Bill Push, DO   10 mg at 08/02/18 7907    losartan (COZAAR) tablet 100 mg  100 mg Per NG tube DAILY Bill Push, DO   100 mg at 08/02/18 4161    sertraline (ZOLOFT) tablet 200 mg  200 mg Per NG tube DAILY Bill Push, DO   200 mg at 08/02/18 4956    acetaminophen (TYLENOL) solution 650 mg  650 mg Per NG tube Q4H PRN Bill Push, DO        furosemide (LASIX) tablet 40 mg  40 mg Oral DAILY Bill Push, DO   40 mg at 08/02/18 2048    hydrALAZINE (APRESOLINE) tablet 100 mg  100 mg Per NG tube TID Bill Push, DO   100 mg at 08/02/18 0520    ondansetron (ZOFRAN) injection 4 mg  4 mg IntraVENous Q4H PRN Bill Push, DO   4 mg at 07/31/18 1000    cefTRIAXone (ROCEPHIN) 1 g in 0.9% sodium chloride (MBP/ADV) 50 mL  1 g IntraVENous Q24H Bill Push,  mL/hr at 08/02/18 1032 1 g at 08/02/18 1032    hydrALAZINE (APRESOLINE) 20 mg/mL injection 20 mg  20 mg IntraVENous Q6H PRN Bill Push, DO   20 mg at 08/01/18 1727    oxyCODONE IR (ROXICODONE) tablet 2.5 mg  2.5 mg Per NG tube Q12H PRN Bill Push, DO   2.5 mg at 08/02/18 0545    niCARdipine in Saline (CARDENE) 25 MG/250 mL infusion kit  5-15 mg/hr IntraVENous TITRATE Ariana Thornton MD   Stopped at 08/01/18 8125    mirabegron ER (MYRBETRIQ) tablet 50 mg (Patient Supplied)  50 mg Oral DAILY Ivon Wilburn MD   Stopped at 07/30/18 0900    sodium chloride (NS) flush 5-10 mL  5-10 mL IntraVENous Q8H Ivon Wilburn MD   10 mL at 08/02/18 0520    sodium chloride (NS) flush 5-10 mL  5-10 mL IntraVENous PRN Ivon Wilburn MD        naloxone [facility]) injection 0.4 mg  0.4 mg IntraVENous PRN Ivon Wilburn MD        0.9% sodium chloride infusion 250 mL  250 mL IntraVENous PRN Ivon Wilburn MD        LORazepam (ATIVAN) injection 1 mg  1 mg IntraVENous EVERY 2 MINUTES AS NEEDED Ivon Wilburn MD        levETIRAcetam (KEPPRA) 1,000 mg in 0.9% sodium chloride 100 mL IVPB  1,000 mg IntraVENous Q12H Tej Moulton  mL/hr at 07/30/18 2214 1,000 mg at 08/02/18 1023    NUTRITIONAL SUPPORT ELECTROLYTE PRN ORDERS   Does Not Apply PRN Tej Moulton DO            Allergies   Allergen Reactions    Codeine Nausea and Vomiting    Oxycodone Nausea and Vomiting       Review of Systems:  Could not obtain due to clinical condition        Objective:     Vitals:    08/01/18 2259 08/02/18 0400 08/02/18 0520 08/02/18 0800   BP:  104/65 128/71 131/73   Pulse:  78 78 72   Resp: 20 18  16   Temp: 98.7 °F (37.1 °C) 98 °F (36.7 °C)  97.7 °F (36.5 °C)   SpO2: 95% 97%  94%   Weight:       Height:            Physical Exam:    Neurological examination     She follows one-step commands but has difficulty with more complex commands. She is oriented to self and time but not place. On cranial nerve examination pupils are equal, round, and reactive to light. Face is asymmetric with left facial droop. Palate rises symmetrically and tongue protrudes in the midline. Trapezius and sternocleidomastoid have full strength. On motor examination she has normal tone with exception to decrease tone in the left upper limb. On strength testing she has decreased power in the left upper limb and left lower limb, approximately 4 -/5.  She has an upgoing toe on the left. Muscle stretch reflexes are hyperactive, relatively more brisk on the left compared to the right with bilateral crossed abductors. Alin and Tromner's signs are present bilaterally, more prominent on the left. Lab Results   Component Value Date/Time    Cholesterol, total 190 07/30/2018 04:26 AM    HDL Cholesterol 69 (H) 07/30/2018 04:26 AM    LDL, calculated 108 (H) 07/30/2018 04:26 AM    VLDL, calculated 13 07/30/2018 04:26 AM    Triglyceride 65 07/30/2018 04:26 AM    CHOL/HDL Ratio 2.8 07/30/2018 04:26 AM        Lab Results   Component Value Date/Time    Hemoglobin A1c 5.6 06/12/2018 11:26 AM    Hemoglobin A1c, External 5.7 12/10/2014        CT Results (most recent): Personally Reviewed     Results from Hospital Encounter encounter on 07/29/18   CT HEAD WO CONT   Narrative Examination: CT scan of the brain without contrast.    History: Speech changes (or aphasia), new or progressive, 76 years Female Speech  changes, vomiting, altered    Technique: 5 mm axial imaging of the brain from the posterior fossa to the  vertex. Radiation dose reduction techniques were used for this study:  Our CT  scanners use one or all of the following: Automated exposure control, adjustment  of the mA and/or kVp according to patient's size, iterative reconstruction. Comparison:  CT brain July 30, 2018, MRI brain December 22, 2017    Findings:  Again visualized is the large complex arteriovenous malformation  centered in the right temporal lobe with associated dystrophic calcifications  and large draining veins along the left temporal and frontal lobes as seen on  prior MRI. Focal subacute hemorrhage centered in the right thalamus appears  relatively unchanged compared to most recent CT brain, measuring approximately  1.6 x 2.7 cm. Again visualized is mild vasogenic edema in the right thalamus  with mild leftward midline shift measuring approximately 5 mm relatively  unchanged since prior. The ventricles, sulci are otherwise age-appropriate. Basal cisterns are intact. The visualized paranasal sinuses and mastoid air  cells are clear. The orbits, bones, and soft tissues are normal in appearance. Impression IMPRESSION:  No significant interval change in the small subacute right thalamic  intraparenchymal hemorrhage adjacent to the large right parietal lobe  arteriovenous malformation. Mild mass effect and mild leftward midline shift  unchanged. Results for orders placed or performed during the hospital encounter of 07/29/18   EKG, 12 LEAD, INITIAL   Result Value Ref Range    Ventricular Rate 70 BPM    Atrial Rate 68 BPM    P-R Interval 156 ms    QRS Duration 84 ms    Q-T Interval 378 ms    QTC Calculation (Bezet) 408 ms    Calculated P Axis 77 degrees    Calculated R Axis 57 degrees    Calculated T Axis 61 degrees    Diagnosis       !! AGE AND GENDER SPECIFIC ECG ANALYSIS !! Normal sinus rhythm  Septal infarct (cited on or before 29-JUL-2018)  Abnormal ECG  When compared with ECG of 16-JAN-2018 11:47,  No significant change was found  Confirmed by [facility]  MD (), Verna Garcia ([id number]) on 7/30/2018 7:12:50 AM              Assessment:     Intraparenchymal hemorrhage  ICH Management   Q4H neurological checks  Maintain SBP < 140 mmHg  STAT CT head for clinical worsening  Head of the bed at 30 degrees with neck in the neutral position  Maintain normoglycemia and normothermia     Seizures    Continue intravenous Keppra. Her thalamic infarct should not make her more predisposed to having seizures as it is subcortical.  We will continue Keppra for her known history of seizures which is secondary to her vascular malformation    Management of Delirium     Non-pharmacological intervention  · Reorient the patient throughout the day  · Window open and lights on during the day. Lights off, television off, noises down during the night.  If able, decrease nursing checks at night  · Therapies as often as possible  · Avoid restraints to the best of your ability   · Avoid sensory deprivation by using glasses and hearing aids, if applicable       Pharmacological intervention  · Replete electrolyte abnormalities and correct metabolic abnormalities  · Limit benzodiazepines, antihistamines, narcotics, anticholinergics. Preference towards Precedex for sedation over fentanyl and benzodiazepines/GABAa agonists. I spent 25 minutes on the patient's care today, over half that time was counseling the patient regarding her current medical condition.     Signed By: Jose Vázquez DO     August 2, 2018

## 2018-08-02 NOTE — PROGRESS NOTES
Bedside report received from Ck Waldron, [facility]. Pt alert and oriented. Intermittently confused. Speech clear to slurred. Left pupil slightly less reactive than right. Left sided weakness in arm and leg. Right side strong . Pt able to move all extremities. Breath sounds diminished. S1S2. Hypoactive bowel sounds. Abd intact, soft, nontender. Skin warm, dry, intact. Ecchymosis scattered to left side and BUE. Pulses palpable in all extremities. Allevyn. Sacrum intact with some excoriation in gluteal fold. SCDs, prevalon boots. NGT with TF. Residual 5ml. Avila urine clear and yellow.

## 2018-08-02 NOTE — PROGRESS NOTES
Hospitalist Progress Note 2018 Admit Date: 2018  9:55 PM  
NAME: Polina Murrieta :  1950 MRN:  [id number] Attending: Mert Ledezma MD 
PCP:  Jennifer Cruz DO 
 
SUBJECTIVE:  
Patient 59F with pmhx of cva, tob abuse, htn, RLS and depression called EMS for LE weakness and was transported to ED. On ED arrival o2 sat was 85% on room air as well as accelerated HTN. CT unfortunately showed right thalamic bleed. Has hx of complex AVM of brain which appears stable. Er contacted neurosx who recommended bp control and supportive care. Spoke with Dr Richie Ng - no indication for treatment of AVM as mortality rates for correction are very high. NGT placed for feeds/meds, plan to get PEG on 8/3.  
 
 - Lethargic, responds to voice, answers questions but falls back to sleep.  at bedside. ROS: Unable to review due to drowsiness. PHYSICAL EXAM  
 
Visit Vitals  /59 (BP 1 Location: Left arm, BP Patient Position: At rest)  Pulse 74  Temp 98.4 °F (36.9 °C)  Resp 18  Ht 5' 4\" (1.626 m)  Wt 123.2 kg (271 lb 9.7 oz)  SpO2 92%  BMI 46.62 kg/m2 Temp (24hrs), Av.4 °F (36.9 °C), Min:97.7 °F (36.5 °C), Max:98.9 °F (37.2 °C) Oxygen Therapy O2 Sat (%): 92 % (18 1200) Pulse via Oximetry: 80 beats per minute (183) O2 Device: Nasal cannula (18) O2 Flow Rate (L/min): 4 l/min (18) Intake/Output Summary (Last 24 hours) at 18 1330 Last data filed at 18 2324 Gross per 24 hour Intake          2203.83 ml Output             3275 ml Net         -1071.17 ml General: No acute distress    
Head:  AT/NC, NGT in place Neck:  Supple Lungs:  CTA Bilaterally. Heart:  Regular rate and rhythm,  No murmur, rub, or gallop Abdomen: Soft, Non distended, Non tender, Positive bowel sounds Extremities: No cyanosis, clubbing or edema Neurologic:  Left sided weakness. Follows commands Skin:  No Rash noted ASSESSMENT Active Hospital Problems Diagnosis Date Noted  Cerebral edema ([facility] 08/01/2018  Brain compression ([facility] 08/01/2018  UTI (urinary tract infection) 07/31/2018  Pulmonary edema 07/31/2018  Nontraumatic thalamic hemorrhage ([facility] 07/30/2018  Hypertensive urgency 07/30/2018  Chronic pain of multiple sites 07/30/2018  Cerebral AV malformation 07/30/2018  Seizure disorder ([facility] 01/22/2018  Morbid obesity with BMI of 40.0-44.9, adult ([facility] 01/19/2015  Edema of both legs 01/19/2015 PLAN: 
 
- Non traumatic thalamic hemorrhage - BP goal <140/90, failed swallow eval. NGT w/ feeds placed 7/30. Neuro has seen. MRI consistent with CT head, right thalamic hemorrhage and large AVM. no indication for intervention as per prior MDs discussion w/ Dr Luz Maria Jessica 
- dysphagia - GI consulted for PEG. Plan in am.  
- HTN emergency -Resolved, c/w current meds 
- Seizue d/o - IV keppra per Neuro. Appreciate input. - Chronic pain - low dose oxy prn, takes at home. 
- UTI - c/w rocephin, follow cultures, polymicrobial double GNR.  
- Pulm edema -with increased o2 requirement. on oral lasix, Echo EF 55% with Grade 1DD. Plan d/w  at bedside. DVT Prophylaxis: SCD Dispo - has bed ready at [facility] when medically ready. Signed By: Raffi Egan MD   
 August 2, 2018

## 2018-08-03 ENCOUNTER — ANESTHESIA (OUTPATIENT)
Dept: ENDOSCOPY | Age: 68
DRG: 064 | End: 2018-08-03
Payer: MEDICARE

## 2018-08-03 ENCOUNTER — APPOINTMENT (OUTPATIENT)
Dept: CT IMAGING | Age: 68
DRG: 064 | End: 2018-08-03
Attending: INTERNAL MEDICINE
Payer: MEDICARE

## 2018-08-03 LAB
ALBUMIN SERPL-MCNC: 3.1 G/DL (ref 3.2–4.6)
ALBUMIN/GLOB SERPL: 0.9 {RATIO} (ref 1.2–3.5)
ALP SERPL-CCNC: 110 U/L (ref 50–136)
ALT SERPL-CCNC: 16 U/L (ref 12–65)
ANION GAP SERPL CALC-SCNC: 7 MMOL/L (ref 7–16)
AST SERPL-CCNC: 14 U/L (ref 15–37)
BACTERIA SPEC CULT: ABNORMAL
BACTERIA SPEC CULT: ABNORMAL
BASOPHILS # BLD: 0 K/UL (ref 0–0.2)
BASOPHILS NFR BLD: 0 % (ref 0–2)
BILIRUB SERPL-MCNC: 0.6 MG/DL (ref 0.2–1.1)
BUN SERPL-MCNC: 26 MG/DL (ref 8–23)
CALCIUM SERPL-MCNC: 8.6 MG/DL (ref 8.3–10.4)
CHLORIDE SERPL-SCNC: 94 MMOL/L (ref 98–107)
CO2 SERPL-SCNC: 34 MMOL/L (ref 21–32)
CREAT SERPL-MCNC: 0.79 MG/DL (ref 0.6–1)
DIFFERENTIAL METHOD BLD: ABNORMAL
EOSINOPHIL # BLD: 0 K/UL (ref 0–0.8)
EOSINOPHIL NFR BLD: 0 % (ref 0.5–7.8)
ERYTHROCYTE [DISTWIDTH] IN BLOOD BY AUTOMATED COUNT: 13.9 % (ref 11.9–14.6)
GLOBULIN SER CALC-MCNC: 3.6 G/DL (ref 2.3–3.5)
GLUCOSE SERPL-MCNC: 122 MG/DL (ref 65–100)
HCT VFR BLD AUTO: 42 % (ref 35.8–46.3)
HGB BLD-MCNC: 13.7 G/DL (ref 11.7–15.4)
IMM GRANULOCYTES # BLD: 0.1 K/UL (ref 0–0.5)
IMM GRANULOCYTES NFR BLD AUTO: 1 % (ref 0–5)
LYMPHOCYTES # BLD: 0.8 K/UL (ref 0.5–4.6)
LYMPHOCYTES NFR BLD: 9 % (ref 13–44)
MCH RBC QN AUTO: 29.1 PG (ref 26.1–32.9)
MCHC RBC AUTO-ENTMCNC: 32.6 G/DL (ref 31.4–35)
MCV RBC AUTO: 89.4 FL (ref 79.6–97.8)
MONOCYTES # BLD: 1 K/UL (ref 0.1–1.3)
MONOCYTES NFR BLD: 10 % (ref 4–12)
NEUTS SEG # BLD: 7.6 K/UL (ref 1.7–8.2)
NEUTS SEG NFR BLD: 80 % (ref 43–78)
PLATELET # BLD AUTO: 172 K/UL (ref 150–450)
PMV BLD AUTO: 9.9 FL (ref 10.8–14.1)
POTASSIUM SERPL-SCNC: 4 MMOL/L (ref 3.5–5.1)
PROT SERPL-MCNC: 6.7 G/DL (ref 6.3–8.2)
RBC # BLD AUTO: 4.7 M/UL (ref 4.05–5.25)
SERVICE CMNT-IMP: ABNORMAL
SODIUM SERPL-SCNC: 135 MMOL/L (ref 136–145)
WBC # BLD AUTO: 9.5 K/UL (ref 4.3–11.1)

## 2018-08-03 PROCEDURE — 36415 COLL VENOUS BLD VENIPUNCTURE: CPT

## 2018-08-03 PROCEDURE — 74011250637 HC RX REV CODE- 250/637: Performed by: INTERNAL MEDICINE

## 2018-08-03 PROCEDURE — 77030005122 HC CATH GASTMY PEG BSC -B: Performed by: INTERNAL MEDICINE

## 2018-08-03 PROCEDURE — 74011000258 HC RX REV CODE- 258: Performed by: INTERNAL MEDICINE

## 2018-08-03 PROCEDURE — 80053 COMPREHEN METABOLIC PANEL: CPT

## 2018-08-03 PROCEDURE — 0DH63UZ INSERTION OF FEEDING DEVICE INTO STOMACH, PERCUTANEOUS APPROACH: ICD-10-PCS | Performed by: INTERNAL MEDICINE

## 2018-08-03 PROCEDURE — 76040000025: Performed by: INTERNAL MEDICINE

## 2018-08-03 PROCEDURE — 77030020263 HC SOL INJ SOD CL0.9% LFCR 1000ML

## 2018-08-03 PROCEDURE — 74011250636 HC RX REV CODE- 250/636: Performed by: ANESTHESIOLOGY

## 2018-08-03 PROCEDURE — 65660000000 HC RM CCU STEPDOWN

## 2018-08-03 PROCEDURE — 85025 COMPLETE CBC W/AUTO DIFF WBC: CPT

## 2018-08-03 PROCEDURE — 74011250636 HC RX REV CODE- 250/636: Performed by: NURSE PRACTITIONER

## 2018-08-03 PROCEDURE — 74011250636 HC RX REV CODE- 250/636

## 2018-08-03 PROCEDURE — 70450 CT HEAD/BRAIN W/O DYE: CPT

## 2018-08-03 PROCEDURE — 74011250636 HC RX REV CODE- 250/636: Performed by: INTERNAL MEDICINE

## 2018-08-03 PROCEDURE — 76060000031 HC ANESTHESIA FIRST 0.5 HR: Performed by: INTERNAL MEDICINE

## 2018-08-03 PROCEDURE — 77030020255 HC SOL INJ LR 1000ML BG

## 2018-08-03 PROCEDURE — 74011000250 HC RX REV CODE- 250: Performed by: ANESTHESIOLOGY

## 2018-08-03 RX ORDER — FACIAL-BODY WIPES
10 EACH TOPICAL DAILY
Status: COMPLETED | OUTPATIENT
Start: 2018-08-03 | End: 2018-08-05

## 2018-08-03 RX ORDER — LIDOCAINE HYDROCHLORIDE 20 MG/ML
INJECTION, SOLUTION EPIDURAL; INFILTRATION; INTRACAUDAL; PERINEURAL AS NEEDED
Status: DISCONTINUED | OUTPATIENT
Start: 2018-08-03 | End: 2018-08-03 | Stop reason: HOSPADM

## 2018-08-03 RX ORDER — SODIUM CHLORIDE 0.9 % (FLUSH) 0.9 %
5-10 SYRINGE (ML) INJECTION AS NEEDED
Status: CANCELLED | OUTPATIENT
Start: 2018-08-03

## 2018-08-03 RX ORDER — SODIUM CHLORIDE, SODIUM LACTATE, POTASSIUM CHLORIDE, CALCIUM CHLORIDE 600; 310; 30; 20 MG/100ML; MG/100ML; MG/100ML; MG/100ML
100 INJECTION, SOLUTION INTRAVENOUS CONTINUOUS
Status: DISCONTINUED | OUTPATIENT
Start: 2018-08-03 | End: 2018-08-04

## 2018-08-03 RX ORDER — SODIUM CHLORIDE, SODIUM LACTATE, POTASSIUM CHLORIDE, CALCIUM CHLORIDE 600; 310; 30; 20 MG/100ML; MG/100ML; MG/100ML; MG/100ML
100 INJECTION, SOLUTION INTRAVENOUS CONTINUOUS
Status: CANCELLED | OUTPATIENT
Start: 2018-08-03

## 2018-08-03 RX ORDER — PROPOFOL 10 MG/ML
INJECTION, EMULSION INTRAVENOUS AS NEEDED
Status: DISCONTINUED | OUTPATIENT
Start: 2018-08-03 | End: 2018-08-03 | Stop reason: HOSPADM

## 2018-08-03 RX ORDER — PROPOFOL 10 MG/ML
INJECTION, EMULSION INTRAVENOUS
Status: DISCONTINUED | OUTPATIENT
Start: 2018-08-03 | End: 2018-08-03 | Stop reason: HOSPADM

## 2018-08-03 RX ADMIN — Medication 10 ML: at 21:37

## 2018-08-03 RX ADMIN — CEFTRIAXONE SODIUM 1 G: 1 INJECTION, POWDER, FOR SOLUTION INTRAMUSCULAR; INTRAVENOUS at 10:40

## 2018-08-03 RX ADMIN — SODIUM CHLORIDE 1000 MG: 900 INJECTION, SOLUTION INTRAVENOUS at 10:45

## 2018-08-03 RX ADMIN — METRONIDAZOLE 500 MG: 500 INJECTION, SOLUTION INTRAVENOUS at 12:23

## 2018-08-03 RX ADMIN — PROPOFOL 30 MG: 10 INJECTION, EMULSION INTRAVENOUS at 12:36

## 2018-08-03 RX ADMIN — SODIUM CHLORIDE, SODIUM LACTATE, POTASSIUM CHLORIDE, AND CALCIUM CHLORIDE 100 ML/HR: 600; 310; 30; 20 INJECTION, SOLUTION INTRAVENOUS at 21:46

## 2018-08-03 RX ADMIN — SODIUM CHLORIDE 1000 MG: 900 INJECTION, SOLUTION INTRAVENOUS at 21:29

## 2018-08-03 RX ADMIN — LIDOCAINE HYDROCHLORIDE 40 MG: 20 INJECTION, SOLUTION EPIDURAL; INFILTRATION; INTRACAUDAL; PERINEURAL at 12:35

## 2018-08-03 RX ADMIN — Medication 10 ML: at 05:29

## 2018-08-03 RX ADMIN — FAMOTIDINE 20 MG: 10 INJECTION, SOLUTION INTRAVENOUS at 12:23

## 2018-08-03 RX ADMIN — Medication 5 ML: at 08:23

## 2018-08-03 RX ADMIN — HYDRALAZINE HYDROCHLORIDE 20 MG: 20 INJECTION INTRAMUSCULAR; INTRAVENOUS at 08:22

## 2018-08-03 RX ADMIN — Medication 2 G: at 12:06

## 2018-08-03 RX ADMIN — PROPOFOL 100 MCG/KG/MIN: 10 INJECTION, EMULSION INTRAVENOUS at 12:36

## 2018-08-03 RX ADMIN — BISACODYL 10 MG: 10 SUPPOSITORY RECTAL at 18:12

## 2018-08-03 RX ADMIN — HYDRALAZINE HYDROCHLORIDE 100 MG: 50 TABLET, FILM COATED ORAL at 21:30

## 2018-08-03 RX ADMIN — Medication 5 ML: at 14:25

## 2018-08-03 RX ADMIN — SODIUM CHLORIDE, SODIUM LACTATE, POTASSIUM CHLORIDE, AND CALCIUM CHLORIDE 100 ML/HR: 600; 310; 30; 20 INJECTION, SOLUTION INTRAVENOUS at 12:18

## 2018-08-03 NOTE — PROGRESS NOTES
Per telemetry, patient had an 8-beat run of SVT last night. Patient asymptomatic. Also urine cx, positive for e-choli and klebsiella,Hospitalist, Dr. Shaun Rosa, notified.

## 2018-08-03 NOTE — PROGRESS NOTES
Problem: Nutrition Deficit Goal: *Optimize nutritional status Nutrition F/U: 
TF management per nutritional support protocols. (Dr. Jay Ann) 
Onetha Terrie: Pt was tolerating TF prior to TF being held at [facility] for PEG placment today, Residuals < 10ml, abdomen with hypoactive BS's. Date of Last BM: unknown. Na 135-may benefit from decreased volume of water lfush when TF is restarted BMP glucose 122mg/dl-decadron stopped 8-1 Macronutrient needs: EER:  5264-4542 kcal /day (12-14 kcal/kg listed BW) EPR:  55-66 grams protein/day (1-1.2 grams/kg IBW) Max CHO:  225 grams/day (55% kcal) Fluid:  1ml/kcal 
Intake/Comparative standards: Current TF on hold. Prior TF of Osmolite at goal rate of 50ml/hr with 50ml water q 1hr provides 1440 kcal/day (100% of needs), 67 grams protein/day (100% of needs), 190 grams CHO/day (does not exceed max CHO),  and ~ 984ml free water/day for a total of 2184 ml fluid/day (>100% of needs). 
 Intervention: 
Meals and snacks: NPO 
EN: Once ready to use PEG for TF; suggest resume Osmolite at goal rate of 50ml/hr but decrease water flush to 25ml water q 1hr. Nutrition related medication: Add dulcolax suppository daily x 3d or until has BM. Nutrition Supplement Therapy: Electrolyte replacement per nutritional support protocols are active on MAR. Gerber Elliott, RD, LD, [address] [facility]

## 2018-08-03 NOTE — PROGRESS NOTES
Date of Outreach Update: 
Madhu Smith was seen and assessed. MEWS Score: 2 (08/02/18 2342) Vitals:  
 08/02/18 1559 08/02/18 2001 08/02/18 2156 08/02/18 2342 BP: 121/58 141/76 156/79 128/61 Pulse: 74 82 74 86 Resp: 18 18 18 18 Temp: 97.7 °F (36.5 °C) 99.2 °F (37.3 °C) 98.7 °F (37.1 °C) 98.9 °F (37.2 °C) SpO2: 93% 94% 97% 94% Weight:      
Height:      
  
 
 Pain Assessment Pain Intensity 1: 0 (08/02/18 1500) Pain Location 1: Leg, Generalized Pain Intervention(s) 1: Medication (see MAR), Repositioned Patient Stated Pain Goal: 0 Previous Outreach assessment has been reviewed. There have been no significant clinical changes since the completion of the last dated Outreach assessment. Will continue to follow up per outreach protocol. Signed By:   Celine Ann   August 3, 2018 3:45 AM

## 2018-08-03 NOTE — PROGRESS NOTES
Hospitalist Progress Note 8/3/2018 Admit Date: 2018  9:55 PM  
NAME: All Gaitan :  1950 MRN:  [id number] Attending: Irena Esparza MD 
PCP:  Levell Carrel, DO 
 
SUBJECTIVE:  
Patient 14R with pmhx of cva, tob abuse, htn, RLS and depression called EMS for LE weakness and was transported to ED. On ED arrival o2 sat was 85% on room air as well as accelerated HTN. CT unfortunately showed right thalamic bleed. Has hx of complex AVM of brain which appears stable. Er contacted neurosx who recommended bp control and supportive care. Spoke with Dr Nisa Munguia - no indication for treatment of AVM as mortality rates for correction are very high. NGT placed for feeds/meds, plan to get PEG on 8/3.  
 
8/3 - She remains Lethargic, responds to voice, answers questions but falls back to sleep. ICU rover at bedside. Pt not moving left arm and leg and this seems to be new. ROS: Unable to review due to drowsiness. PHYSICAL EXAM  
 
Visit Vitals  /70  Pulse 84  Temp 98 °F (36.7 °C)  Resp 20  
 Ht 5' 4\" (1.626 m)  Wt 123.2 kg (271 lb 9.7 oz)  SpO2 92%  BMI 46.62 kg/m2 Temp (24hrs), Av.3 °F (36.8 °C), Min:97 °F (36.1 °C), Max:99.2 °F (37.3 °C) Oxygen Therapy O2 Sat (%): 92 % (18 1425) Pulse via Oximetry: 81 beats per minute (18 1346) O2 Device: Nasal cannula (18 1346) O2 Flow Rate (L/min): 4 l/min (18 1346) Intake/Output Summary (Last 24 hours) at 18 1520 Last data filed at 18 1429 Gross per 24 hour Intake              600 ml Output             2325 ml Net            -1725 ml General: Mild distress    
Head:  AT/NC, NGT in place Neck:  Supple Lungs:  CTA Bilaterally. Heart:  Regular rate and rhythm,  No murmur, rub, or gallop Abdomen: Soft, Non distended, Non tender, Positive bowel sounds Extremities: No cyanosis, clubbing or edema Neurologic:  Left sided flaccid paralysis, Follows some commands, opens mouth. Skin:  No Rash noted ASSESSMENT Active Hospital Problems Diagnosis Date Noted  Cerebral edema ([facility] 08/01/2018  [facility] compression ([facility] 08/01/2018  UTI (urinary tract infection) 07/31/2018  Pulmonary edema 07/31/2018  Nontraumatic thalamic hemorrhage ([facility] 07/30/2018  Hypertensive urgency 07/30/2018  Chronic pain of multiple sites 07/30/2018  Cerebral AV malformation 07/30/2018  Seizure disorder ([facility] 01/22/2018  Morbid obesity with BMI of 40.0-44.9, adult ([facility] 01/19/2015  Edema of both legs 01/19/2015 PLAN: 
 
- STAT CT head to r/o worsening bleed. - Non traumatic thalamic hemorrhage - BP goal <140/90, failed swallow eval. NGT w/ feeds placed 7/30. Neuro following. MRI shows right thalamic hemorrhage and large AVM. no indication for intervention as per prior MDs discussion w/ Dr Dominique Cole - Dysphagia - GI plans for PEG today. D/w Dr Rl Mujica to proceed as planned if STAT CT head is unchanged. - HTN emergency -Resolved, c/w current meds 
- Seizue d/o - IV keppra per Neuro. Appreciate input. - Chronic pain - low dose oxy prn, home dose. Watch for sedation. 
- UTI - c/w rocephin, for polymicrobial double GNR UTI with Kleb Pneumo and E Coli. Mabeline Sink - Pulm edema -improved, now on oral lasix, Echo EF 55% with Grade 1DD. High Risk Pt. DVT Prophylaxis: SCD Dispo - has bed ready at [facility] when medically ready. Probably Sun or Mon. Signed By: Mike Cevallos MD   
 August 3, 2018

## 2018-08-03 NOTE — PROGRESS NOTES
Problem: Interdisciplinary Rounds Goal: Interdisciplinary Rounds Outcome: Progressing Towards Goal 
Interdisciplinary team rounds were held 8/3/2018 with the following team members:Care Management, Physical Therapy, Physician and . Anticipate discharge to [facility] on Saturday. Plan of care discussed. See clinical pathway and/or care plan for interventions and desired outcomes.

## 2018-08-03 NOTE — PROGRESS NOTES
[facility] OUTREACH NURSE PROGRESS REPORT SUBJECTIVE: Called to assess patient secondary to recent transfer out of ICU. MEWS Score: 1 (08/02/18 0400) Vitals:  
 08/02/18 1200 08/02/18 1559 08/02/18 2001 08/02/18 2156 BP: 114/59 121/58 141/76 156/79 Pulse: 74 74 82 74 Resp: 18 18 18 18 Temp: 98.4 °F (36.9 °C) 97.7 °F (36.5 °C) 99.2 °F (37.3 °C) 98.7 °F (37.1 °C) SpO2: 92% 93% 94% 97% Weight:      
Height:      
  
 
 
LAB DATA: 
 
Recent Labs 08/02/18 
 4077  08/01/18 
 2081  07/31/18 
 0872 NA  137  141  141  
K  4.1  4.4  4.5  
CL  96*  100  104 CO2  34*  32  30 AGAP  7  9  7 GLU  139*  164*  140* BUN  26*  16  11 CREA  0.79  0.83  0.85 GFRAA  >60  >60  >60 GFRNA  >60  >60  >60  
CA  8.7  8.5  8.0* ALB  3.1*  3.1*  3.0*  
TP  6.2*  7.0  6.4 GLOB  3.1  3.9*  3.4 AGRAT  1.0*  0.8*  0.9* ALT  18  19  17 Recent Labs 08/02/18 
 7138  08/01/18 
 3711  07/31/18 
 3272 WBC  14.0*  7.3  7.7 HGB  13.2  12.6  11.2* HCT  40.3  39.6  36.6 PLT  181  125*  142* OBJECTIVE: On arrival to room, I found patient to be sleeping in bed. Pain Assessment Pain Intensity 1: 0 (08/02/18 1500) Pain Location 1: Leg, Generalized Pain Intervention(s) 1: Medication (see MAR), Repositioned Patient Stated Pain Goal: 0 
 
  
  
  
  
 
  
  
  
   
 
ASSESSMENT:  Lungs CTA. 02 sat 94% on 4L NC 
 
PLAN:  Will continue to follow per outreach program protocol.   
 
Dimitry Cruz RN

## 2018-08-03 NOTE — PROCEDURES
OPERATIVE NOTE    Date of Procedure: 8/3/2018   Preoperative Diagnosis: Other dysphagia [R13.19]  Postoperative Diagnosis: Normal EGD; PEG placed. Procedure(s):  ESOPHAGOGASTRODUODENOSCOPY (EGD) / BMI=46 / ROOM 708  PERCUTANEOUS ENDOSCOPIC GASTROSTOMY TUBE INSERTION  Surgeon(s) and Role:     * Camacho Godoy MD - Primary _ Endo     * C Konrad Ramirez MD - Assisting - Skin End  Surgical Staff:  Endoscopy Technician-1: Brenda Page  Endoscopy RN-1: Williams Lefort, RN  No case tracking events are documented in the log. Anesthesia: MAC   Estimated Blood Loss: , 10 mL  Specimens: * No specimens in log *     PROCEDURE NOTE:  After informed consent obtained from patient and , the patient was placed in the supine position in endoscopy suite. Conscious sedation was obtained utilizing monitored anesthesia. Please see the anesthesia flow sheet for details. Once adequate analgesia was obtained, the Olympus GIF-H190 video chip endoscope was passed through the oropharynx into the esophagus, stomach and small bowel under direct visualization. The scope was advanced through the periampullary duodenum and was withdrawn with careful attention to mucosal detail. The entire examined duodenum, pylorus, prepyloric antrum, body, cardia and fundus of the stomach are normal. Subsequently, attention was given to the anterior abdominal wall where the endoscope was directed and transillumination is noted in the mid upper abdomen near her Inez scar with adequate 1:1 ballottment from the external surfaces. After confirming possibility of PEG placement the Dobhoff feeding tube was removed. Subsequently, the patient was draped and prepped in a sterile manner by Dr Mayda Serrano. Cutaneous and subcutaneous tissues are anesthetized utilizing a 1% lidocaine solution.   Subsequently, a finder needle is entered into the gastric lumen followed by the introducer needle and subsequently the guidewire which is snared endoscopically and withdrawn per orally. Subsequently, a 21 Western Amie Ponsky style pull type PEG tube is pulled through the oropharynx and through the abdominal wall with minimal blood loss. The location of the internal bolster is verified endoscopically. The external bolster is located at 5 cm from the gastric lumen. The patient tolerated the procedure well. There were no specimens obtained. Blood loss is less than 10 mL. Endoscopic evaluation was performed by Shiv Banks. Cutaneous procedure was performed by Dr Malu Maddox. Recommendations:  Routine PEG care. Restrain if needed to avoid accidental tube removal.  May use PEG tube for medications today if needed. Tube feedings to be started tomorrow 8/4/18 if no evident complications. Complications: None. Implants: 20 Fr PEG Tube.     Miryam Patricio MD

## 2018-08-03 NOTE — PROGRESS NOTES
SPEECH PATHOLOGY NOTE: 
 
Attempted to see patient this pm.   
She is s/p CT and PEG placement. Lethargic and unable to participate at this time. Will follow.  
 
Jose Casillas MS, CCC-SLP

## 2018-08-03 NOTE — PROGRESS NOTES
Problem: Interdisciplinary Rounds Goal: Interdisciplinary Rounds Outcome: Progressing Towards Goal 
Interdisciplinary team rounds were held 8/2/18 with the following team members:Care Management, Occupational Therapy, Physician and  and the patient and spouse. Plan of care discussed. See clinical pathway and/or care plan for interventions and desired outcomes.

## 2018-08-03 NOTE — PROGRESS NOTES
TRANSFER - IN REPORT: 
 
Verbal report received from Irina(name) on [address]  being received from 708(unit) for routine progression of care Report consisted of patients Situation, Background, Assessment and  
Recommendations(SBAR). Information from the following report(s) Kardex was reviewed with the receiving nurse. Opportunity for questions and clarification was provided. Assessment completed upon patients arrival to unit and care assumed.

## 2018-08-03 NOTE — ROUTINE PROCESS
TRANSFER - OUT REPORT: 
 
Verbal report given to Niurka(name) on Eric Goodwin  being transferred to room 708(unit) for routine progression of care Report consisted of patients Situation, Background, Assessment and  
Recommendations(SBAR). Information from the following report(s) SBAR was reviewed with the receiving nurse. Lines:  
Peripheral IV 07/29/18 Right Antecubital (Active) Site Assessment Clean, dry, & intact 8/2/2018  8:00 PM  
Phlebitis Assessment 0 8/2/2018  8:00 PM  
Infiltration Assessment 0 8/2/2018  8:00 PM  
Dressing Status Clean, dry, & intact 8/2/2018  8:00 PM  
Dressing Type Tape;Transparent 8/2/2018  8:00 PM  
Hub Color/Line Status Pink;Capped 8/2/2018  8:00 PM  
Alcohol Cap Used No 8/1/2018  7:32 PM  
   
Peripheral IV 07/30/18 Right Wrist (Active) Site Assessment Clean, dry, & intact 8/2/2018  8:00 PM  
Phlebitis Assessment 0 8/2/2018  8:00 PM  
Infiltration Assessment 0 8/2/2018  8:00 PM  
Dressing Status Clean, dry, & intact 8/2/2018  8:00 PM  
Dressing Type Tape;Transparent 8/2/2018  8:00 PM  
Hub Color/Line Status Blue;Capped 8/2/2018  8:00 PM  
Alcohol Cap Used No 8/1/2018  7:32 PM  
   
Peripheral IV 07/31/18 Left Antecubital (Active) Site Assessment Clean, dry, & intact 8/2/2018  8:00 PM  
Phlebitis Assessment 0 8/2/2018  8:00 PM  
Infiltration Assessment 0 8/2/2018  8:00 PM  
Dressing Status Clean, dry, & intact 8/2/2018  8:00 PM  
Dressing Type Tape;Transparent 8/2/2018  8:00 PM  
Hub Color/Line Status Pink; Infusing 8/2/2018  8:00 PM  
Alcohol Cap Used No 8/1/2018  7:32 PM  
  
 
Opportunity for questions and clarification was provided. Patient transported with: 
 extraTKT

## 2018-08-03 NOTE — PROGRESS NOTES
PT note:  Patient is currently off the floor. Will return as time permits.   Krystyna Santiago, PTA

## 2018-08-03 NOTE — INTERVAL H&P NOTE
H&P Update: 
Belle Snider was seen and examined. History and physical has been reviewed. The patient has been examined. There have been no significant clinical changes since the completion of the originally dated History and Physical. Fluctuating mentation. Repeat CT Head with mild improvement and no new changes Signed By: Kermit Tellez MD   
 August 3, 2018 12:08 PM

## 2018-08-03 NOTE — PROGRESS NOTES
Per telemetry, patient had an 8-beat run of SVT. Patient asymptomatic. Hospitalist, Dr. Ary Fry, notified. No new orders per Dr. Porsha Bender. Will continue to monitor patient.

## 2018-08-03 NOTE — PROGRESS NOTES
SPEECH PATHOLOGY NOTE: 
 
Attempted to see pt this am.  Off the floor for a CT. Will re-attempt later today.  
 
Stone Smith MS, CCC-SLP

## 2018-08-03 NOTE — PROGRESS NOTES
PT note: Treatment deferred per RN request as patient has just returned from her procedure as is still drowsy. Will continue Pt efforts.   Ninfa Gamboa, PTA

## 2018-08-03 NOTE — PROGRESS NOTES
100 [facility] OUTREACH NURSE PROGRESS REPORT SUBJECTIVE: Called to assess patient secondary to recent transfer from ICU. MEWS Score: 2 (08/02/18 2342) Vitals:  
 08/02/18 2156 08/02/18 2342 08/03/18 0325 08/03/18 0800 BP: 156/79 128/61 144/64 152/67 Pulse: 74 86 84 90 Resp: 18 18 18 16 Temp: 98.7 °F (37.1 °C) 98.9 °F (37.2 °C) 98.7 °F (37.1 °C) 98.3 °F (36.8 °C) SpO2: 97% 94% 93% 92% Weight:      
Height:      
  
EKG: SR 60s-70s with PACs. LAB DATA: 
 
Recent Labs 08/03/18 
 0503  08/02/18 
 0812  08/01/18 
 5796 NA  135*  137  141  
K  4.0  4.1  4.4 CL  94*  96*  100 CO2  34*  34*  32 AGAP  7  7  9 GLU  122*  139*  164* BUN  26*  26*  16  
CREA  0.79  0.79  0.83 GFRAA  >60  >60  >60 GFRNA  >60  >60  >60  
CA  8.6  8.7  8.5 ALB  3.1*  3.1*  3.1* TP  6.7  6.2*  7.0  
GLOB  3.6*  3.1  3.9* AGRAT  0.9*  1.0*  0.8* ALT  16  18  19 Recent Labs 08/03/18 
 0503  08/02/18 
 0836  08/01/18 
 8843 WBC  9.5  14.0*  7.3 HGB  13.7  13.2  12.6 HCT  42.0  40.3  39.6 PLT  172  181  125* OBJECTIVE: On arrival to room, I found patient to be pt asleep in bed. Pain Assessment Pain Intensity 1: 0 (08/02/18 1500) Pain Location 1: Leg, Generalized Pain Intervention(s) 1: Medication (see MAR), Repositioned Patient Stated Pain Goal: 0 
 
  
ASSESSMENT: Pt resting quietly in bed, lethargic, responds to voice. Pt focuses, tracks. Slurred speech, oriented to person and place. Disoriented to time and situation. Follows commands appropriately in R arm and leg. No command following noted in L arm or leg. MD notified and discussed with primary RN. Per RN, pt has not been moving L arm today or yesterday. Moved L leg spontaneously yesterday - not to command. MD ordered stat head CT to r/o any further bleeding. /59 after PRN hydralazine this morning. O2 sat 92% on 4 L NC, clear lung sounds bilaterally.  Pt to go for PEG placement today. PLAN: Will continue to follow per outreach protocol.

## 2018-08-03 NOTE — PROGRESS NOTES
Date of Outreach Update: 
Nirmal Jarvis was seen and assessed. Previous Outreach assessment has been reviewed. There have been no significant clinical changes since the completion of the last dated Outreach assessment. Pt continues to only follow commands with R side. Head CT negative for any acute changes. Pt went for PEG placement today and returned to room 30 minutes ago per  at bedside. No other changes noted. Will continue to follow up per outreach protocol. Signed By:   Tu Hadley August 3, 2018 1500

## 2018-08-03 NOTE — ANESTHESIA POSTPROCEDURE EVALUATION
Post-Anesthesia Evaluation and Assessment Patient: Darylene Richard MRN: [id number]  SSN: [id number] YOB: 1950  Age: 76 y.o. Sex: female Cardiovascular Function/Vital Signs Visit Vitals  /54  Pulse 80  Temp 36.1 °C (97 °F)  Resp 16  
 Ht 5' 4\" (1.626 m)  Wt 123.2 kg (271 lb 9.7 oz)  SpO2 95%  BMI 46.62 kg/m2 Patient is status post total IV anesthesia anesthesia for Procedure(s): ESOPHAGOGASTRODUODENOSCOPY (EGD) / BMI=46 / ROOM 708 PERCUTANEOUS ENDOSCOPIC GASTROSTOMY TUBE INSERTION. Nausea/Vomiting: None Postoperative hydration reviewed and adequate. Pain: 
Pain Scale 1: Visual (08/03/18 1257) Pain Intensity 1: 0 (08/03/18 1257) Managed Neurological Status:  
Neuro Neurologic State: Drowsy; Eyes open to voice (08/02/18 2000) Orientation Level: Oriented to person;Oriented to place (08/02/18 2000) Cognition: Decreased attention/concentration; Follows commands (08/02/18 2000) Speech: Clear;Delayed responses (08/02/18 2000) Assessment L Pupil: Round;Brisk (08/02/18 2000) Size L Pupil (mm): 2 (08/02/18 0740) Assessment R Pupil: Brisk;Round (08/02/18 2000) Size R Pupil (mm): 2 (08/02/18 0740) LUE Motor Response:  unequal R greater than L;Weak (08/02/18 2000) LLE Motor Response: Weak (08/02/18 2000) RUE Motor Response:  unequal R greater than L;Weak (08/02/18 2000) RLE Motor Response: Weak (08/02/18 2000) At baseline Mental Status and Level of Consciousness: Arousable Pulmonary Status:  
O2 Device: Nasal cannula (08/03/18 1257) Adequate oxygenation and airway patent Complications related to anesthesia: None Post-anesthesia assessment completed. No concerns Signed By: Roula Martins MD   
 August 3, 2018

## 2018-08-03 NOTE — H&P (VIEW-ONLY)
[facility] Consult Note Primary GI Physician: Dr. Antonio Zheng Referring Physician:  Dr. Kuldeep Crowder Consult Date:  8/2/2018 Admit Date:  7/29/2018 Chief Complaint:  Oropharyngeal Dysphagia, PEG Subjective:  
 
History of Present Illness:  Patient is a 76 y.o. female with PMH of but not limited to GERD, chronic pain, osteoarthritis,RLS, migraines, who is seen in consultation at the request of Dr. Kuldeep Crowder for oropharyngeal dysphagia and request for PEG. Mrs. Nila Nguyễn was admitted on 7/30/18 with findings of small right thalmic intracranial hemorrhage and large AVM in frontal areas. Dr. Christian Friedman at [facility] was consulted and did not recommend surgery. During admission she was also noted to have a UTI and was started on Rocephin. She had speech evaluation on 8/1/18 and failed with recommendations for strict NPO. We are asked to see her for possible PEG placement. Mrs. Nila Nguyễn is awake and participating with speech therapy. However, she is still not doing well with swallowing as she has a lot of throat clearing. Mrs. Nila Nguyễn is able to tell me that this is August 2018 and that she is at [facility]. She denies any abdominal pain, nausea, or vomiting. She is aware that she has a NGT in currently for nutrition and that she is not doing well swallowing safely. She denies any lower GI complaints. She recalls having a colonoscopy in the recent past. She asks that I call and speak with her  as well about possible PEG. Currently, receiving nutrition via NGT with feedings currently infusing. PMH: 
Past Medical History:  
Diagnosis Date  Chronic pain \"all over body\"-takes Zoloft daily  Chronic sinusitis 1/19/2015  Diverticulosis 2017  Edema 1/19/2015 Resolved  Former smoker  GERD (gastroesophageal reflux disease)  Headache 1/19/2015  Hearing loss 1/19/2015 \"some\"- no hearing aids  Heart murmur 1990  
 Hip osteoarthritis 1/19/2015  
 feet, hands  Hx of colonic polyps 2017 [facility]SA  Hx of migraines   
 on medication  Hypercholesterolemia 1/19/2015  Hypertension  Impaired fasting glucose 1/19/2015  Joint disorder 1/19/2015  Morbid obesity ([facility] BMI 40  
 Neuralgia 1/19/2015  Phlebitis and thrombophlebitis of superficial vessels of lower extremities 01/19/2015  
 pt denies  Psychiatric disorder  Restless leg syndrome 1/19/2015  Rheumatic fever   
 pt reports possibly R.F.  
 Seizures ([facility]   
 seizures as a child, has NOT had one since she was 13years old  SOB (shortness of breath) on exertion  Stroke [facility])   
 at age 11 only residual poor peripheral vision  Tobacco abuse 1/19/2015  
 quit smoking 4/2015  Vascular anomalies, congenital   
 Vitamin D deficiency 1/19/2015 PSH: 
Past Surgical History:  
Procedure Laterality Date  COLONOSCOPY N/A 2/20/2017 [place]--appendiceal serrated sessile polyp, transverse and rectal hyperplastic--3 year recall  HX APPENDECTOMY  03/15/2017  HX CATARACT REMOVAL Bilateral 3/2016 [place]  
 pt denies  HX ROTATOR CUFF REPAIR Right 10/31/2016  
 x2 [address]  TOTAL HIP ARTHROPLASTY Right 2005  
 and again 5/2015 and another revision Allergies: Allergies Allergen Reactions  Codeine Nausea and Vomiting  Oxycodone Nausea and Vomiting Home Medications: 
Prior to Admission medications Medication Sig Start Date End Date Taking? Authorizing Provider  
amLODIPine (NORVASC) 10 mg tablet Take 10 mg by mouth daily. Yes Phys MD William  
oxyCODONE IR (ROXICODONE) 5 mg immediate release tablet Take 5 mg by mouth every six (6) hours as needed for Pain. Yes Mauri Thomas MD  
mirabegron ER (MYRBETRIQ) 50 mg ER tablet Take 1 Tab by mouth daily.  6/28/18   Khloe Gong DO  
hydrALAZINE (APRESOLINE) 25 mg tablet TAKE ONE TABLET BY MOUTH TWICE A DAY FOR BLOOD PRESSURE 6/26/18 Khloe Gong DO  
losartan (COZAAR) 100 mg tablet Take 1 Tab by mouth daily. Indications: hypertension 6/18/18   Khloe Gong DO  
levETIRAcetam 1,000 mg tablet Take 1,000 mg by mouth two (2) times a day. 2/7/18 6/28/18  Historical Provider  
ergocalciferol (VITAMIN D2) 50,000 unit capsule TAKE 1 CAPSULE ONE TIME WEEKLY- takes on sat 3/1/18   Khloe Gong DO  
sertraline (ZOLOFT) 100 mg tablet Take 2 Tabs by mouth daily. 1/25/18   Khloe Gong DO  
ibuprofen (MOTRIN) 200 mg tablet Take 200 mg by mouth every eight (8) hours as needed for Pain. Historical Provider Hospital Medications: 
Current Facility-Administered Medications Medication Dose Route Frequency  amLODIPine (NORVASC) tablet 10 mg  10 mg Per NG tube DAILY  losartan (COZAAR) tablet 100 mg  100 mg Per NG tube DAILY  sertraline (ZOLOFT) tablet 200 mg  200 mg Per NG tube DAILY  acetaminophen (TYLENOL) solution 650 mg  650 mg Per NG tube Q4H PRN  
 furosemide (LASIX) tablet 40 mg  40 mg Oral DAILY  hydrALAZINE (APRESOLINE) tablet 100 mg  100 mg Per NG tube TID  ondansetron (ZOFRAN) injection 4 mg  4 mg IntraVENous Q4H PRN  
 cefTRIAXone (ROCEPHIN) 1 g in 0.9% sodium chloride (MBP/ADV) 50 mL  1 g IntraVENous Q24H  hydrALAZINE (APRESOLINE) 20 mg/mL injection 20 mg  20 mg IntraVENous Q6H PRN  
 oxyCODONE IR (ROXICODONE) tablet 2.5 mg  2.5 mg Per NG tube Q12H PRN  niCARdipine in Saline (CARDENE) 25 MG/250 mL infusion kit  5-15 mg/hr IntraVENous TITRATE  mirabegron ER (MYRBETRIQ) tablet 50 mg (Patient Supplied)  50 mg Oral DAILY  sodium chloride (NS) flush 5-10 mL  5-10 mL IntraVENous Q8H  
 sodium chloride (NS) flush 5-10 mL  5-10 mL IntraVENous PRN  
 naloxone (NARCAN) injection 0.4 mg  0.4 mg IntraVENous PRN  
 0.9% sodium chloride infusion 250 mL  250 mL IntraVENous PRN  
 LORazepam (ATIVAN) injection 1 mg  1 mg IntraVENous EVERY 2 MINUTES AS NEEDED  
 levETIRAcetam (KEPPRA) 1,000 mg in 0.9% sodium chloride 100 mL IVPB  1,000 mg IntraVENous Q12H  
 NUTRITIONAL SUPPORT ELECTROLYTE PRN ORDERS   Does Not Apply PRN Social History: 
Social History Substance Use Topics  Smoking status: Former Smoker Packs/day: 1.00 Years: 50.00 Types: Cigarettes Quit date: 3/29/2015  Smokeless tobacco: Never Used Comment: quit 2 months and 2 weeks ago  Alcohol use No  
   Comment: Former- socially Family History: 
Family History Problem Relation Age of Onset  Hypertension Mother  Cancer Mother Bladder  Depression Mother  Parkinson's Disease Father  Alcohol abuse Brother  Alcohol abuse Son  No Known Problems Sister  No Known Problems Brother  Malignant Hyperthermia Neg Hx  Pseudocholinesterase Deficiency Neg Hx  Delayed Awakening Neg Hx  Post-op Nausea/Vomiting Neg Hx  Emergence Delirium Neg Hx  Post-op Cognitive Dysfunction Neg Hx   
 Other Neg Hx Review of Systems: 
Unable to obtain Diet:  Tube feeds Objective:  
 
Physical Exam: 
Vitals: 
Visit Vitals  /73 (BP 1 Location: Left arm, BP Patient Position: At rest)  Pulse 72  Temp 97.7 °F (36.5 °C)  Resp 16  
 Ht 5' 4\" (1.626 m)  Wt 123.2 kg (271 lb 9.7 oz)  SpO2 94%  BMI 46.62 kg/m2 Gen:  Pt is drowsy, cooperative, no acute distress Skin:  Extremities and face reveal no rashes. HEENT: Sclerae anicteric. Extra-occular muscles are intact. No oral ulcers. NGT in place. No abnormal pigmentation of the lips. The neck is supple. Cardiovascular: Regular rate and rhythm. No murmurs, gallops, or rubs. Respiratory:  Comfortable breathing with no accessory muscle use. Clear breath sounds anteriorly with no wheezes, rales, or rhonchi. GI:  Abdomen nondistended, soft, and nontender. Surgical scar noted across RUQ. Normal active bowel sounds. No enlargement of the liver or spleen. No masses palpable.  
Rectal: Deferred Musculoskeletal:  No pitting edema of the lower legs. Neurological:  Gross memory appears intact. Oriented to self, situation, date. Psychiatric:  Mood appears appropriate with judgement intact. Lymphatic:  No cervical or supraclavicular adenopathy. Laboratory:   
Recent Labs 08/02/18 
 1312  08/02/18 
 6649  08/01/18 
 0253  07/31/18 
 9013 WBC  14.0*   --   7.3  7.7 HGB  13.2   --   12.6  11.2* HCT  40.3   --   39.6  36.6 PLT  181   --   125*  142* MCV  90.2   --   91.5  93.4 NA   --   137  141  141 K   --   4.1  4.4  4.5  
CL   --   96*  100  104 CO2   --   34*  32  30 BUN   --   26*  16  11 CREA   --   0.79  0.83  0.85 CA   --   8.7  8.5  8.0*  
GLU   --   139*  164*  140* AP   --   111  128  122 SGOT   --   14*  14*  14* ALT   --   18  19  17 TBILI   --   0.4  0.4  0.4 ALB   --   3.1*  3.1*  3.0*  
TP   --   6.2*  7.0  6.4 Assessment:  
 
Principal Problem: 
  Nontraumatic thalamic hemorrhage ([address] (7/30/2018) Active Problems: 
  Edema of both legs (1/19/2015) Morbid obesity with BMI of 40.0-44.9, adult ([address] (1/19/2015) Seizure disorder ([address] (1/22/2018) Hypertensive urgency (7/30/2018) Chronic pain of multiple sites (7/30/2018) Cerebral AV malformation (7/30/2018) UTI (urinary tract infection) (7/31/2018) Pulmonary edema (7/31/2018) Cerebral edema ([address] (8/1/2018) Brain compression ([address] (8/1/2018) Patient is a 76 y.o. female with PMH of but not limited to GERD, chronic pain, osteoarthritis,RLS, migraines, who is seen in consultation at the request of Dr. Bob Tompkins for oropharyngeal dysphagia and request for PEG. Mrs. Arpit Macedo was admitted on 7/30/18 with findings of small right thalmic intracranial hemorrhage and large AVM in frontal areas. She has failed swallow evaluation and is strictly NPO. We are asked to evaluate for possible PEG placement. Mrs. Arpit Macedo is agreeable to PEG tube placement.  I have called and spoke with her  as well, per her request, who is also in agreement. I have discussed possible complications and risks of PEG placement as well as the fact that the PEG tube has to remain in for at least 8 weeks prior to being removed. They both voiced understanding and wish to plan for PEG placement. Plan: We will plan for EGD with PEG placement on 8/3/18 with Dr. Tu Issa. Antibiotics: Ancef 2 gram and Flagyl 500mg on call to the GI lab Thank you for this kind consultation. We will follow along with you. Aimee Luz NP Patient is seen and examined in collaboration with Dr. Tu Issa. Assessment and plan as per Dr. Tu Issa.

## 2018-08-03 NOTE — ANESTHESIA PREPROCEDURE EVALUATION
Anesthetic History No history of anesthetic complications Review of Systems / Medical History Patient summary reviewed, nursing notes reviewed and pertinent labs reviewed Pulmonary Pertinent negatives: No smoker (smoked until 2015) Neuro/Psych  
 
seizures: well controlled CVA Headaches (migraines) Comments: RLS Periph neuropathy Cerebral AV malformation Cardiovascular Hypertension Exercise tolerance: <4 METS: Limited by morbid obesity Comments: TTE 8/1/18:  LVEF 55-60%. Mild AS/AR. Mild-moderate TR.  
GI/Hepatic/Renal 
  
GERD Endo/Other Diabetes Morbid obesity and arthritis Other Findings Physical Exam 
 
Airway Mallampati: II 
TM Distance: 4 - 6 cm Neck ROM: short neck Mouth opening: Normal 
 
 Cardiovascular Regular rate and rhythm,  S1 and S2 normal,  no murmur, click, rub, or gallop Dental 
 
 
  
Pulmonary Decreased breath sounds: bilateral 
 
 
 
 
 Abdominal 
GI exam deferred Other Findings Anesthetic Plan ASA: 4 Anesthesia type: total IV anesthesia Anesthetic plan and risks discussed with: Patient Admitted with new thalamic stroke, per neurology note the patient is not really oriented. History by chart review only.  has consented to procedure on patient's behalf.

## 2018-08-04 ENCOUNTER — APPOINTMENT (OUTPATIENT)
Dept: GENERAL RADIOLOGY | Age: 68
DRG: 064 | End: 2018-08-04
Attending: INTERNAL MEDICINE
Payer: MEDICARE

## 2018-08-04 LAB — GLUCOSE BLD STRIP.AUTO-MCNC: 96 MG/DL (ref 65–100)

## 2018-08-04 PROCEDURE — 77010033678 HC OXYGEN DAILY

## 2018-08-04 PROCEDURE — 94760 N-INVAS EAR/PLS OXIMETRY 1: CPT

## 2018-08-04 PROCEDURE — 74011250637 HC RX REV CODE- 250/637: Performed by: INTERNAL MEDICINE

## 2018-08-04 PROCEDURE — 97112 NEUROMUSCULAR REEDUCATION: CPT

## 2018-08-04 PROCEDURE — 74011000258 HC RX REV CODE- 258: Performed by: INTERNAL MEDICINE

## 2018-08-04 PROCEDURE — 74011250636 HC RX REV CODE- 250/636: Performed by: INTERNAL MEDICINE

## 2018-08-04 PROCEDURE — 82962 GLUCOSE BLOOD TEST: CPT

## 2018-08-04 PROCEDURE — 97530 THERAPEUTIC ACTIVITIES: CPT

## 2018-08-04 PROCEDURE — 77030018798 HC PMP KT ENTRL FED COVD -A

## 2018-08-04 PROCEDURE — 77030020255 HC SOL INJ LR 1000ML BG

## 2018-08-04 PROCEDURE — 71045 X-RAY EXAM CHEST 1 VIEW: CPT

## 2018-08-04 PROCEDURE — 65660000000 HC RM CCU STEPDOWN

## 2018-08-04 RX ORDER — LEVETIRACETAM 500 MG/1
1500 TABLET ORAL 2 TIMES DAILY
Status: DISCONTINUED | OUTPATIENT
Start: 2018-08-04 | End: 2018-08-04 | Stop reason: SDUPTHER

## 2018-08-04 RX ORDER — CIPROFLOXACIN 500 MG/1
500 TABLET ORAL EVERY 12 HOURS
Status: DISCONTINUED | OUTPATIENT
Start: 2018-08-05 | End: 2018-08-06 | Stop reason: HOSPADM

## 2018-08-04 RX ADMIN — LEVETIRACETAM 1500 MG: 500 SOLUTION ORAL at 17:05

## 2018-08-04 RX ADMIN — Medication 10 ML: at 10:11

## 2018-08-04 RX ADMIN — Medication 10 ML: at 13:39

## 2018-08-04 RX ADMIN — FUROSEMIDE 40 MG: 40 TABLET ORAL at 11:02

## 2018-08-04 RX ADMIN — CEFTRIAXONE SODIUM 1 G: 1 INJECTION, POWDER, FOR SOLUTION INTRAMUSCULAR; INTRAVENOUS at 10:04

## 2018-08-04 RX ADMIN — HYDRALAZINE HYDROCHLORIDE 100 MG: 50 TABLET, FILM COATED ORAL at 06:12

## 2018-08-04 RX ADMIN — Medication 10 ML: at 06:12

## 2018-08-04 RX ADMIN — HYDRALAZINE HYDROCHLORIDE 100 MG: 50 TABLET, FILM COATED ORAL at 13:38

## 2018-08-04 RX ADMIN — SERTRALINE HYDROCHLORIDE 200 MG: 100 TABLET ORAL at 11:01

## 2018-08-04 RX ADMIN — Medication 10 ML: at 22:06

## 2018-08-04 RX ADMIN — ACETAMINOPHEN 650 MG: 325 SOLUTION ORAL at 18:06

## 2018-08-04 RX ADMIN — SODIUM CHLORIDE 1000 MG: 900 INJECTION, SOLUTION INTRAVENOUS at 11:02

## 2018-08-04 RX ADMIN — HYDRALAZINE HYDROCHLORIDE 100 MG: 50 TABLET, FILM COATED ORAL at 22:25

## 2018-08-04 RX ADMIN — BISACODYL 10 MG: 10 SUPPOSITORY RECTAL at 10:08

## 2018-08-04 RX ADMIN — AMLODIPINE BESYLATE 10 MG: 10 TABLET ORAL at 11:01

## 2018-08-04 RX ADMIN — LOSARTAN POTASSIUM 100 MG: 50 TABLET ORAL at 11:02

## 2018-08-04 NOTE — PROGRESS NOTES
Hospitalist Progress Note 2018 Admit Date: 2018  9:55 PM  
NAME: All Gaitan :  1950 MRN:  [id number] Attending: Irena Esparza MD 
PCP:  Levell Carrel, DO 
 
SUBJECTIVE:  
Patient 2Y with pmhx of cva, tob abuse, htn, RLS and depression called EMS for LE weakness and was transported to ED. On ED arrival o2 sat was 85% on room air as well as accelerated HTN. CT unfortunately showed right thalamic bleed. Has hx of complex AVM of brain which appears stable. Er contacted neurosx who recommended bp control and supportive care. Spoke with Dr Nisa Munguia - no indication for treatment of AVM as mortality rates for correction are very high. NGT placed for feeds/meds, plan to get PEG on 8/3.  
 
 - She remains Lethargic, responds to voice, answers questions, still has left sided flaccid paralysis. ROS: Unable to review due to drowsiness. PHYSICAL EXAM  
 
Visit Vitals  /61 (BP 1 Location: Right arm, BP Patient Position: At rest)  Pulse 72  Temp 98.8 °F (37.1 °C)  Resp 18  Ht 5' 4\" (1.626 m)  Wt 123.2 kg (271 lb 9.7 oz)  SpO2 92%  BMI 46.62 kg/m2 Temp (24hrs), Av.3 °F (36.8 °C), Min:97 °F (36.1 °C), Max:99.2 °F (37.3 °C) Oxygen Therapy O2 Sat (%): 92 % (18 0319) Pulse via Oximetry: 81 beats per minute (18 1346) O2 Device: Nasal cannula (18 1346) O2 Flow Rate (L/min): 4 l/min (18 1346) Intake/Output Summary (Last 24 hours) at 18 3488 Last data filed at 18 3094 Gross per 24 hour Intake              200 ml Output             2575 ml Net            -2375 ml General: Mild distress    
Head:  AT/NC Neck:  Supple Lungs:  CTA Bilaterally. Heart:  Regular rate and rhythm,  No murmur, rub, or gallop Abdomen: Soft, Non distended, Non tender, Positive bowel sounds Extremities: No cyanosis, clubbing or edema Neurologic:  Left sided flaccid paralysis, Follows some commands, opens mouth. Skin:  No Rash noted ASSESSMENT Active Hospital Problems Diagnosis Date Noted  Cerebral edema ([facility] 08/01/2018  Brain compression ([facility] 08/01/2018  UTI (urinary tract infection) 07/31/2018  Pulmonary edema 07/31/2018  Nontraumatic thalamic hemorrhage ([facility] 07/30/2018  Hypertensive urgency 07/30/2018  Chronic pain of multiple sites 07/30/2018  Cerebral AV malformation 07/30/2018  Seizure disorder ([facility] 01/22/2018  Morbid obesity with BMI of 40.0-44.9, adult ([facility] 01/19/2015  Edema of both legs 01/19/2015 PLAN: 
 
- Non traumatic thalamic hemorrhage - BP goal <140/90, failed swallow eval. NGT w/ feeds placed 7/30. Neuro following. MRI shows right thalamic hemorrhage and large AVM. no indication for intervention as per prior MDs discussion w/ Dr Prescott Oms - Dysphagia - s/p PEG. Started meds and feeds via PEG.  
- HTN emergency -Resolved, c/w current meds 
- Seizue d/o - IV keppra per Neuro. Appreciate input. - Chronic pain - low dose oxy prn, home dose. Watch for sedation. 
- E Coli and Kleb Pneumo UTI - DC rocephin, start Cipro PO for 3 days EOT 8/7.  
- Pulm edema -improved, now on oral lasix, Echo EF 55% with Grade 1DD. High Risk Pt. DVT Prophylaxis: SCD Dispo - has bed ready at [facility] when medically ready. Probably Sun or Mon. Signed By: Jakub Monroy MD   
 August 4, 2018

## 2018-08-04 NOTE — PROGRESS NOTES
100 [facility] OUTREACH NURSE PROGRESS REPORT SUBJECTIVE: Called to assess patient secondary to recent transfer from ICU. MEWS Score: 2 (08/02/18 2342) Vitals:  
 08/03/18 1346 08/03/18 1425 08/03/18 1559 08/03/18 1926 BP: 153/68 126/70 132/72 115/60 Pulse: 81 84 85 (!) 101 Resp: 20 18 18 Temp:  98 °F (36.7 °C) 99.1 °F (37.3 °C) 97.8 °F (36.6 °C) SpO2: (!) 89% 92% 95% 93% Weight:      
Height:      
  
 
LAB DATA: 
 
Recent Labs 08/03/18 
 0503  08/02/18 
 1094  08/01/18 
 0284 NA  135*  137  141  
K  4.0  4.1  4.4 CL  94*  96*  100 CO2  34*  34*  32 AGAP  7  7  9 GLU  122*  139*  164* BUN  26*  26*  16  
CREA  0.79  0.79  0.83 GFRAA  >60  >60  >60 GFRNA  >60  >60  >60  
CA  8.6  8.7  8.5 ALB  3.1*  3.1*  3.1* TP  6.7  6.2*  7.0  
GLOB  3.6*  3.1  3.9* AGRAT  0.9*  1.0*  0.8* ALT  16  18  19 Recent Labs 08/03/18 
 0503  08/02/18 
 4237  08/01/18 
 9594 WBC  9.5  14.0*  7.3 HGB  13.7  13.2  12.6 HCT  42.0  40.3  39.6 PLT  172  181  125* OBJECTIVE: On arrival to room, I found patient to be resting in bed. Pain Assessment Pain Intensity 1: 0 (08/03/18 1425) Pain Location 1: Leg, Generalized Pain Intervention(s) 1: Medication (see MAR), Repositioned Patient Stated Pain Goal: 0 
 
  
  
  
  
 
  
  
  
   
 
ASSESSMENT:  Pt alert and oriented x3. HR 86, O2 sats 95% on 4L NC. Lungs are CTA. Pt denies needs at this time. PLAN:  Pt is now discharged from the outreach program. Please call with any concerns.

## 2018-08-04 NOTE — PROGRESS NOTES
Hemorrhagic Stroke/Intracerebral Hemorrhage (excludes Subdural/Epidural Hematoma) VTE Prophylaxis: Yes: SCD's Antiplatelet: No 
 
Statin if LDL Greater Than or Equal to100: No 
 
BP Parameters: Less Than 140/90 Controlled With: Scheduled PO Dysphagia Screen Completed: Yes: Fail Patient has PEG, NG Tube, Feeding Tube: Yes Medication orders per above route: Yes 
 
Nutrition Status: PEG 
 
NIH Stroke Scale Complete: Yes: 8 Frequency of Vital Signs: Every 4 hours Frequency of Neuro Checks: Every 4 hours Daily Education/Care Plan Updated: Yes 
 
Tran Fischer

## 2018-08-04 NOTE — PROGRESS NOTES
Problem: Self Care Deficits Care Plan (Adult) Goal: *Acute Goals and Plan of Care (Insert Text) 1. Patient will complete grooming and hygiene with CGA following set up 2. Patient will complete upper body dressing and bathing with min assist  and adaptive equipment as needed. 3. Patient complete HEP with min cues to promote >  BUE strength, coordination, , and functional use for ADLs 4. Pt will demonstrate improved cognition to follow 100% basic directions with min or fewer cues to promote > participation in ADLs for increased independence 5. Patient will complete bed mobility with mod assist in prep for ADLs Timeframe: 7 visits OCCUPATIONAL THERAPY: Daily Note, Treatment Day: 2nd and AM  
 8/4/2018 INPATIENT: Hospital Day: 7 Payor: [address] [place] [address] / Plan: [facility] MEDICARE COMPLETE / Product Type: Managed Care Medicare /  
  
NAME/AGE/GENDER: Jennifer Dooley is a 76 y.o. 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  
 · Hemiplegia and hemiparesis following cerebral infarction affecting left non-dominant side (E27.580) Precautions/Allergies: 
  falls Codeine and Oxycodone ASSESSMENT:  
 
Ms. Maya Healy was positioned in supine upon arrival and transferred to sitting with total assist x's 2. Pt initially with poor sitting balance progressing to fair (-) at times. Pt participated in NDT to L UE with decreased mobility and inattention noted. Pt was returned to supine with max a x's 2. Good effort . Continue OT POC. This section established at most recent assessment PROBLEM LIST (Impairments causing functional limitations): 1. Decreased Strength 2. Decreased ADL/Functional Activities 3.  Decreased Transfer Abilities 4. Decreased Balance 5. Decreased Activity Tolerance 6. Increased Fatigue 7. Decreased Cognition INTERVENTIONS PLANNED: (Benefits and precautions of occupational therapy have been discussed with the patient.) 1. Activities of daily living training 2. Adaptive equipment training 3. Balance training 4. Clothing management 5. Cognitive training 6. Donning&doffing training 7. Group therapy 8. Villa tech training 9. Hygiene training 10. Neuromuscular re-eduation 11. Therapeutic activity 12. Therapeutic exercise TREATMENT PLAN: Frequency/Duration: Follow patient 3 times/ week to address above goals. Rehabilitation Potential For Stated Goals: Good RECOMMENDED REHABILITATION/EQUIPMENT: (at time of discharge pending progress): Due to the probability of continued deficits (see above) this patient will likely need continued skilled occupational therapy after discharge. Equipment:  
 None at this time OCCUPATIONAL PROFILE AND HISTORY:  
History of Present Injury/Illness (Reason for Referral): Thalamic non-traumatic infarct Past Medical History/Comorbidities: Ms. Shirley Anne  has a past medical history of Chronic pain; Chronic sinusitis (1/19/2015); Diverticulosis (2017); Edema (1/19/2015); Former smoker; GERD (gastroesophageal reflux disease); Headache (1/19/2015); Hearing loss (1/19/2015); Heart murmur (1990); Hip osteoarthritis (1/19/2015); colonic polyps (2017); migraines; Hypercholesterolemia (1/19/2015); Hypertension; Impaired fasting glucose (1/19/2015); Joint disorder (1/19/2015); Morbid obesity ([address] Neuralgia (1/19/2015); Phlebitis and thrombophlebitis of superficial vessels of lower extremities (01/19/2015); Psychiatric disorder; Restless leg syndrome (1/19/2015); Rheumatic fever; Seizures ([address] SOB (shortness of breath) on exertion; Stroke ([address] Tobacco abuse (1/19/2015); Vascular anomalies, congenital; and Vitamin D deficiency (1/19/2015).  She also has no past medical history of Adverse effect of anesthesia; Aneurysm ([facility] Arrhythmia; Asthma; Autoimmune disease ([facility] CAD (coronary artery disease); Cancer ([facility] [facility] Chronic kidney disease; Chronic obstructive pulmonary disease ([facility] Coagulation disorder ([facility] Diabetes ([facility] [address] [facility] Difficult intubation; Endocarditis; Heart failure ([facility] [address] [facility] Liver disease; Malignant hyperthermia due to anesthesia; Nausea & vomiting; Pseudocholinesterase deficiency; PUD (peptic ulcer disease); Sleep apnea; Thromboembolus ([facility] [address] or Thyroid disease. Ms. Shirley Anne  has a past surgical history that includes hx mohs procedure (Right, 1995); hx cholecystectomy (1982); colonoscopy (N/A, 2/20/2017); pr total hip arthroplasty (Right, 2005); hx rotator cuff repair (Right, 10/31/2016); hx cataract removal (Bilateral, 3/2016); hx tubal ligation (1982); and hx appendectomy (03/15/2017). Social History/Living Environment:  
Home Environment: Private residence # Steps to Enter: 0 One/Two Story Residence: One story Living Alone: No 
Support Systems: Spouse/Significant Other/Partner Patient Expects to be Discharged to[de-identified] Unknown Current DME Used/Available at Home: Walker, rollator, Grab bars, Raised toilet seat Tub or Shower Type: Tub/Shower combination Prior Level of Function/Work/Activity: 
Lives with , independent with ADLs, uses a rollator. Per RN,  is in a WC Number of Personal Factors/Comorbidities that affect the Plan of Care: Expanded review of therapy/medical records (1-2):  MODERATE COMPLEXITY ASSESSMENT OF OCCUPATIONAL PERFORMANCE[de-identified]  
Activities of Daily Living:  
Basic ADLs (From Assessment) Complex ADLs (From Assessment) Feeding: Moderate assistance Oral Facial Hygiene/Grooming: Moderate assistance Bathing: Maximum assistance Upper Body Dressing: Maximum assistance Lower Body Dressing: Total assistance Toileting: Maximum assistance Instrumental ADL Meal Preparation: Total assistance Homemaking: Total assistance Medication Management: Total assistance Financial Management: Maximum assistance Grooming/Bathing/Dressing Activities of Daily Living Bed/Mat Mobility Supine to Sit: Total assistance;Assist x2 Sit to Supine: Total assistance;Assist x2 Scooting: Total assistance;Assist x2 Most Recent Physical Functioning:  
Gross Assessment: 
  
         
  
Posture: 
Posture (WDL): Exceptions to [facility] Posture Assessment: Forward head Balance: 
Sitting: Impaired Sitting - Static:  (mostly poor sitting balance with occasional fair with cues) Sitting - Dynamic: Poor (constant support) Bed Mobility: 
Supine to Sit: Total assistance;Assist x2 Sit to Supine: Total assistance;Assist x2 Scooting: Total assistance;Assist x2 Wheelchair Mobility: 
  
Transfers: 
   
 
    
 
Patient Vitals for the past 6 hrs: 
 BP BP Patient Position SpO2 O2 Flow Rate (L/min) Pulse 08/04/18 0751 138/84 At rest 97 % 4 l/min 73  
08/04/18 1129 152/80 Sitting 96 % 4 l/min 85 Mental Status Neurologic State: Drowsy, Eyes open to voice Orientation Level: Oriented to person, Oriented to place Cognition: Decreased attention/concentration, Decreased command following Perception: Cues to maintain midline in sitting, Cues to attend left visual field Perseveration: No perseveration noted Safety/Judgement: Decreased insight into deficits Physical Skills Involved: 1. Range of Motion 2. Balance 3. Strength 4. Activity Tolerance 5. Sensation 6. Fine Motor Control 7. Gross Motor Control 8. Vision 9. Pain (Chronic) Cognitive Skills Affected (resulting in the inability to perform in a timely and safe manner): 1. Perception 2. Executive Function 3. Immediate Memory 4. Short Term Recall 5. Long Term Memory 6. Sustained Attention 7. Divided Attention 8. Comprehension Psychosocial Skills Affected: 1. Habits/Routines 2. Environmental Adaptation 3. Social Interaction 4. Social Roles Number of elements that affect the Plan of Care: 5+:  HIGH COMPLEXITY CLINICAL DECISION MAKING:  
[employer] AM-PAC 6 Clicks Daily Activity Inpatient Short Form How much help from another person does the patient currently need. .. Total A Lot A Little None 1. Putting on and taking off regular lower body clothing? [x] 1   [] 2   [] 3   [] 4  
2. Bathing (including washing, rinsing, drying)? [] 1   [x] 2   [] 3   [] 4  
3. Toileting, which includes using toilet, bedpan or urinal?   [x] 1   [] 2   [] 3   [] 4  
4. Putting on and taking off regular upper body clothing? [] 1   [x] 2   [] 3   [] 4  
5. Taking care of personal grooming such as brushing teeth? [] 1   [x] 2   [] 3   [] 4  
6. Eating meals? [] 1   [x] 2   [] 3   [] 4  
© 2007, Trustees of [facility] [employer], under license to [employer]. All rights reserved Score:  Initial: 10 Most Recent: X (Date: -- ) Interpretation of Tool:  Represents activities that are increasingly more difficult (i.e. Bed mobility, Transfers, Gait). Score 24 23 22-20 19-15 14-10 9-7 6 Modifier CH CI CJ CK CL CM CN   
 
? Self Care:  
  - CURRENT STATUS: CL - 60%-79% impaired, limited or restricted  - GOAL STATUS: CK - 40%-59% impaired, limited or restricted  - D/C STATUS:  ---------------To be determined--------------- Payor: Ayan Guan / Plan: [facility] MEDICARE COMPLETE / Product Type: Managed Care Medicare /   
 
Medical Necessity:    
· Patient is expected to demonstrate progress in strength, range of motion, balance, coordination and functional technique to increase independence with ADLs. Reason for Services/Other Comments: 
· Patient continues to require present interventions due to patient's inability to independently complete ADLs.   
Use of outcome tool(s) and clinical judgement create a POC that gives a: MODERATE COMPLEXITY  
 
 
 
TREATMENT:  
(In addition to Assessment/Re-Assessment sessions the following treatments were rendered) Pre-treatment Symptoms/Complaints:  none Pain: Initial:  
Pain Intensity 1: 0  Post Session:  same Neuromuscular Re-education: (12 minutes):  Exercise/activities per grid below to improve balance, coordination, kinesthetic sense, posture and proprioception. Required maximal visual, verbal and manual cues to promote motor control of pt's trunk. Re-Education Training Re-Education Training: Yes  LUE Re-Education Other Activities: Lateral weight bearing through the L UE. AAROM to L UE Date: 
8/2/18 Date: 
 Date: Activity/Exercise Parameters Parameters Parameters L  strength (w/ use of hand towel mold to ) 20 reps w/ L hand L UE AROM 10 reps to ~90 degrees L UE PROM 10 reps to end range Braces/Orthotics/Lines/Etc:  
· wu catheter · nasogastric tube · O2 Device: Nasal cannula Treatment/Session Assessment:   
· Response to Treatment:  No adverse reaction · Interdisciplinary Collaboration:  
o Certified Occupational Therapy Assistant 
o Registered Nurse · After treatment position/precautions:  
o Supine in bed 
o Bed/Chair-wheels locked 
o Bed in low position 
o Call light within reach 
o RN notified 
o Side rails x 3  
· Compliance with Program/Exercises: Will assess as treatment progresses. · Recommendations/Intent for next treatment session: \"Next visit will focus on advancements to more challenging activities and reduction in assistance provided\". Total Treatment Duration: OT Patient Time In/Time Out Time In: 3850 Time Out: 1143 Matt Ray

## 2018-08-04 NOTE — PROGRESS NOTES
Problem: Mobility Impaired (Adult and Pediatric) Goal: *Acute Goals and Plan of Care (Insert Text) STG: 
(1.)Ms. Herron will move from supine to sit and sit to supine , scoot up and down and roll side to side with MAXIMAL ASSIST within 5 treatment day(s). (2.)Ms. Herron will tolerate 1 hour of chair position with stable vital signs within 5 treatment days to improve activity tolerance for functional mobility. (3.)Ms. Herron will perform LE exercises with 3 to 5 cues for form within 5 days to improve strength for functional transfers. LTG: 
(1.)Ms. Herron will move from supine to sit and sit to supine , scoot up and down and roll side to side in bed with MODERATE ASSIST within 10 treatment day(s). (2.)Ms. Herron will transfer from bed to chair and chair to bed with MAXIMAL ASSIST using the least restrictive device within 10 treatment day(s). (3.)Ms. Herron will perform sit to stand transfer with MAXIMAL ASSIST to prepare for ambulation within 10 treatment days. (4.)Ms. Herron will ambulate with MAXIMAL ASSIST for 3 feet with the least restrictive device within 10 treatment day(s). Will update goals as patient is able. ________________________________________________________________________________________________ PHYSICAL THERAPY: Daily Note, Treatment Day: 2nd, AM 8/4/2018 INPATIENT: Hospital Day: 7 Payor: [address] [place] [address] / Plan: [facility] [employer] MEDICARE COMPLETE / Product Type: Managed Care Medicare /  
  
NAME/AGE/GENDER: Polina Murrieta is a 76 y.o. 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  
 · Generalized Muscle Weakness (M62.81) · Difficulty in walking, Not elsewhere classified (R26.2) · Hemiplegia and hemiparesis following cerebral infarction affecting left non-dominant side (R02.811) Precaution/Allergies: 
Codeine and Oxycodone ASSESSMENT:  
 
Ms. Tila Flores was supine upon contact and agreeable to PT. Patient presents with left sided weakness and inattention. She is also hard of hearing with delayed command following noted. Patient requires total assist x 2 and cues to perform supine to sit. Once seated EOB patient demonstrates mostly poor sitting balance but occasional fair sitting balance with cues for improved trunk control. Patient has low activity tolerance and fatigues quickly with all activity. PT treatment ended to allow OT treatment to start. Overall slow progress towards physical therapy goals. No goals have been met thus far. Will continue efforts. This section established at most recent assessment PROBLEM LIST (Impairments causing functional limitations): 1. Decreased Strength 2. Decreased ADL/Functional Activities 3. Decreased Transfer Abilities 4. Decreased Ambulation Ability/Technique 5. Decreased Balance 6. Increased Pain 7. Decreased Activity Tolerance 8. Decreased Pacing Skills 9. Decreased Knowledge of Precautions 10. Decreased Ness with Home Exercise Program 
11. Decreased Cognition INTERVENTIONS PLANNED: (Benefits and precautions of physical therapy have been discussed with the patient.) 1. Balance Exercise 2. Bed Mobility 3. Family Education 4. Gait Training 5. Home Exercise Program (HEP) 6. Therapeutic Activites 7. Therapeutic Exercise/Strengthening 8. Transfer Training 9. Patient Education 10. Group Therapy TREATMENT PLAN: Frequency/Duration: 3 times a week for duration of hospital stay Rehabilitation Potential For Stated Goals: Good RECOMMENDED REHABILITATION/EQUIPMENT: (at time of discharge pending progress): Due to the probability of continued deficits (see above) this patient will likely need continued skilled physical therapy after discharge. Equipment:  
 None at this time HISTORY:  
History of Present Injury/Illness (Reason for Referral): 
Patient history was obtained from the ER provider prior to seeing the patient. 
  
Patient is a 76 y.o. female who presents to the ER due to legs \"feeling like jello\". She reported jittery legs and weakness to ER staff. She has had increased difficulty walking but no fall reported. EMS was called; they reported an O2 sat of 85% on room air. Pt has complained of multiple sites of pain and some vague symptoms while in ER. Difficult to determine what is new vs baseline, as she does have chronic pain of multiple sites. ER workup shows continued mild hypoxia and accelerated HTN. Head CT unfortunately shows a rt thalamic bleed. 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 ER contacted neurosurgery, who recommended BP control and supportive care. There is no recommended surgical intervention. Denies fever/chills, n/v/d, SOB, cough/congestion Pt states that she is going to die. She is offered reassurance. Past Medical History/Comorbidities: Ms. Janet Saldaña  has a past medical history of Chronic pain; Chronic sinusitis (1/19/2015); Diverticulosis (2017); Edema (1/19/2015); Former smoker; GERD (gastroesophageal reflux disease); Headache (1/19/2015); Hearing loss (1/19/2015); Heart murmur (1990); Hip osteoarthritis (1/19/2015); colonic polyps (2017); migraines; Hypercholesterolemia (1/19/2015); Hypertension; Impaired fasting glucose (1/19/2015); Joint disorder (1/19/2015); Morbid obesity ([address] Neuralgia (1/19/2015); Phlebitis and thrombophlebitis of superficial vessels of lower extremities (01/19/2015); Psychiatric disorder; Restless leg syndrome (1/19/2015); Rheumatic fever; Seizures ([address] SOB (shortness of breath) on exertion; Stroke ([address] Tobacco abuse (1/19/2015); Vascular anomalies, congenital; and Vitamin D deficiency (1/19/2015).  She also has no past medical history of Adverse effect of anesthesia; Aneurysm ([facility] Arrhythmia; Asthma; Autoimmune disease ([facility] CAD (coronary artery disease); Cancer ([facility] Chronic kidney disease; Chronic obstructive pulmonary disease ([facility] [address] [facility] Coagulation disorder ([facility] [address] [facility] Diabetes ([facility] [address] [facility] Difficult intubation; Endocarditis; Heart failure ([facility] [address] [facility] Liver disease; Malignant hyperthermia due to anesthesia; Nausea & vomiting; Pseudocholinesterase deficiency; PUD (peptic ulcer disease); Sleep apnea; Thromboembolus ([facility] or Thyroid disease. Ms. Rodolfo Thayer  has a past surgical history that includes hx mohs procedure (Right, 1995); hx cholecystectomy (1982); colonoscopy (N/A, 2/20/2017); pr total hip arthroplasty (Right, 2005); hx rotator cuff repair (Right, 10/31/2016); hx cataract removal (Bilateral, 3/2016); hx tubal ligation (1982); and hx appendectomy (03/15/2017). Social History/Living Environment:  
Home Environment: Private residence # Steps to Enter: 0 One/Two Story Residence: One story Living Alone: No 
Support Systems: Spouse/Significant Other/Partner Patient Expects to be Discharged to[de-identified] Unknown Current DME Used/Available at Home: Walker, rollator, Grab bars, Raised toilet seat Tub or Shower Type: Tub/Shower combination Prior Level of Function/Work/Activity: 
Patient ambulates with a rollator walker, but reports she \"does not walk much. \" 
  
Number of Personal Factors/Comorbidities that affect the Plan of Care: 3+: HIGH COMPLEXITY EXAMINATION:  
Most Recent Physical Functioning:  
Gross Assessment: 
  
         
  
Posture: 
  
Balance: 
Sitting: Impaired Sitting - Static:  (mostly poor sitting balance with occasional fair with cues) Sitting - Dynamic: Poor (constant support) Bed Mobility: 
Supine to Sit: Total assistance;Assist x2 Sit to Supine: Total assistance;Assist x2 Scooting: Total assistance;Assist x2 Wheelchair Mobility: 
  
Transfers: 
  
Gait: 
  
   
  
Body Structures Involved: 1. Nerves 2.  Muscles Body Functions Affected: 1. Mental 
2. Sensory/Pain 3. Respiratory 4. Neuromusculoskeletal 
5. Movement Related Activities and Participation Affected: 1. Learning and Applying Knowledge 2. General Tasks and Demands 3. Communication 4. Mobility 5. Self Care 6. Domestic Life 7. Interpersonal Interactions and Relationships 8. Community, Social and [place] Number of elements that affect the Plan of Care: 4+: HIGH COMPLEXITY CLINICAL PRESENTATION:  
Presentation: Evolving clinical presentation with unstable and unpredictable characteristics: HIGH COMPLEXITY CLINICAL DECISION MAKIN[address] [place] [address] AM-PAC 6 Clicks Basic Mobility Inpatient Short Form How much difficulty does the patient currently have. .. Unable A Lot A Little None 1. Turning over in bed (including adjusting bedclothes, sheets and blankets)? [] 1   [x] 2   [] 3   [] 4  
2. Sitting down on and standing up from a chair with arms ( e.g., wheelchair, bedside commode, etc.)   [x] 1   [] 2   [] 3   [] 4  
3. Moving from lying on back to sitting on the side of the bed? [] 1   [x] 2   [] 3   [] 4 How much help from another person does the patient currently need. .. Total A Lot A Little None 4. Moving to and from a bed to a chair (including a wheelchair)? [x] 1   [] 2   [] 3   [] 4  
5. Need to walk in hospital room? [x] 1   [] 2   [] 3   [] 4  
6. Climbing 3-5 steps with a railing? [x] 1   [] 2   [] 3   [] 4  
© , Trustees of [address] [place] [address], under license to [employer]. All rights reserved Score:  Initial: 8 Most Recent: X (Date: -- ) Interpretation of Tool:  Represents activities that are increasingly more difficult (i.e. Bed mobility, Transfers, Gait). Score 24 23 22-20 19-15 14-10 9-7 6 Modifier CH CI CJ CK CL CM CN   
 
? Mobility - Walking and Moving Around:  
  - CURRENT STATUS: CM - 80%-99% impaired, limited or restricted   - GOAL STATUS: CL - 60%-79% impaired, limited or restricted  - D/C STATUS:  ---------------To be determined--------------- Payor: Hernan Li / Plan: [facility] MEDICARE COMPLETE / Product Type: Managed Care Medicare /   
 
Medical Necessity:    
· Patient demonstrates good rehab potential due to higher previous functional level. Reason for Services/Other Comments: 
· Patient continues to require modification of therapeutic interventions to increase complexity of exercises. Use of outcome tool(s) and clinical judgement create a POC that gives a: Difficult prediction of patient's progress: HIGH COMPLEXITY  
  
 
 
 
TREATMENT:  
(In addition to Assessment/Re-Assessment sessions the following treatments were rendered) Pre-treatment Symptoms/Complaints:  See above Pain: Initial:  
Pain Intensity 1: 0  Post Session:  0/10 Therapeutic Activity: (    15 minutes): Therapeutic activities including bed mobility training, static sitting balance training, trunk control at midline training, scooting, posture training, and patient education to improve mobility, strength, balance and coordination. Required maximal verbal, tactile and manual cues   to promote coordination of bilateral, upper extremity(s), lower extremity(s). Braces/Orthotics/Lines/Etc:  
· IV 
· wu catheter · O2 Device: Nasal cannula Treatment/Session Assessment:   
· Response to Treatment:  See above · Interdisciplinary Collaboration:  
o Physical Therapy Assistant 
o Registered Nurse · After treatment position/precautions:  
o Supine in bed 
o Bed/Chair-wheels locked 
o Bed in low position 
o Call light within reach 
o RN notified 
o head of bed raised >30 degrees · Compliance with Program/Exercises: Will assess as treatment progresses. · Recommendations/Intent for next treatment session: \"Next visit will focus on advancements to more challenging activities and reduction in assistance provided\". Total Treatment Duration: PT Patient Time In/Time Out Time In: 1119 
Time Out: 1130 Poncho Ruiz, PTA

## 2018-08-04 NOTE — PROGRESS NOTES
Date: 8/4/18 GI Progress / Sign off Note: 
Subjective:  
 
Patient with altered mental status. Reports some pain at tube site. No nausea, vomiting. Minimal blood on dressing. Objective:  
Vitals Vitals:  
 08/03/18 1926 08/04/18 0000 08/04/18 0319 08/04/18 7015 BP: 115/60 133/68 129/61 138/84 Pulse: (!) 101 80 72 73 Resp: 18 18 18 19 Temp: 97.8 °F (36.6 °C) 99.2 °F (37.3 °C) 98.8 °F (37.1 °C) 98 °F (36.7 °C) SpO2: 93% 94% 92% 97% Weight:      
Height:      
 
 
Intake & Output 08/02 1901 - 08/04 0700 In: 250 [I.V.:200] [facility]: 0951 [id number] Exam 
General appearance: No distress. Lungs: Clear to auscultation bilaterally. Heart: S1, S2 normal, regular rhythm and rate. Abdomen: Bowel sounds normal in all four quadrants; soft/obese/non-distended. Tube site with minimal blood staining. Lab Review Recent Labs 08/03/18 
 0503  08/02/18 
 6390 WBC  9.5  14.0* HGB  13.7  13.2 HCT  42.0  40.3 MCV  89.4  90.2 PLT  172  181 Recent Labs 08/03/18 
 0503  08/02/18 
 0862 NA  135*  137  
K  4.0  4.1 CL  94*  96* CO2  34*  34* BUN  26*  26* CREA  0.79  0.79 CA  8.6  8.7 GLU  122*  139* Recent Labs 08/03/18 
 0503  08/02/18 
 8741 TP  6.7  6.2* ALB  3.1*  3.1*  
SGOT  14*  14* AP  110  111 Assessment / Plan:  
 
Principal Problem: 
  Nontraumatic thalamic hemorrhage ([facility] (7/30/2018) Active Problems: 
  Edema of both legs (1/19/2015) Morbid obesity with BMI of 40.0-44.9, adult ([facility] (1/19/2015) Seizure disorder ([facility] (1/22/2018) Hypertensive urgency (7/30/2018) Chronic pain of multiple sites (7/30/2018) Cerebral AV malformation (7/30/2018) UTI (urinary tract infection) (7/31/2018) Pulmonary edema (7/31/2018) Cerebral edema ([address] (8/1/2018) Brain compression ([address] (8/1/2018) Dysphagia - S/P Peg 6/9/18 without complications. Continue routine care.  Okay to resume tube feedings. Will sign off. Call or re-consult as needed.  
 
 
Mae Mckenna MD

## 2018-08-05 PROCEDURE — 77030020263 HC SOL INJ SOD CL0.9% LFCR 1000ML

## 2018-08-05 PROCEDURE — 74011250637 HC RX REV CODE- 250/637: Performed by: INTERNAL MEDICINE

## 2018-08-05 PROCEDURE — 65660000000 HC RM CCU STEPDOWN

## 2018-08-05 PROCEDURE — 94760 N-INVAS EAR/PLS OXIMETRY 1: CPT

## 2018-08-05 PROCEDURE — 77010033678 HC OXYGEN DAILY

## 2018-08-05 RX ADMIN — LEVETIRACETAM 1500 MG: 500 SOLUTION ORAL at 17:21

## 2018-08-05 RX ADMIN — Medication 10 ML: at 21:07

## 2018-08-05 RX ADMIN — LOSARTAN POTASSIUM 100 MG: 50 TABLET ORAL at 08:18

## 2018-08-05 RX ADMIN — HYDRALAZINE HYDROCHLORIDE 100 MG: 50 TABLET, FILM COATED ORAL at 05:21

## 2018-08-05 RX ADMIN — LEVETIRACETAM 1500 MG: 500 SOLUTION ORAL at 08:18

## 2018-08-05 RX ADMIN — BISACODYL 10 MG: 10 SUPPOSITORY RECTAL at 09:00

## 2018-08-05 RX ADMIN — SERTRALINE HYDROCHLORIDE 200 MG: 100 TABLET ORAL at 08:18

## 2018-08-05 RX ADMIN — AMLODIPINE BESYLATE 10 MG: 10 TABLET ORAL at 08:18

## 2018-08-05 RX ADMIN — HYDRALAZINE HYDROCHLORIDE 100 MG: 50 TABLET, FILM COATED ORAL at 21:07

## 2018-08-05 RX ADMIN — Medication 5 ML: at 14:00

## 2018-08-05 RX ADMIN — CIPROFLOXACIN HYDROCHLORIDE 500 MG: 500 TABLET, FILM COATED ORAL at 08:18

## 2018-08-05 RX ADMIN — CIPROFLOXACIN HYDROCHLORIDE 500 MG: 500 TABLET, FILM COATED ORAL at 21:07

## 2018-08-05 RX ADMIN — OXYCODONE HYDROCHLORIDE 2.5 MG: 5 TABLET ORAL at 15:32

## 2018-08-05 RX ADMIN — Medication 10 ML: at 05:36

## 2018-08-05 RX ADMIN — FUROSEMIDE 40 MG: 40 TABLET ORAL at 08:18

## 2018-08-05 RX ADMIN — ACETAMINOPHEN 650 MG: 325 SOLUTION ORAL at 06:35

## 2018-08-05 NOTE — PROGRESS NOTES
Hospitalist Progress Note    2018  Admit Date: 2018  9:55 PM   NAME: Garcia Castle   :  1950   MRN:  [id number]   Attending: Izabela Ibrahim MD  PCP:  Teresa Powers DO    SUBJECTIVE:   Patient 66E with pmhx of cva, tob abuse, htn, RLS and depression called EMS for LE weakness and was transported to ED. On ED arrival o2 sat was 85% on room air as well as accelerated HTN. CT unfortunately showed right thalamic bleed. Has hx of complex AVM of brain which appears stable. Er contacted neurosx who recommended bp control and supportive care. Spoke with Dr Andres Wakefield - no indication for treatment of AVM as mortality rates for correction are very high. NGT placed for feeds/meds, plan to get PEG on 8/3.      - She remains Lethargic, responds to voice, answers questions, still has left sided flaccid paralysis. ROS: Unable to review due to drowsiness. PHYSICAL EXAM     Visit Vitals    /53 (BP 1 Location: Right arm, BP Patient Position: At rest)    Pulse 73    Temp 98.6 °F (37 °C)    Resp 18    Ht 5' 4\" (1.626 m)    Wt 123.2 kg (271 lb 9.7 oz)    SpO2 97%    BMI 46.62 kg/m2      Temp (24hrs), Av.7 °F (37.1 °C), Min:98 °F (36.7 °C), Max:99.5 °F (37.5 °C)    Oxygen Therapy  O2 Sat (%): 97 % (18 0730)  Pulse via Oximetry: 79 beats per minute (18)  O2 Device: Nasal cannula (18)  O2 Flow Rate (L/min): 4 l/min (18)    Intake/Output Summary (Last 24 hours) at 18 0909  Last data filed at 18 0010   Gross per 24 hour   Intake              350 ml   Output             2050 ml   Net            -1700 ml      General: Mild distress     Head:  AT/NC  Neck:  Supple  Lungs:  CTA Bilaterally. Heart:  Regular rate and rhythm,  No murmur, rub, or gallop  Abdomen: Soft, Non distended, Non tender, Positive bowel sounds  Extremities: No cyanosis, clubbing or edema  Neurologic:  Left sided flaccid paralysis, Follows some commands, opens mouth.  Moves left hand and foot today  Skin:  No Rash noted    ASSESSMENT      Active Hospital Problems    Diagnosis Date Noted    Cerebral edema ([facility] 08/01/2018    [facility] 08/01/2018    UTI (urinary tract infection) 07/31/2018    Pulmonary edema 07/31/2018    [facility]raumatic [facility]amic [facility] 07/30/2018    Hypertensive urgency 07/30/2018    Chronic pain of multiple sites 07/30/2018    Cerebral AV malformation 07/30/2018    [facility] 01/22/2018    Morbid obesity with BMI of 40.0-44.9, adult ([facility] 01/19/2015    Edema of both legs 01/19/2015     PLAN:    - Non traumatic thalamic hemorrhage - BP goal <140/90, failed swallow eval. NGT w/ feeds placed 7/30. Neuro following. MRI shows right thalamic hemorrhage and large AVM. no indication for intervention as per prior MDs discussion w/ Dr Isael Samples  - Dysphagia - s/p PEG. Started meds and feeds via PEG.   - HTN emergency -Resolved, c/w current meds  - Seizue d/o - On keppra per Neuro, switched to PO.  - Chronic pain - low dose oxy prn, home dose. Watch for sedation.  - E Coli and Kleb Pneumo UTI - DC rocephin, start Cipro PO for 3 days EOT 8/7.   - Pulm edema -improved, now on oral lasix, Echo EF 55% with Grade 1DD. High Risk Pt. DVT Prophylaxis: SCD    Dispo - has bed ready at [facility] when medically ready. Probably Mon.      Signed By: Velvet Velasquez MD     August 5, 2018

## 2018-08-06 VITALS
DIASTOLIC BLOOD PRESSURE: 61 MMHG | TEMPERATURE: 98.4 F | HEIGHT: 64 IN | HEART RATE: 56 BPM | BODY MASS INDEX: 46.37 KG/M2 | SYSTOLIC BLOOD PRESSURE: 105 MMHG | OXYGEN SATURATION: 97 % | WEIGHT: 271.61 LBS | RESPIRATION RATE: 17 BRPM

## 2018-08-06 PROCEDURE — 74011250637 HC RX REV CODE- 250/637: Performed by: INTERNAL MEDICINE

## 2018-08-06 PROCEDURE — 99232 SBSQ HOSP IP/OBS MODERATE 35: CPT | Performed by: PSYCHIATRY & NEUROLOGY

## 2018-08-06 PROCEDURE — 97530 THERAPEUTIC ACTIVITIES: CPT

## 2018-08-06 RX ORDER — OXYCODONE HYDROCHLORIDE 5 MG/1
2.5 TABLET ORAL
Qty: 5 TAB | Refills: 0 | Status: ON HOLD | OUTPATIENT
Start: 2018-08-06 | End: 2019-02-05 | Stop reason: SDUPTHER

## 2018-08-06 RX ORDER — CIPROFLOXACIN 500 MG/1
500 TABLET ORAL EVERY 12 HOURS
Qty: 3 TAB | Refills: 0 | Status: SHIPPED
Start: 2018-08-06 | End: 2018-08-08

## 2018-08-06 RX ORDER — OXYCODONE HYDROCHLORIDE 5 MG/1
2.5 TABLET ORAL
Qty: 5 TAB | Refills: 0 | Status: SHIPPED | OUTPATIENT
Start: 2018-08-06 | End: 2018-08-06

## 2018-08-06 RX ORDER — HYDRALAZINE HYDROCHLORIDE 100 MG/1
100 TABLET, FILM COATED ORAL 3 TIMES DAILY
Qty: 90 TAB | Refills: 2 | Status: SHIPPED
Start: 2018-08-06

## 2018-08-06 RX ADMIN — SERTRALINE HYDROCHLORIDE 200 MG: 100 TABLET ORAL at 08:09

## 2018-08-06 RX ADMIN — CIPROFLOXACIN HYDROCHLORIDE 500 MG: 500 TABLET, FILM COATED ORAL at 08:09

## 2018-08-06 RX ADMIN — Medication 10 ML: at 05:08

## 2018-08-06 RX ADMIN — FUROSEMIDE 40 MG: 40 TABLET ORAL at 08:09

## 2018-08-06 RX ADMIN — OXYCODONE HYDROCHLORIDE 2.5 MG: 5 TABLET ORAL at 08:09

## 2018-08-06 RX ADMIN — AMLODIPINE BESYLATE 10 MG: 10 TABLET ORAL at 08:09

## 2018-08-06 RX ADMIN — HYDRALAZINE HYDROCHLORIDE 100 MG: 50 TABLET, FILM COATED ORAL at 05:08

## 2018-08-06 RX ADMIN — LEVETIRACETAM 1500 MG: 500 SOLUTION ORAL at 08:10

## 2018-08-06 RX ADMIN — LOSARTAN POTASSIUM 100 MG: 50 TABLET ORAL at 08:09

## 2018-08-06 NOTE — DISCHARGE SUMMARY
Hospitalist Discharge Summary     Patient ID:  Caty Ray  [id number]  71 y.o.  1950  Admit date: 7/29/2018  9:55 PM  Discharge date and time: 8/6/2018  Attending: Fallon Rodriguez MD  PCP:  Patti Hernandez DO  Treatment Team: Attending Provider: Fallon Rodriguez MD; Consulting Provider: Benji Veloz MD; Care Manager: Gely Franz, RN; Utilization Review: Faby Villanueva RN; Consulting Provider: Fidel Felipe NP; Consulting Provider: Manoj Griffin DO; Care Manager: Edelmira Crain, RN; Speech Language Pathologist: Abbey Severin, NORRIS; Charge Nurse: Phill Calvillo    Principal Diagnosis Nontraumatic thalamic hemorrhage [facility])   Principal Problem:    Nontraumatic thalamic hemorrhage ([address] (7/30/2018)    Active Problems:    Edema of both legs (1/19/2015)      Morbid obesity with BMI of 40.0-44.9, adult ([address] (1/19/2015)      Seizure disorder ([address] (1/22/2018)      Chronic right hip pain (6/18/2018)      Hypertensive urgency (7/30/2018)      Chronic pain of multiple sites (7/30/2018)      Cerebral AV malformation (7/30/2018)      UTI (urinary tract infection) (7/31/2018)      Pulmonary edema (7/31/2018)      Cerebral edema ([address] (8/1/2018)      Brain compression ([address] (8/1/2018)       HPI:    Patient is a 76 y.o. female who presents to the ER due to legs \"feeling like jello\". She reported jittery legs and weakness to ER staff. She has had increased difficulty walking but no fall reported. EMS was called; they reported an O2 sat of 85% on room air. Pt has complained of multiple sites of pain and some vague symptoms while in ER. Difficult to determine what is new vs baseline, as she does have chronic pain of multiple sites. ER workup shows continued mild hypoxia and accelerated HTN. Head CT unfortunately shows a rt thalamic bleed. 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   ER contacted neurosurgery, who recommended BP control and supportive care.  There is no recommended surgical intervention. Denies fever/chills, n/v/d, SOB, cough/congestion. Hospital Course:    Pt was admitted to ICU and seen by Neurology and Neurosurgery. Case was discussed with Dr Bryant Holland - no indication for treatment of AVM as mortality rates for correction are very high. She was started on Keppra 1.5Gm bid per Neurology. She did have good BP control on current oral meds regimen with Goal SBP < 140. She was moved out of ICU and seen by PT/OT and SLP. She unfortunately failed MBS and PEG was placed by GI Dr. Manuela Silva. Started on Rocephin for UTI and Urine Cx was +ve for Pan Sensitive Kleb Pneumo and E Coli, pt will complete treatment with Cipro for 2 more days. She also did develop some Pulm edema which improved with lasix diuresis. TTE showed nl EF with Grade 1 DD. She is currently slowly improving from the hemorrhagic stroke and will hopefully continue to improve with PT per Neurology Dr. Valarie Fernandez. She is more alert and awake today. PEG tube feeds are running per recommendations from Dietary. She currenly has Left sided Paresis, can have slight movement in Left arm and Left leg, barely able to lift them off the bed. Plan discussed with pt. All questions answered. Pt was stable at time of discharge. Follow up as per below. Significant Diagnostic Imaging:     CT head: 7/30/18    Findings:     Again, there are numerous calcifications involving right posterior parietal area  extending in the right occipital region with abnormal appearing vessels also in  the left frontal region and left middle cranial fossa. The prior MRI showed this  patient have a complex arteriovenous malformation. On today's exam, there is  hyperdense acute hemorrhage in the right thalamus. This measures approximately 2  x 1.2 cm. See axial images 12 through 15. Ventricles are normal in size. No  extra-axial abnormality. . Mastoid air cells and visualized paranasal sinuses are  aerated and unremarkable.     IMPRESSION  Impression:   1. Changes consistent with complex arteriovenous malformation as demonstrated on  prior exams.   2. Hyperdense acute hemorrhage in the right thalamus    CTA Head:    IMPRESSION: There is a very extensive arterial venous malformation, with  extensive calcifications, predominantly in the right posterior parietal  occipital location however also in the left hemisphere. Area of hemorrhage seen  earlier today is less conspicuous following intravenous contrast administration  however persists unchanged. Malformation obscures the normal vascularity of the  posterior fossa.     Overall, the blood vessels on the MRI obtained in 2017 and today's examination  are essentially unchanged except for the presence of the small right posterior  thalamic hemorrhage. CT head 8/3:    FINDINGS:  The right thalamic hemorrhage is stable in size to slightly smaller,  currently measuring 1.2 x 2.2 cm. There is also slight decrease in midline  shift, now measured at 3-4 mm. Partially calcified AVM noted in the right posterior cerebral hemisphere with  abnormally dilated vascular structures crossing the midline and at the lateral  margin of the left cerebral hemisphere and left parasellar region. Ventricular  size is unchanged. No new areas of hemorrhage are evident.     IMPRESSION  IMPRESSION:    1. The high attenuation right thalamic hemorrhage measures slightly smaller than  the comparison study and there is slight decrease in midline shift. 2. No new areas of hemorrhage. 3. Partially calcified AVM again seen. Labs: Results:       Chemistry No results for input(s): GLU, NA, K, CL, CO2, BUN, CREA, CA, AGAP, BUCR, TBIL, GPT, AP, TP, ALB, GLOB, AGRAT in the last 72 hours. CBC w/Diff No results for input(s): WBC, RBC, HGB, HCT, PLT, GRANS, LYMPH, EOS, HGBEXT, HCTEXT, PLTEXT in the last 72 hours. Cardiac Enzymes No results for input(s): CPK, CKND1, CHEIKH in the last 72 hours.     No lab exists for component: CKRMB, TROIP   Coagulation No results for input(s): PTP, INR, APTT in the last 72 hours. No lab exists for component: INREXT    Last A1c Lab Results   Component Value Date/Time    Hemoglobin A1c 5.6 06/12/2018 11:26 AM    Hemoglobin A1c, External 5.7 12/10/2014      Lipid Panel Lab Results   Component Value Date/Time    Cholesterol, total 190 07/30/2018 04:26 AM    HDL Cholesterol 69 (H) 07/30/2018 04:26 AM    LDL, calculated 108 (H) 07/30/2018 04:26 AM    VLDL, calculated 13 07/30/2018 04:26 AM    Triglyceride 65 07/30/2018 04:26 AM    CHOL/HDL Ratio 2.8 07/30/2018 04:26 AM      BNP No results for input(s): BNPP in the last 72 hours. Liver Enzymes No results for input(s): TP, ALB, TBIL, AP, SGOT, GPT in the last 72 hours. No lab exists for component: DBIL   Thyroid Studies Lab Results   Component Value Date/Time    TSH 1.590 12/23/2017 01:44 PM            Discharge Exam:  Patient Vitals for the past 24 hrs:   Temp Pulse Resp BP SpO2   08/06/18 0800 98.5 °F (36.9 °C) 68 17 121/63 95 %   08/06/18 0435 99.4 °F (37.4 °C) (!) 59 17 131/65 97 %   08/05/18 2312 99.1 °F (37.3 °C) (!) 59 17 110/67 97 %   08/05/18 1910 99 °F (37.2 °C) 64 17 106/64 96 %   08/05/18 1532 - 72 - 100/61 -   08/05/18 1525 98.7 °F (37.1 °C) 72 18 90/54 96 %     Visit Vitals    /63 (BP 1 Location: Right arm, BP Patient Position: At rest)    Pulse 68    Temp 98.5 °F (36.9 °C)    Resp 17    Ht 5' 4\" (1.626 m)    Wt 123.2 kg (271 lb 9.7 oz)    SpO2 95%    BMI 46.62 kg/m2     General appearance: alert, cooperative, no distress  Lungs: CTABL  Heart: RRR, no m/r/g  Abdomen: soft, non-tender. Bowel sounds normal. PEG in place  Extremities: no cyanosis. Neurologic: Left hemiparesis    Disposition: SNF  Discharge Condition: stable  Patient Instructions: SBP goal Less than 140.   Diet and meds per PEG tube per Nutrition Recs  Activity as tolerated  Current Discharge Medication List      START taking these medications    Details   levETIRAcetam (KEPPRA) 100 mg/ml soln oral solution 15 mL by Per G Tube route two (2) times a day for 30 days. Qty: 900 mL, Refills: 3      ciprofloxacin HCl (CIPRO) 500 mg tablet Take 1 Tab by mouth every twelve (12) hours for 3 doses. Qty: 3 Tab, Refills: 0         CONTINUE these medications which have CHANGED    Details   hydrALAZINE (APRESOLINE) 100 mg tablet 1 Tab by Per NG tube route three (3) times daily. Qty: 90 Tab, Refills: 2      oxyCODONE IR (ROXICODONE) 5 mg immediate release tablet 0.5 Tabs by Per NG tube route every eight (8) hours as needed. Max Daily Amount: 7.5 mg.  Qty: 5 Tab, Refills: 0    Associated Diagnoses: Chronic pain of multiple sites         CONTINUE these medications which have NOT CHANGED    Details   amLODIPine (NORVASC) 10 mg tablet Take 10 mg by mouth daily. mirabegron ER (MYRBETRIQ) 50 mg ER tablet Take 1 Tab by mouth daily. Qty: 14 Tab, Refills: 0    Associated Diagnoses: Nocturia; Urinary frequency; OAB (overactive bladder)      losartan (COZAAR) 100 mg tablet Take 1 Tab by mouth daily. Indications: hypertension  Qty: 30 Tab, Refills: 11    Associated Diagnoses: Essential hypertension      ergocalciferol (VITAMIN D2) 50,000 unit capsule TAKE 1 CAPSULE ONE TIME WEEKLY- takes on sat  Qty: 5 Cap, Refills: 11    Associated Diagnoses: Vitamin D deficiency      sertraline (ZOLOFT) 100 mg tablet Take 2 Tabs by mouth daily. Qty: 180 Tab, Refills: 3    Associated Diagnoses: Moderate episode of recurrent major depressive disorder (HCC)         STOP taking these medications       levETIRAcetam 1,000 mg tablet Comments:   Reason for Stopping:         ibuprofen (MOTRIN) 200 mg tablet Comments:   Reason for Stopping:               Vaccinations:   Pt screened by nursing for pneumococcal and influenza vaccination needs at discharge, will be ordered if needed and pt consented.   Immunization History   Administered Date(s) Administered    Influenza High Dose Vaccine PF 08/25/2015, 01/06/2017    Influenza Vaccine 09/21/2010, 09/09/2011, 10/31/2013, 10/15/2014    Pneumococcal Conjugate (PCV-13) 06/03/2015    Pneumococcal Polysaccharide (PPSV-23) 01/03/2018    TB Skin Test (PPD) Intradermal 05/05/2015, 12/22/2017, 07/30/2018    Td, Adsorbed PF 11/24/2015    ZZZ-RETIRED (DO NOT USE) Pneumococcal Vaccine (Unspecified Type) 02/10/2012    Zoster Vaccine, Live 06/21/2012       Follow-up Information     Follow up With Details Comments [address] [address]  [address] [place] [address]  [phone]            Time spent in the discharge process was 35 minutes.       Signed By: Hailee Dudley MD     August 6, 2018

## 2018-08-06 NOTE — PROGRESS NOTES
Patient is discharging on this date to [place] [id number]. Patient and spouse have been informed. RN informed and provided with report contact number. Evangelista Sagastume scheduled for 1:00PM. No other needs at this time. Care Management Interventions  PCP Verified by CM:  Yes  Mode of Transport at Discharge: BLS [facility] Chain)  Transition of Care Consult (CM Consult): SNF (STR)  Partner SNF: Yes  Discharge Durable Medical Equipment: No (To be ordered at SNF if needed)  Physical Therapy Consult: Yes  Occupational Therapy Consult: Yes  Speech Therapy Consult: Yes  Current Support Network: Own Home, Lives with Spouse  Confirm Follow Up Transport: Family  Plan discussed with Pt/Family/Caregiver: Yes  Freedom of Choice Offered: Yes   Resource Information Provided?:  (confirms [employer])  Discharge Location  Discharge Placement: Rehab Unit Subacute

## 2018-08-06 NOTE — PROGRESS NOTES
Palliative Care Progress Note    Patient: Henrry Nino MRN: [id number]  SSN: [id number]    YOB: 1950  Age: 76 y.o. Sex: female       Assessment/Plan:     Chief Complaint/Interval History: Preparing for d/c to STR       Principal Diagnosis:    · Debility, Unspecified  R53.81    Additional Diagnoses:   · Fatigue, Lethargy  R53.83  · Frailty  R54  · Pain, limb  M79.609  · Counseling, Encounter for Medical Advice  Z71.9  · Encounter for Palliative Care  Z51.5    Palliative Performance Scale (PPS)  PPS: 30    Medical Decision Making:   Reviewed and summarized notes since last palliative care visit. Discussed case with appropriate providers: Jr Law RN  Reviewed laboratory and x-ray data since last palliative care visit. Pt resting in bed, no family at bedside. Pt is in easy chair, awaiting d/c to STR. I raise question of code status, which we did not have the opportunity to discuss while the pt was in the ICU. The pt immediately responded, \"No resuscitation. I don't want to be resuscitated. \"  When asked if she would want to be intubated if needed to keep her breathing, she repeated, \"I don't want to be resuscitated\". I said that I would make the change in the chart, and the pt was in agreement. The pt's  is her health care decision maker. The pt was looking uncomfortable and unhappy. RORY Bernard and I tried to reassure her that rehab would likely improve her strength and her ability to transfer. Will discuss findings with members of the interdisciplinary team.          More than 50% of this 15 minute visit was spent counseling and coordination of care as outlined above. Subjective:     Review of Systems:  A comprehensive review of systems was negative except for:   Constitutional: Positive for malaise, fatigue. Musculoskeletal: Positive for discomfort in right leg.      Objective:     Visit Vitals    /61 (BP 1 Location: Right arm, BP Patient Position: Sitting)  Pulse (!) 56    Temp 98.4 °F (36.9 °C)    Resp 17    Ht 5' 4\" (1.626 m)    Wt 123.2 kg (271 lb 9.7 oz)    SpO2 97%    BMI 46.62 kg/m2       Physical Exam:    General:  Cooperative. No acute distress. Eyes:  Conjunctivae/corneas clear    Nose: Nares normal. Septum midline.  DH feeding tube   Neck: Supple, symmetrical, trachea midline   Lungs:   Clear to auscultation bilaterally, unlabored   Heart:  Regular rate and rhythm    Abdomen:   Soft, non-tender, non-distended   Extremities: Normal, atraumatic, no cyanosis or edema   Skin: Skin color, texture, turgor normal. Scattered bruising    Neurologic: Nonfocal.  Left arm weakness   Psych: Alert and oriented     Signed By: Kian Desir NP     August 6, 2018

## 2018-08-06 NOTE — PROGRESS NOTES
CM met with patient on this date regarding her preferences for STR. Patient confirmed she prefers to go to [facility] for STR at discharge. JUDI also spoke with Sandy Healy in admissions at [facility] who confirmed patient's insurance requires notification only, and she can be admitted to facility whenever medically ready.

## 2018-08-06 NOTE — ACP (ADVANCE CARE PLANNING)
Pt resting in bed, no family at bedside. Pt is in easy chair, awaiting d/c to STR. I raise question of code status, which we did not have the opportunity to discuss while the pt was in the ICU. The pt immediately responded, \"No resuscitation. I don't want to be resuscitated. \"  When asked if she would want to be intubated if needed to keep her breathing, she repeated, \"I don't want to be resuscitated\". I said that I would make the change in the chart, and the pt was in agreement. The pt's  is her health care decision maker.

## 2018-08-06 NOTE — PROGRESS NOTES
Problem: Mobility Impaired (Adult and Pediatric)  Goal: *Acute Goals and Plan of Care (Insert Text)  STG:  (1.)Ms. Herron will move from supine to sit and sit to supine , scoot up and down and roll side to side with MAXIMAL ASSIST within 5 treatment day(s). (2.)Ms. Herron will tolerate 1 hour of chair position with stable vital signs within 5 treatment days to improve activity tolerance for functional mobility. (3.)Ms. Herron will perform LE exercises with 3 to 5 cues for form within 5 days to improve strength for functional transfers. LTG:  (1.)Ms. Herron will move from supine to sit and sit to supine , scoot up and down and roll side to side in bed with MODERATE ASSIST within 10 treatment day(s). (2.)Ms. Herron will transfer from bed to chair and chair to bed with MAXIMAL ASSIST using the least restrictive device within 10 treatment day(s). (3.)Ms. Herron will perform sit to stand transfer with MAXIMAL ASSIST to prepare for ambulation within 10 treatment days. (4.)Ms. Herron will ambulate with MAXIMAL ASSIST for 3 feet with the least restrictive device within 10 treatment day(s). Will update goals as patient is able.   ________________________________________________________________________________________________    PHYSICAL THERAPY: Daily Note, Treatment Day: 3rd, AM 8/6/2018  INPATIENT: Hospital Day: 9  Payor: [facility] MEDICARE COMPLETE / Plan: [address] / Product Type: Managed Care Medicare /      NAME/AGE/GENDER: Jennifer Vera is a 76 y.o. 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 Arpit Macedo was supine upon contact and agreeable to PT. Patient is drowsy but oriented. Patient presents with left sided weakness and inattention. She is also hard of hearing with delayed command following noted. Patient is doing better overall today compared to Saturday. Patient able to perform supine to sit with max assist, additional time, and cues for improved/proper technique. Patient sits EOB x 15 minutes working on posture, midline trunk sitting, activity tolerance, static/dynamic sitting balance, and attention to the left side exercises. Patient demonstrates mostly poor sitting balance but occasional fair sitting balance with cues for improved trunk control. Patient fatigues quickly and requires almost constant cues to maintain posture and midline sitting. Patient attempted one transfer to standing requiring max assist x 2 and cues for improved/proper technique. Patient unable to achieve full standing but gave it a great effort. Patient returns to supine where she was sheet transferred to recliner chair. Overall slow progress towards physical therapy goals. No goals have been met thus far. Will continue efforts. This section established at most recent assessment   PROBLEM LIST (Impairments causing functional limitations):  1. Decreased Strength  2. Decreased ADL/Functional Activities  3. Decreased Transfer Abilities  4. Decreased Ambulation Ability/Technique  5. Decreased Balance  6. Increased Pain  7. Decreased Activity Tolerance  8. Decreased Pacing Skills  9. Decreased Knowledge of Precautions  10. Decreased [place] with Home Exercise Program  11. Decreased Cognition   INTERVENTIONS PLANNED: (Benefits and precautions of physical therapy have been discussed with the patient.)  1. Balance Exercise  2. Bed Mobility  3. Family Education  4.  Gait Training  5. Home Exercise Program (HEP)  6. Therapeutic Activites  7. Therapeutic Exercise/Strengthening  8. Transfer Training  9. Patient Education  10. Group Therapy     TREATMENT PLAN: Frequency/Duration: 3 times a week for duration of hospital stay  Rehabilitation Potential For Stated Goals: Good     RECOMMENDED REHABILITATION/EQUIPMENT: (at time of discharge pending progress): Due to the probability of continued deficits (see above) this patient will likely need continued skilled physical therapy after discharge. Equipment:    None at this time              HISTORY:   History of Present Injury/Illness (Reason for Referral):  Patient history was obtained from the ER provider prior to seeing the patient.     Patient is a 76 y.o. female who presents to the ER due to legs \"feeling like jello\". She reported jittery legs and weakness to ER staff. She has had increased difficulty walking but no fall reported. EMS was called; they reported an O2 sat of 85% on room air. Pt has complained of multiple sites of pain and some vague symptoms while in ER. Difficult to determine what is new vs baseline, as she does have chronic pain of multiple sites. ER workup shows continued mild hypoxia and accelerated HTN. Head CT unfortunately shows a rt thalamic bleed. 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 ER contacted neurosurgery, who recommended BP control and supportive care. There is no recommended surgical intervention. Denies fever/chills, n/v/d, SOB, cough/congestion  Pt states that she is going to die. She is offered reassurance. Past Medical History/Comorbidities:   Ms. Lilibeth Cruz  has a past medical history of Chronic pain; Chronic sinusitis (1/19/2015); Diverticulosis (2017); Edema (1/19/2015); Former smoker; GERD (gastroesophageal reflux disease); Headache (1/19/2015); Hearing loss (1/19/2015); Heart murmur (1990);  Hip osteoarthritis (1/19/2015); colonic polyps (2017); migraines; Hypercholesterolemia (1/19/2015); Hypertension; Impaired fasting glucose (1/19/2015); Joint disorder (1/19/2015); Morbid obesity ([address] Neuralgia (1/19/2015); Phlebitis and thrombophlebitis of superficial vessels of lower extremities (01/19/2015); Psychiatric disorder; Restless leg syndrome (1/19/2015); Rheumatic fever; Seizures ([address] SOB (shortness of breath) on exertion; Stroke ([address] Tobacco abuse (1/19/2015); Vascular anomalies, congenital; and Vitamin D deficiency (1/19/2015). She also has no past medical history of Adverse effect of anesthesia; Aneurysm ([address] Arrhythmia; Asthma; Autoimmune disease ([address] CAD (coronary artery disease); Cancer ([address] Chronic kidney disease; Chronic obstructive pulmonary disease ([address] Coagulation disorder ([address] Diabetes ([address] Difficult intubation; Endocarditis; Heart failure ([address] Liver disease; Malignant hyperthermia due to anesthesia; Nausea & vomiting; Pseudocholinesterase deficiency; PUD (peptic ulcer disease); Sleep apnea; Thromboembolus ([address] or Thyroid disease. Ms. Dimas Conway  has a past surgical history that includes hx mohs procedure (Right, 1995); hx cholecystectomy (1982); colonoscopy (N/A, 2/20/2017); pr total hip arthroplasty (Right, 2005); hx rotator cuff repair (Right, 10/31/2016); hx cataract removal (Bilateral, 3/2016); hx tubal ligation (1982); and hx appendectomy (03/15/2017). Social History/Living Environment:   Home Environment: Private residence  # Steps to Enter: 0  One/Two Story Residence: One story  Living Alone: No  Support Systems: Spouse/Significant Other/Partner  Patient Expects to be Discharged to[de-identified] Unknown  Current DME Used/Available at Home: Walker, rollator, Grab bars, Raised toilet seat  Tub or Shower Type: Tub/Shower combination  Prior Level of Function/Work/Activity:  Patient ambulates with a rollator walker, but reports she \"does not walk much. \"     Number of Personal Factors/Comorbidities that affect the Plan of Care: 3+: HIGH COMPLEXITY EXAMINATION:   Most Recent Physical Functioning:   Gross Assessment:                  Posture:     Balance:  Sitting: Impaired  Sitting - Static:  (mostly poor sitting balance with occasional fair with cues)  Sitting - Dynamic: Poor (constant support)  Standing: Impaired  Standing - Static: Poor  Standing - Dynamic : Poor Bed Mobility:  Supine to Sit: Maximum assistance  Wheelchair Mobility:     Transfers:  Sit to Stand: Maximum assistance;Assist x2 (unable to achieve full standing)  Stand to Sit: Maximum assistance;Assist x2  Gait:            Body Structures Involved:  1. Nerves  2. Muscles Body Functions Affected:  1. Mental  2. Sensory/Pain  3. Respiratory  4. Neuromusculoskeletal  5. Movement Related Activities and Participation Affected:  1. Learning and Applying Knowledge  2. General Tasks and Demands  3. Communication  4. Mobility  5. Self Care  6. Domestic Life  7. Interpersonal Interactions and Relationships  8. Community, Social and [place]   Number of elements that affect the Plan of Care: 4+: HIGH COMPLEXITY   CLINICAL PRESENTATION:   Presentation: Evolving clinical presentation with unstable and unpredictable characteristics: HIGH COMPLEXITY   CLINICAL DECISION MAKIN [facility] Inpatient Short Form  How much difficulty does the patient currently have. .. Unable A Lot A Little None   1. Turning over in bed (including adjusting bedclothes, sheets and blankets)? [] 1   [x] 2   [] 3   [] 4   2. Sitting down on and standing up from a chair with arms ( e.g., wheelchair, bedside commode, etc.)   [x] 1   [] 2   [] 3   [] 4   3. Moving from lying on back to sitting on the side of the bed? [] 1   [x] 2   [] 3   [] 4   How much help from another person does the patient currently need. .. Total A Lot A Little None   4. Moving to and from a bed to a chair (including a wheelchair)? [x] 1   [] 2   [] 3   [] 4   5. Need to walk in hospital room?    [x] 1   [] 2   [] 3   [] 4   6. Climbing 3-5 steps with a railing? [x] 1   [] 2   [] 3   [] 4   © 2007, Trustees of [address] [place] [address] [address] [id number], under license to [employer]. All rights reserved      Score:  Initial: 8 Most Recent: X (Date: -- )    Interpretation of Tool:  Represents activities that are increasingly more difficult (i.e. Bed mobility, Transfers, Gait). Score 24 23 22-20 19-15 14-10 9-7 6     Modifier CH CI CJ CK CL CM CN      ? Mobility - Walking and Moving Around:     - CURRENT STATUS: CM - 80%-99% impaired, limited or restricted    - GOAL STATUS: CL - 60%-79% impaired, limited or restricted    - D/C STATUS:  ---------------To be determined---------------  [facility] / Plan: [address] / Product Type: Managed Care Medicare /      Medical Necessity:     · Patient demonstrates good rehab potential due to higher previous functional level. Reason for Services/Other Comments:  · Patient continues to require modification of therapeutic interventions to increase complexity of exercises. Use of outcome tool(s) and clinical judgement create a POC that gives a: Difficult prediction of patient's progress: HIGH COMPLEXITY            TREATMENT:   (In addition to Assessment/Re-Assessment sessions the following treatments were rendered)   Pre-treatment Symptoms/Complaints:  See above  Pain: Initial:   Pain Intensity 1: 0  Post Session:  0/10     Therapeutic Activity: (    25 minutes): Therapeutic activities including bed mobility training, static/dynamic sitting balance training, trunk control at midline training, attention to the left side exercises, transfer training, scooting, posture training, and patient education to improve mobility, strength, balance and coordination. Required maximal verbal, tactile and manual cues   to promote static and dynamic balance in standing and promote coordination of bilateral, upper extremity(s), lower extremity(s). Braces/Orthotics/Lines/Etc:   · IV  · wu catheter  · O2 Device: Nasal cannula  Treatment/Session Assessment:    · Response to Treatment:  See above  · Interdisciplinary Collaboration:   o Physical Therapy Assistant  o Registered Nurse  · After treatment position/precautions:   o Up in chair  o Bed/Chair-wheels locked  o Bed in low position  o Call light within reach  o RN notified   · Compliance with Program/Exercises: Will assess as treatment progresses. · Recommendations/Intent for next treatment session: \"Next visit will focus on advancements to more challenging activities and reduction in assistance provided\".   Total Treatment Duration:  PT Patient Time In/Time Out  Time In: 0845  Time Out: [address] [facility]

## 2018-08-06 NOTE — PROGRESS NOTES
Report given to [facility] at [facility].  Pt to be transported at 1pm.

## 2018-08-06 NOTE — PROGRESS NOTES
Impression:    Multilobar AVM with hemorrhage, inoperable with acceptable risk. The annual risk of rebleed is 2-4% with a life time risk of about 30-40%. 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 Marla Garcia after discharge within a month. [place] [address] [address]. Interval history    Much more alert over weekend. No seizures.  Pending DC to rehab      Current Facility-Administered Medications:     ciprofloxacin HCl (CIPRO) tablet 500 mg, 500 mg, Oral, Q12H, Jakub Monroy MD, 500 mg at 08/06/18 0809    levETIRAcetam (KEPPRA) oral solution 1,500 mg, 1,500 mg, Per G Tube, BID, Jakub Monroy MD, 1,500 mg at 08/06/18 0810    amLODIPine (NORVASC) tablet 10 mg, 10 mg, Per NG tube, DAILY, Carrillo Romero DO, 10 mg at 08/06/18 0809    losartan (COZAAR) tablet 100 mg, 100 mg, Per NG tube, DAILY, Carrillo Romero DO, 100 mg at 08/06/18 0809    sertraline (ZOLOFT) tablet 200 mg, 200 mg, Per NG tube, DAILY, Carrillo Romero DO, 200 mg at 08/06/18 0809    acetaminophen (TYLENOL) solution 650 mg, 650 mg, Per NG tube, Q4H PRN, Carrillo Romero, DO, 650 mg at 08/05/18 1893    furosemide (LASIX) tablet 40 mg, 40 mg, Oral, DAILY, Mona Huitron DO, 40 mg at 08/06/18 0809    hydrALAZINE (APRESOLINE) tablet 100 mg, 100 mg, Per NG tube, TID, Carrillo Normans, DO, 100 mg at 08/06/18 0508    ondansetron [facility]) injection 4 mg, 4 mg, IntraVENous, Q4H PRN, Carrillo Romero, DO, 4 mg at 07/31/18 1000    hydrALAZINE (APRESOLINE) 20 mg/mL injection 20 mg, 20 mg, IntraVENous, Q6H PRN, Carrillo Romero, DO, 20 mg at 08/03/18 9905    oxyCODONE IR (ROXICODONE) tablet 2.5 mg, 2.5 mg, Per NG tube, Q12H PRN, Carrillo Romero DO, 2.5 mg at 08/06/18 0809    mirabegron ER (MYRBETRIQ) tablet 50 mg (Patient Supplied), 50 mg, Oral, DAILY, Mayank Chávez MD, Stopped at 07/30/18 0900    sodium chloride (NS) flush 5-10 mL, 5-10 mL, IntraVENous, Q8H, Mayank Chávez, MD, 10 mL at 08/06/18 0508    sodium chloride (NS) flush 5-10 mL, 5-10 mL, IntraVENous, PRN, Hugh Jain MD, 10 mL at 08/04/18 1011    naloxone [facility]) injection 0.4 mg, 0.4 mg, IntraVENous, PRN, Hugh Jain MD    0.9% sodium chloride infusion 250 mL, 250 mL, IntraVENous, PRN, Hugh Jain MD    LORazepam (ATIVAN) injection 1 mg, 1 mg, IntraVENous, EVERY 2 MINUTES AS NEEDED, Hugh Jain MD    NUTRITIONAL SUPPORT ELECTROLYTE PRN ORDERS, , Does Not Apply, PRN, Lincoln Hoop, DO    Visit Vitals    /61 (BP 1 Location: Right arm, BP Patient Position: Sitting)  Comment (BP Patient Position): recliner    Pulse (!) 56    Temp 98.4 °F (36.9 °C)    Resp 17    Ht 5' 4\" (1.626 m)    Wt 271 lb 9.7 oz (123.2 kg)    SpO2 97%    BMI 46.62 kg/m2     Patient alert  Able to converse fairly coherently  LT hemiparesis    No results found for this or any previous visit (from the past 24 hour(s)). MRI brain reviewed by me  MRI BRAIN WITHOUT and WITH CONTRAST.     HISTORY:  History of large arterial venous malformation and acute intracranial  hemorrhage.     COMPARISON:  December 2017, CTA of the head from July 30, 2018. Study performed  within 24 hours of admission.     TECHNIQUE:  Sagittal T1, axial T1, T2, FLAIR, gradient echo, diffusion with ADC  map were followed by 20cc intravenous gadolinium after which axial and coronal  T1 images were repeated. Intravenous contrast was given to increase specificity  of T2 abnormalities.       FINDINGS: There is an extensive, contrast-enhancing arteriovenous malformation,  detailed in the CTA report and prior MRI report. There is evidence of an acute  hemorrhage in the right thalamus. Diffusion images do not demonstrate any areas  of restricted diffusion to suggest acute infarction. Gradient echo images are  unremarkable. The lateral ventricles are normal size.   Orbits are grossly  normal.  Paranasal sinuses are clear.     IMPRESSION  IMPRESSION: Large extensive arteriovenous malformation as previously described  involving the right parietal-occipital lobes, also on the left hemisphere and to  a lesser degree both cerebellar hemispheres acute right thalamic hemorrhage.

## 2018-08-07 ENCOUNTER — PATIENT OUTREACH (OUTPATIENT)
Dept: CASE MANAGEMENT | Age: 68
End: 2018-08-07

## 2018-08-14 ENCOUNTER — HOSPITAL ENCOUNTER (OUTPATIENT)
Dept: LAB | Age: 68
Discharge: HOME | End: 2018-08-14

## 2018-08-14 LAB
ALBUMIN SERPL-MCNC: 3.3 G/DL (ref 3.2–4.6)
ALBUMIN/GLOB SERPL: 1.2 {RATIO} (ref 1.2–3.5)
ALP SERPL-CCNC: 94 U/L (ref 50–136)
ALT SERPL-CCNC: 35 U/L (ref 12–65)
ANION GAP SERPL CALC-SCNC: 4 MMOL/L (ref 7–16)
APPEARANCE UR: ABNORMAL
AST SERPL-CCNC: 25 U/L (ref 15–37)
BACTERIA URNS QL MICRO: 0 /HPF
BILIRUB SERPL-MCNC: 0.4 MG/DL (ref 0.2–1.1)
BILIRUB UR QL: NEGATIVE
BUN SERPL-MCNC: 17 MG/DL (ref 8–23)
CALCIUM SERPL-MCNC: 8.5 MG/DL (ref 8.3–10.4)
CASTS URNS QL MICRO: ABNORMAL /LPF
CHLORIDE SERPL-SCNC: 103 MMOL/L (ref 98–107)
CO2 SERPL-SCNC: 32 MMOL/L (ref 21–32)
COLOR UR: YELLOW
CREAT SERPL-MCNC: 0.73 MG/DL (ref 0.6–1)
EPI CELLS #/AREA URNS HPF: 0 /HPF
ERYTHROCYTE [DISTWIDTH] IN BLOOD BY AUTOMATED COUNT: 13.5 %
GLOBULIN SER CALC-MCNC: 2.8 G/DL (ref 2.3–3.5)
GLUCOSE SERPL-MCNC: 99 MG/DL (ref 65–100)
GLUCOSE UR STRIP.AUTO-MCNC: NEGATIVE MG/DL
HCT VFR BLD AUTO: 38.7 % (ref 35.8–46.3)
HGB BLD-MCNC: 12.3 G/DL (ref 11.7–15.4)
HGB UR QL STRIP: NEGATIVE
KETONES UR QL STRIP.AUTO: NEGATIVE MG/DL
LEUKOCYTE ESTERASE UR QL STRIP.AUTO: ABNORMAL
MCH RBC QN AUTO: 28.9 PG (ref 26.1–32.9)
MCHC RBC AUTO-ENTMCNC: 31.8 G/DL (ref 31.4–35)
MCV RBC AUTO: 90.8 FL (ref 79.6–97.8)
NITRITE UR QL STRIP.AUTO: NEGATIVE
NRBC # BLD: 0 K/UL (ref 0–0.2)
PH UR STRIP: 7.5 [PH] (ref 5–9)
PLATELET # BLD AUTO: 169 K/UL (ref 150–450)
PMV BLD AUTO: 10.1 FL (ref 9.4–12.3)
POTASSIUM SERPL-SCNC: 4.3 MMOL/L (ref 3.5–5.1)
PROT SERPL-MCNC: 6.1 G/DL (ref 6.3–8.2)
PROT UR STRIP-MCNC: NEGATIVE MG/DL
RBC # BLD AUTO: 4.26 M/UL (ref 4.05–5.2)
RBC #/AREA URNS HPF: ABNORMAL /HPF
SODIUM SERPL-SCNC: 139 MMOL/L (ref 136–145)
SP GR UR REFRACTOMETRY: 1.01 (ref 1–1.02)
UROBILINOGEN UR QL STRIP.AUTO: 0.2 EU/DL (ref 0.2–1)
WBC # BLD AUTO: 9.2 K/UL (ref 4.3–11.1)
WBC URNS QL MICRO: ABNORMAL /HPF

## 2018-08-14 PROCEDURE — 80053 COMPREHEN METABOLIC PANEL: CPT

## 2018-08-14 PROCEDURE — 81001 URINALYSIS AUTO W/SCOPE: CPT

## 2018-08-14 PROCEDURE — 85027 COMPLETE CBC AUTOMATED: CPT

## 2018-08-16 ENCOUNTER — APPOINTMENT (OUTPATIENT)
Dept: CT IMAGING | Age: 68
End: 2018-08-16
Attending: EMERGENCY MEDICINE
Payer: MEDICARE

## 2018-08-16 ENCOUNTER — HOSPITAL ENCOUNTER (EMERGENCY)
Age: 68
Discharge: HOME | End: 2018-08-16
Attending: EMERGENCY MEDICINE
Payer: MEDICARE

## 2018-08-16 VITALS
OXYGEN SATURATION: 97 % | BODY MASS INDEX: 48.02 KG/M2 | SYSTOLIC BLOOD PRESSURE: 140 MMHG | WEIGHT: 271 LBS | HEART RATE: 72 BPM | RESPIRATION RATE: 18 BRPM | TEMPERATURE: 98.5 F | DIASTOLIC BLOOD PRESSURE: 65 MMHG | HEIGHT: 63 IN

## 2018-08-16 DIAGNOSIS — N39.0 URINARY TRACT INFECTION WITHOUT HEMATURIA, SITE UNSPECIFIED: Primary | ICD-10-CM

## 2018-08-16 LAB
ALBUMIN SERPL-MCNC: 3.9 G/DL (ref 3.2–4.6)
ALBUMIN/GLOB SERPL: 1 {RATIO} (ref 1.2–3.5)
ALP SERPL-CCNC: 116 U/L (ref 50–136)
ALT SERPL-CCNC: 38 U/L (ref 12–65)
ANION GAP SERPL CALC-SCNC: 8 MMOL/L (ref 7–16)
AST SERPL-CCNC: 27 U/L (ref 15–37)
BACTERIA URNS QL MICRO: ABNORMAL /HPF
BASOPHILS # BLD: 0 K/UL
BASOPHILS NFR BLD: 0 % (ref 0–2)
BILIRUB SERPL-MCNC: 0.5 MG/DL (ref 0.2–1.1)
BUN SERPL-MCNC: 21 MG/DL (ref 8–23)
CALCIUM SERPL-MCNC: 9.1 MG/DL (ref 8.3–10.4)
CASTS URNS QL MICRO: 0 /LPF
CHLORIDE SERPL-SCNC: 99 MMOL/L (ref 98–107)
CO2 SERPL-SCNC: 32 MMOL/L (ref 21–32)
CREAT SERPL-MCNC: 0.85 MG/DL (ref 0.6–1)
CRYSTALS URNS QL MICRO: 0 /LPF
DIFFERENTIAL METHOD BLD: ABNORMAL
EOSINOPHIL # BLD: 0.1 K/UL
EOSINOPHIL NFR BLD: 1 % (ref 0.5–7.8)
EPI CELLS #/AREA URNS HPF: 0 /HPF
ERYTHROCYTE [DISTWIDTH] IN BLOOD BY AUTOMATED COUNT: 13.8 %
GLOBULIN SER CALC-MCNC: 4 G/DL (ref 2.3–3.5)
GLUCOSE SERPL-MCNC: 112 MG/DL (ref 65–100)
HCT VFR BLD AUTO: 42.7 % (ref 35.8–46.3)
HGB BLD-MCNC: 13.6 G/DL (ref 11.7–15.4)
IMM GRANULOCYTES # BLD: 0 K/UL
IMM GRANULOCYTES NFR BLD AUTO: 0 % (ref 0–5)
LYMPHOCYTES # BLD: 0.6 K/UL
LYMPHOCYTES NFR BLD: 7 % (ref 13–44)
MCH RBC QN AUTO: 29 PG (ref 26.1–32.9)
MCHC RBC AUTO-ENTMCNC: 31.9 G/DL (ref 31.4–35)
MCV RBC AUTO: 91 FL (ref 79.6–97.8)
MONOCYTES # BLD: 0.5 K/UL
MONOCYTES NFR BLD: 6 % (ref 4–12)
MUCOUS THREADS URNS QL MICRO: 0 /LPF
NEUTS SEG # BLD: 7.2 K/UL
NEUTS SEG NFR BLD: 85 % (ref 43–78)
NRBC # BLD: 0 K/UL (ref 0–0.2)
PLATELET # BLD AUTO: 173 K/UL (ref 150–450)
PMV BLD AUTO: 9.8 FL (ref 9.4–12.3)
POTASSIUM SERPL-SCNC: 4.6 MMOL/L (ref 3.5–5.1)
PROT SERPL-MCNC: 7.9 G/DL (ref 6.3–8.2)
RBC # BLD AUTO: 4.69 M/UL (ref 4.05–5.2)
RBC #/AREA URNS HPF: ABNORMAL /HPF
SODIUM SERPL-SCNC: 139 MMOL/L (ref 136–145)
WBC # BLD AUTO: 8.5 K/UL (ref 4.3–11.1)
WBC URNS QL MICRO: >100 /HPF

## 2018-08-16 PROCEDURE — 80053 COMPREHEN METABOLIC PANEL: CPT

## 2018-08-16 PROCEDURE — 81015 MICROSCOPIC EXAM OF URINE: CPT

## 2018-08-16 PROCEDURE — 99284 EMERGENCY DEPT VISIT MOD MDM: CPT | Performed by: EMERGENCY MEDICINE

## 2018-08-16 PROCEDURE — 85025 COMPLETE CBC W/AUTO DIFF WBC: CPT

## 2018-08-16 RX ORDER — CEPHALEXIN 250 MG/5ML
500 POWDER, FOR SUSPENSION ORAL 4 TIMES DAILY
Qty: 300 ML | Refills: 0 | Status: SHIPPED | OUTPATIENT
Start: 2018-08-16 | End: 2018-08-23

## 2018-08-16 RX ORDER — SODIUM CHLORIDE 0.9 % (FLUSH) 0.9 %
10 SYRINGE (ML) INJECTION
Status: DISCONTINUED | OUTPATIENT
Start: 2018-08-16 | End: 2018-08-16 | Stop reason: HOSPADM

## 2018-08-16 NOTE — DISCHARGE INSTRUCTIONS
Urinary Tract Infection in Women: Care Instructions  Your Care Instructions    A urinary tract infection, or UTI, is a general term for an infection anywhere between the kidneys and the urethra (where urine comes out). Most UTIs are bladder infections. They often cause pain or burning when you urinate. UTIs are caused by bacteria and can be cured with antibiotics. Be sure to complete your treatment so that the infection goes away. Follow-up care is a key part of your treatment and safety. Be sure to make and go to all appointments, and call your doctor if you are having problems. It's also a good idea to know your test results and keep a list of the medicines you take. How can you care for yourself at home? · Take your antibiotics as directed. Do not stop taking them just because you feel better. You need to take the full course of antibiotics. · Drink extra water and other fluids for the next day or two. This may help wash out the bacteria that are causing the infection. (If you have kidney, heart, or liver disease and have to limit fluids, talk with your doctor before you increase your fluid intake.)  · Avoid drinks that are carbonated or have caffeine. They can irritate the bladder. · Urinate often. Try to empty your bladder each time. · To relieve pain, take a hot bath or lay a heating pad set on low over your lower belly or genital area. Never go to sleep with a heating pad in place. To prevent UTIs  · Drink plenty of water each day. This helps you urinate often, which clears bacteria from your system. (If you have kidney, heart, or liver disease and have to limit fluids, talk with your doctor before you increase your fluid intake.)  · Urinate when you need to. · Urinate right after you have sex. · Change sanitary pads often. · Avoid douches, bubble baths, feminine hygiene sprays, and other feminine hygiene products that have deodorants.   · After going to the bathroom, wipe from front to back.  When should you call for help? Call your doctor now or seek immediate medical care if:    · Symptoms such as fever, chills, nausea, or vomiting get worse or appear for the first time.     · You have new pain in your back just below your rib cage. This is called flank pain.     · There is new blood or pus in your urine.     · You have any problems with your antibiotic medicine.    Watch closely for changes in your health, and be sure to contact your doctor if:    · You are not getting better after taking an antibiotic for 2 days.     · Your symptoms go away but then come back. Where can you learn more? Go to http://davian-joshua.info/. Enter P135 in the search box to learn more about \"Urinary Tract Infection in Women: Care Instructions. \"  Current as of: May 12, 2017  Content Version: 11.7  © 1794-2797 [employer]. Care instructions adapted under license by [employer] (which disclaims liability or warranty for this information). If you have questions about a medical condition or this instruction, always ask your healthcare professional. [address] any warranty or liability for your use of this information.

## 2018-08-16 NOTE — ED NOTES
Called pt  per pt request and let pt know that pt broke phone and requests a new one. Had in depth conversation with pt explaining process from here on out of plan of care as already described by physician. Pt complies.

## 2018-08-16 NOTE — ED NOTES
I have reviewed discharge instructions with the patient. The patient verbalized understanding. Patient left ED via Discharge Method: stretcher to Nursing Home with [address] transport. Opportunity for questions and clarification provided. Patient given 1 scripts. To continue your aftercare when you leave the hospital, you may receive an automated call from our care team to check in on how you are doing. This is a free service and part of our promise to provide the best care and service to meet your aftercare needs.  If you have questions, or wish to unsubscribe from this service please call [phone]. Thank you for Choosing our [facility] Emergency Department.

## 2018-08-16 NOTE — ED TRIAGE NOTES
Per ems called out to [facility] for lower abd pain x3 days. Patient states of n/v/d which has now resolved. Patient states of urinary fredquency. States at [facility] for previous stroke which she now has left sided deficits.

## 2018-08-16 NOTE — ED PROVIDER NOTES
Patient is a 76 y.o. female presenting with abdominal pain. The history is provided by the patient. Abdominal Pain    This is a new problem. The current episode started more than 2 days ago. The problem occurs constantly. The problem has not changed since onset. The pain is associated with an unknown factor. The pain is located in the RLQ and suprapubic region. The quality of the pain is sharp. The pain is at a severity of 8/10. The pain is severe. Associated symptoms include diarrhea, flatus, nausea and constipation. Pertinent negatives include no anorexia, no fever, no belching, no hematochezia, no melena, no vomiting, no dysuria, no frequency, no hematuria, no headaches, no arthralgias, no myalgias, no trauma, no chest pain and no back pain. Nothing worsens the pain. The pain is relieved by nothing. Past workup includes no CT scan. The patient's surgical history includes appendectomy and cholecystectomy.        Past Medical History:   Diagnosis Date    Chronic pain     \"all over body\"-takes Zoloft daily    Chronic sinusitis 1/19/2015    Diverticulosis 2017    Edema 1/19/2015    Resolved    Former smoker     GERD (gastroesophageal reflux disease)     Headache 1/19/2015    Hearing loss 1/19/2015    \"some\"- no hearing aids    Heart murmur 1990    Hip osteoarthritis 1/19/2015    feet, hands    Hx of colonic polyps 2017    SSA    Hx of migraines     on medication    Hypercholesterolemia 1/19/2015    Hypertension     Impaired fasting glucose 1/19/2015    Joint disorder 1/19/2015    Morbid obesity (HCC)     BMI 40    Neuralgia 1/19/2015    Phlebitis and thrombophlebitis of superficial vessels of lower extremities 01/19/2015    pt denies     Psychiatric disorder     Restless leg syndrome 1/19/2015    Rheumatic fever     pt reports possibly R.F.    Seizures ([address]     seizures as a child, has NOT had one since she was 13years old    SOB (shortness of breath) on exertion     Stroke ([address]     at age 5 only residual poor peripheral vision    Tobacco abuse 1/19/2015    quit smoking 4/2015    Vascular anomalies, congenital     Vitamin D deficiency 1/19/2015       Past Surgical History:   Procedure Laterality Date    COLONOSCOPY N/A 2/20/2017    Bjorn--appendiceal serrated sessile polyp, transverse and rectal hyperplastic--3 year recall    HX APPENDECTOMY  03/15/2017    HX CATARACT REMOVAL Bilateral 3/2016    HX CHOLECYSTECTOMY  1982    HX MOHS PROCEDURES Right 1995    pt denies    HX ROTATOR CUFF REPAIR Right 10/31/2016    x2    HX TUBAL LIGATION  1982    TOTAL HIP ARTHROPLASTY Right 2005    and again 5/2015 and another revision         Family History:   Problem Relation Age of Onset    Hypertension Mother     Cancer Mother      Bladder    Depression Mother     Parkinson's Disease Father     Alcohol abuse Brother     Alcohol abuse Son     No Known Problems Sister     No Known Problems Brother     Malignant Hyperthermia Neg Hx     Pseudocholinesterase Deficiency Neg Hx     Delayed Awakening Neg Hx     Post-op Nausea/Vomiting Neg Hx     Emergence Delirium Neg Hx     Post-op Cognitive Dysfunction Neg Hx     Other Neg Hx        Social History     Social History    Marital status:      Spouse name: N/A    Number of children: N/A    Years of education: N/A     Occupational History    Not on file. Social History Main Topics    Smoking status: Former Smoker     Packs/day: 1.00     Years: 50.00     Types: Cigarettes     Quit date: 3/29/2015    Smokeless tobacco: Never Used      Comment: quit 2 months and 2 weeks ago    Alcohol use No      Comment: Former- socially   [employer] Drug use: No    Sexual activity: Not on file     Other Topics Concern    Not on file     Social History Narrative         ALLERGIES: Codeine and Oxycodone    Review of Systems   Constitutional: Negative for fever. Cardiovascular: Negative for chest pain.    Gastrointestinal: Positive for abdominal pain, constipation, diarrhea, flatus and nausea. Negative for anorexia, hematochezia, melena and vomiting. Genitourinary: Negative for dysuria, frequency and hematuria. Musculoskeletal: Negative for arthralgias, back pain and myalgias. Neurological: Negative for headaches. All other systems reviewed and are negative. Vitals:    08/16/18 1204 08/16/18 1237   BP: 138/61    Pulse: 67    Resp: 18    Temp: 98.5 °F (36.9 °C)    SpO2: 97% 98%   Weight: 122.9 kg (271 lb)    Height: 5' 3\" (1.6 m)             Physical Exam   Constitutional: She is oriented to person, place, and time. She appears well-developed and well-nourished. No distress. HENT:   Head: Normocephalic and atraumatic. Eyes: Conjunctivae and EOM are normal. Pupils are equal, round, and reactive to light. Neck: Normal range of motion. Neck supple. Cardiovascular: Normal rate, regular rhythm, normal heart sounds and intact distal pulses. Pulmonary/Chest: Effort normal and breath sounds normal.   Abdominal: Soft. Bowel sounds are normal. She exhibits no distension and no mass. There is tenderness. There is no rebound and no guarding. tender in the right lower quadrant and suprapubic areas noted   Musculoskeletal: Normal range of motion. Neurological: She is alert and oriented to person, place, and time. Skin: Skin is warm and dry. Psychiatric: She has a normal mood and affect. Her behavior is normal.   Nursing note and vitals reviewed.        MDM  Number of Diagnoses or Management Options     Amount and/or Complexity of Data Reviewed  Clinical lab tests: ordered and reviewed  Tests in the radiology section of CPT®: ordered and reviewed  Independent visualization of images, tracings, or specimens: yes    Risk of Complications, Morbidity, and/or Mortality  Presenting problems: moderate  Diagnostic procedures: moderate  Management options: moderate    Patient Progress  Patient progress: stable        ED Course       Procedures

## 2018-08-16 NOTE — ED NOTES
Report from Katerina Garsia, [facility] for continuation of care.

## 2018-09-22 ENCOUNTER — APPOINTMENT (OUTPATIENT)
Dept: CT IMAGING | Age: 68
DRG: 178 | End: 2018-09-22
Attending: EMERGENCY MEDICINE
Payer: MEDICARE

## 2018-09-22 ENCOUNTER — APPOINTMENT (OUTPATIENT)
Dept: GENERAL RADIOLOGY | Age: 68
DRG: 178 | End: 2018-09-22
Attending: EMERGENCY MEDICINE
Payer: MEDICARE

## 2018-09-22 ENCOUNTER — APPOINTMENT (OUTPATIENT)
Dept: ULTRASOUND IMAGING | Age: 68
DRG: 178 | End: 2018-09-22
Attending: INTERNAL MEDICINE
Payer: MEDICARE

## 2018-09-22 ENCOUNTER — HOSPITAL ENCOUNTER (INPATIENT)
Age: 68
LOS: 3 days | Discharge: NURSING, REHAB OR LONG-TERM CARE | DRG: 178 | End: 2018-09-25
Attending: EMERGENCY MEDICINE | Admitting: INTERNAL MEDICINE
Payer: MEDICARE

## 2018-09-22 DIAGNOSIS — I10 ESSENTIAL HYPERTENSION: ICD-10-CM

## 2018-09-22 DIAGNOSIS — J96.21 ACUTE ON CHRONIC RESPIRATORY FAILURE WITH HYPOXIA (HCC): Primary | ICD-10-CM

## 2018-09-22 DIAGNOSIS — G40.909 SEIZURE DISORDER (HCC): Chronic | ICD-10-CM

## 2018-09-22 PROBLEM — J40 BRONCHITIS: Status: ACTIVE | Noted: 2018-09-22

## 2018-09-22 PROBLEM — J96.20 RESPIRATORY FAILURE, ACUTE-ON-CHRONIC (HCC): Status: ACTIVE | Noted: 2018-09-22

## 2018-09-22 PROBLEM — N13.30 HYDRONEPHROSIS: Status: ACTIVE | Noted: 2018-09-22

## 2018-09-22 PROBLEM — E66.01 MORBID OBESITY (HCC): Status: ACTIVE | Noted: 2018-09-22

## 2018-09-22 PROBLEM — N17.9 AKI (ACUTE KIDNEY INJURY) (HCC): Status: ACTIVE | Noted: 2018-09-22

## 2018-09-22 LAB
ALBUMIN SERPL-MCNC: 2.6 G/DL (ref 3.2–4.6)
ALBUMIN/GLOB SERPL: 0.6 {RATIO} (ref 1.2–3.5)
ALP SERPL-CCNC: 104 U/L (ref 50–136)
ALT SERPL-CCNC: 17 U/L (ref 12–65)
AMMONIA PLAS-SCNC: <10 UMOL/L (ref 11–32)
ANION GAP SERPL CALC-SCNC: 6 MMOL/L (ref 7–16)
ARTERIAL PATENCY WRIST A: POSITIVE
ARTERIAL PATENCY WRIST A: POSITIVE
AST SERPL-CCNC: 14 U/L (ref 15–37)
ATRIAL RATE: 66 BPM
BASE EXCESS BLDA CALC-SCNC: 4.6 MMOL/L (ref 0–3)
BASE EXCESS BLDA CALC-SCNC: 5.1 MMOL/L (ref 0–3)
BASOPHILS # BLD: 0 K/UL (ref 0–0.2)
BASOPHILS NFR BLD: 0 % (ref 0–2)
BDY SITE: ABNORMAL
BDY SITE: ABNORMAL
BILIRUB SERPL-MCNC: 0.3 MG/DL (ref 0.2–1.1)
BNP SERPL-MCNC: 210 PG/ML
BUN SERPL-MCNC: 39 MG/DL (ref 8–23)
CALCIUM SERPL-MCNC: 8.6 MG/DL (ref 8.3–10.4)
CALCULATED P AXIS, ECG09: 64 DEGREES
CALCULATED R AXIS, ECG10: 45 DEGREES
CALCULATED T AXIS, ECG11: 58 DEGREES
CHLORIDE SERPL-SCNC: 98 MMOL/L (ref 98–107)
CO2 SERPL-SCNC: 34 MMOL/L (ref 21–32)
COHGB MFR BLD: 0.3 % (ref 0.5–1.5)
CREAT SERPL-MCNC: 1.22 MG/DL (ref 0.6–1)
D DIMER PPP FEU-MCNC: 4.34 UG/ML(FEU)
DIAGNOSIS, 93000: NORMAL
DIFFERENTIAL METHOD BLD: ABNORMAL
DO-HGB BLD-MCNC: 7 % (ref 0–5)
EOSINOPHIL # BLD: 0.1 K/UL (ref 0–0.8)
EOSINOPHIL NFR BLD: 1 % (ref 0.5–7.8)
ERYTHROCYTE [DISTWIDTH] IN BLOOD BY AUTOMATED COUNT: 14.6 %
FIO2 ON VENT: 35 %
FIO2 ON VENT: 50 %
GLOBULIN SER CALC-MCNC: 4.1 G/DL (ref 2.3–3.5)
GLUCOSE SERPL-MCNC: 138 MG/DL (ref 65–100)
HCO3 BLDA-SCNC: 32 MMOL/L (ref 22–26)
HCO3 BLDA-SCNC: 33 MMOL/L (ref 22–26)
HCT VFR BLD AUTO: 33.8 % (ref 35.8–46.3)
HGB BLD-MCNC: 10.6 G/DL (ref 11.7–15.4)
HGB BLDMV-MCNC: 11.3 GM/DL (ref 11.7–15)
IMM GRANULOCYTES # BLD: 0 K/UL (ref 0–0.5)
IMM GRANULOCYTES NFR BLD AUTO: 1 % (ref 0–5)
LACTATE BLD-SCNC: 0.4 MMOL/L (ref 0.5–1.9)
LYMPHOCYTES # BLD: 0.6 K/UL (ref 0.5–4.6)
LYMPHOCYTES NFR BLD: 8 % (ref 13–44)
MCH RBC QN AUTO: 29.1 PG (ref 26.1–32.9)
MCHC RBC AUTO-ENTMCNC: 31.4 G/DL (ref 31.4–35)
MCV RBC AUTO: 92.9 FL (ref 79.6–97.8)
METHGB MFR BLD: 0.3 % (ref 0–1.5)
MONOCYTES # BLD: 1.1 K/UL (ref 0.1–1.3)
MONOCYTES NFR BLD: 14 % (ref 4–12)
NEUTS SEG # BLD: 6 K/UL (ref 1.7–8.2)
NEUTS SEG NFR BLD: 77 % (ref 43–78)
NRBC # BLD: 0 K/UL (ref 0–0.2)
OXYHGB MFR BLDA: 92.1 % (ref 94–97)
P-R INTERVAL, ECG05: 160 MS
PCO2 BLDA: 59 MMHG (ref 35–45)
PCO2 BLDA: 63 MMHG (ref 35–45)
PH BLDA: 7.34 [PH] (ref 7.35–7.45)
PH BLDA: 7.35 [PH] (ref 7.35–7.45)
PLATELET # BLD AUTO: 138 K/UL (ref 150–450)
PMV BLD AUTO: 10.1 FL (ref 9.4–12.3)
PO2 BLDA: 149 MMHG (ref 80–105)
PO2 BLDA: 68 MMHG (ref 80–105)
POTASSIUM SERPL-SCNC: 5.2 MMOL/L (ref 3.5–5.1)
PROT SERPL-MCNC: 6.7 G/DL (ref 6.3–8.2)
Q-T INTERVAL, ECG07: 390 MS
QRS DURATION, ECG06: 84 MS
QTC CALCULATION (BEZET), ECG08: 408 MS
RBC # BLD AUTO: 3.64 M/UL (ref 4.05–5.2)
SAO2 % BLD: 93 % (ref 92–98.5)
SERVICE CMNT-IMP: ABNORMAL
SODIUM SERPL-SCNC: 138 MMOL/L (ref 136–145)
TROPONIN I BLD-MCNC: 0.01 NG/ML (ref 0.02–0.05)
VENTILATION MODE VENT: ABNORMAL
VENTILATION MODE VENT: ABNORMAL
VENTRICULAR RATE, ECG03: 66 BPM
WBC # BLD AUTO: 7.8 K/UL (ref 4.3–11.1)

## 2018-09-22 PROCEDURE — 94760 N-INVAS EAR/PLS OXIMETRY 1: CPT

## 2018-09-22 PROCEDURE — 76770 US EXAM ABDO BACK WALL COMP: CPT

## 2018-09-22 PROCEDURE — 74011250637 HC RX REV CODE- 250/637: Performed by: INTERNAL MEDICINE

## 2018-09-22 PROCEDURE — 71260 CT THORAX DX C+: CPT

## 2018-09-22 PROCEDURE — 77030019605

## 2018-09-22 PROCEDURE — 84484 ASSAY OF TROPONIN QUANT: CPT

## 2018-09-22 PROCEDURE — 80053 COMPREHEN METABOLIC PANEL: CPT

## 2018-09-22 PROCEDURE — 74011000258 HC RX REV CODE- 258: Performed by: EMERGENCY MEDICINE

## 2018-09-22 PROCEDURE — 74011636320 HC RX REV CODE- 636/320: Performed by: EMERGENCY MEDICINE

## 2018-09-22 PROCEDURE — 83605 ASSAY OF LACTIC ACID: CPT

## 2018-09-22 PROCEDURE — 80177 DRUG SCRN QUAN LEVETIRACETAM: CPT

## 2018-09-22 PROCEDURE — 65660000000 HC RM CCU STEPDOWN

## 2018-09-22 PROCEDURE — 83880 ASSAY OF NATRIURETIC PEPTIDE: CPT

## 2018-09-22 PROCEDURE — 82803 BLOOD GASES ANY COMBINATION: CPT

## 2018-09-22 PROCEDURE — 70450 CT HEAD/BRAIN W/O DYE: CPT

## 2018-09-22 PROCEDURE — 93005 ELECTROCARDIOGRAM TRACING: CPT | Performed by: EMERGENCY MEDICINE

## 2018-09-22 PROCEDURE — 71045 X-RAY EXAM CHEST 1 VIEW: CPT

## 2018-09-22 PROCEDURE — 94640 AIRWAY INHALATION TREATMENT: CPT

## 2018-09-22 PROCEDURE — 96360 HYDRATION IV INFUSION INIT: CPT | Performed by: EMERGENCY MEDICINE

## 2018-09-22 PROCEDURE — 74011000250 HC RX REV CODE- 250: Performed by: INTERNAL MEDICINE

## 2018-09-22 PROCEDURE — 36600 WITHDRAWAL OF ARTERIAL BLOOD: CPT

## 2018-09-22 PROCEDURE — 87040 BLOOD CULTURE FOR BACTERIA: CPT

## 2018-09-22 PROCEDURE — 99285 EMERGENCY DEPT VISIT HI MDM: CPT | Performed by: EMERGENCY MEDICINE

## 2018-09-22 PROCEDURE — 82140 ASSAY OF AMMONIA: CPT

## 2018-09-22 PROCEDURE — 74011250636 HC RX REV CODE- 250/636: Performed by: EMERGENCY MEDICINE

## 2018-09-22 PROCEDURE — 85025 COMPLETE CBC W/AUTO DIFF WBC: CPT

## 2018-09-22 PROCEDURE — 74011000302 HC RX REV CODE- 302: Performed by: INTERNAL MEDICINE

## 2018-09-22 PROCEDURE — 85379 FIBRIN DEGRADATION QUANT: CPT

## 2018-09-22 PROCEDURE — 86580 TB INTRADERMAL TEST: CPT | Performed by: INTERNAL MEDICINE

## 2018-09-22 RX ORDER — LOSARTAN POTASSIUM 50 MG/1
100 TABLET ORAL DAILY
Status: DISCONTINUED | OUTPATIENT
Start: 2018-09-23 | End: 2018-09-25 | Stop reason: HOSPADM

## 2018-09-22 RX ORDER — ACETAMINOPHEN 325 MG/1
650 TABLET ORAL
Status: DISCONTINUED | OUTPATIENT
Start: 2018-09-22 | End: 2018-09-25 | Stop reason: HOSPADM

## 2018-09-22 RX ORDER — SODIUM CHLORIDE 9 MG/ML
75 INJECTION, SOLUTION INTRAVENOUS CONTINUOUS
Status: DISCONTINUED | OUTPATIENT
Start: 2018-09-22 | End: 2018-09-23

## 2018-09-22 RX ORDER — SODIUM CHLORIDE 0.9 % (FLUSH) 0.9 %
5-10 SYRINGE (ML) INJECTION EVERY 8 HOURS
Status: DISCONTINUED | OUTPATIENT
Start: 2018-09-22 | End: 2018-09-25 | Stop reason: HOSPADM

## 2018-09-22 RX ORDER — HYDRALAZINE HYDROCHLORIDE 25 MG/1
100 TABLET, FILM COATED ORAL 3 TIMES DAILY
Status: DISCONTINUED | OUTPATIENT
Start: 2018-09-22 | End: 2018-09-25 | Stop reason: HOSPADM

## 2018-09-22 RX ORDER — OXYCODONE HYDROCHLORIDE 5 MG/1
2.5 TABLET ORAL
Status: DISCONTINUED | OUTPATIENT
Start: 2018-09-22 | End: 2018-09-25 | Stop reason: HOSPADM

## 2018-09-22 RX ORDER — SODIUM CHLORIDE 0.9 % (FLUSH) 0.9 %
10 SYRINGE (ML) INJECTION
Status: COMPLETED | OUTPATIENT
Start: 2018-09-22 | End: 2018-09-22

## 2018-09-22 RX ORDER — IPRATROPIUM BROMIDE AND ALBUTEROL SULFATE 2.5; .5 MG/3ML; MG/3ML
3 SOLUTION RESPIRATORY (INHALATION)
Status: DISCONTINUED | OUTPATIENT
Start: 2018-09-22 | End: 2018-09-25 | Stop reason: HOSPADM

## 2018-09-22 RX ORDER — AMLODIPINE BESYLATE 10 MG/1
10 TABLET ORAL DAILY
Status: DISCONTINUED | OUTPATIENT
Start: 2018-09-23 | End: 2018-09-25 | Stop reason: HOSPADM

## 2018-09-22 RX ORDER — ONDANSETRON 2 MG/ML
4 INJECTION INTRAMUSCULAR; INTRAVENOUS
Status: DISCONTINUED | OUTPATIENT
Start: 2018-09-22 | End: 2018-09-25 | Stop reason: HOSPADM

## 2018-09-22 RX ORDER — SODIUM CHLORIDE 0.9 % (FLUSH) 0.9 %
5-10 SYRINGE (ML) INJECTION AS NEEDED
Status: DISCONTINUED | OUTPATIENT
Start: 2018-09-22 | End: 2018-09-25 | Stop reason: HOSPADM

## 2018-09-22 RX ORDER — SERTRALINE HYDROCHLORIDE 100 MG/1
200 TABLET, FILM COATED ORAL DAILY
Status: DISCONTINUED | OUTPATIENT
Start: 2018-09-23 | End: 2018-09-25 | Stop reason: HOSPADM

## 2018-09-22 RX ADMIN — HYDRALAZINE HYDROCHLORIDE 100 MG: 25 TABLET, FILM COATED ORAL at 21:36

## 2018-09-22 RX ADMIN — LEVETIRACETAM 1500 MG: 100 SOLUTION ORAL at 21:36

## 2018-09-22 RX ADMIN — IPRATROPIUM BROMIDE AND ALBUTEROL SULFATE 3 ML: .5; 3 SOLUTION RESPIRATORY (INHALATION) at 21:08

## 2018-09-22 RX ADMIN — SODIUM CHLORIDE 500 ML: 900 INJECTION, SOLUTION INTRAVENOUS at 10:41

## 2018-09-22 RX ADMIN — IOPAMIDOL 100 ML: 755 INJECTION, SOLUTION INTRAVENOUS at 12:05

## 2018-09-22 RX ADMIN — Medication 5 ML: at 21:36

## 2018-09-22 RX ADMIN — SODIUM CHLORIDE 100 ML: 900 INJECTION, SOLUTION INTRAVENOUS at 12:05

## 2018-09-22 RX ADMIN — Medication 10 ML: at 12:05

## 2018-09-22 RX ADMIN — TUBERCULIN PURIFIED PROTEIN DERIVATIVE 5 UNITS: 5 INJECTION INTRADERMAL at 17:01

## 2018-09-22 NOTE — H&P
HOSPITALIST H&P/CONSULT 
NAME:  Kacie Painter Age:  76 y.o. 
:   1950 MRN:   [id number] PCP: Estela Sethi DO Consulting MD: Treatment Team: Attending Provider: Masood Bettencourt MD; Primary Nurse: Abena Munoz, RN 
HPI:  
Pt 89H with pmhx of chronic pain, anxiety, hx of seizures, large AVM, chronic resp failure with 3LNC continuous and recent admission  to  for thalamic bleed with residual dysphagia and left sided weakness. Currently a resident at [facility] and presented from there today for report of \"stroke like symptoms\" which was apparently a concern for patient not being able to use her phone and appeared more confused. ED evaluation did not reveal any acute neuro deficits but she was noted to be hypoxic at 88% on 5L nc. ABG 7.34/63/149. ED eval notable for UA suggestive of UTI,  CT scan negative for PE with with diffuse bronchial wall thickening w/o focal consolidation. DALTON cr 1.2 baseline <1, CT head non acute. Hospitalist asked to admit for acute/chronic hypoxic resp failure. During my interview patient was oriented although slow to answer questions. I remember her from prior admission and does not look much different than baseline. She denies dyspnea, cough, chest pain. She is focused on me calling her  for update which I have done. Complete ROS done and is as stated in HPI or otherwise negative Past Medical History:  
Diagnosis Date  Chronic pain \"all over body\"-takes Zoloft daily  Chronic sinusitis 2015  Diverticulosis 2017  Edema 2015 Resolved  Former smoker  GERD (gastroesophageal reflux disease)  Headache 2015  Hearing loss 2015 \"some\"- no hearing aids  Heart murmur   
 Hip osteoarthritis 2015  
 feet, hands  Hx of colonic polyps  SSA  Hx of migraines   
 on medication  Hypercholesterolemia 2015  Hypertension  Impaired fasting glucose 2015  Joint disorder 2015  Morbid obesity ([facility] BMI 40  
 Neuralgia 2015  Phlebitis and thrombophlebitis of superficial vessels of lower extremities 2015  
 pt denies  Psychiatric disorder  Restless leg syndrome 2015  Rheumatic fever   
 pt reports possibly R.F.  
 Seizures ([facility]   
 seizures as a child, has NOT had one since she was 13years old  SOB (shortness of breath) on exertion  Stroke [facility])   
 at age 11 only residual poor peripheral vision  Tobacco abuse 2015  
 quit smoking 2015  Vascular anomalies, congenital   
 Vitamin D deficiency 2015 Past Surgical History:  
Procedure Laterality Date  COLONOSCOPY N/A 2017 Bjorn--appendiceal serrated sessile polyp, transverse and rectal hyperplastic--3 year recall  HX APPENDECTOMY  03/15/2017  HX CATARACT REMOVAL Bilateral 3/2016 [address] [place]  
 pt denies  HX ROTATOR CUFF REPAIR Right 10/31/2016  
 x2 [address]  TOTAL HIP ARTHROPLASTY Right   
 and again 2015 and another revision Prior to Admission Medications Prescriptions Last Dose Informant Patient Reported? Taking? amLODIPine (NORVASC) 10 mg tablet   Yes No  
Sig: Take 10 mg by mouth daily. ergocalciferol (VITAMIN D2) 50,000 unit capsule   No No  
Sig: TAKE 1 CAPSULE ONE TIME WEEKLY- takes on sat  
hydrALAZINE (APRESOLINE) 100 mg tablet   No No  
Si Tab by Per NG tube route three (3) times daily. levETIRAcetam (KEPPRA) 100 mg/ml soln oral solution   No No  
Sig: 15 mL by Per G Tube route two (2) times a day for 30 days. losartan (COZAAR) 100 mg tablet   No No  
Sig: Take 1 Tab by mouth daily. Indications: hypertension  
mirabegron ER (MYRBETRIQ) 50 mg ER tablet   No No  
Sig: Take 1 Tab by mouth daily.   
oxyCODONE IR (ROXICODONE) 5 mg immediate release tablet   No No  
 Si.5 Tabs by Per NG tube route every eight (8) hours as needed. Max Daily Amount: 7.5 mg.  
sertraline (ZOLOFT) 100 mg tablet   No No  
Sig: Take 2 Tabs by mouth daily. Facility-Administered Medications: None Allergies Allergen Reactions  Codeine Nausea and Vomiting Patient currently takes oxycodone and denies an allergy.  Oxycodone Nausea and Vomiting Social History Substance Use Topics  Smoking status: Former Smoker Packs/day: 1.00 Years: 50.00 Types: Cigarettes Quit date: 3/29/2015  Smokeless tobacco: Never Used Comment: quit 2 months and 2 weeks ago  Alcohol use No  
   Comment: Former- socially Family History Problem Relation Age of Onset  Hypertension Mother  Cancer Mother Bladder  Depression Mother  Parkinson's Disease Father  Alcohol abuse Brother  Alcohol abuse Son  No Known Problems Sister  No Known Problems Brother  Malignant Hyperthermia Neg Hx  Pseudocholinesterase Deficiency Neg Hx  Delayed Awakening Neg Hx  Post-op Nausea/Vomiting Neg Hx  Emergence Delirium Neg Hx  Post-op Cognitive Dysfunction Neg Hx   
 Other Neg Hx Objective:  
 
Visit Vitals  /68  Pulse 72  Temp 98.7 °F (37.1 °C)  Resp 24  
 Ht 5' 3\" (1.6 m)  Wt 122.9 kg (271 lb)  SpO2 90%  BMI 48.01 kg/m2 Temp (24hrs), Av.7 °F (37.1 °C), Min:98.7 °F (37.1 °C), Max:98.7 °F (37.1 °C) Oxygen Therapy O2 Sat (%): 90 % (18 1539) Pulse via Oximetry: 72 beats per minute (18 1539) O2 Device: Nasal cannula (18) O2 Flow Rate (L/min): 5 l/min (18 0715) Physical Exam: 
General:    Slightly lethargic, no distress, morbidly obese Head:   Normocephalic, without obvious abnormality, atraumatic. Nose:  Nares normal. No drainage or sinus tenderness. Lungs:   Diminished with faint exp wheezing Heart:   Regular rate and rhythm,  no murmur, rub or gallop. Abdomen:   Soft, non-tender. Not distended. Bowel sounds normal.  
Extremities: No cyanosis. No edema. No clubbing Skin:     Texture, turgor normal. No rashes or lesions. Not Jaundiced Neurologic: Alert and oriented x 3, no focal deficits Data Review:  
Recent Results (from the past 24 hour(s)) POC TROPONIN-I Collection Time: 09/22/18  7:17 AM  
Result Value Ref Range Troponin-I (POC) 0.01 (L) 0.02 - 0.05 ng/ml POC LACTIC ACID Collection Time: 09/22/18  7:18 AM  
Result Value Ref Range Lactic Acid (POC) 0.4 (LL) 0.5 - 1.9 mmol/L  
EKG, 12 LEAD, INITIAL Collection Time: 09/22/18  7:24 AM  
Result Value Ref Range Ventricular Rate 66 BPM  
 Atrial Rate 66 BPM  
 P-R Interval 160 ms QRS Duration 84 ms Q-T Interval 390 ms QTC Calculation (Bezet) 408 ms Calculated P Axis 64 degrees Calculated R Axis 45 degrees Calculated T Axis 58 degrees Diagnosis    
  !! AGE AND GENDER SPECIFIC ECG ANALYSIS !! Normal sinus rhythm Normal ECG When compared with ECG of 29-JUL-2018 22:01, 
Criteria for Septal infarct are no longer Present BNP Collection Time: 09/22/18  7:36 AM  
Result Value Ref Range  pg/mL D DIMER Collection Time: 09/22/18  7:36 AM  
Result Value Ref Range D DIMER 4.34 (HH) <0.56 ug/ml(FEU) CBC WITH AUTOMATED DIFF Collection Time: 09/22/18  7:37 AM  
Result Value Ref Range WBC 7.8 4.3 - 11.1 K/uL  
 RBC 3.64 (L) 4.05 - 5.2 M/uL  
 HGB 10.6 (L) 11.7 - 15.4 g/dL HCT 33.8 (L) 35.8 - 46.3 % MCV 92.9 79.6 - 97.8 FL  
 MCH 29.1 26.1 - 32.9 PG  
 MCHC 31.4 31.4 - 35.0 g/dL  
 RDW 14.6 % PLATELET 522 (L) 615 - 450 K/uL MPV 10.1 9.4 - 12.3 FL ABSOLUTE NRBC 0.00 0.0 - 0.2 K/uL  
 DF AUTOMATED NEUTROPHILS 77 43 - 78 % LYMPHOCYTES 8 (L) 13 - 44 % MONOCYTES 14 (H) 4.0 - 12.0 % EOSINOPHILS 1 0.5 - 7.8 % BASOPHILS 0 0.0 - 2.0 % IMMATURE GRANULOCYTES 1 0.0 - 5.0 %  
 ABS. NEUTROPHILS 6.0 1.7 - 8.2 K/UL ABS. LYMPHOCYTES 0.6 0.5 - 4.6 K/UL  
 ABS. MONOCYTES 1.1 0.1 - 1.3 K/UL  
 ABS. EOSINOPHILS 0.1 0.0 - 0.8 K/UL  
 ABS. BASOPHILS 0.0 0.0 - 0.2 K/UL  
 ABS. IMM. GRANS. 0.0 0.0 - 0.5 K/UL METABOLIC PANEL, COMPREHENSIVE Collection Time: 09/22/18  7:37 AM  
Result Value Ref Range Sodium 138 136 - 145 mmol/L Potassium 5.2 (H) 3.5 - 5.1 mmol/L Chloride 98 98 - 107 mmol/L  
 CO2 34 (H) 21 - 32 mmol/L Anion gap 6 (L) 7 - 16 mmol/L Glucose 138 (H) 65 - 100 mg/dL BUN 39 (H) 8 - 23 MG/DL Creatinine 1.22 (H) 0.6 - 1.0 MG/DL  
 GFR est AA 56 (L) >60 ml/min/1.73m2 GFR est non-AA 47 (L) >60 ml/min/1.73m2 Calcium 8.6 8.3 - 10.4 MG/DL Bilirubin, total 0.3 0.2 - 1.1 MG/DL  
 ALT (SGPT) 17 12 - 65 U/L  
 AST (SGOT) 14 (L) 15 - 37 U/L Alk. phosphatase 104 50 - 136 U/L Protein, total 6.7 6.3 - 8.2 g/dL Albumin 2.6 (L) 3.2 - 4.6 g/dL Globulin 4.1 (H) 2.3 - 3.5 g/dL A-G Ratio 0.6 (L) 1.2 - 3.5 BLOOD GAS, ARTERIAL Collection Time: 09/22/18  2:24 PM  
Result Value Ref Range pH 7.34 (L) 7.35 - 7.45    
 PCO2 63 (H) 35 - 45 mmHg PO2 149 (H) 80 - 105 mmHg BICARBONATE 33 (H) 22 - 26 mmol/L  
 BASE EXCESS 5.1 (H) 0 - 3 mmol/L  
 SITE LR   
 ALLENS TEST POSITIVE    
 MODE VMASK   
 FIO2 50.0 % Respiratory comment: Dr Sera Becerra at 9 22 2018 2 28 58 PM. Read back. Imaging Karrie Brizuela Assessment and Plan: Active Hospital Problems Diagnosis Date Noted  Respiratory failure, acute-on-chronic ([facility] 09/22/2018  DALTON (acute kidney injury) ([facility] 09/22/2018  Hydronephrosis 09/22/2018  UTI (urinary tract infection) 07/31/2018  Nontraumatic thalamic hemorrhage ([facility] 07/30/2018  Cerebral AV malformation 07/30/2018  OAB (overactive bladder) 06/28/2018  Excessive sleepiness 06/28/2018  Seizure disorder ([facility] 01/22/2018  Morbid obesity with BMI of 40.0-44.9, adult ([facility] 01/19/2015 A/P 
 - Acute/chronic resp failure - suspect bronchitis based on CT report. Uncertain of underlying reason for chronic oxygen requirement but may have degree of obesity hypoventliation. Add rocephin/azithromycin and scheduled nebs. Deferring steroids at this time. - AMS - seemingly close to baseline now. Likely secondary to UTI.  
- UTI - rocephin, urine cx 
- hx of seizure - resume home meds, ck Keppra level 
- hx of thalamic CVA and AVM - with residual dysphagia and left sided weakness. Has PEG. Modified diet 
- ?hydronephrosis/DALTON - incidentally seen on CT chest. Obtain renal US. Ck for PVR. Slow IVF - likely need to stop IVF in AM 
 
 
Code Status: Full code Anticipated discharge: 2-3 days Signed By: Cesar Maddox DO September 22, 2018

## 2018-09-22 NOTE — ED PROVIDER NOTES
HPI Comments: EMS states that the patient is coming from \[facility]\"". She was sent here because the staff was concerned that she had a stroke. The patient has a history of a stroke with residual left-sided weakness. The patient states that this morning she was unable to dial the telephone and call a family member. It seems that for this reason, there was concern about a possible stroke. She is on oxygen 2 L at \[facility]\"". She arrived here on 5 L nasal cannula with an oxygen saturation of 88%. She was placed on a Ventimask to bring up her oxygen saturation. The patient denies any shortness of breath or any other complaints. Elements of this note were made using speech recognition software. As such, errors of speech recognition may occur. Patient is a 76 y.o. female presenting with respiratory distress syndrome. The history is provided by the patient and the EMS personnel. Respiratory Distress Pertinent negatives include no fever and no vomiting. Past Medical History:  
Diagnosis Date  Chronic pain \"all over body\"-takes Zoloft daily  Chronic sinusitis 1/19/2015  Diverticulosis 2017  Edema 1/19/2015 Resolved  Former smoker  GERD (gastroesophageal reflux disease)  Headache 1/19/2015  Hearing loss 1/19/2015 \"some\"- no hearing aids  Heart murmur 1990  
 Hip osteoarthritis 1/19/2015  
 feet, hands  Hx of colonic polyps 2017 SSA  Hx of migraines   
 on medication  Hypercholesterolemia 1/19/2015  Hypertension  Impaired fasting glucose 1/19/2015  Joint disorder 1/19/2015  Morbid obesity ([address] BMI 40  
 Neuralgia 1/19/2015  Phlebitis and thrombophlebitis of superficial vessels of lower extremities 01/19/2015  
 pt denies  Psychiatric disorder  Restless leg syndrome 1/19/2015  Rheumatic fever   
 pt reports possibly R.F.  
 Seizures ([address]   
 seizures as a child, has NOT had one since she was 13years old  SOB (shortness of breath) on exertion  Stroke [facility])   
 at age 11 only residual poor peripheral vision  Tobacco abuse 1/19/2015  
 quit smoking 4/2015  Vascular anomalies, congenital   
 Vitamin D deficiency 1/19/2015 Past Surgical History:  
Procedure Laterality Date  COLONOSCOPY N/A 2/20/2017 Bjorn--appendiceal serrated sessile polyp, transverse and rectal hyperplastic--3 year recall  HX APPENDECTOMY  03/15/2017  HX CATARACT REMOVAL Bilateral 3/2016 [place]  
 pt denies  HX ROTATOR CUFF REPAIR Right 10/31/2016  
 x2 [address]  TOTAL HIP ARTHROPLASTY Right 2005  
 and again 5/2015 and another revision Family History:  
Problem Relation Age of Onset  Hypertension Mother  Cancer Mother Bladder  Depression Mother  Parkinson's Disease Father  Alcohol abuse Brother  Alcohol abuse Son  No Known Problems Sister  No Known Problems Brother  Malignant Hyperthermia Neg Hx  Pseudocholinesterase Deficiency Neg Hx  Delayed Awakening Neg Hx  Post-op Nausea/Vomiting Neg Hx  Emergence Delirium Neg Hx  Post-op Cognitive Dysfunction Neg Hx   
 Other Neg Hx Social History Social History  Marital status:  Spouse name: N/A  
 Number of children: N/A  
 Years of education: N/A Occupational History  Not on file. Social History Main Topics  Smoking status: Former Smoker Packs/day: 1.00 Years: 50.00 Types: Cigarettes Quit date: 3/29/2015  Smokeless tobacco: Never Used Comment: quit 2 months and 2 weeks ago  Alcohol use No  
   Comment: Former- socially  Drug use: No  
 Sexual activity: Not on file Other Topics Concern  Not on file Social History Narrative ALLERGIES: Codeine and Oxycodone Review of Systems Constitutional: Negative for chills and fever. Gastrointestinal: Negative for nausea and vomiting. All other systems reviewed and are negative. Vitals:  
 09/22/18 8565 BP: 140/60 Pulse: 70 Resp: 24 Temp: 98.7 °F (37.1 °C) SpO2: (!) 88% Weight: 122.9 kg (271 lb) Height: 5' 3\" (1.6 m) Physical Exam  
Constitutional: She is oriented to person, place, and time. She appears well-developed and well-nourished. Morbidly obese white female who appears in no distress. She answers questions slowly but appropriately. HENT:  
Head: Normocephalic and atraumatic. Eyes: Conjunctivae are normal. Pupils are equal, round, and reactive to light. Neck: Normal range of motion. Neck supple. Cardiovascular: Normal rate and regular rhythm. Murmur heard. Pulmonary/Chest: Effort normal. She has no wheezes. Abdominal: Soft. There is no tenderness. Musculoskeletal: She exhibits no edema or tenderness. Neurological: She is alert and oriented to person, place, and time. She exhibits abnormal muscle tone. Generalized weakness in all extremities, slightly more prominent in her left arm and leg Skin: Skin is warm and dry. Psychiatric: She has a normal mood and affect. Her behavior is normal.  
Nursing note and vitals reviewed. MDM Number of Diagnoses or Management Options Acute on chronic respiratory failure with hypoxia [facility]): new and requires workup Essential hypertension:  
Seizure disorder [facility]):  
Diagnosis management comments: 7:35 AM I'm unsure if her chief complaint is respiratory versus neurological.  She does have slight deficit to her left side though she says that she has a residual deficit from a prior stroke. She was considerably hypoxic upon arrival, both avenues will be pursued. Given her overall general level of debility, it is difficult to get a good neurologic exam 
10:24 AM d-dimer >4, will get CT chest 
2:04 PM CT scan shows no PE and no pneumonia.   Given her new onset acute respiratory failure and hypoxemia, she will be admitted to the hospital.  The hospitalist has been paged Amount and/or Complexity of Data Reviewed Clinical lab tests: ordered and reviewed Tests in the radiology section of CPT®: ordered and reviewed Tests in the medicine section of CPT®: ordered and reviewed Discuss the patient with other providers: yes Risk of Complications, Morbidity, and/or Mortality Presenting problems: high Diagnostic procedures: moderate Management options: moderate Patient Progress Patient progress: stable ED Course Procedures

## 2018-09-22 NOTE — ED NOTES
Patient laying in bed resting. A+O x4. Able to make needs known. Patient encouraged patient to provide urine sample. Patient refuses an in and out cath. MD notified.

## 2018-09-22 NOTE — PROGRESS NOTES
Patient admitted to room 809 via stretcher per Dr Kamari Whitehead dx acute/chronic respiratory failure.

## 2018-09-22 NOTE — ROUTINE PROCESS
TRANSFER - OUT REPORT: 
 
Verbal report given to Jacy Harvey RN on Mireya Mendieta  being transferred to 8th floor for routine progression of care Report consisted of patients Situation, Background, Assessment and  
Recommendations(SBAR). Information from the following report(s) ED Summary was reviewed with the receiving nurse. Lines:  
Peripheral IV 09/22/18 Left Antecubital (Active) Opportunity for questions and clarification was provided.    
 
Patient transported with:

## 2018-09-22 NOTE — PROGRESS NOTES
TRANSFER - IN REPORT: 
 
Verbal report received from Yanci Bowie RN (name) on Josefina Rico  being received from ED (unit) for routine progression of care Report consisted of patients Situation, Background, Assessment and  
Recommendations(SBAR). Information from the following report(s) SBAR and Kardex was reviewed with the receiving nurse. Opportunity for questions and clarification was provided. Assessment completed upon patients arrival to unit and care assumed. SBAR given to primary receiving RN, April Kelly Alvarez.

## 2018-09-22 NOTE — ED TRIAGE NOTES
Patient arrived by EMS. EMS states facility call EMS out for \"stoke\". Per EMS on arrival patient A+Ox 4 but in apparent respiratory distress. Recent UTI. Hx of freq uti's. Temp 99.4F, 128/84, HR SR 68 bpm.  . Resp rate 20-30 O2 sat 90-92% on 4LNC per EMS. On patient arrival respiratory distress noted with course lung sounds. Patient resides at [facility].

## 2018-09-23 ENCOUNTER — APPOINTMENT (OUTPATIENT)
Dept: ULTRASOUND IMAGING | Age: 68
DRG: 178 | End: 2018-09-23
Attending: INTERNAL MEDICINE
Payer: MEDICARE

## 2018-09-23 LAB
ANION GAP SERPL CALC-SCNC: 4 MMOL/L (ref 7–16)
APPEARANCE UR: ABNORMAL
BACTERIA URNS QL MICRO: ABNORMAL /HPF
BILIRUB UR QL: NEGATIVE
BUN SERPL-MCNC: 36 MG/DL (ref 8–23)
CALCIUM SERPL-MCNC: 8.6 MG/DL (ref 8.3–10.4)
CHLORIDE SERPL-SCNC: 102 MMOL/L (ref 98–107)
CO2 SERPL-SCNC: 36 MMOL/L (ref 21–32)
COLOR UR: YELLOW
CREAT SERPL-MCNC: 1.16 MG/DL (ref 0.6–1)
EPI CELLS #/AREA URNS HPF: ABNORMAL /HPF
ERYTHROCYTE [DISTWIDTH] IN BLOOD BY AUTOMATED COUNT: 14.1 %
GLUCOSE SERPL-MCNC: 112 MG/DL (ref 65–100)
GLUCOSE UR STRIP.AUTO-MCNC: NEGATIVE MG/DL
HCT VFR BLD AUTO: 31.7 % (ref 35.8–46.3)
HGB BLD-MCNC: 9.8 G/DL (ref 11.7–15.4)
HGB UR QL STRIP: ABNORMAL
KETONES UR QL STRIP.AUTO: NEGATIVE MG/DL
LEUKOCYTE ESTERASE UR QL STRIP.AUTO: ABNORMAL
MCH RBC QN AUTO: 29 PG (ref 26.1–32.9)
MCHC RBC AUTO-ENTMCNC: 30.9 G/DL (ref 31.4–35)
MCV RBC AUTO: 93.8 FL (ref 79.6–97.8)
MM INDURATION POC: NORMAL MM (ref 0–5)
NITRITE UR QL STRIP.AUTO: POSITIVE
NRBC # BLD: 0 K/UL (ref 0–0.2)
OTHER OBSERVATIONS,UCOM: ABNORMAL
PH UR STRIP: 6 [PH] (ref 5–9)
PLATELET # BLD AUTO: 124 K/UL (ref 150–450)
PMV BLD AUTO: 10.1 FL (ref 9.4–12.3)
POTASSIUM SERPL-SCNC: 4.7 MMOL/L (ref 3.5–5.1)
PPD POC: NEGATIVE NEGATIVE
PROT UR STRIP-MCNC: 100 MG/DL
RBC # BLD AUTO: 3.38 M/UL (ref 4.05–5.2)
RBC #/AREA URNS HPF: ABNORMAL /HPF
SODIUM SERPL-SCNC: 142 MMOL/L (ref 136–145)
SP GR UR REFRACTOMETRY: 1.02 (ref 1–1.02)
UROBILINOGEN UR QL STRIP.AUTO: 1 EU/DL (ref 0.2–1)
WBC # BLD AUTO: 6.9 K/UL (ref 4.3–11.1)
WBC URNS QL MICRO: >100 /HPF

## 2018-09-23 PROCEDURE — 94760 N-INVAS EAR/PLS OXIMETRY 1: CPT

## 2018-09-23 PROCEDURE — 77030033269 HC SLV COMPR SCD KNE2 CARD -B

## 2018-09-23 PROCEDURE — 74011250636 HC RX REV CODE- 250/636: Performed by: INTERNAL MEDICINE

## 2018-09-23 PROCEDURE — 74011000258 HC RX REV CODE- 258: Performed by: INTERNAL MEDICINE

## 2018-09-23 PROCEDURE — 77030020263 HC SOL INJ SOD CL0.9% LFCR 1000ML

## 2018-09-23 PROCEDURE — 74011250637 HC RX REV CODE- 250/637: Performed by: INTERNAL MEDICINE

## 2018-09-23 PROCEDURE — 36415 COLL VENOUS BLD VENIPUNCTURE: CPT

## 2018-09-23 PROCEDURE — 87186 SC STD MICRODIL/AGAR DIL: CPT

## 2018-09-23 PROCEDURE — 87086 URINE CULTURE/COLONY COUNT: CPT

## 2018-09-23 PROCEDURE — 65660000000 HC RM CCU STEPDOWN

## 2018-09-23 PROCEDURE — 85027 COMPLETE CBC AUTOMATED: CPT

## 2018-09-23 PROCEDURE — 80048 BASIC METABOLIC PNL TOTAL CA: CPT

## 2018-09-23 PROCEDURE — 97162 PT EVAL MOD COMPLEX 30 MIN: CPT

## 2018-09-23 PROCEDURE — 77030034849

## 2018-09-23 PROCEDURE — 87088 URINE BACTERIA CULTURE: CPT

## 2018-09-23 PROCEDURE — 81001 URINALYSIS AUTO W/SCOPE: CPT

## 2018-09-23 PROCEDURE — 77010033678 HC OXYGEN DAILY

## 2018-09-23 PROCEDURE — 77030019605

## 2018-09-23 PROCEDURE — 76770 US EXAM ABDO BACK WALL COMP: CPT

## 2018-09-23 PROCEDURE — 94640 AIRWAY INHALATION TREATMENT: CPT

## 2018-09-23 PROCEDURE — 97165 OT EVAL LOW COMPLEX 30 MIN: CPT

## 2018-09-23 PROCEDURE — 74011000250 HC RX REV CODE- 250: Performed by: INTERNAL MEDICINE

## 2018-09-23 RX ORDER — SODIUM CHLORIDE 9 MG/ML
75 INJECTION, SOLUTION INTRAVENOUS CONTINUOUS
Status: DISCONTINUED | OUTPATIENT
Start: 2018-09-23 | End: 2018-09-23

## 2018-09-23 RX ORDER — TAMSULOSIN HYDROCHLORIDE 0.4 MG/1
0.4 CAPSULE ORAL
Status: DISCONTINUED | OUTPATIENT
Start: 2018-09-23 | End: 2018-09-25 | Stop reason: HOSPADM

## 2018-09-23 RX ADMIN — Medication 5 ML: at 05:57

## 2018-09-23 RX ADMIN — SODIUM CHLORIDE 75 ML/HR: 900 INJECTION, SOLUTION INTRAVENOUS at 08:57

## 2018-09-23 RX ADMIN — AMLODIPINE BESYLATE 10 MG: 10 TABLET ORAL at 08:55

## 2018-09-23 RX ADMIN — IPRATROPIUM BROMIDE AND ALBUTEROL SULFATE 3 ML: .5; 3 SOLUTION RESPIRATORY (INHALATION) at 01:34

## 2018-09-23 RX ADMIN — OXYCODONE HYDROCHLORIDE 2.5 MG: 5 TABLET ORAL at 08:55

## 2018-09-23 RX ADMIN — IPRATROPIUM BROMIDE AND ALBUTEROL SULFATE 3 ML: .5; 3 SOLUTION RESPIRATORY (INHALATION) at 07:44

## 2018-09-23 RX ADMIN — SERTRALINE HYDROCHLORIDE 200 MG: 100 TABLET ORAL at 08:54

## 2018-09-23 RX ADMIN — Medication 10 ML: at 13:25

## 2018-09-23 RX ADMIN — HYDRALAZINE HYDROCHLORIDE 100 MG: 25 TABLET, FILM COATED ORAL at 18:21

## 2018-09-23 RX ADMIN — CEFTRIAXONE SODIUM 1 G: 1 INJECTION, POWDER, FOR SOLUTION INTRAMUSCULAR; INTRAVENOUS at 18:21

## 2018-09-23 RX ADMIN — LEVETIRACETAM 1500 MG: 100 SOLUTION ORAL at 21:02

## 2018-09-23 RX ADMIN — IPRATROPIUM BROMIDE AND ALBUTEROL SULFATE 3 ML: .5; 3 SOLUTION RESPIRATORY (INHALATION) at 19:32

## 2018-09-23 RX ADMIN — Medication 5 ML: at 21:03

## 2018-09-23 RX ADMIN — HYDRALAZINE HYDROCHLORIDE 100 MG: 25 TABLET, FILM COATED ORAL at 08:55

## 2018-09-23 RX ADMIN — LEVETIRACETAM 1500 MG: 100 SOLUTION ORAL at 08:54

## 2018-09-23 RX ADMIN — LOSARTAN POTASSIUM 100 MG: 50 TABLET ORAL at 08:54

## 2018-09-23 RX ADMIN — TAMSULOSIN HYDROCHLORIDE 0.4 MG: 0.4 CAPSULE ORAL at 21:03

## 2018-09-23 RX ADMIN — HYDRALAZINE HYDROCHLORIDE 100 MG: 25 TABLET, FILM COATED ORAL at 21:03

## 2018-09-23 NOTE — PROGRESS NOTES
Problem: Mobility Impaired (Adult and Pediatric) Goal: *Acute Goals and Plan of Care (Insert Text) STG: 
(1.)Ms. Herron will move from supine to sit and sit to supine , scoot up and down and roll side to side with MAXIMAL ASSIST within 3 treatment day(s). (2.)Ms. Herron will demonstrate static sitting balance for 10 minutes with MINIMAL ASSIST within 3 treatment days. LTG: 
(1.)Ms. Herron will move from supine to sit and sit to supine , scoot up and down and roll side to side in bed with MINIMAL ASSIST within 7 treatment day(s). (2.)Ms. Herron will transfer from sit to stand with MAXIMUM ASSIST  using the least restrictive device within 7 treatment day(s). (3.)(3.)Ms. Herron will demonstrate static sitting balance for 15 minutes with CONTACT GUARD ASSIST within 7 treatment days. ________________________________________________________________________________________________ PHYSICAL THERAPY: Initial Assessment, Treatment Day: Day of Assessment, PM 9/23/2018 INPATIENT: Hospital Day: 2 Payor: [address] [place] [address] / Plan: [facility] A[employer] MEDICARE COMPLETE / Product Type: Managed Care Medicare /  
  
NAME/AGE/GENDER: Garcia Manrique is a 76 y.o. female PRIMARY DIAGNOSIS: Respiratory failure, acute-on-chronic (HCC) <principal problem not specified> <principal problem not specified> 
  
  
ICD-10: Treatment Diagnosis:  
 · Generalized Muscle Weakness (M62.81) · Other abnormalities of gait and mobility (R26.89) Precaution/Allergies: 
Review of patient's allergies indicates no known allergies. ASSESSMENT:  
 
Ms. Mayra Pearce is sitting EOB with OT upon contact and agreeable to PT evaluation this afternoon. Pt is A&O X 4 with reports of  0/10 pain prior to mobility. Pt is LTC resident at [facility] 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 Pt is currently on 5L O2 NC.  Pt states staff assist with ADLs and she is transferred bed to chair with use of dependent sharee lift. Pt demonstrates poor static sitting balance with strong L trunk lean despite cues. Pt reports at baseline she is able to maintain midline sitting balance. Pt requesting to use bathroom. Pt assisted back to supine requiring total A X 2 and performed rolling R and L in order to place bedpan. Pt left supine in bed with all needs met and within reach. Nursing notified. Mp Mello will benefit from skilled PT (medically necessary) to address decreased strength, decreased balance, decreased functional tolerance, decreased cardiopulmonary endurance affecting participation in basic ADLs and functional tasks. Will put pt on 2 week trial to determine if rehab potential.   
 
 
This section established at most recent assessment PROBLEM LIST (Impairments causing functional limitations): 1. Decreased Strength 2. Decreased ADL/Functional Activities 3. Decreased Transfer Abilities 4. Decreased Balance 5. Decreased Activity Tolerance 6. Decreased Pacing Skills 7. Increased Fatigue 8. Increased Shortness of Breath 9. Decreased Massey with Home Exercise Program 
 INTERVENTIONS PLANNED: (Benefits and precautions of physical therapy have been discussed with the patient.) 1. Balance Exercise 2. Bed Mobility 3. Family Education 4. Home Exercise Program (HEP) 5. Neuromuscular Re-education/Strengthening 6. Range of Motion (ROM) 7. Therapeutic Activites 8. Therapeutic Exercise/Strengthening 9. Transfer Training TREATMENT PLAN: Frequency/Duration: 3 times a week for 2 weeks Rehabilitation Potential For Stated Goals: Fair RECOMMENDED REHABILITATION/EQUIPMENT: (at time of discharge pending progress): Due to the probability of continued deficits (see above) this patient will likely need continued skilled physical therapy after discharge. Equipment:  
? None at this time HISTORY:  
 History of Present Injury/Illness (Reason for Referral): 
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 Currently a resident at [facility] and presented from there today for report of \"stroke like symptoms\" which was apparently a concern for patient not being able to use her phone and appeared more confused. ED evaluation did not reveal any acute neuro deficits but she was noted to be hypoxic at 88% on 5L nc. ABG 7.34/63/149. ED eval notable for UA suggestive of UTI,  CT scan negative for PE with with diffuse bronchial wall thickening w/o focal consolidation. DALTON cr 1.2 baseline <1, CT head non acute. Hospitalist asked to admit for acute/chronic hypoxic resp failure. During my interview patient was oriented although slow to answer questions. I remember her from prior admission and does not look much different than baseline. She denies dyspnea, cough, chest pain. She is focused on me calling her  for update which I have done. Past Medical History/Comorbidities: Ms. Willie Draper  has a past medical history of Chronic pain; Chronic sinusitis (1/19/2015); Diverticulosis (2017); Edema (1/19/2015); Former smoker; GERD (gastroesophageal reflux disease); Headache (1/19/2015); Hearing loss (1/19/2015); Heart murmur (1990); Hip osteoarthritis (1/19/2015); colonic polyps (2017); migraines; Hypercholesterolemia (1/19/2015); Hypertension; Impaired fasting glucose (1/19/2015); Joint disorder (1/19/2015); Morbid obesity ([address] Neuralgia (1/19/2015); Phlebitis and thrombophlebitis of superficial vessels of lower extremities (01/19/2015); Psychiatric disorder; Restless leg syndrome (1/19/2015); Rheumatic fever; Seizures ([address] SOB (shortness of breath) on exertion; Stroke ([address]  Tobacco abuse (1/19/2015); Vascular anomalies, congenital; and Vitamin D deficiency (1/19/2015). She also has no past medical history of Adverse effect of anesthesia; Aneurysm ([facility] Arrhythmia; Asthma; Autoimmune disease ([address] CAD (coronary artery disease); Cancer ([address] Chronic kidney disease; Chronic obstructive pulmonary disease ([address] Coagulation disorder ([facility] [address] Diabetes ([facility] [address] Difficult intubation; Endocarditis; Heart failure ([address] Liver disease; Malignant hyperthermia due to anesthesia; Nausea & vomiting; Pseudocholinesterase deficiency; PUD (peptic ulcer disease); Sleep apnea; Thromboembolus ([facility] [address] [facility] or Thyroid disease. Ms. Carol Blue  has a past surgical history that includes hx mohs procedure (Right, 1995); hx cholecystectomy (1982); colonoscopy (N/A, 2/20/2017); pr total hip arthroplasty (Right, 2005); hx rotator cuff repair (Right, 10/31/2016); hx cataract removal (Bilateral, 3/2016); hx tubal ligation (1982); and hx appendectomy (03/15/2017). Social History/Living Environment:  
Home Environment: Skilled nursing facility Care Facility Name: [facility] One/Two Story Residence: One story Living Alone: No 
Support Systems: Skilled nursing facility Patient Expects to be Discharged to[de-identified] Skilled nursing facility Current DME Used/Available at Home: Wheelchair Prior Level of Function/Work/Activity: 
From [facility], dependent for transfers Number of Personal Factors/Comorbidities that affect the Plan of Care: 3+: HIGH COMPLEXITY EXAMINATION:  
Most Recent Physical Functioning:  
Gross Assessment: 
AROM: Grossly decreased, non-functional 
Strength: Grossly decreased, non-functional 
Coordination: Grossly decreased, non-functional 
Tone: Abnormal 
Sensation: Impaired Posture: 
  
Balance: 
Sitting: Impaired; With support Sitting - Static: Poor (constant support) Bed Mobility: 
Sit to Supine: Maximum assistance; Total assistance;Assist x2 Scooting: Total assistance Wheelchair Mobility: 
  
Transfers: Gait: 
  
   
  
Body Structures Involved: 1. Nerves 2. Heart 3. Lungs 4. Bones 5. Joints 6. Muscles 7. Ligaments Body Functions Affected: 1. Sensory/Pain 2. Cardio 3. Respiratory 4. Neuromusculoskeletal 
5. Movement Related Activities and Participation Affected: 1. General Tasks and Demands 2. Mobility 3. Self Care 4. Domestic Life 5. Interpersonal Interactions and Relationships 6. Community, Social and [place] Number of elements that affect the Plan of Care: 4+: HIGH COMPLEXITY CLINICAL PRESENTATION:  
Presentation: Evolving clinical presentation with changing clinical characteristics: MODERATE COMPLEXITY CLINICAL DECISION MAKIN[address] [place] [address] AM-PAC 6 Clicks Basic Mobility Inpatient Short Form How much difficulty does the patient currently have. .. Unable A Lot A Little None 1. Turning over in bed (including adjusting bedclothes, sheets and blankets)? [] 1   [x] 2   [] 3   [] 4  
2. Sitting down on and standing up from a chair with arms ( e.g., wheelchair, bedside commode, etc.)   [x] 1   [] 2   [] 3   [] 4  
3. Moving from lying on back to sitting on the side of the bed? [] 1   [x] 2   [] 3   [] 4 How much help from another person does the patient currently need. .. Total A Lot A Little None 4. Moving to and from a bed to a chair (including a wheelchair)? [x] 1   [] 2   [] 3   [] 4  
5. Need to walk in hospital room? [x] 1   [] 2   [] 3   [] 4  
6. Climbing 3-5 steps with a railing? [x] 1   [] 2   [] 3   [] 4  
© , Trustees of [address] [place] [address], under license to [employer]. All rights reserved Score:  Initial: 8 Most Recent: X (Date: -- ) Interpretation of Tool:  Represents activities that are increasingly more difficult (i.e. Bed mobility, Transfers, Gait). Score 24 23 22-20 19-15 14-10 9-7 6 Modifier CH CI CJ CK CL CM CN   
 
? Mobility - Walking and Moving Around:  - CURRENT STATUS: CM - 80%-99% impaired, limited or restricted  - GOAL STATUS: CL - 60%-79% impaired, limited or restricted  - D/C STATUS:  ---------------To be determined--------------- Payor: Sanam How / Plan: [facility] MEDICARE COMPLETE / Product Type: Managed Care Medicare /   
 
Medical Necessity:    
· Patient is expected to demonstrate progress in strength, range of motion, balance, coordination and functional technique to decrease assistance required with gait, transfers, and functional mobility. Constanza De La Rosa Reason for Services/Other Comments: 
· Patient continues to require skilled intervention due to decreased strength, decreased balance, decreased functional tolerance, decreased cardiopulmonary endurance affecting participation in basic ADLs and functional tasks. Use of outcome tool(s) and clinical judgement create a POC that gives a: Questionable prediction of patient's progress: MODERATE COMPLEXITY  
  
 
 
 
TREATMENT:  
(In addition to Assessment/Re-Assessment sessions the following treatments were rendered) Pre-treatment Symptoms/Complaints:  none Pain: Initial:  
Pain Intensity 1: 0  Post Session:  0/10 Assessment/Reassessment only, no treatment provided today Braces/Orthotics/Lines/Etc:  
· IV 
· wu catheter · O2 Device: Nasal cannula Treatment/Session Assessment:   
· Response to Treatment:  Max -total A X2 for bed mobility · Interdisciplinary Collaboration:  
o Physical Therapist 
o Occupational Therapist 
o Registered Nurse · After treatment position/precautions:  
o Supine in bed 
o Bed/Chair-wheels locked 
o Bed in low position 
o Call light within reach 
o RN notified · Compliance with Program/Exercises: Will assess as treatment progresses. · Recommendations/Intent for next treatment session: \"Next visit will focus on advancements to more challenging activities and reduction in assistance provided\". Total Treatment Duration: PT Patient Time In/Time Out Time In: 9995 Time Out: [address]

## 2018-09-23 NOTE — PROGRESS NOTES
BSR received, pt in bed resting, call light within reach, no distress noted, will continue to monitor

## 2018-09-23 NOTE — PROGRESS NOTES
Reassessment completed, no changes, pt off the floor to radiology, no distress before tranported to radiology

## 2018-09-23 NOTE — PROGRESS NOTES
Database complete. Patient alert and oriented, answers questions appropriately. Some confusion noted. Oriented to room and call system. Respirations even and unlabored on 5L via mask. Coarse with auscultation. Gurgled sounds when speaking. Dysphagia screening complete, NPO per protocol. Peg tube noted. No acute distress noted. Primary Nurse Dakota Hough and Nic Allen RN performed a dual skin assessment on this patient. Peg tube noted to abdomen. Excoriation and blanchable redness noted to buttocks, foam dressing applied. Left outer foot laceration noted. Small serous drainage noted, gauze and tape applied. Asim score is 11.

## 2018-09-23 NOTE — PROGRESS NOTES
Speech therapy note: 
Spoke with MD. Pt just had a new PEG placed on 8/3/18. Recommend NPO at this time. Recommend a Modified barium swallow study to rule out aspiration and fully assess pharyngeal swallow. 8/2/18 Patient was presented with ice chip x1 and thin/nectar/honey via tsp sips. Incoordinated swallow upon palpation with multiple swallows per bolus, concerning for pharyngeal reside. Strong cough following thin liquids. Immediate weak throat clearing with nectar and honey thick liquids. Throat clearing appeared to increased as consistencies were thickened. She required verbal cues for cough following honey thick trials, which appeared to clear suspected pharyngeal residue. Recommend strict NPO with alternative means of nutrition and hydration. GI present in room at conclusion of session to discuss possible PEG tube placement.  
 
 Connie Macdonald MA/SCOTT/SLP

## 2018-09-23 NOTE — PROGRESS NOTES
Problem: Nutrition Deficit Goal: *Optimize nutritional status Nutrition: 
Nutrition Consult for TF Management. (Dr. Wayne Sneed) Assessment: Anthropometrics: Ht - 5'3\", wgt - 122.9 kg (stated), BMI 48 c/w obesity class III, edema - 2+ BLEs. Macronutrient Needs: 
Estimated calorie needs - 8186-3528 laura/day (22-25 laura/kg/day) Estimated protein needs - 42-62 gm pro/day (0.8-1.2 gm pro/kgIBW/day) (GFR 49 ml/min) Max CHO/day - 215 gm CHO/day (50% laura/day) Fluid/day - 1.4-1.8 liters/day (1 ml/laura/day) Intake/Comparative Standards: The patient is currently NPO which meets 0% of her2er calorie and 0% of her protein needs. Pertinent Labs/Hemodynamic Parameters: 
 AM glucose 112, BUN 36, creatinine 1.16; MAP - 91 (calculated). Pertinent Medications: 
 Rocephin. GI Function/Activity: 
 Active bowel sounds, obese abdomen, last bowel movement PTA. Diet: 
 NPO. Food/Nutrition History: 
 76year old lady with a h/o seizure disorder and recent thalamic hemorrhage requiring PEG placement admitted with acute respiratory failure with hypoxia and UTI. She has received TF at [facility] via her PEG. Diagnosis (Nutrition): 
Swallowing difficulty related to thalamic hemorrhage as evidenced by the patient having a PEG placed for nutrition support and requires chronic TF to be resumed. Intervention: 
Meals and Snacks: NPO. Enteral Nutrition: Resume TF with Jevity 1.2 @ 30 ml/hr with a 25 ml/hr water flush via PEG. Increase TF as tolerated to the goal rate of 50 ml/hr - 1440 calories/day (100% calorie goal), 67 grams protein/day (>100% protein goal), 203 grams CHO/day (does not exceed max CHO limit) and 1572 ml water/day (100% fluid goal). IV Fluid: (TF + water flush) + IVF = 75 ml/hr. Nutrition Discharge Plan: PEG feeding. Simon Medina. [facility] 
[phone]

## 2018-09-23 NOTE — PROGRESS NOTES
Dr. Leilani Patel notified of NPO status due to dysphagia screening. No new orders at this time. Instructed to give oral medication via peg.

## 2018-09-23 NOTE — PROGRESS NOTES
SBAR from Raul Dunn RN. Patient resting in bed, eyes closed. Respirations even and unlabored on 5L NC. No acute distress noted at this time. Peg tube patent to Jevity 1.2. Avila patent to bedside drainage bag. Call light within reach. Safety measures in place. Will continue to monitor.

## 2018-09-23 NOTE — PROGRESS NOTES
Bedtime meds given via peg. Resting in bed. Respirations even and unlabored on 5L via NC. No acute distress noted. Call light within reach. Will continue to monitor.

## 2018-09-23 NOTE — PROGRESS NOTES
Patient remains in stable condition. No acute distress noted. No needs noted or voiced at this time. Safety measures remain in place. Call light is within reach. Preparing to give report to oncoming shift.

## 2018-09-23 NOTE — PROGRESS NOTES
Progress Note Patient: Garcia Manrique MRN: [id number]  SSN: [id number] YOB: 1950  Age: 76 y.o. Sex: female Admit Date: 9/22/2018 LOS: 1 day Subjective:  
 
\" Pt 68F with pmhx of chronic pain, anxiety, hx of seizures, large AVM, chronic resp failure with 3LNC continuous and recent admission 7/29 to 8/6 for thalamic bleed with residual dysphagia and left sided weakness. Sent back from rehab facility yesterday due to hypoxia and worsening mental status\" 9/23. Head ct scan in the ER did not show any new findings. Chest CT scan done at admission considered compatible with bronchitis. Chest ct also reported moderate R sided hydronephrosis. Started on ceftriaxone at admission. Apparently dipstick at admission was suggestive of UTI. Today she was found to have an urinary retention of 1.1 L. Avila inserted. UA and cultures ordered. Started on flomax. Urology consulted. Objective:  
 
Vitals:  
 09/23/18 0738 09/23/18 0744 09/23/18 1100 09/23/18 1526 BP: 151/61  108/49 122/56 Pulse: 67  61 67 Resp: 18  20 20 Temp: 98.7 °F (37.1 °C)  98.5 °F (36.9 °C) 98.2 °F (36.8 °C) SpO2: 96% 94% 98% 96% Weight:      
Height:      
  
 
Intake and Output: 
Current Shift: 09/23 0701 - 09/23 1900 In: 25 Out: 1125 [EOALT:9292] Last three shifts:   
 
Physical Exam:  
GENERAL: alert, appears stated age EYE: conjunctivae/corneas clear. LYMPHATIC: Cervical, supraclavicular, and axillary nodes normal.  
THROAT & NECK: normal and no erythema or exudates noted. LUNG: clear to auscultation bilaterally HEART: regular rate and rhythm ABDOMEN: soft, non-tender. Bowel sounds normal. No masses,  no organomegaly EXTREMITIES:  extremities normal, atraumatic, no cyanosis or edema SKIN: Normal. 
NEUROLOGIC: left sided weakness. PSYCHIATRIC: confused Lab/Data Review: 
Lab results reviewed. For significant abnormal values and values requiring intervention, see assessment and plan. Assessment:  
 
Active Problems: Morbid obesity with BMI of 40.0-44.9, adult ([facility] (1/19/2015) Seizure disorder ([address] (1/22/2018) Excessive sleepiness (6/28/2018) OAB (overactive bladder) (6/28/2018) Nontraumatic thalamic hemorrhage ([address] (7/30/2018) Cerebral AV malformation (7/30/2018) UTI (urinary tract infection) (7/31/2018) Respiratory failure, acute-on-chronic ([address] (9/22/2018) DALTON (acute kidney injury) ([facility] [address] (9/22/2018) Hydronephrosis (9/22/2018) Morbid obesity ([address] (9/22/2018) Bronchitis (9/22/2018) Plan:  
 
# mental status apparently at baseline. She is not a good historian and remains slightly confused # Found to have urinary retention ( 1.1 Lt). Most likely this was causing retrograde hydronephrosis. Avila inserted. UA done today. Suggestive of UTI. Continue ceftriaxone. Started on flomax. Urology consulted. #no wheezing or rhonchi reported #monitor VS 
#seen by speech. They recommended to keep her NPO and to do a barium swallow test tomorrow. COntinue using exclusively PEG. She claims she has been drinking water at her rehab facility. She might have been aspirating and that would explain her \" bronchitis\" and hypoxia at admission #case discussed with her  Eric Briones ( phone number ) Signed By: Richy Barnes MD   
 September 23, 2018

## 2018-09-24 ENCOUNTER — PATIENT OUTREACH (OUTPATIENT)
Dept: CASE MANAGEMENT | Age: 68
End: 2018-09-24

## 2018-09-24 ENCOUNTER — APPOINTMENT (OUTPATIENT)
Dept: GENERAL RADIOLOGY | Age: 68
DRG: 178 | End: 2018-09-24
Attending: INTERNAL MEDICINE
Payer: MEDICARE

## 2018-09-24 LAB
ANION GAP SERPL CALC-SCNC: 5 MMOL/L (ref 7–16)
BUN SERPL-MCNC: 27 MG/DL (ref 8–23)
CALCIUM SERPL-MCNC: 8.5 MG/DL (ref 8.3–10.4)
CHLORIDE SERPL-SCNC: 106 MMOL/L (ref 98–107)
CO2 SERPL-SCNC: 32 MMOL/L (ref 21–32)
CREAT SERPL-MCNC: 0.93 MG/DL (ref 0.6–1)
ERYTHROCYTE [DISTWIDTH] IN BLOOD BY AUTOMATED COUNT: 14.1 %
GLUCOSE SERPL-MCNC: 171 MG/DL (ref 65–100)
HCT VFR BLD AUTO: 31.6 % (ref 35.8–46.3)
HGB BLD-MCNC: 9.8 G/DL (ref 11.7–15.4)
MCH RBC QN AUTO: 29 PG (ref 26.1–32.9)
MCHC RBC AUTO-ENTMCNC: 31 G/DL (ref 31.4–35)
MCV RBC AUTO: 93.5 FL (ref 79.6–97.8)
NRBC # BLD: 0 K/UL (ref 0–0.2)
PLATELET # BLD AUTO: 138 K/UL (ref 150–450)
PMV BLD AUTO: 9.7 FL (ref 9.4–12.3)
POTASSIUM SERPL-SCNC: 4.1 MMOL/L (ref 3.5–5.1)
RBC # BLD AUTO: 3.38 M/UL (ref 4.05–5.2)
SODIUM SERPL-SCNC: 143 MMOL/L (ref 136–145)
WBC # BLD AUTO: 5.7 K/UL (ref 4.3–11.1)

## 2018-09-24 PROCEDURE — 74011000250 HC RX REV CODE- 250: Performed by: INTERNAL MEDICINE

## 2018-09-24 PROCEDURE — 65660000000 HC RM CCU STEPDOWN

## 2018-09-24 PROCEDURE — 77010033678 HC OXYGEN DAILY

## 2018-09-24 PROCEDURE — 74011000255 HC RX REV CODE- 255: Performed by: INTERNAL MEDICINE

## 2018-09-24 PROCEDURE — 77030018798 HC PMP KT ENTRL FED COVD -A

## 2018-09-24 PROCEDURE — 74011000258 HC RX REV CODE- 258: Performed by: INTERNAL MEDICINE

## 2018-09-24 PROCEDURE — 94760 N-INVAS EAR/PLS OXIMETRY 1: CPT

## 2018-09-24 PROCEDURE — 36415 COLL VENOUS BLD VENIPUNCTURE: CPT

## 2018-09-24 PROCEDURE — 80048 BASIC METABOLIC PNL TOTAL CA: CPT

## 2018-09-24 PROCEDURE — 74011250636 HC RX REV CODE- 250/636: Performed by: INTERNAL MEDICINE

## 2018-09-24 PROCEDURE — 74230 X-RAY XM SWLNG FUNCJ C+: CPT

## 2018-09-24 PROCEDURE — 74011250637 HC RX REV CODE- 250/637: Performed by: INTERNAL MEDICINE

## 2018-09-24 PROCEDURE — 77030018846 HC SOL IRR STRL H20 ICUM -A

## 2018-09-24 PROCEDURE — 92611 MOTION FLUOROSCOPY/SWALLOW: CPT

## 2018-09-24 PROCEDURE — 0T9B70Z DRAINAGE OF BLADDER WITH DRAINAGE DEVICE, VIA NATURAL OR ARTIFICIAL OPENING: ICD-10-PCS

## 2018-09-24 PROCEDURE — 94640 AIRWAY INHALATION TREATMENT: CPT

## 2018-09-24 PROCEDURE — 85027 COMPLETE CBC AUTOMATED: CPT

## 2018-09-24 RX ADMIN — IPRATROPIUM BROMIDE AND ALBUTEROL SULFATE 3 ML: .5; 3 SOLUTION RESPIRATORY (INHALATION) at 01:38

## 2018-09-24 RX ADMIN — IPRATROPIUM BROMIDE AND ALBUTEROL SULFATE 3 ML: .5; 3 SOLUTION RESPIRATORY (INHALATION) at 21:21

## 2018-09-24 RX ADMIN — BARIUM SULFATE 15 ML: 400 SUSPENSION ORAL at 09:48

## 2018-09-24 RX ADMIN — BARIUM SULFATE 15 ML: 400 PASTE ORAL at 09:47

## 2018-09-24 RX ADMIN — BARIUM SULFATE 45 ML: 980 POWDER, FOR SUSPENSION ORAL at 09:50

## 2018-09-24 RX ADMIN — LEVETIRACETAM 1500 MG: 100 SOLUTION ORAL at 21:27

## 2018-09-24 RX ADMIN — Medication 5 ML: at 21:28

## 2018-09-24 RX ADMIN — HYDRALAZINE HYDROCHLORIDE 100 MG: 25 TABLET, FILM COATED ORAL at 16:21

## 2018-09-24 RX ADMIN — HYDRALAZINE HYDROCHLORIDE 100 MG: 25 TABLET, FILM COATED ORAL at 08:41

## 2018-09-24 RX ADMIN — LEVETIRACETAM 1500 MG: 100 SOLUTION ORAL at 08:41

## 2018-09-24 RX ADMIN — IPRATROPIUM BROMIDE AND ALBUTEROL SULFATE 3 ML: .5; 3 SOLUTION RESPIRATORY (INHALATION) at 15:15

## 2018-09-24 RX ADMIN — IPRATROPIUM BROMIDE AND ALBUTEROL SULFATE 3 ML: .5; 3 SOLUTION RESPIRATORY (INHALATION) at 07:24

## 2018-09-24 RX ADMIN — Medication 10 ML: at 16:21

## 2018-09-24 RX ADMIN — Medication 5 ML: at 05:19

## 2018-09-24 RX ADMIN — HYDRALAZINE HYDROCHLORIDE 100 MG: 25 TABLET, FILM COATED ORAL at 21:26

## 2018-09-24 RX ADMIN — CEFTRIAXONE SODIUM 1 G: 1 INJECTION, POWDER, FOR SOLUTION INTRAMUSCULAR; INTRAVENOUS at 16:20

## 2018-09-24 RX ADMIN — SERTRALINE HYDROCHLORIDE 200 MG: 100 TABLET ORAL at 08:41

## 2018-09-24 RX ADMIN — AMLODIPINE BESYLATE 10 MG: 10 TABLET ORAL at 08:42

## 2018-09-24 RX ADMIN — TAMSULOSIN HYDROCHLORIDE 0.4 MG: 0.4 CAPSULE ORAL at 21:26

## 2018-09-24 RX ADMIN — BARIUM SULFATE 15 ML: 400 SUSPENSION ORAL at 09:51

## 2018-09-24 RX ADMIN — LOSARTAN POTASSIUM 100 MG: 50 TABLET ORAL at 08:42

## 2018-09-24 NOTE — PROGRESS NOTES
Problem: Interdisciplinary Rounds Goal: Interdisciplinary Rounds Interdisciplinary team rounds were held 9/24/2018 with the following team members:Care Management, Physical Therapy, Physician and Clinical Coordinator. Plan of care discussed. See clinical pathway and/or care plan for interventions and desired outcomes.

## 2018-09-24 NOTE — CONSULTS
Urology Consult    Subjective:     Date of Consultation:  September 24, 2018    Referring Physician: Felicia Adame MD    Reason for Consultation:  Urinary retention, R hydronephrosis with wu catheter in place    History of Present Illness:     Ms. Clayton Davidson is a 75-year-old female with past medical hx including chronic pain, anxiety, seizures, recent PEG tube placement after dysphagia s/p CVA, large AVM, chronic respiratory failure with 3L NC presents from [facility] with hypoxia and worsening AMS. She was recently hospitalized from 7/29-8/6 for thalamic bleed with residual dysphagia and left sided weakness. CT head showed no new findings, CT chest compatible for bronchitis, negative for PE. It incidentally showed R hydronephrosis but the images were limited. Cn was 1.22.  UA + for nitrites, >100 WBC, 5-10 RBC, urine culture growing >100,000 colonies GNR. Renal US obtained and showed (prior to wu insertion):  IMPRESSION:  1. Abnormal bladder which is distended, the sonographer indicates the patient is  unable to empty the bladder. There are soft tissue structures seen dependently  within the bladder possibly representing ureteroceles however incompletely  evaluated. No Doppler evaluation provided. 2. Poorly evaluated left kidney secondary to poor sonographic windows. The left  kidney appears atrophic and hyperechoic consistent with chronic failure.     Renal US (after wu insertion):   IMPRESSION  IMPRESSION:   No significant change from yesterday's exam. Prominent renal  pyramids versus mild hydronephrosis on the right       After renal US results were reviewed by primary team, wu catheter was placed (with 1.1 L of urine drained) and flomax started. We are consulted to further evaluate her urinary retention. Cn has since come down to 0.93 since wu placement. Urine culture growing GNR, final culture in process. Pt is on IV rocephin. She is afebrile, VSS.   On exam, she is hard of hearing and she is alert and oriented to person, place and situation but occasionally seems to have some periodic confusion and is not very eager to discuss her history. She does not recall having urinary issues while at [facility] and feels as if she was emptying her bladder completely but is not for certain. She has no hx of kidney stones and has never seen a urologist.  She does have a referral for urology and was apparently supposed to be seen by Dr. Johnathan Hatfield per request from her PCP for urinary frequency and UTIs. She never responded to our phone call and letter was mailed. She reports that her mother passed away from bladder cancer. Her UAs in the past do show some microscopic hematuria but usually she has large amount of WBC in the urine as well with UTI. She is a former smoker (unsure of length of time). She just had large BM.         Past Medical History:   Diagnosis Date    Chronic pain     \"all over body\"-takes Zoloft daily    Chronic sinusitis 1/19/2015    Diverticulosis 2017    Edema 1/19/2015    Resolved    Former smoker     GERD (gastroesophageal reflux disease)     Headache 1/19/2015    Hearing loss 1/19/2015    \"some\"- no hearing aids    Heart murmur 1990    Hip osteoarthritis 1/19/2015    feet, hands    Hx of colonic polyps 2017    SSA    Hx of migraines     on medication    Hypercholesterolemia 1/19/2015    Hypertension     Impaired fasting glucose 1/19/2015    Joint disorder 1/19/2015    Morbid obesity (HCC)     BMI 40    Neuralgia 1/19/2015    Phlebitis and thrombophlebitis of superficial vessels of lower extremities 01/19/2015    pt denies     Psychiatric disorder     Restless leg syndrome 1/19/2015    Rheumatic fever     pt reports possibly R.F.    Seizures ([address]     seizures as a child, has NOT had one since she was 13years old    SOB (shortness of breath) on exertion     Stroke ([address]     at age 11 only residual poor peripheral vision    Tobacco abuse 1/19/2015    quit smoking 4/2015    Vascular anomalies, congenital     Vitamin D deficiency 1/19/2015      Past Surgical History:   Procedure Laterality Date    COLONOSCOPY N/A 2/20/2017    Bjorn--appendiceal serrated sessile polyp, transverse and rectal hyperplastic--3 year recall    HX APPENDECTOMY  03/15/2017    HX CATARACT REMOVAL Bilateral 3/2016    HX CHOLECYSTECTOMY  1982    HX MOHS PROCEDURES Right 1995    pt denies    HX ROTATOR CUFF REPAIR Right 10/31/2016    x2    HX TUBAL LIGATION  1982    TOTAL HIP ARTHROPLASTY Right 2005    and again 5/2015 and another revision      Family History   Problem Relation Age of Onset    Hypertension Mother     Cancer Mother      Bladder    Depression Mother     Parkinson's Disease Father     Alcohol abuse Brother     Alcohol abuse Son     No Known Problems Sister     No Known Problems Brother     Malignant Hyperthermia Neg Hx     Pseudocholinesterase Deficiency Neg Hx     Delayed Awakening Neg Hx     Post-op Nausea/Vomiting Neg Hx     Emergence Delirium Neg Hx     Post-op Cognitive Dysfunction Neg Hx     Other Neg Hx       Social History   Substance Use Topics    Smoking status: Former Smoker     Packs/day: 1.00     Years: 50.00     Types: Cigarettes     Quit date: 3/29/2015    Smokeless tobacco: Never Used      Comment: quit 2 months and 2 weeks ago    Alcohol use No      Comment: Former- socially     No Known Allergies   Prior to Admission medications    Medication Sig Start Date End Date Taking? Authorizing Provider   hydrALAZINE (APRESOLINE) 100 mg tablet 1 Tab by Per NG tube route three (3) times daily. 8/6/18   Kenna South MD   levETIRAcetam (KEPPRA) 100 mg/ml soln oral solution 15 mL by Per G Tube route two (2) times a day for 30 days. 8/6/18 9/5/18  Kenna South MD   oxyCODONE IR (ROXICODONE) 5 mg immediate release tablet 0.5 Tabs by Per NG tube route every eight (8) hours as needed.  Max Daily Amount: 7.5 mg. 8/6/18   Kenna South MD amLODIPine (NORVASC) 10 mg tablet Take 10 mg by mouth daily. Mauri Thomas MD   mirabegron ER (MYRBETRIQ) 50 mg ER tablet Take 1 Tab by mouth daily. 18   Maxim Trinidad DO   losartan (COZAAR) 100 mg tablet Take 1 Tab by mouth daily. Indications: hypertension 18   Maxim Trinidad DO   ergocalciferol (VITAMIN D2) 50,000 unit capsule TAKE 1 CAPSULE ONE TIME WEEKLY- takes on sat 3/1/18   Maxim Trinidad DO   sertraline (ZOLOFT) 100 mg tablet Take 2 Tabs by mouth daily. 18   Maxim Trinidad DO         Review of Systems:  A comprehensive review of systems was negative except for that written in the HPI. Objective:     Patient Vitals for the past 8 hrs:   BP Temp Pulse Resp SpO2   18 1119 144/81 99.2 °F (37.3 °C) 70 21 95 %   18 0738 126/68 98.5 °F (36.9 °C) 78 19 96 %   18 0725 - - - - 97 %     Temp (24hrs), Av.9 °F (37.2 °C), Min:98.2 °F (36.8 °C), Max:99.3 °F (37.4 °C)      Intake and Output:    1901 -  0700  In: 300   Out: 2625 [Urine:2625]    Physical Exam:            General:    alert, oriented, hard of hearing                     Skin:  scattered ecchymosis                HEENT:  PERRLA        Throat/Neck:  neck supple and symmetrical    Lymph nodes:  Cervical nodes normal.                 Lungs:  diminished bilaterally      Cardiovascular:  RRR, S1 S2             Abdomen[de-identified]  soft, non-tender. Active BS. Semi-distended, obese (RN reports pt just had large BM)             :  wu draining yellow urine          Extremities:  peripheral pulses 2+ and symmetric, LUE/LLE        Assessment:     Active Problems:     Morbid obesity with BMI of 40.0-44.9, adult ([facility] (2015)      Seizure disorder ([facility] (2018)      Excessive sleepiness (2018)      OAB (overactive bladder) (2018)      Nontraumatic thalamic hemorrhage ([facility] (2018)      Cerebral AV malformation (2018)      UTI (urinary tract infection) (2018)      Respiratory failure, acute-on-chronic ([facility] (9/22/2018)      DALTON (acute kidney injury) ([facility] (9/22/2018)      Hydronephrosis (9/22/2018)      Morbid obesity ([facility] (9/22/2018)      Bronchitis (9/22/2018)      Urinary retention. UTI. Plan:     Continue wu catheter at least 3-4 more days until pt is stronger and more ambulatory. Treat UTI. Okay to continue flomax. Stop myrbetriq. Pt will need to be seen in the office for cystoscopy by Dr. Aiden Trevizo for further evaluation based on abnormal US findings. This will need to be arranged at time of discharge. Patient is seen and examined in collaboration with Dr. Radha Hunt. Assessment and plan as per Dr. Tate Noguera. Thank you for the opportunity to assist in the care of this patient. Signed By: Delisa Horn NP                         September 24, 2018   I have reviewed the above note and examined the patient. I agree with the exam, assessment and plan. Pt reports history of freq but denies prior history of AUR. AF.  VSS. Abd soft, ND. Wu in place draining clear urine. Ucx GNR. Imaging reviewed. Recommend leaving Wu 3-4 days until overall condition improves. Stop Myrbetriq indefinitely. Cont abx/Await Ucx. Needs cysto as outpt after dischage to evaluate bladder findings on ultrasound.     Brittny Deal,

## 2018-09-24 NOTE — PROGRESS NOTES
Problem: Falls - Risk of 
Goal: *Absence of Falls Document Chuck Baker Fall Risk and appropriate interventions in the flowsheet. Outcome: Progressing Towards Goal 
Fall Risk Interventions: 
Mobility Interventions: Bed/chair exit alarm Medication Interventions: Bed/chair exit alarm Elimination Interventions: Call light in reach History of Falls Interventions: Bed/chair exit alarm

## 2018-09-24 NOTE — PROGRESS NOTES
Pt is in room alert and oriented. She does have some periods of confusion. No distress noted. PEG in place patent and infusing. She is stable. Will continue to monitor.

## 2018-09-24 NOTE — PROGRESS NOTES
Pt is back from barium swallow. Therapist recommends honey thick liquids and a pureed diet. MD was notified. Pt still continues to refuse thickened liquids.

## 2018-09-24 NOTE — PROGRESS NOTES
MD has spoken at length with pt. She has decided she is still going to drink her thin liquids. She was educated on the side of effects of this decision and she stated she does not care. She is stable. Safety measures in place will continue to monitor.

## 2018-09-24 NOTE — PROGRESS NOTES
Ms. Wood Rodriguez is off the floor. Check back later as time allows.  
Colonel Sujatha Garcia, PT

## 2018-09-24 NOTE — PROGRESS NOTES
SPEECH PATHOLOGY NOTE: 
Modified Barium Swallow study complete. Patient reports she has been given PO for pleasure prior to this hospitalization. She reports it is \"mushed up\" and liquids are thick (she does not know consistency). She desperately wants to have water. During Modified Barium Swallow study, patient had persistent laryngeal penetration that became deeper as additional trials were presented with both thin and nectar liquids. There was no reflexive throat clearing and cued cough/throat clear did not completely clear laryngeal residue. Increased oral transit time with laryngeal penetration prior to and during swallow with mixed consistency. Cracker deferred. There was no laryngeal penetration or aspiration observed with honey-thick liquid or pudding. Recommend pureed diet and honey-thick liquids. Give meds with applesauce. Patient reports she does not care about risk of aspiration. She wants to have water today. Consider thin water in isolation between meals with SLP or staff if oral cavity has been completely cleaned of any food debris. All liquids with meals should be honey-thick consistency. Patient would benefit from swallowing therapy for laryngeal strengthening exercises and safe swallowing strategies. Results/recommendations called to floor. Spoke with RORY Reeves. Full report to follow.  
Amanda Bethea MA, CCC-SLP

## 2018-09-24 NOTE — PROGRESS NOTES
Bedtime meds given via peg tube. No acute distress noted. Feeding tube rate increased to desired goal. No residual noted. Safety measures in place. Call light within reach. Door open for monitoring.

## 2018-09-24 NOTE — PROGRESS NOTES
Visit with patient to build rapport with . Patient is calm. Receptive to  presence. Encouraged and assured patient of our continued care. Wicho Donaldson,  Staff  C: [phone]  /  [email or web address]

## 2018-09-24 NOTE — PROGRESS NOTES
Connect Care Notification for patient returning to hospital 
RRAT - 23, High risk for readmission; Admitted from [facility] for Acute on Chronic Respiratory Failure History of obesity, depression, DALTON Likely DC plan to return to [facility] Plan - patient to be linked with [facility] at [place] from [facility]

## 2018-09-24 NOTE — H&P
[address] HISTORY AND PHYSICAL [place] 
MR#: [id number] : 1950 ACCOUNT #: [de-identified] ADMIT DATE: 2018 SUBJECTIVE:  The patient had a modified barium swallow study completed earlier today and recommendations were made for pureed diet and honey thick liquids, medications to be given with applesauce. She is quite adamant, however, about having to drink water. She states she has been doing this for many years and irrespective of her risk for aspiration, she wants to drink. She is quite alert and oriented and exactly knows what she wants. She voiced understanding of all the implications of her decision. She does not seem to be depressed and does not seem to have any suicidal ideations. She states she lived her life and does not want to deny herself the pleasure of drinking water. PHYSICAL EXAMINATION: 
VITAL SIGNS:  Temperature 98.2, blood pressure 144/81, pulse rate 70, O2 sat is 95 on 2 liters via the nasal cannula. GENERAL:  The patient is lying on the bed in no apparent distress. HEENT:  Sclerae are nonicteric. Oropharynx appears normal. 
NECK:  Supple. LUNGS:  Minimal bibasilar crackles. HEART:  Normal S1, S2, no murmurs. ABDOMEN:  Obese, nontender. Bowel sounds are active. EXTREMITIES:  No pitting edema. NEUROLOGIC:  Alert and oriented x3. She has mild left-sided weakness. Speech is completely normal.  Cranial nerves are grossly intact. LABORATORY DATA:  White blood cells 5.7, hemoglobin 9.8, creatinine 0.93. Urine culture is growing on the  showed gram-negative rods. Blood culture did not grow any pathogens. ASSESSMENT AND PLAN: 
1. Acute hypoxia likely due to aspiration. Patient is adamant about drinking water despite the risks associated with that. She voiced understanding of her decision and still wants to have a glass of ice water. I have had extensive discussion with her as outlined in the HPI. She is alert and oriented x3 and knows exactly what she wants. I am not inclined to stand against the wishes of this competent patient. CT of the chest initially done showed bronchial thickening, could be bronchitis, but it could also be due to aspiration. She currently with no fevers or chills and no leukocytosis. We will taper her off oxygen. 2.  Urinary retention, currently with Avila catheter. Urology consulted. Continue Flomax. Treat underlying urinary tract infection. Keep a Avila catheter for at least 2-3 more days. Her CT of the chest initially suggested hydronephrosis, but subsequent renal ultrasound showed no evidence of that. 3.  Thalamic hemorrhage with cerebral arteriovenous malformation and residual left-sided weakness, complicated by oropharyngeal dysphagia. Continue physical therapy. 4.  Dysphagia with a PEG tube in place. She has had modified swallow test completed, but she is adamant about drinking water. She understands the risks associated with that. 5.  Urinary tract infection awaiting urine culture results. We will continue current antibiotics. 6.  Morbid obesity. 7.  Acute kidney injury with underlying chronic kidney disease. MD SOFIA Chung/[place] 
D: 09/24/2018 13:37    
T: 09/24/2018 14:09 
JOB #: [id number]

## 2018-09-24 NOTE — PROGRESS NOTES
Problem: Dysphagia (Adult) Goal: *Acute Goals and Plan of Care (Insert Text) STG: Patient will swallow pureed diet and honey-thick liquids without overt signs or symptoms of aspiration 100% of the time. STG: Patient will perform laryngeal strengthening exercises x10 each with 80% accuracy. LTG: Patient will consume least restrictive diet consistency without respiratory compromise by discharge. Speech language pathology: modified barium swallow study: Initial Assessment NAME/AGE/GENDER: Jonny Olmedo is a 76 y.o. female DATE: 9/24/2018 PRIMARY DIAGNOSIS: Respiratory failure, acute-on-chronic ([facility] [address] ICD-10: Treatment Diagnosis: oropharyngeal dysphagia (R13.12) INTERDISCIPLINARY COLLABORATION: Radiologist, Dr. Dinora Talbot PRECAUTIONS/ALLERGIES: Review of patient's allergies indicates no known allergies. ASSESSMENT/PLAN OF CARE:Based on the objective data described below, Ms. David Zelaya presents with persistent laryngeal penetration that became deeper as additional trials were presented with both thin and nectar liquids. There was no reflexive throat clearing and cued cough/throat clear did not completely clear laryngeal residue. Increased oral transit time with laryngeal penetration prior to and during swallow with mixed consistency. Cracker deferred. There was no laryngeal penetration or aspiration observed with honey-thick liquid or pudding. Patient reports she has been given PO for pleasure prior to this hospitalization. She reports it is \"mushed up\" and liquids are thick (she does not know consistency). She desperately wants to have water. At this time, recommend pureed diet and honey-thick liquids. Give meds with applesauce. Patient reports she does not care about risk of aspiration. She wants to have water today. Consider thin water in isolation between meals with SLP or staff if oral cavity has been completely cleaned of any food debris.  All liquids with meals should be honey-thick consistency. Patient would benefit from swallowing therapy for laryngeal strengthening exercises and safe swallowing strategies. Results/recommendations called to floor. Spoke with RORY Reeves.???? ? ? This section established at most recent assessment???????? 
RECOMMENDATIONS AND PLANNED INTERVENTIONS (Benefits and precautions of therapy have been discussed with the patient.): 
· PO:  Pureed · Liquids:  honey MEDICATIONS: 
· With applesauce COMPENSATORY STRATEGIES/MODIFICATIONS INCLUDING: 
· Upright for all PO 
OTHER RECOMMENDATIONS (including follow up treatment recommendations): · Tongue based expressions · Family training/education · Laryngeal exercises · Patient education FREQUENCY/DURATION: Continue to follow patient 2 times a week for duration of hospital stay to address above goals. RECOMMENDED REHABILITATION/EQUIPMENT: (at time of discharge pending progress): Due to the probability of continued deficits (see above) this patient will likely need continued skilled speech therapy after discharge. SUBJECTIVE:  
Patient upset about thickened liquid recommendation, as she really wants to have thin water. She states she understands the risk of aspiration and wants to have thin water anyway. RN informed. History of Present Injury/Illness: Ms. Lance Henderson  has a past medical history of Chronic pain; Chronic sinusitis (1/19/2015); Diverticulosis (2017); Edema (1/19/2015); Former smoker; GERD (gastroesophageal reflux disease); Headache (1/19/2015); Hearing loss (1/19/2015); Heart murmur (1990); Hip osteoarthritis (1/19/2015); colonic polyps (2017); migraines; Hypercholesterolemia (1/19/2015); Hypertension; Impaired fasting glucose (1/19/2015); Joint disorder (1/19/2015); Morbid obesity ([facility] [address] Neuralgia (1/19/2015); Phlebitis and thrombophlebitis of superficial vessels of lower extremities (01/19/2015);  Psychiatric disorder; Restless leg syndrome (1/19/2015); Rheumatic fever; Seizures ([address] SOB (shortness of breath) on exertion; Stroke ([address] Tobacco abuse (1/19/2015); Vascular anomalies, congenital; and Vitamin D deficiency (1/19/2015). She also has no past medical history of Adverse effect of anesthesia; Aneurysm ([address] Arrhythmia; Asthma; Autoimmune disease ([address] CAD (coronary artery disease); Cancer ([address] Chronic kidney disease; Chronic obstructive pulmonary disease ([address] Coagulation disorder ([address] Diabetes ([address] Difficult intubation; Endocarditis; Heart failure ([address] Liver disease; Malignant hyperthermia due to anesthesia; Nausea & vomiting; Pseudocholinesterase deficiency; PUD (peptic ulcer disease); Sleep apnea; Thromboembolus ([address] or Thyroid disease. . She also  has a past surgical history that includes hx mohs procedure (Right, 1995); hx cholecystectomy (1982); colonoscopy (N/A, 2/20/2017); pr total hip arthroplasty (Right, 2005); hx rotator cuff repair (Right, 10/31/2016); hx cataract removal (Bilateral, 3/2016); hx tubal ligation (1982); and hx appendectomy (03/15/2017). Present Symptoms: recent history of CVA with PEG Pain Intensity 1: 0 Pain Location 1: Hip Pain Orientation 1: Right Pain Intervention(s) 1: Medication (see MAR) Current Dietary Status:  PEG Social History/Home Situation:  
Home Environment: Skilled nursing facility [facility] Residence: One story Living Alone: No 
Support Systems: Skilled nursing facility Patient Expects to be Discharged to[de-identified] Skilled nursing facility Current DME Used/Available at Home: Wheelchair OBJECTIVE:  
Cognitive/Communication Status:  Mental Status Neurologic State: Alert Orientation Level: Oriented X4 Cognition: Follows commands Perception: Appears intact Perseveration: No perseveration noted Safety/Judgement: Awareness of environment Oral Assessment:  Oral Assessment Labial: No impairment Dentition: Upper dentures (no lower dentition) Oral Hygiene: adequate Lingual: No impairment Velum: No impairment Vocal Quality: adequate Patient Viewed:   
Film Views: Lateral, Fluoro Oral Prepatory: 
The patient was given the following: Consistency Presented: Thin liquid, Pudding, Mixed consistency, Nectar thick liquid, Honey thick liquid How Presented: Self-fed/presented, SLP-fed/presented, Cup/sip, Cup/gulp, Spoon, Straw Oral Phase: 
Bolus Acceptance: No impairment Bolus Formation/Control: Impaired Propulsion: Delayed (# of seconds) Type of Impairment: Delayed, Mastication, Premature spillage Oral Residue: None Initiation of Swallow: Triggered at vallecula Oral Phase Severity: Moderate Pharyngeal Phase: 
Timing: Pooling 1-5 sec, Vallecular Decreased Tongue Base Retraction?: No 
Laryngeal Elevation: Incomplete laryngeal closure Penetration: Before swallow, During swallow, After swallow, To cords, To laryngeal vestibule, From initial swallow Aspiration/Timing: No evidence of aspiration Aspiration/Penetration Score: 5 (Penetration/Visible residue-Contrast contacts the folds, but is not ejected) Pharyngeal Dysfunction: Decreased elevation/closure Pharyngeal Phase Severity: Moderate Pharyngeal-Esophageal Segment: No impairment Assessment/Reassessment only, no treatment provided today. Tool Used: Dysphagia Outcome and Severity Scale (KAROLINA) Score Comments Normal Diet  [] 7 With no strategies or extra time needed Functional Swallow  [] 6 May have mild oral or pharyngeal delay Mild Dysphagia 
  [] 5 Which may require one diet consistency restricted (those who demonstrate penetration which is entirely cleared on MBS would be included) Mild-Moderate Dysphagia  [] 4 With 1-2 diet consistencies restricted Moderate Dysphagia  [x] 3 With 2 or more diet consistencies restricted Moderately Severe Dysphagia  [] 2 With partial PO strategies (trials with ST only) Severe Dysphagia  [] 1 With inability to tolerate any PO safely Score:  Initial: 3 Most Recent:  (Date:) Interpretation of Tool: The Dysphagia Outcome and Severity Scale (KAROLINA) is a simple, easy-to-use, 7-point scale developed to systematically rate the functional severity of dysphagia based on objective assessment and make recommendations for diet level, independence level, and type of nutrition. Score 7 6 5 4 3 2 1 Modifier CH CI CJ CK CL CM CN ? Swallowing:  
  - CURRENT STATUS: CL - 60%-79% impaired, limited or restricted  - GOAL STATUS:  CK - 40%-59% impaired, limited or restricted  - D/C STATUS:  ---------------To be determined--------------- Payor: Chalino Olvera / Plan: [facility] MEDICARE COMPLETE / Product Type: [employer] Medicare / Safety: After treatment position/precautions: 
· Patient waiting in radiology holding bay for transport back to room. · Results/recommendations called to floor; spoke with RORY Reeves Recommendations for treatment: puree diet, honey thick liquids; laryngeal exercises; thin water with SLP, only if oral cavity is completely clear Total Treatment Duration: 
Time In: 3087 Time Out: 4343 Izzy Frost MA, CCC-SLP

## 2018-09-24 NOTE — PROGRESS NOTES
Pt is in room alert and oriented. rr are even and unlabored. Lung sounds are diminished. On room air. No distress noted. abd is soft bowel sounds are active. PEG is in place patent infusing jevity 1.2. Pt has stated she wants to drink thin liquids. She stated she is aware of what can happen and she wants it anyway. MD was notified. She is to go for barium swallow this AM. Avila in place patent. She is stable. Will continue to monitor.

## 2018-09-25 VITALS
RESPIRATION RATE: 18 BRPM | WEIGHT: 271 LBS | SYSTOLIC BLOOD PRESSURE: 125 MMHG | HEART RATE: 73 BPM | BODY MASS INDEX: 48.02 KG/M2 | OXYGEN SATURATION: 95 % | HEIGHT: 63 IN | TEMPERATURE: 98.6 F | DIASTOLIC BLOOD PRESSURE: 67 MMHG

## 2018-09-25 LAB
BACTERIA SPEC CULT: ABNORMAL
LEVETIRACETAM SERPL-MCNC: 48.2 UG/ML (ref 10–40)
MM INDURATION POC: NORMAL MM (ref 0–5)
MM INDURATION POC: NORMAL MM (ref 0–5)
PPD POC: NORMAL NEGATIVE
PPD POC: NORMAL NEGATIVE
SERVICE CMNT-IMP: ABNORMAL

## 2018-09-25 PROCEDURE — 90686 IIV4 VACC NO PRSV 0.5 ML IM: CPT | Performed by: INTERNAL MEDICINE

## 2018-09-25 PROCEDURE — 94640 AIRWAY INHALATION TREATMENT: CPT

## 2018-09-25 PROCEDURE — 97530 THERAPEUTIC ACTIVITIES: CPT

## 2018-09-25 PROCEDURE — 97110 THERAPEUTIC EXERCISES: CPT

## 2018-09-25 PROCEDURE — 74011250637 HC RX REV CODE- 250/637: Performed by: INTERNAL MEDICINE

## 2018-09-25 PROCEDURE — 77010033678 HC OXYGEN DAILY

## 2018-09-25 PROCEDURE — 94760 N-INVAS EAR/PLS OXIMETRY 1: CPT

## 2018-09-25 PROCEDURE — 90471 IMMUNIZATION ADMIN: CPT

## 2018-09-25 PROCEDURE — 74011000250 HC RX REV CODE- 250: Performed by: INTERNAL MEDICINE

## 2018-09-25 PROCEDURE — 74011250636 HC RX REV CODE- 250/636: Performed by: INTERNAL MEDICINE

## 2018-09-25 PROCEDURE — 92526 ORAL FUNCTION THERAPY: CPT

## 2018-09-25 RX ORDER — CEFDINIR 300 MG/1
300 CAPSULE ORAL 2 TIMES DAILY
Qty: 14 CAP | Refills: 0 | Status: SHIPPED | OUTPATIENT
Start: 2018-09-25 | End: 2019-02-01 | Stop reason: ALTCHOICE

## 2018-09-25 RX ADMIN — HYDRALAZINE HYDROCHLORIDE 100 MG: 25 TABLET, FILM COATED ORAL at 08:43

## 2018-09-25 RX ADMIN — IPRATROPIUM BROMIDE AND ALBUTEROL SULFATE 3 ML: .5; 3 SOLUTION RESPIRATORY (INHALATION) at 08:33

## 2018-09-25 RX ADMIN — LOSARTAN POTASSIUM 100 MG: 50 TABLET ORAL at 08:43

## 2018-09-25 RX ADMIN — SERTRALINE HYDROCHLORIDE 200 MG: 100 TABLET ORAL at 08:43

## 2018-09-25 RX ADMIN — Medication 10 ML: at 14:10

## 2018-09-25 RX ADMIN — AMLODIPINE BESYLATE 10 MG: 10 TABLET ORAL at 08:43

## 2018-09-25 RX ADMIN — INFLUENZA VIRUS VACCINE 0.5 ML: 15; 15; 15; 15 SUSPENSION INTRAMUSCULAR at 14:21

## 2018-09-25 RX ADMIN — Medication 5 ML: at 05:44

## 2018-09-25 RX ADMIN — LEVETIRACETAM 1500 MG: 100 SOLUTION ORAL at 08:43

## 2018-09-25 NOTE — PROGRESS NOTES
Problem: Mobility Impaired (Adult and Pediatric) Goal: *Acute Goals and Plan of Care (Insert Text) STG: 
(1.)Ms. Herron will move from supine to sit and sit to supine , scoot up and down and roll side to side with MAXIMAL ASSIST within 3 treatment day(s). (2.)Ms. Herron will demonstrate static sitting balance for 10 minutes with MINIMAL ASSIST within 3 treatment days. LTG: 
(1.)Ms. Herron will move from supine to sit and sit to supine , scoot up and down and roll side to side in bed with MINIMAL ASSIST within 7 treatment day(s). (2.)Ms. Herron will transfer from sit to stand with MAXIMUM ASSIST  using the least restrictive device within 7 treatment day(s). (3.)(3.)Ms. Herron will demonstrate static sitting balance for 15 minutes with CONTACT GUARD ASSIST within 7 treatment days. ________________________________________________________________________________________________ PHYSICAL THERAPY: Daily Note, Treatment Day: 1st, AM 9/25/2018 INPATIENT: Hospital Day: 4 Payor: [address] [place] [address] / Plan: [facility] A[employer] MEDICARE COMPLETE / Product Type: [employer] Medicare /  
  
NAME/AGE/GENDER: Elana Dalton is a 76 y.o. female PRIMARY DIAGNOSIS: Respiratory failure, acute-on-chronic (HCC) <principal problem not specified> <principal problem not specified> 
  
  
ICD-10: Treatment Diagnosis:  
 · Generalized Muscle Weakness (M62.81) · Other abnormalities of gait and mobility (R26.89) Precaution/Allergies: 
Review of patient's allergies indicates no known allergies. ASSESSMENT:  
 
Ms. Vandana Browning is supine sleeping and agreeable to working with me. She performed supine exercises below with good ability. She rolled to the left with minimal to moderate assist and sat up with moderate assist.  Her sitting balance was fair needing minimal assist most of the time but able to hold good balance for periods of time although short.   Progress made and hoping to return to rehab as soon as possible. sats 94% while sitting EOB. This section established at most recent assessment PROBLEM LIST (Impairments causing functional limitations): 1. Decreased Strength 2. Decreased ADL/Functional Activities 3. Decreased Transfer Abilities 4. Decreased Balance 5. Decreased Activity Tolerance 6. Decreased Pacing Skills 7. Increased Fatigue 8. Increased Shortness of Breath 9. Decreased [place] with Home Exercise Program 
 INTERVENTIONS PLANNED: (Benefits and precautions of physical therapy have been discussed with the patient.) 1. Balance Exercise 2. Bed Mobility 3. Family Education 4. Home Exercise Program (HEP) 5. Neuromuscular Re-education/Strengthening 6. Range of Motion (ROM) 7. Therapeutic Activites 8. Therapeutic Exercise/Strengthening 9. Transfer Training TREATMENT PLAN: Frequency/Duration: 3 times a week for 2 weeks Rehabilitation Potential For Stated Goals: Fair RECOMMENDED REHABILITATION/EQUIPMENT: (at time of discharge pending progress): Due to the probability of continued deficits (see above) this patient will likely need continued skilled physical therapy after discharge. Equipment:  
? None at this time HISTORY:  
History of Present Injury/Illness (Reason for Referral): 
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 Currently a resident at [facility] and presented from there today for report of \"stroke like symptoms\" which was apparently a concern for patient not being able to use her phone and appeared more confused. ED evaluation did not reveal any acute neuro deficits but she was noted to be hypoxic at 88% on 5L nc. ABG 7.34/63/149.   ED eval notable for UA suggestive of UTI,  CT scan negative for PE with with diffuse bronchial wall thickening w/o focal consolidation. DALTON cr 1.2 baseline <1, CT head non acute. Hospitalist asked to admit for acute/chronic hypoxic resp failure. During my interview patient was oriented although slow to answer questions. I remember her from prior admission and does not look much different than baseline. She denies dyspnea, cough, chest pain. She is focused on me calling her  for update which I have done. Past Medical History/Comorbidities: Ms. Dick Gonzalez  has a past medical history of Chronic pain; Chronic sinusitis (1/19/2015); Diverticulosis (2017); Edema (1/19/2015); Former smoker; GERD (gastroesophageal reflux disease); Headache (1/19/2015); Hearing loss (1/19/2015); Heart murmur (1990); Hip osteoarthritis (1/19/2015); colonic polyps (2017); migraines; Hypercholesterolemia (1/19/2015); Hypertension; Impaired fasting glucose (1/19/2015); Joint disorder (1/19/2015); Morbid obesity ([address] Neuralgia (1/19/2015); Phlebitis and thrombophlebitis of superficial vessels of lower extremities (01/19/2015); Psychiatric disorder; Restless leg syndrome (1/19/2015); Rheumatic fever; Seizures ([address] SOB (shortness of breath) on exertion; Stroke ([address] Tobacco abuse (1/19/2015); Vascular anomalies, congenital; and Vitamin D deficiency (1/19/2015). She also has no past medical history of Adverse effect of anesthesia; Aneurysm ([address] Arrhythmia; Asthma; Autoimmune disease ([address] CAD (coronary artery disease); Cancer ([address] Chronic kidney disease; Chronic obstructive pulmonary disease ([address] Coagulation disorder ([address] Diabetes ([address] Difficult intubation; Endocarditis; Heart failure ([address] Liver disease; Malignant hyperthermia due to anesthesia; Nausea & vomiting; Pseudocholinesterase deficiency; PUD (peptic ulcer disease); Sleep apnea; Thromboembolus ([address] or Thyroid disease.   Ms. Dick Gonzalez  has a past surgical history that includes hx mohs procedure (Right, 1995); hx cholecystectomy (1982); colonoscopy (N/A, 2/20/2017); pr total hip arthroplasty (Right, 2005); hx rotator cuff repair (Right, 10/31/2016); hx cataract removal (Bilateral, 3/2016); hx tubal ligation (1982); and hx appendectomy (03/15/2017). Social History/Living Environment:  
Home Environment: Skilled nursing facility [facility] Residence: One story Living Alone: No 
Support Systems: Skilled nursing facility Patient Expects to be Discharged to[de-identified] Skilled nursing facility Current DME Used/Available at Home: Wheelchair Prior Level of Function/Work/Activity: 
From [facility], dependent for transfers Number of Personal Factors/Comorbidities that affect the Plan of Care: 3+: HIGH COMPLEXITY EXAMINATION:  
Most Recent Physical Functioning:  
Gross Assessment: 
  
         
  
Posture: 
  
Balance: 
Sitting - Static: Fair (occasional) Sitting - Dynamic: Fair (occasional) Bed Mobility: 
Rolling: Minimum assistance Supine to Sit: Moderate assistance Sit to Supine: Moderate assistance;Maximum assistance;Assist x1 Wheelchair Mobility: 
  
Transfers: 
  
Gait: 
  
   
  
Body Structures Involved: 1. Nerves 2. Heart 3. Lungs 4. Bones 5. Joints 6. Muscles 7. Ligaments Body Functions Affected: 1. Sensory/Pain 2. Cardio 3. Respiratory 4. Neuromusculoskeletal 
5. Movement Related Activities and Participation Affected: 1. General Tasks and Demands 2. Mobility 3. Self Care 4. Domestic Life 5. Interpersonal Interactions and Relationships 6. Community, Social and [place] Number of elements that affect the Plan of Care: 4+: HIGH COMPLEXITY CLINICAL PRESENTATION:  
Presentation: Evolving clinical presentation with changing clinical characteristics: MODERATE COMPLEXITY CLINICAL DECISION MAKING:  
M [employer] AM-PAC 6 Clicks Basic Mobility Inpatient Short Form How much difficulty does the patient currently have. .. Unable A Lot A Little None 1.  Turning over in bed (including adjusting bedclothes, sheets and blankets)? [] 1   [x] 2   [] 3   [] 4  
2. Sitting down on and standing up from a chair with arms ( e.g., wheelchair, bedside commode, etc.)   [x] 1   [] 2   [] 3   [] 4  
3. Moving from lying on back to sitting on the side of the bed? [] 1   [x] 2   [] 3   [] 4 How much help from another person does the patient currently need. .. Total A Lot A Little None 4. Moving to and from a bed to a chair (including a wheelchair)? [x] 1   [] 2   [] 3   [] 4  
5. Need to walk in hospital room? [x] 1   [] 2   [] 3   [] 4  
6. Climbing 3-5 steps with a railing? [x] 1   [] 2   [] 3   [] 4  
© 2007, Trustees of [facility] [employer], under license to [employer]. All rights reserved Score:  Initial: 8 Most Recent: X (Date: -- ) Interpretation of Tool:  Represents activities that are increasingly more difficult (i.e. Bed mobility, Transfers, Gait). Score 24 23 22-20 19-15 14-10 9-7 6 Modifier CH CI CJ CK CL CM CN   
 
? Mobility - Walking and Moving Around:  
  - CURRENT STATUS: CM - 80%-99% impaired, limited or restricted  - GOAL STATUS: CL - 60%-79% impaired, limited or restricted  - D/C STATUS:  ---------------To be determined--------------- Payor: Rachael Purchase / Plan: [facility] MEDICARE COMPLETE / Product Type: Managed Care Medicare /   
 
Medical Necessity:    
· Patient is expected to demonstrate progress in strength, range of motion, balance, coordination and functional technique to decrease assistance required with gait, transfers, and functional mobility. Mike Lagos Reason for Services/Other Comments: 
· Patient continues to require skilled intervention due to decreased strength, decreased balance, decreased functional tolerance, decreased cardiopulmonary endurance affecting participation in basic ADLs and functional tasks.   
Use of outcome tool(s) and clinical judgement create a POC that gives a: Questionable prediction of patient's progress: MODERATE COMPLEXITY  
  
 
 
 
TREATMENT:  
(In addition to Assessment/Re-Assessment sessions the following treatments were rendered) Pre-treatment Symptoms/Complaints: \"I feel ok\" Pain: Initial:  
Pain Intensity 1: 0  Post Session:  0/10 Therapeutic Activity: (    10 minutes): Therapeutic activities including Bed transfers and sitting EOB to improve mobility, strength and balance. Required minimal   to promote static and dynamic balance in sitting. Therapeutic Exercise: (20 Minutes):  Exercises per grid below to improve mobility, strength and coordination. Required minimal verbal and manual cues to promote proper body mechanics. Progressed complexity of movement as indicated. Date: 
9/25/18 Date: 
 Date: Activity/Exercise Parameters Parameters Parameters Supine AP 10x B Supine heel slides 10x B Supine SAQ 10x B Supine hip abd 10x B Supine bridging 8x Braces/Orthotics/Lines/Etc:  
· wu catheter · O2 Device: Nasal cannula Treatment/Session Assessment:   
· Response to Treatment: Mod to Max A x1 for bed mobility · Interdisciplinary Collaboration:  
o Physical Therapy Assistant 
o Registered Nurse · After treatment position/precautions:  
o Supine in bed 
o Bed/Chair-wheels locked 
o Bed in low position 
o Call light within reach 
o RN notified · Compliance with Program/Exercises: good · Recommendations/Intent for next treatment session: \"Next visit will focus on advancements to more challenging activities and reduction in assistance provided\". Total Treatment Duration: PT Patient Time In/Time Out Time In: 1030 Time Out: 1100 Rhonda Roque PTA

## 2018-09-25 NOTE — PROGRESS NOTES
Patient is discharging back to [facility] this afternoon. Transportation has been arranged for 15:00 via [employer]. Awaiting Discharge Summary prior to transport. No other discharge planning needs identified at this time. Care Management Interventions PCP Verified by CM: Yes Transition of Care Consult (CM Consult): Discharge Planning Discharge Durable Medical Equipment: No 
Physical Therapy Consult: Yes Occupational Therapy Consult: Yes Speech Therapy Consult: Yes Current Support Network: Nursing Facility (Long-term at [facility].) Confirm Follow Up Transport: Family Plan discussed with Pt/Family/Caregiver: Yes Freedom of Choice Offered: Yes Discharge Location Discharge Placement: Skilled nursing facility

## 2018-09-25 NOTE — PROGRESS NOTES
Pt discharged to [facility] via stretcher. Report called to [facility]. RN told pt has a hx of urinary retention and that she would need to assess pts urine out put. She verbalized understanding and stated she would take care of it. PEG in place patent. Pt is alert and oriented. No distress noted. O2 3L in place. Pt is leaving with all her belongings. She verbalized understanding of all discharge instructions and follow up appointments.

## 2018-09-25 NOTE — PROGRESS NOTES
SBAR from April M. RN. Patient resting in bed, eyes closed. Respirations even and unlabored on 5L NC. No acute distress noted at this time. Peg tube patent to Jevity 1.2. Patient with water at bedside. Avila patent to bedside drainage bag. Call light within reach. Safety measures in place. Will continue to monitor.

## 2018-09-25 NOTE — PROGRESS NOTES
Patient admitted from [facility], where she is in long-term care. Patient has a 10-day Medicaid bed hold. She has residual deficits from a previous CVA. We anticipate return to [facility] at discharge. Case Management will continue to follow. Care Management Interventions PCP Verified by CM: Yes Transition of Care Consult (CM Consult): Discharge Planning Discharge Durable Medical Equipment: No 
Physical Therapy Consult: Yes Occupational Therapy Consult: Yes Speech Therapy Consult: Yes Current Support Network: Nursing Facility (Long-term at [facility].) Confirm Follow Up Transport: Family Plan discussed with Pt/Family/Caregiver: Yes Freedom of Choice Offered: Yes Discharge Location Discharge Placement: Skilled nursing facility

## 2018-09-25 NOTE — DISCHARGE SUMMARY
[address] SUMMARY    Maria Isabel Muse  MR#: [id number]  : 1950  ACCOUNT #: [de-identified]   ADMIT DATE: 2018  DISCHARGE DATE: 2018    ADMITTING DIAGNOSES:    1. Acute hypoxia and possible aspiration pneumonia, possible bronchitis. 2.  Acute kidney injury with underlying chronic kidney disease stage I, mild creatinine peaked at 1.22. PRINCIPAL DISCHARGE DIAGNOSES:    1. Acute hypoxia, possible aspiration pneumonia, possible bronchitis with CT of the chest showing bronchial thickening. No pneumonia seen. 2.  Oropharyngeal dysphagia with a PEG tube in place. Modified swallow test completed. Recommendations for modified diet, but patient is adamant about continuing to drink water. She certainly understands the risks associated with that. 3.  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 4.  Urinary tract infection with urine cultures growing E. coli, broadly susceptible. 5.  Urinary retention requiring Avila catheter insertion. 6.  Morbid obesity. 7.  Seizure disorder, on Keppra. DISCHARGE MEDICATIONS:    Omnicef 300 mg p.o. b.i.d. for 7 days for her UTI. Mirabegron ER was discontinued from her home regimen in the setting of urinary retention. Otherwise, no change to home medication was done including:  Amlodipine 10 mg p.o. every day, vitamin D 50,000 p.o. once a week, hydralazine 100 mg p.o. 3 times daily, Keppra via the PEG tube 1500 mg p.o. 2 times daily, losartan 100 mg p.o. daily, oxycodone immediate release 0.5 mg per NG tube every 8 hours p.r.n. for pain, sertraline or Zoloft 100 mg p.o. 2 tablets with a total of 200 mg p.o. every day. DISPOSITION:  Skilled nursing facility.     BRIEF HISTORY:  This is a 19-year-old female patient with recent history of left thalamic hemorrhage complicated by dysphagia requiring PEG tube and left-sided residual weakness along with seizure disorder. She was recuperating at the [facility] nursing facility when she came in with a transient episode of altered mental status and worsening hypoxia. On presentation to the ED, the patient's O2 sat was 88 even on 5 liters via the nasal cannula. CT of the chest did not show any pulmonary emboli, but there was diffuse bronchial wall thickening, but no consolidation. Her creatinine was slightly elevated. The patient was admitted to the hospital for further evaluation and management. She was given oxygen support. Her mental status completely returned to normal by the next day. Empiric antibiotics with Rocephin was given. During her hospital stay, she developed urinary retention requiring a Avila catheter insertion with a bladder scan showing at least 1000 mL of urine. Urinalysis also showed significant pyuria with urine cultures growing E. coli. She was treated briefly with Rocephin and was switched to ARDON JOSE on discharge to complete another 7-day course of therapy. Her CT of the chest initially suggested hydronephrosis, but then subsequent renal ultrasound did not show evidence of that. Urology was consulted. They recommended brief Avila catheter use and to be removed after that for voiding trial.  We actually, on her day of discharge, removed the Avila catheter and we will see how she urinates. Flomax was given briefly. With regard to her dysphagia, she still was considered to be high risk for aspiration with repeat barium swallow. However, patient continues to be noncompliant. She insisted on drinking water, ice water typically, despite the risks associated with that. She understood and voiced understanding of the implications of her decision. She said she did not want to deny herself of what she has been doing for many, many years. The patient seems to have been drinking the water with no observe new aspiration.   Her CT of the chest as outlined above showed bronchial thickening and it could be that this is relating to aspiration. No fevers or leukocytosis. Her hypoxia improved and she was fit to be going back to the skilled nursing facility by the time of her discharge. I have counseled her again about the risks associated with aspiration and she voiced understanding. At the time of discharge, patient is alert and oriented. Mild residual left-sided weakness. Speech was normal.  PEG tube was in place.       MD SOFIA [place]  D: 09/25/2018 13:49     T: 09/25/2018 14:08  JOB #: [id number]

## 2018-09-26 ENCOUNTER — PATIENT OUTREACH (OUTPATIENT)
Dept: CASE MANAGEMENT | Age: 68
End: 2018-09-26

## 2018-09-26 NOTE — PROGRESS NOTES
Please note this patient was IP d/c to [facility] for LTC. No further CHEMO indicated. Alden Bahena LPN/ Care Coordinator [id number]:[phone] Garo [address] [place] [address] 
[email or web address]. [facility] note will not be viewable in [address]

## 2018-09-26 NOTE — PROGRESS NOTES
Follow up chart review [facility] will not be following this patient as she has discharged to  to [facility] resident.       Episode resolved

## 2018-09-27 LAB
BACTERIA SPEC CULT: NORMAL
BACTERIA SPEC CULT: NORMAL
SERVICE CMNT-IMP: NORMAL
SERVICE CMNT-IMP: NORMAL

## 2018-11-23 ENCOUNTER — HOSPITAL ENCOUNTER (OUTPATIENT)
Dept: LAB | Age: 68
Discharge: HOME | End: 2018-11-23

## 2018-11-23 LAB
BASOPHILS # BLD: 0 K/UL (ref 0–0.2)
BASOPHILS NFR BLD: 0 % (ref 0–2)
DIFFERENTIAL METHOD BLD: ABNORMAL
EOSINOPHIL # BLD: 0 K/UL (ref 0–0.8)
EOSINOPHIL NFR BLD: 1 % (ref 0.5–7.8)
ERYTHROCYTE [DISTWIDTH] IN BLOOD BY AUTOMATED COUNT: 13.6 %
HCT VFR BLD AUTO: 28.5 % (ref 35.8–46.3)
HGB BLD-MCNC: 9 G/DL (ref 11.7–15.4)
IMM GRANULOCYTES # BLD: 0 K/UL (ref 0–0.5)
IMM GRANULOCYTES NFR BLD AUTO: 1 % (ref 0–5)
LYMPHOCYTES # BLD: 0.8 K/UL (ref 0.5–4.6)
LYMPHOCYTES NFR BLD: 10 % (ref 13–44)
MCH RBC QN AUTO: 29.6 PG (ref 26.1–32.9)
MCHC RBC AUTO-ENTMCNC: 31.6 G/DL (ref 31.4–35)
MCV RBC AUTO: 93.8 FL (ref 79.6–97.8)
MONOCYTES # BLD: 0.6 K/UL (ref 0.1–1.3)
MONOCYTES NFR BLD: 8 % (ref 4–12)
NEUTS SEG # BLD: 6.3 K/UL (ref 1.7–8.2)
NEUTS SEG NFR BLD: 81 % (ref 43–78)
NRBC # BLD: 0 K/UL (ref 0–0.2)
PLATELET # BLD AUTO: 143 K/UL (ref 150–450)
PMV BLD AUTO: 9.3 FL (ref 9.4–12.3)
RBC # BLD AUTO: 3.04 M/UL (ref 4.05–5.2)
WBC # BLD AUTO: 7.8 K/UL (ref 4.3–11.1)

## 2018-11-23 PROCEDURE — 85025 COMPLETE CBC W/AUTO DIFF WBC: CPT

## 2019-02-01 ENCOUNTER — HOSPITAL ENCOUNTER (INPATIENT)
Age: 69
LOS: 4 days | Discharge: NURSING, REHAB OR LONG-TERM CARE | DRG: 177 | End: 2019-02-05
Attending: EMERGENCY MEDICINE | Admitting: INTERNAL MEDICINE
Payer: MEDICARE

## 2019-02-01 ENCOUNTER — APPOINTMENT (OUTPATIENT)
Dept: GENERAL RADIOLOGY | Age: 69
DRG: 177 | End: 2019-02-01
Attending: EMERGENCY MEDICINE
Payer: MEDICARE

## 2019-02-01 ENCOUNTER — APPOINTMENT (OUTPATIENT)
Dept: CT IMAGING | Age: 69
DRG: 177 | End: 2019-02-01
Attending: EMERGENCY MEDICINE
Payer: MEDICARE

## 2019-02-01 DIAGNOSIS — G89.29 CHRONIC PAIN OF MULTIPLE SITES: Chronic | ICD-10-CM

## 2019-02-01 DIAGNOSIS — J69.0 ASPIRATION PNEUMONIA OF LEFT UPPER LOBE, UNSPECIFIED ASPIRATION PNEUMONIA TYPE (HCC): Primary | ICD-10-CM

## 2019-02-01 DIAGNOSIS — R52 CHRONIC PAIN OF MULTIPLE SITES: Chronic | ICD-10-CM

## 2019-02-01 PROBLEM — N13.30 HYDRONEPHROSIS: Status: RESOLVED | Noted: 2018-09-22 | Resolved: 2019-02-01

## 2019-02-01 PROBLEM — I16.0 HYPERTENSIVE URGENCY: Status: RESOLVED | Noted: 2018-07-30 | Resolved: 2019-02-01

## 2019-02-01 PROBLEM — J81.1 PULMONARY EDEMA: Status: RESOLVED | Noted: 2018-07-31 | Resolved: 2019-02-01

## 2019-02-01 PROBLEM — L98.429 STAGE 1 SKIN ULCER OF SACRAL REGION (HCC): Status: ACTIVE | Noted: 2019-02-01

## 2019-02-01 PROBLEM — I61.9 NONTRAUMATIC THALAMIC HEMORRHAGE (HCC): Status: RESOLVED | Noted: 2018-07-30 | Resolved: 2019-02-01

## 2019-02-01 PROBLEM — G93.6 CEREBRAL EDEMA (HCC): Status: RESOLVED | Noted: 2018-08-01 | Resolved: 2019-02-01

## 2019-02-01 PROBLEM — E66.01 MORBID OBESITY (HCC): Chronic | Status: ACTIVE | Noted: 2018-09-22

## 2019-02-01 PROBLEM — L98.421 SKIN ULCER OF SACRUM, LIMITED TO BREAKDOWN OF SKIN (HCC): Status: ACTIVE | Noted: 2019-02-01

## 2019-02-01 PROBLEM — N39.0 UTI (URINARY TRACT INFECTION): Status: RESOLVED | Noted: 2018-07-31 | Resolved: 2019-02-01

## 2019-02-01 PROBLEM — F03.90 DEMENTIA WITHOUT BEHAVIORAL DISTURBANCE (HCC): Chronic | Status: ACTIVE | Noted: 2019-02-01

## 2019-02-01 PROBLEM — J96.01 ACUTE RESPIRATORY FAILURE WITH HYPOXIA (HCC): Status: ACTIVE | Noted: 2019-02-01

## 2019-02-01 PROBLEM — F03.918 DEMENTIA WITH BEHAVIORAL DISTURBANCE: Chronic | Status: ACTIVE | Noted: 2019-02-01

## 2019-02-01 PROBLEM — N17.9 AKI (ACUTE KIDNEY INJURY) (HCC): Status: RESOLVED | Noted: 2018-09-22 | Resolved: 2019-02-01

## 2019-02-01 PROBLEM — G93.5 BRAIN COMPRESSION (HCC): Status: RESOLVED | Noted: 2018-08-01 | Resolved: 2019-02-01

## 2019-02-01 PROBLEM — G47.10 EXCESSIVE SLEEPINESS: Status: RESOLVED | Noted: 2018-06-28 | Resolved: 2019-02-01

## 2019-02-01 PROBLEM — J96.20 RESPIRATORY FAILURE, ACUTE-ON-CHRONIC (HCC): Status: RESOLVED | Noted: 2018-09-22 | Resolved: 2019-02-01

## 2019-02-01 PROBLEM — F03.90 DEMENTIA WITHOUT BEHAVIORAL DISTURBANCE (HCC): Status: ACTIVE | Noted: 2019-02-01

## 2019-02-01 PROBLEM — J40 BRONCHITIS: Status: RESOLVED | Noted: 2018-09-22 | Resolved: 2019-02-01

## 2019-02-01 LAB
ALBUMIN SERPL-MCNC: 3.1 G/DL (ref 3.2–4.6)
ALBUMIN/GLOB SERPL: 0.7 {RATIO} (ref 1.2–3.5)
ALP SERPL-CCNC: 126 U/L (ref 50–136)
ALT SERPL-CCNC: 21 U/L (ref 12–65)
ANION GAP SERPL CALC-SCNC: 3 MMOL/L (ref 7–16)
ARTERIAL PATENCY WRIST A: YES
ARTERIAL PATENCY WRIST A: YES
AST SERPL-CCNC: 15 U/L (ref 15–37)
ATRIAL RATE: 312 BPM
BACTERIA URNS QL MICRO: ABNORMAL /HPF
BASE EXCESS BLD CALC-SCNC: 10 MMOL/L
BASE EXCESS BLD CALC-SCNC: 10 MMOL/L
BASOPHILS # BLD: 0 K/UL (ref 0–0.2)
BASOPHILS NFR BLD: 0 % (ref 0–2)
BDY SITE: ABNORMAL
BDY SITE: ABNORMAL
BILIRUB SERPL-MCNC: 0.3 MG/DL (ref 0.2–1.1)
BNP SERPL-MCNC: 30 PG/ML
BODY TEMPERATURE: 98.6
BODY TEMPERATURE: 98.6
BUN SERPL-MCNC: 18 MG/DL (ref 8–23)
CALCIUM SERPL-MCNC: 9.4 MG/DL (ref 8.3–10.4)
CALCULATED R AXIS, ECG10: 43 DEGREES
CALCULATED T AXIS, ECG11: 41 DEGREES
CASTS URNS QL MICRO: 0 /LPF
CHLORIDE SERPL-SCNC: 97 MMOL/L (ref 98–107)
CO2 BLD-SCNC: 40 MMOL/L
CO2 BLD-SCNC: 40 MMOL/L
CO2 SERPL-SCNC: 37 MMOL/L (ref 21–32)
COLLECT TIME,HTIME: 1144
COLLECT TIME,HTIME: 1255
CREAT SERPL-MCNC: 0.67 MG/DL (ref 0.6–1)
CRYSTALS URNS QL MICRO: 0 /LPF
DIAGNOSIS, 93000: NORMAL
DIFFERENTIAL METHOD BLD: ABNORMAL
EOSINOPHIL # BLD: 0.2 K/UL (ref 0–0.8)
EOSINOPHIL NFR BLD: 3 % (ref 0.5–7.8)
EPI CELLS #/AREA URNS HPF: ABNORMAL /HPF
ERYTHROCYTE [DISTWIDTH] IN BLOOD BY AUTOMATED COUNT: 14.5 % (ref 11.9–14.6)
EXHALED MINUTE VOLUME, VE: 12 L/MIN
EXHALED MINUTE VOLUME, VE: 12.9 L/MIN
GAS FLOW.O2 O2 DELIVERY SYS: ABNORMAL L/MIN
GAS FLOW.O2 O2 DELIVERY SYS: ABNORMAL L/MIN
GAS FLOW.O2 SETTING OXYMISER: 8 BPM
GAS FLOW.O2 SETTING OXYMISER: 8 BPM
GLOBULIN SER CALC-MCNC: 4.3 G/DL (ref 2.3–3.5)
GLUCOSE SERPL-MCNC: 151 MG/DL (ref 65–100)
HCO3 BLD-SCNC: 37.8 MMOL/L (ref 22–26)
HCO3 BLD-SCNC: 37.9 MMOL/L (ref 22–26)
HCT VFR BLD AUTO: 39.4 % (ref 35.8–46.3)
HGB BLD-MCNC: 12.2 G/DL (ref 11.7–15.4)
IMM GRANULOCYTES # BLD AUTO: 0 K/UL (ref 0–0.5)
IMM GRANULOCYTES NFR BLD AUTO: 1 % (ref 0–5)
INSPIRATION.DURATION SETTING TIME VENT: 0.7 SEC
INSPIRATION.DURATION SETTING TIME VENT: 0.8 SEC
LACTATE BLD-SCNC: 1.03 MMOL/L (ref 0.5–1.9)
LYMPHOCYTES # BLD: 0.8 K/UL (ref 0.5–4.6)
LYMPHOCYTES NFR BLD: 13 % (ref 13–44)
MCH RBC QN AUTO: 29.3 PG (ref 26.1–32.9)
MCHC RBC AUTO-ENTMCNC: 31 G/DL (ref 31.4–35)
MCV RBC AUTO: 94.5 FL (ref 79.6–97.8)
MONOCYTES # BLD: 0.4 K/UL (ref 0.1–1.3)
MONOCYTES NFR BLD: 7 % (ref 4–12)
MUCOUS THREADS URNS QL MICRO: 0 /LPF
NEUTS SEG # BLD: 4.6 K/UL (ref 1.7–8.2)
NEUTS SEG NFR BLD: 77 % (ref 43–78)
NRBC # BLD: 0 K/UL (ref 0–0.2)
O2/TOTAL GAS SETTING VFR VENT: 60 %
O2/TOTAL GAS SETTING VFR VENT: 60 %
PCO2 BLD: 65.1 MMHG (ref 35–45)
PCO2 BLD: 66.9 MMHG (ref 35–45)
PEEP RESPIRATORY: 8 CMH2O
PEEP RESPIRATORY: 8 CMH2O
PH BLD: 7.36 [PH] (ref 7.35–7.45)
PH BLD: 7.37 [PH] (ref 7.35–7.45)
PIP ISTAT,IPIP: 16
PIP ISTAT,IPIP: 19
PLATELET # BLD AUTO: 151 K/UL (ref 150–450)
PMV BLD AUTO: 9.4 FL (ref 9.4–12.3)
PO2 BLD: 108 MMHG (ref 75–100)
PO2 BLD: 75 MMHG (ref 75–100)
POTASSIUM SERPL-SCNC: 4.4 MMOL/L (ref 3.5–5.1)
PROT SERPL-MCNC: 7.4 G/DL (ref 6.3–8.2)
Q-T INTERVAL, ECG07: 394 MS
QRS DURATION, ECG06: 80 MS
QTC CALCULATION (BEZET), ECG08: 406 MS
RBC # BLD AUTO: 4.17 M/UL (ref 4.05–5.2)
RBC #/AREA URNS HPF: ABNORMAL /HPF
SAO2 % BLD: 93 % (ref 95–98)
SAO2 % BLD: 98 % (ref 95–98)
SERVICE CMNT-IMP: ABNORMAL
SERVICE CMNT-IMP: ABNORMAL
SODIUM SERPL-SCNC: 137 MMOL/L (ref 136–145)
SPECIMEN TYPE: ABNORMAL
SPECIMEN TYPE: ABNORMAL
TROPONIN I BLD-MCNC: 0.01 NG/ML (ref 0.02–0.05)
VENTRICULAR RATE, ECG03: 64 BPM
WBC # BLD AUTO: 6 K/UL (ref 4.3–11.1)
WBC URNS QL MICRO: ABNORMAL /HPF

## 2019-02-01 PROCEDURE — 74011636320 HC RX REV CODE- 636/320: Performed by: EMERGENCY MEDICINE

## 2019-02-01 PROCEDURE — 74011000258 HC RX REV CODE- 258: Performed by: INTERNAL MEDICINE

## 2019-02-01 PROCEDURE — 80053 COMPREHEN METABOLIC PANEL: CPT

## 2019-02-01 PROCEDURE — 71045 X-RAY EXAM CHEST 1 VIEW: CPT

## 2019-02-01 PROCEDURE — 36600 WITHDRAWAL OF ARTERIAL BLOOD: CPT

## 2019-02-01 PROCEDURE — 81015 MICROSCOPIC EXAM OF URINE: CPT

## 2019-02-01 PROCEDURE — 93005 ELECTROCARDIOGRAM TRACING: CPT | Performed by: EMERGENCY MEDICINE

## 2019-02-01 PROCEDURE — 74011000258 HC RX REV CODE- 258: Performed by: EMERGENCY MEDICINE

## 2019-02-01 PROCEDURE — 65270000029 HC RM PRIVATE

## 2019-02-01 PROCEDURE — 85025 COMPLETE CBC W/AUTO DIFF WBC: CPT

## 2019-02-01 PROCEDURE — 87040 BLOOD CULTURE FOR BACTERIA: CPT

## 2019-02-01 PROCEDURE — 81003 URINALYSIS AUTO W/O SCOPE: CPT | Performed by: EMERGENCY MEDICINE

## 2019-02-01 PROCEDURE — 83605 ASSAY OF LACTIC ACID: CPT

## 2019-02-01 PROCEDURE — 74011250636 HC RX REV CODE- 250/636: Performed by: INTERNAL MEDICINE

## 2019-02-01 PROCEDURE — 74011250637 HC RX REV CODE- 250/637: Performed by: INTERNAL MEDICINE

## 2019-02-01 PROCEDURE — 96374 THER/PROPH/DIAG INJ IV PUSH: CPT | Performed by: EMERGENCY MEDICINE

## 2019-02-01 PROCEDURE — 83880 ASSAY OF NATRIURETIC PEPTIDE: CPT

## 2019-02-01 PROCEDURE — 99285 EMERGENCY DEPT VISIT HI MDM: CPT | Performed by: EMERGENCY MEDICINE

## 2019-02-01 PROCEDURE — 74011250636 HC RX REV CODE- 250/636: Performed by: EMERGENCY MEDICINE

## 2019-02-01 PROCEDURE — 71260 CT THORAX DX C+: CPT

## 2019-02-01 PROCEDURE — 84484 ASSAY OF TROPONIN QUANT: CPT

## 2019-02-01 PROCEDURE — 82803 BLOOD GASES ANY COMBINATION: CPT

## 2019-02-01 RX ORDER — GABAPENTIN 300 MG/1
300 CAPSULE ORAL
COMMUNITY
End: 2019-02-05

## 2019-02-01 RX ORDER — ALBUTEROL SULFATE 0.83 MG/ML
2.5 SOLUTION RESPIRATORY (INHALATION)
Status: DISCONTINUED | OUTPATIENT
Start: 2019-02-01 | End: 2019-02-05 | Stop reason: HOSPADM

## 2019-02-01 RX ORDER — HYDROCODONE BITARTRATE AND ACETAMINOPHEN 5; 325 MG/1; MG/1
1 TABLET ORAL
Status: DISCONTINUED | OUTPATIENT
Start: 2019-02-01 | End: 2019-02-03

## 2019-02-01 RX ORDER — DEXTROMETHORPHAN/PSEUDOEPHED 2.5-7.5/.8
80 DROPS ORAL
COMMUNITY

## 2019-02-01 RX ORDER — FACIAL-BODY WIPES
10 EACH TOPICAL
COMMUNITY

## 2019-02-01 RX ORDER — LOSARTAN POTASSIUM 50 MG/1
100 TABLET ORAL DAILY
Status: DISCONTINUED | OUTPATIENT
Start: 2019-02-02 | End: 2019-02-05 | Stop reason: HOSPADM

## 2019-02-01 RX ORDER — ENOXAPARIN SODIUM 100 MG/ML
40 INJECTION SUBCUTANEOUS EVERY 12 HOURS
Status: DISCONTINUED | OUTPATIENT
Start: 2019-02-01 | End: 2019-02-05 | Stop reason: HOSPADM

## 2019-02-01 RX ORDER — AMOXICILLIN 250 MG
2 CAPSULE ORAL
Status: DISCONTINUED | OUTPATIENT
Start: 2019-02-01 | End: 2019-02-03

## 2019-02-01 RX ORDER — LORAZEPAM 0.5 MG/1
.5-1 TABLET ORAL
Status: DISCONTINUED | OUTPATIENT
Start: 2019-02-01 | End: 2019-02-03

## 2019-02-01 RX ORDER — LEVETIRACETAM 500 MG/1
1500 TABLET ORAL 2 TIMES DAILY
COMMUNITY
End: 2019-02-05

## 2019-02-01 RX ORDER — HYDRALAZINE HYDROCHLORIDE 20 MG/ML
20 INJECTION INTRAMUSCULAR; INTRAVENOUS
Status: DISCONTINUED | OUTPATIENT
Start: 2019-02-01 | End: 2019-02-05 | Stop reason: HOSPADM

## 2019-02-01 RX ORDER — SODIUM CHLORIDE 0.9 % (FLUSH) 0.9 %
10 SYRINGE (ML) INJECTION
Status: COMPLETED | OUTPATIENT
Start: 2019-02-01 | End: 2019-02-01

## 2019-02-01 RX ORDER — NALOXONE HYDROCHLORIDE 0.4 MG/ML
0.4 INJECTION, SOLUTION INTRAMUSCULAR; INTRAVENOUS; SUBCUTANEOUS AS NEEDED
Status: DISCONTINUED | OUTPATIENT
Start: 2019-02-01 | End: 2019-02-05 | Stop reason: HOSPADM

## 2019-02-01 RX ORDER — FUROSEMIDE 10 MG/ML
60 INJECTION INTRAMUSCULAR; INTRAVENOUS
Status: COMPLETED | OUTPATIENT
Start: 2019-02-01 | End: 2019-02-01

## 2019-02-01 RX ORDER — MAG HYDROX/ALUMINUM HYD/SIMETH 200-200-20
30 SUSPENSION, ORAL (FINAL DOSE FORM) ORAL
Status: DISCONTINUED | OUTPATIENT
Start: 2019-02-01 | End: 2019-02-05 | Stop reason: HOSPADM

## 2019-02-01 RX ORDER — SODIUM CHLORIDE 0.9 % (FLUSH) 0.9 %
5-40 SYRINGE (ML) INJECTION AS NEEDED
Status: DISCONTINUED | OUTPATIENT
Start: 2019-02-01 | End: 2019-02-05 | Stop reason: HOSPADM

## 2019-02-01 RX ORDER — SERTRALINE HYDROCHLORIDE 100 MG/1
200 TABLET, FILM COATED ORAL DAILY
Status: DISCONTINUED | OUTPATIENT
Start: 2019-02-02 | End: 2019-02-05 | Stop reason: HOSPADM

## 2019-02-01 RX ORDER — TAMSULOSIN HYDROCHLORIDE 0.4 MG/1
0.4 CAPSULE ORAL
COMMUNITY

## 2019-02-01 RX ORDER — AMLODIPINE BESYLATE 10 MG/1
10 TABLET ORAL DAILY
Status: DISCONTINUED | OUTPATIENT
Start: 2019-02-02 | End: 2019-02-05 | Stop reason: HOSPADM

## 2019-02-01 RX ORDER — OXYCODONE HYDROCHLORIDE 5 MG/1
2.5 TABLET ORAL
Status: DISCONTINUED | OUTPATIENT
Start: 2019-02-01 | End: 2019-02-05 | Stop reason: HOSPADM

## 2019-02-01 RX ORDER — GUAIFENESIN 600 MG/1
1200 TABLET, EXTENDED RELEASE ORAL 2 TIMES DAILY
Status: DISCONTINUED | OUTPATIENT
Start: 2019-02-01 | End: 2019-02-05 | Stop reason: HOSPADM

## 2019-02-01 RX ORDER — HYDRALAZINE HYDROCHLORIDE 50 MG/1
100 TABLET, FILM COATED ORAL 3 TIMES DAILY
Status: DISCONTINUED | OUTPATIENT
Start: 2019-02-02 | End: 2019-02-05 | Stop reason: HOSPADM

## 2019-02-01 RX ORDER — ACETAMINOPHEN 325 MG/1
650 TABLET ORAL
Status: DISCONTINUED | OUTPATIENT
Start: 2019-02-01 | End: 2019-02-03

## 2019-02-01 RX ORDER — ACETAMINOPHEN 325 MG/1
650 TABLET ORAL
COMMUNITY

## 2019-02-01 RX ORDER — SODIUM CHLORIDE 0.9 % (FLUSH) 0.9 %
5-40 SYRINGE (ML) INJECTION EVERY 8 HOURS
Status: DISCONTINUED | OUTPATIENT
Start: 2019-02-01 | End: 2019-02-05 | Stop reason: HOSPADM

## 2019-02-01 RX ADMIN — GUAIFENESIN 1200 MG: 600 TABLET, EXTENDED RELEASE ORAL at 18:01

## 2019-02-01 RX ADMIN — PIPERACILLIN AND TAZOBACTAM 4.5 G: 4; .5 INJECTION, POWDER, FOR SOLUTION INTRAVENOUS at 23:47

## 2019-02-01 RX ADMIN — Medication 5 ML: at 18:02

## 2019-02-01 RX ADMIN — Medication 5 ML: at 21:01

## 2019-02-01 RX ADMIN — PIPERACILLIN SODIUM,TAZOBACTAM SODIUM 4.5 G: 4; .5 INJECTION, POWDER, FOR SOLUTION INTRAVENOUS at 16:03

## 2019-02-01 RX ADMIN — Medication 1 AMPULE: at 20:27

## 2019-02-01 RX ADMIN — Medication 10 ML: at 14:48

## 2019-02-01 RX ADMIN — ENOXAPARIN SODIUM 40 MG: 40 INJECTION SUBCUTANEOUS at 20:27

## 2019-02-01 RX ADMIN — FUROSEMIDE 60 MG: 10 INJECTION, SOLUTION INTRAVENOUS at 13:05

## 2019-02-01 RX ADMIN — LEVETIRACETAM 1500 MG: 100 SOLUTION ORAL at 18:00

## 2019-02-01 RX ADMIN — SODIUM CHLORIDE 100 ML: 9 INJECTION, SOLUTION INTRAVENOUS at 14:48

## 2019-02-01 RX ADMIN — Medication 5 ML: at 20:30

## 2019-02-01 RX ADMIN — IOPAMIDOL 100 ML: 755 INJECTION, SOLUTION INTRAVENOUS at 14:48

## 2019-02-01 NOTE — PROGRESS NOTES
Patient in bed at this time. Respirations even and unlabored. Alert and confused. No acute distress noted.  at bedside. Door remains open for monitoring.

## 2019-02-01 NOTE — ED NOTES
TRANSFER - OUT REPORT: 
 
Verbal report given to [facility], RN (name) on Josue Martines  being transferred to [facility] [phone] (unit) for routine progression of care Report consisted of patients Situation, Background, Assessment and  
Recommendations(SBAR). Information from the following report(s) SBAR, ED Summary, [facility] and Recent Results was reviewed with the receiving nurse. Lines:  
Peripheral IV 02/01/19 Right Hand (Active) Peripheral IV 02/01/19 Left Antecubital (Active) Opportunity for questions and clarification was provided. Patient transported with: 
 [employer]

## 2019-02-01 NOTE — ED NOTES
Patient report received from Juan M Barnhart, [address] [place] [address]. Patient care to be assumed at this time.

## 2019-02-01 NOTE — ED TRIAGE NOTES
Pt arrives ems from [facility] facility, called ems for increased somnolence/lethargy and respiratory distress. Pt O2 78-80% RA. Ems states sputum that was visible just when talking. Adventitious lung sounds present in all lung fields. End tidal 59. Initial end tidal 80. Pt placed on CPAP by ems, oxygen increased to 95%. Given 5 mg albuterol treatment en route. 12 lead clear. HR 80s. Last /82. Pt given nitro paste by ems on left chest. . Hx of pneumonia, hx of left sided deficit and paralysis from CVA.

## 2019-02-01 NOTE — ED NOTES
In and out catheter has been used to obtain urine specimen. Patient's bladder drained 700ml. Patient tolerated procedure well. Patient with redness to sacral and perineal area. Patient has been placed on absorbent pads and not in brief due to redness. Linens changed as well. Patient had small formed bowel movement.

## 2019-02-01 NOTE — H&P
Hospitalist H&P Note Admit Date:  2019 11:28 AM  
Name:  King Traci Age:  76 y.o. 
:  1950 MRN:  [id number] PCP:  Génesis Zhu DO Treatment Team: Attending Provider: Bishop Bee MD; Primary Nurse: Celso Mahoney 
 
HPI:  
Patient history was obtained from the ER provider prior to seeing the patient. Pt is a 75 yo female with PMH of dementia, HTN, stroke, seizures, RLS, neuralgia, morbid obesity, hypercholesterolemia, OA, hearing loss. Pt has been at [facility] as a long term care resident. Pt presented to the ED via EMS with shortness of breath and hypoxia. Per EMS pt was hypoxic on her usual oxygen setting. Initially pt was on CPAP for transport and then BiPap in the ED, she has not been weaned down to 4L per NC. Pt is not able to provide any history, unaware that she is at a hospital, asking for her spouse. CT scan of chest neg for PE, there is atelectasis/infiltrate left upper and lower lobes, poss aspiration. PT/OT/SLP ordered to assess. Start IV zosyn. 10 systems reviewed and negative except as noted in HPI. Past Medical History:  
Diagnosis Date  Chronic pain \"all over body\"-takes Zoloft daily  Chronic sinusitis 2015  Diverticulosis 2017  Edema 2015 Resolved  Former smoker  GERD (gastroesophageal reflux disease)  Headache 2015  Hearing loss 2015 \"some\"- no hearing aids  Heart murmur 1990  
 Hip osteoarthritis 2015  
 feet, hands  Hx of colonic polyps  SSA  Hx of migraines   
 on medication  Hypercholesterolemia 2015  Hypertension  Impaired fasting glucose 2015  Joint disorder 2015  Morbid obesity ([facility] [address] BMI 40  
 Neuralgia 2015  Phlebitis and thrombophlebitis of superficial vessels of lower extremities 2015  
 pt denies  Psychiatric disorder  Restless leg syndrome 2015  Rheumatic fever pt reports possibly R.F.  
 Seizures ([facility]   
 seizures as a child, has NOT had one since she was 13years old  SOB (shortness of breath) on exertion  Stroke [facility])   
 at age 11 only residual poor peripheral vision  Tobacco abuse 1/19/2015  
 quit smoking 4/2015  Vascular anomalies, congenital   
 Vitamin D deficiency 1/19/2015 Past Surgical History:  
Procedure Laterality Date  COLONOSCOPY N/A 2/20/2017 Bjorn--appendiceal serrated sessile polyp, transverse and rectal hyperplastic--3 year recall  HX APPENDECTOMY  03/15/2017  HX CATARACT REMOVAL Bilateral 3/2016 [place]  
 pt denies  HX ROTATOR CUFF REPAIR Right 10/31/2016  
 x2 [address]  TOTAL HIP ARTHROPLASTY Right 2005  
 and again 5/2015 and another revision No Known Allergies Social History Tobacco Use  Smoking status: Former Smoker Packs/day: 1.00 Years: 50.00 Pack years: 50.00 Types: Cigarettes Last attempt to quit: 3/29/2015 Years since quitting: 3.8  Smokeless tobacco: Never Used  Tobacco comment: quit 2 months and 2 weeks ago Substance Use Topics  Alcohol use: No  
  Alcohol/week: 0.0 oz  
  Comment: Former- socially Family History Problem Relation Age of Onset  Hypertension Mother  Cancer Mother Bladder  Depression Mother  Parkinson's Disease Father  Alcohol abuse Brother  Alcohol abuse Son  No Known Problems Sister  No Known Problems Brother  Malignant Hyperthermia Neg Hx  Pseudocholinesterase Deficiency Neg Hx  Delayed Awakening Neg Hx  Post-op Nausea/Vomiting Neg Hx  Emergence Delirium Neg Hx  Post-op Cognitive Dysfunction Neg Hx   
 Other Neg Hx Immunization History Administered Date(s) Administered  Influenza High Dose Vaccine PF 08/25/2015, 01/06/2017  Influenza Vaccine 2010, 2011, 10/31/2013, 10/15/2014  Influenza Vaccine (Quad) PF 2018  Pneumococcal Conjugate (PCV-13) 2015  Pneumococcal Polysaccharide (PPSV-23) 2018  TB Skin Test (PPD) Intradermal 2015, 2017, 2018, 2018  Td, Adsorbed PF 2015  ZZZ-RETIRED (DO NOT USE) Pneumococcal Vaccine (Unspecified Type) 02/10/2012  Zoster Vaccine, Live 2012 PTA Medications: 
Prior to Admission Medications Prescriptions Last Dose Informant Patient Reported? Taking? amLODIPine (NORVASC) 10 mg tablet   Yes No  
Sig: Take 10 mg by mouth daily. ergocalciferol (VITAMIN D2) 50,000 unit capsule   No No  
Sig: TAKE 1 CAPSULE ONE TIME WEEKLY- takes on sat  
hydrALAZINE (APRESOLINE) 100 mg tablet   No No  
Si Tab by Per NG tube route three (3) times daily. levETIRAcetam (KEPPRA) 100 mg/ml soln oral solution   No No  
Sig: 15 mL by Per G Tube route two (2) times a day for 30 days. losartan (COZAAR) 100 mg tablet   No No  
Sig: Take 1 Tab by mouth daily. Indications: hypertension  
oxyCODONE IR (ROXICODONE) 5 mg immediate release tablet   No No  
Si.5 Tabs by Per NG tube route every eight (8) hours as needed. Max Daily Amount: 7.5 mg.  
sertraline (ZOLOFT) 100 mg tablet   No No  
Sig: Take 2 Tabs by mouth daily. Facility-Administered Medications: None Objective:  
 
Patient Vitals for the past 24 hrs: 
 Temp Pulse Resp BP SpO2  
19 1546     92 % 19 1529  71 17 127/59 90 % 19 1431  61 17 112/55 96 % 19 1421  61 16 111/53 96 % 19 1411  61 16 110/53 96 % 19 1400  61 20 110/56 96 % 19 1351  61 18 109/55 95 % 19 1341  72 20 98/51 98 % 19 1331  68 23 109/54 96 % 19 1321  62 17 102/52 95 % 19 1311  70  108/52 95 % 19 1305  63  117/56   
19 1300  63 22 117/56 96 % 19 1251  78 18 119/56 94 % 02/01/19 1241  69 18 110/56 93 % 02/01/19 1231  70 19 111/56 93 % 02/01/19 1221  76  103/58 94 % 02/01/19 1211  66 17 110/54 92 % 02/01/19 1201  65 17 111/56 92 % 02/01/19 1159     92 % 02/01/19 1147  67 19 115/56 91 % 02/01/19 1133  67  114/60 99 % 02/01/19 1131 98.3 °F (36.8 °C) 64 28 114/60 99 % Oxygen Therapy O2 Sat (%): 92 % (02/01/19 1546) Pulse via Oximetry: 67 beats per minute (02/01/19 1546) O2 Device: Nasal cannula (02/01/19 1546) O2 Flow Rate (L/min): 4 l/min (02/01/19 1546) FIO2 (%): 60 % (02/01/19 1159) No intake or output data in the 24 hours ending 02/01/19 1604 Physical Exam: 
General:    Well nourished. Lethargic but wakes up with stimulation Eyes:   Normal sclera. Extraocular movements intact. ENT:  Normocephalic, atraumatic. Moist mucous membranes. Very hard of hearing CV:   RRR. No m/r/g. Peripheral pulses 2+. Lungs:  Rales throughout Abdomen: Soft, nontender, nondistended. +BS Extremities: Warm and dry. No cyanosis or edema. Neurologic: No gross focal deficit. Skin:     No rashes or jaundice. Normal coloration Psych:  Normal mood and affect. I reviewed the labs, imaging, EKGs, telemetry, and other studies done this admission. Data Review:  
Recent Results (from the past 24 hour(s)) EKG, 12 LEAD, INITIAL Collection Time: 02/01/19 11:34 AM  
Result Value Ref Range Ventricular Rate 64 BPM  
 Atrial Rate 312 BPM  
 QRS Duration 80 ms  
 Q-T Interval 394 ms QTC Calculation (Bezet) 406 ms Calculated R Axis 43 degrees Calculated T Axis 41 degrees Diagnosis    
  !! AGE AND GENDER SPECIFIC ECG ANALYSIS !! Normal sinus rhythm 
!!! Poor data quality, interpretation may be adversely affected Abnormal ECG When compared with ECG of 22-SEP-2018 07:24, Confirmed by LEESA TOLEDO (), Luiz Beltran ([id number]) on 2/1/2019 3:22:51 PM 
  
CBC WITH AUTOMATED DIFF Collection Time: 02/01/19 11:45 AM  
Result Value Ref Range WBC 6.0 4.3 - 11.1 K/uL  
 RBC 4.17 4.05 - 5.2 M/uL  
 HGB 12.2 11.7 - 15.4 g/dL HCT 39.4 35.8 - 46.3 % MCV 94.5 79.6 - 97.8 FL  
 MCH 29.3 26.1 - 32.9 PG  
 MCHC 31.0 (L) 31.4 - 35.0 g/dL  
 RDW 14.5 11.9 - 14.6 % PLATELET 776 937 - 231 K/uL MPV 9.4 9.4 - 12.3 FL ABSOLUTE NRBC 0.00 0.0 - 0.2 K/uL  
 DF AUTOMATED NEUTROPHILS 77 43 - 78 % LYMPHOCYTES 13 13 - 44 % MONOCYTES 7 4.0 - 12.0 % EOSINOPHILS 3 0.5 - 7.8 % BASOPHILS 0 0.0 - 2.0 % IMMATURE GRANULOCYTES 1 0.0 - 5.0 %  
 ABS. NEUTROPHILS 4.6 1.7 - 8.2 K/UL  
 ABS. LYMPHOCYTES 0.8 0.5 - 4.6 K/UL  
 ABS. MONOCYTES 0.4 0.1 - 1.3 K/UL  
 ABS. EOSINOPHILS 0.2 0.0 - 0.8 K/UL  
 ABS. BASOPHILS 0.0 0.0 - 0.2 K/UL  
 ABS. IMM. GRANS. 0.0 0.0 - 0.5 K/UL METABOLIC PANEL, COMPREHENSIVE Collection Time: 02/01/19 11:45 AM  
Result Value Ref Range Sodium 137 136 - 145 mmol/L Potassium 4.4 3.5 - 5.1 mmol/L Chloride 97 (L) 98 - 107 mmol/L  
 CO2 37 (H) 21 - 32 mmol/L Anion gap 3 (L) 7 - 16 mmol/L Glucose 151 (H) 65 - 100 mg/dL BUN 18 8 - 23 MG/DL Creatinine 0.67 0.6 - 1.0 MG/DL  
 GFR est AA >60 >60 ml/min/1.73m2 GFR est non-AA >60 >60 ml/min/1.73m2 Calcium 9.4 8.3 - 10.4 MG/DL Bilirubin, total 0.3 0.2 - 1.1 MG/DL  
 ALT (SGPT) 21 12 - 65 U/L  
 AST (SGOT) 15 15 - 37 U/L Alk. phosphatase 126 50 - 136 U/L Protein, total 7.4 6.3 - 8.2 g/dL Albumin 3.1 (L) 3.2 - 4.6 g/dL Globulin 4.3 (H) 2.3 - 3.5 g/dL A-G Ratio 0.7 (L) 1.2 - 3.5 BNP Collection Time: 02/01/19 11:45 AM  
Result Value Ref Range BNP 30 (H) 0 pg/mL POC G3 Collection Time: 02/01/19 11:46 AM  
Result Value Ref Range Device: BIPAP    
 FIO2 (POC) 60 % pH (POC) 7.372 7.35 - 7.45    
 pCO2 (POC) 65.1 (HH) 35 - 45 MMHG  
 pO2 (POC) 108 (H) 75 - 100 MMHG  
 HCO3 (POC) 37.8 (H) 22 - 26 MMOL/L  
 sO2 (POC) 98 95 - 98 % Base excess (POC) 10 mmol/L  Set Rate 8 bpm  
 PEEP/CPAP (POC) 8 cmH2O  
 PIP (POC) 19    
 Allens test (POC) YES Inspiratory Time 0.7 sec Site RIGHT RADIAL Patient temp. 98.6 Specimen type (POC) ARTERIAL Performed by WallaceMicheleRT   
 CO2, POC 40 MMOL/L Exhaled minute volume 12.00 L/min COLLECT TIME 1,144 POC TROPONIN-I Collection Time: 02/01/19 11:53 AM  
Result Value Ref Range Troponin-I (POC) 0.01 (L) 0.02 - 0.05 ng/ml POC LACTIC ACID Collection Time: 02/01/19 11:55 AM  
Result Value Ref Range Lactic Acid (POC) 1.03 0.5 - 1.9 mmol/L  
POC G3 Collection Time: 02/01/19 12:58 PM  
Result Value Ref Range Device: BIPAP    
 FIO2 (POC) 60 % pH (POC) 7.362 7.35 - 7.45    
 pCO2 (POC) 66.9 (HH) 35 - 45 MMHG  
 pO2 (POC) 75 75 - 100 MMHG  
 HCO3 (POC) 37.9 (H) 22 - 26 MMOL/L  
 sO2 (POC) 93 (L) 95 - 98 % Base excess (POC) 10 mmol/L Set Rate 8 bpm  
 PEEP/CPAP (POC) 8 cmH2O  
 PIP (POC) 16 Allens test (POC) YES Inspiratory Time 0.8 sec Site RIGHT RADIAL Patient temp. 98.6 Specimen type (POC) ARTERIAL Performed by WallaceMicheleRT   
 CO2, POC 40 MMOL/L Exhaled minute volume 12.90 L/min COLLECT TIME 1,255 All Micro Results Procedure Component Value Units Date/Time CULTURE, BLOOD [[id number]] Collected:  02/01/19 1304 Order Status:  Completed Specimen:  Blood Updated:  02/01/19 1336 CULTURE, BLOOD [[id number]] Collected:  02/01/19 1145 Order Status:  Completed Specimen:  Blood Updated:  02/01/19 1241 Other Studies: 
Ct Chest W Cont Result Date: 2/1/2019 Chest CT, with intravenous contrast 2/1/2019. Indication:  History pneumonia, shortness of breath. Comparison: 9/22/2018. Technique:  Spiral images through the chest with 100 cc Isovue 370. IV contrast was used to evaluate the pulmonary arterial tree. Coronal reconstructed images also reviewed. Radiation dose reduction techniques were used for this study.  Our CT scanners use one or all of the following: Automated exposure control, adjustment of the mA and/or kV according to patient size, iterative reconstruction. The pulmonary arterial tree is adequately opacified. There are no central or convincing distal emboli. There is however significant volume left lung with mediastinal shift to the left. There is consolidation within left upper and lower lobes. There is narrowing or debris within the left lower lobe bronchus. The left upper lobe of bronchus is not well defined but appears similar to the lower lobe bronchus. There are no infiltrates on the right. 5 mm right lower lobe nodule as seen on image number 64 is noted. IMPRESSION: 1. There is  significant volume loss in the left lung with atelectasis/infiltrate involving left upper and lower lobes. The more distal upper and lower lobe bronchi are poorly defined probably debris-filled. Consider the possibility of aspiration. 2. Negative for pulmonary emboli. 3. Stable 5 mm right lung nodule. Xr Chest [facility] Result Date: 2/1/2019 Portable chest, one view, 2/1/2019 INDICATION: Respiratory distress. COMPARISON: 9/22/2018 The patient is rotated to the left. The heart is enlarged. There appears to be some vascular congestion. IMPRESSION: Cardiomegaly with vascular congestion. Assessment and Plan:  
 
Hospital Problems as of 2/1/2019 Date Reviewed: 6/28/2018 Codes Class Noted - Resolved POA * (Principal) Aspiration pneumonia ([facility] ICD-10-CM: J69.0 ICD-9-CM: 507.0  2/1/2019 - Present Yes Dementia without behavioral disturbance (Chronic) ICD-10-CM: F03.90 ICD-9-CM: 294.20  2/1/2019 - Present Yes Acute respiratory failure with hypoxia [facility]) ICD-10-CM: J96.01 
ICD-9-CM: 518.81  2/1/2019 - Present Yes Morbid obesity ([facility] [address] (Chronic) ICD-10-CM: E66.01 
ICD-9-CM: 278.01  9/22/2018 - Present Yes [facility] (Chronic) ICD-10-CM: G40.909 ICD-9-CM: 345.90  1/22/2018 - Present Yes Essential hypertension (Chronic) ICD-10-CM: I10 
ICD-9-CM: 401.9  1/19/2015 - Present Yes PLAN: 
1. Aspiration pneumonia 
-Oxygen to keep sat >90% -Pulse oxymetry 
-Albuterol nebs with RT 
-IV Zosyn 
-Mucinex 2. Acute respiratory hypoxia 
-As in #1 3. HTN 
-Cont home med 
-Monitor 4. Seizure disorder 
-Monitor for seizure activity 5. Dementia 
-Chronic 
-Fall precautions -PRN ativan Discharge planning:  Return to [place], [place] consult DVT ppx:  Lovenix Code status:  Full code, no document on file Decision Maker: None designated, spouse is her emergency contact Admit status:  Inpatient Estimated LOS:  Greater than 2 midnights Risk:  High Case reviewed with supervising MD Brian Richards MD 
 
 
Signed: 
Ivy Barrera NP

## 2019-02-01 NOTE — ED NOTES
Please contact , Merlin Dears, at [phone] when patient is assigned room. Family will come when patient receives admission placement.

## 2019-02-01 NOTE — PROGRESS NOTES
TRANSFER - IN REPORT: 
 
Verbal report received from RORY Redman(name) on [address]  being received from ED (unit) for routine progression of care Report consisted of patients Situation, Background, Assessment and  
Recommendations(SBAR). Information from the following report(s) SBAR was reviewed with the receiving nurse. Opportunity for questions and clarification was provided. Assessment completed upon patients arrival to unit and care assumed.

## 2019-02-01 NOTE — PROGRESS NOTES
Primary Nurse Ashley Rashid RN and Geno Rai RN performed a dual skin assessment on this patient No impairment noted Asim score is 12

## 2019-02-01 NOTE — PROGRESS NOTES
CM chart review. Patient PCP listed as Sydney Anthony DO. Patient coming from [place] ([address] [place] [address]).

## 2019-02-01 NOTE — ED PROVIDER NOTES
80-year-old lady presents with concerns about shortness of breath and hypoxia. Patient was in a nursing facility with mild dementia and a recent diagnosis of pneumonia. EMS reports that she had significant right breath sounds and she was hypoxic on her usual home oxygen. Patient is not really able to give much in the way of a history because of her mild dementia. EMS reports that nursing home staff said that normally she can have a mild conversation with you but she was unable to do that today. Patient arrived on CPAP from EMS so she was placed on BiPAP with us. Elements of this note were created using speech recognition software. As such, errors of speech recognition may be present. Past Medical History:  
Diagnosis Date  Chronic pain \"all over body\"-takes Zoloft daily  Chronic sinusitis 1/19/2015  Diverticulosis 2017  Edema 1/19/2015 Resolved  Former smoker  GERD (gastroesophageal reflux disease)  Headache 1/19/2015  Hearing loss 1/19/2015 \"some\"- no hearing aids  Heart murmur 1990  
 Hip osteoarthritis 1/19/2015  
 feet, hands  Hx of colonic polyps 2017 [facility]  Hx of migraines   
 on medication  Hypercholesterolemia 1/19/2015  Hypertension  Impaired fasting glucose 1/19/2015  Joint disorder 1/19/2015  Morbid obesity ([address] BMI 40  
 Neuralgia 1/19/2015  Phlebitis and thrombophlebitis of superficial vessels of lower extremities 01/19/2015  
 pt denies  Psychiatric disorder  Restless leg syndrome 1/19/2015  Rheumatic fever   
 pt reports possibly R.F.  
 Seizures ([address]   
 seizures as a child, has NOT had one since she was 13years old  SOB (shortness of breath) on exertion  Stroke [facility])   
 at age 11 only residual poor peripheral vision  Tobacco abuse 1/19/2015  
 quit smoking 4/2015  Vascular anomalies, congenital   
 Vitamin D deficiency 1/19/2015 Past Surgical History: Procedure Laterality Date  COLONOSCOPY N/A 2/20/2017 [facility]--appendiceal serrated sessile polyp, transverse and rectal hyperplastic--3 year recall  HX APPENDECTOMY  03/15/2017  HX CATARACT REMOVAL Bilateral 3/2016 [address] [place]  
 pt denies  HX ROTATOR CUFF REPAIR Right 10/31/2016  
 x2 [facility]  TOTAL HIP ARTHROPLASTY Right 2005  
 and again 5/2015 and another revision Family History:  
Problem Relation Age of Onset  Hypertension Mother  Cancer Mother Bladder  Depression Mother  Parkinson's Disease Father  Alcohol abuse Brother  Alcohol abuse Son  No Known Problems Sister  No Known Problems Brother  Malignant Hyperthermia Neg Hx  Pseudocholinesterase Deficiency Neg Hx  Delayed Awakening Neg Hx  Post-op Nausea/Vomiting Neg Hx  Emergence Delirium Neg Hx  Post-op Cognitive Dysfunction Neg Hx   
 Other Neg Hx Social History Socioeconomic History  Marital status:  Spouse name: Not on file  Number of children: Not on file  Years of education: Not on file  Highest education level: Not on file Social Needs  Financial resource strain: Not on file  Food insecurity - worry: Not on file  Food insecurity - inability: Not on file  Transportation needs - medical: Not on file  Transportation needs - non-medical: Not on file Occupational History  Not on file Tobacco Use  Smoking status: Former Smoker Packs/day: 1.00 Years: 50.00 Pack years: 50.00 Types: Cigarettes Last attempt to quit: 3/29/2015 Years since quitting: 3.8  Smokeless tobacco: Never Used  Tobacco comment: quit 2 months and 2 weeks ago Substance and Sexual Activity  Alcohol use: No  
  Alcohol/week: 0.0 oz  
  Comment: Former- socially  Drug use: No  
 Sexual activity: Not on file Other Topics Concern  Not on file Social History Narrative  Not on file ALLERGIES: Patient has no known allergies. Review of Systems Unable to perform ROS: Dementia Vitals:  
 02/01/19 1131 02/01/19 1159 02/01/19 1305 BP: 114/60  117/56 Pulse: 64  63 Resp: 28 Temp: 98.3 °F (36.8 °C) SpO2: 99% 92% Weight: 122.9 kg (271 lb) Height: 5' 3\" (1.6 m) Physical Exam  
Constitutional: She appears well-developed and well-nourished. Obesity deconditioned lady HENT:  
Head: Normocephalic and atraumatic. Eyes: Right eye exhibits no discharge. Left eye exhibits no discharge. Cardiovascular: Normal rate and regular rhythm. Pulmonary/Chest: No respiratory distress. She has no wheezes. Diffuse rales Abdominal: Bowel sounds are normal. She exhibits no mass. Neurological:  
Patient will wake up with stimulation but she does drift back off. Nursing note and vitals reviewed. MDM Number of Diagnoses or Management Options Diagnosis management comments: Patient's initial ABG and repeat ABG on BiPAP are relatively unremarkable. She does have some pulmonary edema on her chest x-ray. Review of an echocardiogram from mid two thousand eighteen shows no systolic dysfunction with only very mild diastolic dysfunction. Her BNP today is only thirty. Potentially the fluid seen on x-ray may represent a pneumonia but her white count and lactic acid are negative. Therefore, I will get a CTA of her chest to evaluate for potential pulmonary embolus. 3:35 PM 
Patient's CT scan showed no evidence for pulmonary embolism. It does however show a fairly complex left sided infiltrate with potential aspiration. Therefore I will give her some IV Zosyn. I spoke with the hospitalist who kindly agreed to see the patient. Procedures

## 2019-02-01 NOTE — PROGRESS NOTES
Patient arrived to floor and placed in room 833. Transferred to bed by staff. Patient on 4 L NC. Patient is confused and yells out at times. Door open for frequent monitoring.

## 2019-02-02 LAB
ANION GAP SERPL CALC-SCNC: 3 MMOL/L (ref 7–16)
BUN SERPL-MCNC: 14 MG/DL (ref 8–23)
CALCIUM SERPL-MCNC: 8.9 MG/DL (ref 8.3–10.4)
CHLORIDE SERPL-SCNC: 98 MMOL/L (ref 98–107)
CO2 SERPL-SCNC: 39 MMOL/L (ref 21–32)
CREAT SERPL-MCNC: 0.66 MG/DL (ref 0.6–1)
ERYTHROCYTE [DISTWIDTH] IN BLOOD BY AUTOMATED COUNT: 14.5 % (ref 11.9–14.6)
GLUCOSE SERPL-MCNC: 111 MG/DL (ref 65–100)
HCT VFR BLD AUTO: 34.9 % (ref 35.8–46.3)
HGB BLD-MCNC: 10.8 G/DL (ref 11.7–15.4)
MCH RBC QN AUTO: 29.2 PG (ref 26.1–32.9)
MCHC RBC AUTO-ENTMCNC: 30.9 G/DL (ref 31.4–35)
MCV RBC AUTO: 94.3 FL (ref 79.6–97.8)
NRBC # BLD: 0 K/UL (ref 0–0.2)
PLATELET # BLD AUTO: 145 K/UL (ref 150–450)
PMV BLD AUTO: 10.1 FL (ref 9.4–12.3)
POTASSIUM SERPL-SCNC: 3.9 MMOL/L (ref 3.5–5.1)
RBC # BLD AUTO: 3.7 M/UL (ref 4.05–5.2)
SODIUM SERPL-SCNC: 140 MMOL/L (ref 136–145)
WBC # BLD AUTO: 6.4 K/UL (ref 4.3–11.1)

## 2019-02-02 PROCEDURE — 97110 THERAPEUTIC EXERCISES: CPT

## 2019-02-02 PROCEDURE — 74011250637 HC RX REV CODE- 250/637: Performed by: INTERNAL MEDICINE

## 2019-02-02 PROCEDURE — 36415 COLL VENOUS BLD VENIPUNCTURE: CPT

## 2019-02-02 PROCEDURE — 85027 COMPLETE CBC AUTOMATED: CPT

## 2019-02-02 PROCEDURE — 97530 THERAPEUTIC ACTIVITIES: CPT

## 2019-02-02 PROCEDURE — 65270000029 HC RM PRIVATE

## 2019-02-02 PROCEDURE — 97535 SELF CARE MNGMENT TRAINING: CPT

## 2019-02-02 PROCEDURE — 80048 BASIC METABOLIC PNL TOTAL CA: CPT

## 2019-02-02 PROCEDURE — 74011250636 HC RX REV CODE- 250/636: Performed by: INTERNAL MEDICINE

## 2019-02-02 PROCEDURE — 97162 PT EVAL MOD COMPLEX 30 MIN: CPT

## 2019-02-02 PROCEDURE — 97166 OT EVAL MOD COMPLEX 45 MIN: CPT

## 2019-02-02 PROCEDURE — 74011000258 HC RX REV CODE- 258: Performed by: INTERNAL MEDICINE

## 2019-02-02 PROCEDURE — 92610 EVALUATE SWALLOWING FUNCTION: CPT

## 2019-02-02 RX ADMIN — HYDRALAZINE HYDROCHLORIDE 100 MG: 50 TABLET, FILM COATED ORAL at 09:25

## 2019-02-02 RX ADMIN — ENOXAPARIN SODIUM 40 MG: 40 INJECTION SUBCUTANEOUS at 22:27

## 2019-02-02 RX ADMIN — AMLODIPINE BESYLATE 10 MG: 10 TABLET ORAL at 09:26

## 2019-02-02 RX ADMIN — Medication 5 ML: at 22:28

## 2019-02-02 RX ADMIN — Medication 5 ML: at 06:03

## 2019-02-02 RX ADMIN — PIPERACILLIN AND TAZOBACTAM 4.5 G: 4; .5 INJECTION, POWDER, FOR SOLUTION INTRAVENOUS at 09:24

## 2019-02-02 RX ADMIN — HYDRALAZINE HYDROCHLORIDE 100 MG: 50 TABLET, FILM COATED ORAL at 19:13

## 2019-02-02 RX ADMIN — LEVETIRACETAM 1500 MG: 100 SOLUTION ORAL at 19:13

## 2019-02-02 RX ADMIN — GUAIFENESIN 1200 MG: 600 TABLET, EXTENDED RELEASE ORAL at 09:25

## 2019-02-02 RX ADMIN — Medication 10 ML: at 13:23

## 2019-02-02 RX ADMIN — SERTRALINE 200 MG: 100 TABLET, FILM COATED ORAL at 09:26

## 2019-02-02 RX ADMIN — ENOXAPARIN SODIUM 40 MG: 40 INJECTION SUBCUTANEOUS at 09:25

## 2019-02-02 RX ADMIN — LEVETIRACETAM 1500 MG: 100 SOLUTION ORAL at 09:00

## 2019-02-02 RX ADMIN — PIPERACILLIN AND TAZOBACTAM 4.5 G: 4; .5 INJECTION, POWDER, FOR SOLUTION INTRAVENOUS at 19:13

## 2019-02-02 RX ADMIN — Medication 1 AMPULE: at 09:24

## 2019-02-02 RX ADMIN — Medication 1 AMPULE: at 22:27

## 2019-02-02 RX ADMIN — LOSARTAN POTASSIUM 100 MG: 50 TABLET ORAL at 09:26

## 2019-02-02 NOTE — PROGRESS NOTES
Problem: Dysphagia (Adult) Goal: *Acute Goals and Plan of Care (Insert Text) STG: Pt will demonstrate zero signs/sx of aspiration with pureed textures with honey thick liquids with 90% accuracy during all meals. STG: Pt will complete a Modified barium swallow study with zero signs/sx of aspiration  with 100% accuracy during swallow study. LTG: Pt will demonstrate zero signs/sx of aspiration with least restrictive diet with 100% accuracy during all meals. Speech language pathology: bedside swallow note: Initial Assessment NAME/AGE/GENDER: Kayla Garza is a 76 y.o. female DATE: 2/2/2019 PRIMARY DIAGNOSIS: Aspiration pneumonia ([facility] ICD-10: Treatment Diagnosis: R13.12 oropharyngeal phase dysphagia INTERDISCIPLINARY COLLABORATION: Registered Nurse PRECAUTIONS/ALLERGIES: Patient has no known allergies. ASSESSMENT:Based on the objective data described below, Ms. Henok Navarro presents with a history of dysphagia per a Modified barium swallow study on 9/24/18 Ms. Henok Navarro presents with persistent laryngeal penetration that became deeper as additional trials were presented with both thin and nectar liquids. There was no reflexive throat clearing and cued cough/throat clear did not completely clear laryngeal residue. Increased oral transit time with laryngeal penetration prior to and during swallow with mixed consistency. Cracker deferred. There was no laryngeal penetration or aspiration observed with honey-thick liquid or pudding. Pt tolerated thin via cup and pureed. Cough with thin via straw. Increased mastication with mixed. Pt very [place] Recommend pureed/honey with medications crushed in pureed. Recommend a Modified barium swallow study to re assess pharyngeal swallow and rule out aspiration. Patient will benefit from skilled intervention to address the below impairments. ?????? ? ? This section established at most recent assessment?????????? 
PROBLEM LIST (Impairments causing functional limitations): 
 1. dysphagia REHABILITATION POTENTIAL FOR STATED GOALS: Fair PLAN OF CARE:  
Patient will benefit from skilled intervention to address the following impairments. RECOMMENDATIONS AND PLANNED INTERVENTIONS (Benefits and precautions of therapy have been discussed with the patient.): 
· PO:  Pureed · Liquids:  honey MEDICATIONS: 
· Crushed in puree ASPIRATION PRECAUTIONS: 
· Slow rate of intake · Small bites/sips · Upright at 90 degrees during meal 
COMPENSATORY STRATEGIES/MODIFICATIONS INCLUDING: 
· Cup/sip OTHER RECOMMENDATIONS (including follow up treatment recommendations): · Family training/education · Laryngeal exercises · Patient education · MBS 
RECOMMENDED DIET MODIFICATIONS DISCUSSED WITH: 
· Nursing · Patient FREQUENCY/DURATION: Continue to follow patient 3 times a week for duration of hospital stay to address above goals. RECOMMENDED REHABILITATION/EQUIPMENT: (at time of discharge pending progress): Due to the probability of continued deficits (see above) this patient will likely need continued skilled speech therapy after discharge. SUBJECTIVE:  
Very [place] History of Present Injury/Illness: Ms. Liz Riojas  has a past medical history of Chronic pain, Chronic sinusitis (1/19/2015), Diverticulosis (2017), Edema (1/19/2015), Former smoker, GERD (gastroesophageal reflux disease), Headache (1/19/2015), Hearing loss (1/19/2015), Heart murmur (1990), Hip osteoarthritis (1/19/2015), colonic polyps (2017), migraines, Hypercholesterolemia (1/19/2015), Hypertension, Impaired fasting glucose (1/19/2015), Joint disorder (1/19/2015), Morbid obesity ([address] Neuralgia (1/19/2015), Phlebitis and thrombophlebitis of superficial vessels of lower extremities (01/19/2015), Psychiatric disorder, Restless leg syndrome (1/19/2015), Rheumatic fever, Seizures ([address] SOB (shortness of breath) on exertion, Stroke ([address] Tobacco abuse (1/19/2015), Vascular anomalies, congenital, and Vitamin D deficiency (1/19/2015). She also has no past medical history of Adverse effect of anesthesia, Aneurysm ([facility] Arrhythmia, Asthma, Autoimmune disease ([facility] CAD (coronary artery disease), Cancer ([facility] Chronic kidney disease, Chronic obstructive pulmonary disease ([address] Coagulation disorder ([facility] Diabetes ([facility] [address] [facility] Difficult intubation, Endocarditis, Heart failure ([facility] Liver disease, Malignant hyperthermia due to anesthesia, Nausea & vomiting, Pseudocholinesterase deficiency, PUD (peptic ulcer disease), Sleep apnea, Thromboembolus ([facility] or Thyroid disease. She also  has a past surgical history that includes hx mohs procedure (Right, 1995); hx cholecystectomy (1982); colonoscopy (N/A, 2/20/2017); pr total hip arthroplasty (Right, 2005); hx rotator cuff repair (Right, 10/31/2016); hx cataract removal (Bilateral, 3/2016); hx tubal ligation (1982); and hx appendectomy (03/15/2017). Present Symptoms:  
Pain Scale 1: Adult Nonverbal Pain Scale Pain Intensity 1: 0 Current Medications: No current facility-administered medications on file prior to encounter. Current Outpatient Medications on File Prior to Encounter Medication Sig Dispense Refill  Lactose-Free Food with Fiber (JEVITY 1.5 KELLY) 0.06 gram-1.5 kcal/mL liqd Take 65 mL/hr by mouth. Start at 1800 and run unit 1040 ml have infused  bisacodyl (DULCOLAX, BISACODYL,) 10 mg suppository Insert 10 mg into rectum daily as needed.  gabapentin (NEURONTIN) 300 mg capsule 300 mg by Per J Tube route nightly.  levETIRAcetam (KEPPRA) 500 mg tablet Take 1,500 mg by mouth two (2) times a day.  simethicone (MYLICON) 40 ER/5.2 mL drops Take 80 mg by mouth four (4) times daily as needed.  dextromethorphan-guaiFENesin (ROBITUSSIN-DM)  mg/5 mL syrup 10 mL by Per G Tube route every four (4) hours as needed for Cough.  tamsulosin (FLOMAX) 0.4 mg capsule Take 0.4 mg by mouth nightly.  acetaminophen (TYLENOL) 325 mg tablet 650 mg by Per G Tube route every six (6) hours as needed for Pain.  hydrALAZINE (APRESOLINE) 100 mg tablet 1 Tab by Per NG tube route three (3) times daily. 90 Tab 2  
 oxyCODONE IR (ROXICODONE) 5 mg immediate release tablet 0.5 Tabs by Per NG tube route every eight (8) hours as needed. Max Daily Amount: 7.5 mg. 5 Tab 0  
 amLODIPine (NORVASC) 10 mg tablet Take 10 mg by mouth daily.  ergocalciferol (VITAMIN D2) 50,000 unit capsule TAKE 1 CAPSULE ONE TIME WEEKLY- takes on sat 5 Cap 11  
 sertraline (ZOLOFT) 100 mg tablet Take 2 Tabs by mouth daily. (Patient taking differently: 200 mg by Per G Tube route daily. ) 180 Tab 3  
 levETIRAcetam (KEPPRA) 100 mg/ml soln oral solution 15 mL by Per G Tube route two (2) times a day for 30 days. 900 mL 3  
 losartan (COZAAR) 100 mg tablet Take 1 Tab by mouth daily. Indications: hypertension (Patient taking differently: 100 mg by Per G Tube route daily.) 30 Tab 11 Current Dietary Status: NPO Social History/Home Situation:  
Home Environment: Skilled nursing facility Care Facility Name: [facility] # Steps to Enter: 0 One/Two Story Residence: One story Living Alone: No 
Support Systems: Spouse/Significant Other/Partner, Family member(s) Patient Expects to be Discharged to[de-identified] Skilled nursing facility Current DME Used/Available at Home: Other (comment) OBJECTIVE:  
Respiratory Status:     7 l/min Oral Motor Structure/Speech:  Oral-Motor Structure/Motor Speech Labial: No impairment Dentition: Edentulous Oral Hygiene: dry Lingual: Decreased rate Cognitive and Communication Status: 
Neurologic State: Confused Orientation Level: Unable to verbalize Cognition: Decreased command following(very [place]) Perception: Appears intact Perseveration: No perseveration noted Safety/Judgement: Fall prevention BEDSIDE SWALLOW EVALUATIONOral Assessment: 
Oral Assessment Labial: No impairment Dentition: Edentulous Oral Hygiene: dry Lingual: Decreased rate P.O. Trials: 
Patient Position: upright in bed The patient was given teaspoon amounts of the following:  
Consistency Presented: Thin liquid;Mixed consistency;Puree How Presented: SLP-fed/presented;Straw;Cup/sip;Spoon ORAL PHASE: 
Bolus Acceptance: No impairment Bolus Formation/Control: Impaired Propulsion: Delayed (# of seconds) Type of Impairment: Mastication Oral Residue: Less than 10% of bolus PHARYNGEAL PHASE: 
Initiation of Swallow: No impairment Aspiration Signs/Symptoms: None Vocal Quality: No impairment Pharyngeal Phase Characteristics: Suspected pharyngeal residue;Poor endurance OTHER OBSERVATIONS: 
Rate/bite size: WNL and Impaired Endurance:  Questionable Comments: Tool Used: Dysphagia Outcome and Severity Scale (KAROLINA) Score Comments Normal Diet  [] 7 With no strategies or extra time needed Functional Swallow  [] 6 May have mild oral or pharyngeal delay Mild Dysphagia 
  [] 5 Which may require one diet consistency restricted (those who demonstrate penetration which is entirely cleared on MBS would be included) Mild-Moderate Dysphagia  [] 4 With 1-2 diet consistencies restricted Moderate Dysphagia  [x] 3 With 2 or more diet consistencies restricted Moderately Severe Dysphagia  [] 2 With partial PO strategies (trials with ST only) Severe Dysphagia  [] 1 With inability to tolerate any PO safely Score:  Initial: 3 Most Recent: X (Date: --) Interpretation of Tool: The Dysphagia Outcome and Severity Scale (KAROLINA) is a simple, easy-to-use, 7-point scale developed to systematically rate the functional severity of dysphagia based on objective assessment and make recommendations for diet level, independence level, and type of nutrition.   
 
 
Payor: Wing Nguyen / Plan: [facility] AARP MEDICARE COMPLETE / Product Type: Managed Care Medicare /  
 
TREATMENT:  
 (In addition to Assessment/Re-Assessment sessions the following treatments were rendered) Assessment/Reassessment only, no treatment provided today MODALITIES:  
   
 
ORAL MOTOR  EXERCISES: 
   
 
LARYNGEAL / PHARYNGEAL EXERCISES: 
   
 
__________________________________________________________________________________________________ Safety: After treatment position/precautions: 
· Call light within reach · RN notified · Upright in Bed Treatment Assessment:   
Progression/Medical Necessity:  
· Patient demonstrates fair rehab potential due to higher previous functional level. Compliance with Program/Exercises: Will assess as treatment progresses Reason for Continuation of Services/Other Comments: 
· Patient continues to require skilled intervention due to dysphagia. Recommendations/Intent for next treatment session: \"Treatment next visit will focus on MBS\". Total Treatment Duration: 
Time In: 6211 Time Out: 1050  Dennise Snellen MA/CCC/SLP

## 2019-02-02 NOTE — PROGRESS NOTES
Problem: Nutrition Deficit Goal: *Optimize nutritional status Nutrition: 
Reason for assessment: Consult for TF management per nutritional support protocols. (Dr. Isaura Aleger) BPA for EN/TPN prior to admission. Assessment:  
Food/Nutrition History: The patient is noted to have a h/o Dementia, HTN, Seizures and obesity. The patient has is unable to provide RD with any information. She is from [place]. She is on tube feeding there. She is PEG tube feeding dependent related to dysphagia. PEG in place,  Abdomen WNL. Date of Last BM: yesterday. Pertinent Medications: Zosyn Pertinent labs: Unremarkable at this time. Anthropometrics:Height: 5' 3\" (160 cm), Weight: 108.5 kg (239 lb 3.2 oz), Weight Source: Unknown, Body mass index is 42.37 kg/m². BMI class of overweight for age >71. Macronutrient needs: EER:  5982-2612 kcal /day (13-18 kcal/kg listed BW) EPR:  52-68 grams protein/day (1-1.3 grams/kg IBW) Max CHO:  297 grams/day (55% kcal) Fluid:  1ml/kcal 
Intake/Comparative standards: Current NPO status does not meet kcal or protein needs Nutrition Diagnosis: Difficulty swallowing r/t dysphagia as evidenced by patient with PEG tube and tuber feeding dependent for nutrition needs. Intervention: 
Meals and snacks: NPO 
EN:Start TF of Jevity 1.2 at 30 ml/hr and increase by 10 ml/hr q 4 hrs as tolerated to goal rate of 50 ml/hr with 25ml water q 1hr to provide: 1440 calories/day (100% calorie goal), 67 grams protein/day (>100% protein goal), 203 grams CHO/day (does not exceed max CHO limit) and 1572 ml water/day (100% fluid goal). Nutrition Supplement Therapy: Electrolyte replacement per nutritional support protocols are active on MAR. Discharge nutrition plan: Too soon to determine [address], [address] [place] [address], [place]  
[phone]

## 2019-02-02 NOTE — PROGRESS NOTES
Problem: Mobility Impaired (Adult and Pediatric) Goal: *Therapy Goal (Edit Goal, Insert Text) STG: 
(1.)Ms. Herron will move from supine to sit and sit to supine , scoot up and down and roll side to side with MAXIMAL ASSIST within 4 treatment day(s). (2.)Ms. Herron will transfer from bed to chair and chair to bed with MAXIMAL ASSIST using the least restrictive device within 4 treatment day(s). (3.)Ms. Herron will ambulate with MAXIMAL ASSIST for 5 feet with the least restrictive device within 4 treatment day(s). LTG: 
(1.)Ms. Herron will move from supine to sit and sit to supine , scoot up and down and roll side to side in bed with MODERATE ASSIST within 7 treatment day(s). (2.)Ms. Herron will transfer from bed to chair and chair to bed with MODERATE ASSIST using the least restrictive device within 7 treatment day(s). (3.)Ms. Herron will ambulate with MODERATE ASSIST for 25 feet with the least restrictive device within 7 treatment day(s). ________________________________________________________________________________________________ PHYSICAL THERAPY: Initial Assessment and AM 2/2/2019INPATIENT:   
Payor: Fortunato Wheeler / Plan: Marcell Blanchard / Product Type: [employer] /   
  
NAME/AGE/GENDER: Ghanshyam Mckay is a 76 y.o. female PRIMARY DIAGNOSIS: Aspiration pneumonia ([address] Aspiration pneumonia ([address] Aspiration pneumonia ([address] ICD-10: Treatment Diagnosis:  
 · Generalized Muscle Weakness (M62.81) · Other lack of cordination (R27.8) · Difficulty in walking, Not elsewhere classified (R26.2) · Other abnormalities of gait and mobility (R26.89) Precaution/Allergies: 
Patient has no known allergies. ASSESSMENT:  
Ms. Hema Uriarte presents with decreased transfers, ambulation and mobility with above diagnosis. She demonstrates transfers of Total Assistance x 1-3 in bed and bed mobility.    She also has soft hand mits and is more confused today with dementia exacerbation. The bed is converted to chair position and therapeutic exercise is performed seated with PT assistance AARPERCY. Skilled PT is indicated for patient safety and mobility progression during current acute care episode. This section established at most recent assessment PROBLEM LIST (Impairments causing functional limitations): 1. Decreased Strength 2. Decreased ADL/Functional Activities 3. Decreased Transfer Abilities 4. Decreased Ambulation Ability/Technique 5. Decreased Balance 6. Decreased Activity Tolerance 7. Decreased Pacing Skills 8. Decreased Flexibility/Joint Mobility 9. Decreased [place] with Home Exercise Program 
 INTERVENTIONS PLANNED: (Benefits and precautions of physical therapy have been discussed with the patient.) 1. Balance Exercise 2. Bed Mobility 3. Family Education 4. Gait Training 5. Home Exercise Program (HEP) 6. Range of Motion (ROM) 7. Therapeutic Activites 8. Therapeutic Exercise/Strengthening 9. Transfer Training TREATMENT PLAN: Frequency/Duration: 3-5 times a week for duration of hospital stayRehabilitation Potential For Stated Goals: Good RECOMMENDED REHABILITATION/EQUIPMENT: (at time of discharge pending progress): Due to the probability of continued deficits (see above) this patient will likely need continued skilled physical therapy after discharge. Equipment:  
? None at this time HISTORY:  
History of Present Injury/Illness (Reason for Referral): PER MD H&P Patient history was obtained from the ER provider prior to seeing the patient. 
  
Pt is a 75 yo female with PMH of dementia, HTN, stroke, seizures, RLS, neuralgia, morbid obesity, hypercholesterolemia, OA, hearing loss. Pt has been at [facility] as a long term care resident. Pt presented to the ED via EMS with shortness of breath and hypoxia.  Per EMS pt was hypoxic on her usual oxygen setting. Initially pt was on CPAP for transport and then BiPap in the ED, she has not been weaned down to 4L per NC. Pt is not able to provide any history, unaware that she is at a hospital, asking for her spouse. CT scan of chest neg for PE, there is atelectasis/infiltrate left upper and lower lobes, poss aspiration. PT/OT/SLP ordered to assess. Start IV zosyn. Past Medical History/Comorbidities: Ms. Jyotsna Dove  has a past medical history of Chronic pain, Chronic sinusitis (1/19/2015), Diverticulosis (2017), Edema (1/19/2015), Former smoker, GERD (gastroesophageal reflux disease), Headache (1/19/2015), Hearing loss (1/19/2015), Heart murmur (1990), Hip osteoarthritis (1/19/2015), colonic polyps (2017), migraines, Hypercholesterolemia (1/19/2015), Hypertension, Impaired fasting glucose (1/19/2015), Joint disorder (1/19/2015), Morbid obesity ([address] Neuralgia (1/19/2015), Phlebitis and thrombophlebitis of superficial vessels of lower extremities (01/19/2015), Psychiatric disorder, Restless leg syndrome (1/19/2015), Rheumatic fever, Seizures ([address] SOB (shortness of breath) on exertion, Stroke ([address] Tobacco abuse (1/19/2015), Vascular anomalies, congenital, and Vitamin D deficiency (1/19/2015). She also has no past medical history of Adverse effect of anesthesia, Aneurysm ([address] Arrhythmia, Asthma, Autoimmune disease ([address] CAD (coronary artery disease), Cancer ([address] Chronic kidney disease, Chronic obstructive pulmonary disease ([address] Coagulation disorder ([address] Diabetes ([address] Difficult intubation, Endocarditis, Heart failure ([address] Liver disease, Malignant hyperthermia due to anesthesia, Nausea & vomiting, Pseudocholinesterase deficiency, PUD (peptic ulcer disease), Sleep apnea, Thromboembolus ([address] or Thyroid disease.   Ms. Jyotsna Dove  has a past surgical history that includes hx mohs procedure (Right, 1995); hx cholecystectomy (1982); colonoscopy (N/A, 2/20/2017); pr total hip arthroplasty (Right, 2005); hx rotator cuff repair (Right, 10/31/2016); hx cataract removal (Bilateral, 3/2016); hx tubal ligation (1982); and hx appendectomy (03/15/2017). Social History/Living Environment:  
Home Environment: Skilled nursing facility [facility] # Steps to Enter: 0 One/Two Story Residence: One story Living Alone: No 
Support Systems: Spouse/Significant Other/Partner, Family member(s) Patient Expects to be Discharged to[de-identified] Skilled nursing facility Current DME Used/Available at Home: Other (comment) Prior Level of Function/Work/Activity: 
Limited. Dementia lives at [facility]. Dominant Side:  
      RIGHT Personal Factors:   
      Sex:  female Age:  76 y.o. Number of Personal Factors/Comorbidities that affect the Plan of Care: 1-2: MODERATE COMPLEXITY EXAMINATION:  
Most Recent Physical Functioning:  
Gross Assessment: 
AROM: Grossly decreased, non-functional 
Strength: Grossly decreased, non-functional 
Coordination: Grossly decreased, non-functional 
Tone: Abnormal 
Sensation: Impaired Posture: 
Posture (WDL): Exceptions to [facility] Posture Assessment: Cervical, Forward head Balance: 
Sitting: Impaired Sitting - Static: Fair (occasional) Sitting - Dynamic: Poor (constant support) Standing: Impaired Bed Mobility: 
Rolling: Total assistance Wheelchair Mobility: 
  
Transfers: 
  
Gait: 
  
   
  
Body Structures Involved: 1. Bones 2. Joints 3. Muscles 4. Ligaments Body Functions Affected: 1. Neuromusculoskeletal 
2. Movement Related 3. Skin Related 4. Metobolic/Endocrine Activities and Participation Affected: 1. General Tasks and Demands 2. Communication 3. Mobility 4. Self Care 5. Community, Social and [place] Number of elements that affect the Plan of Care: 3: MODERATE COMPLEXITY CLINICAL PRESENTATION:  
Presentation: Evolving clinical presentation with changing clinical characteristics: MODERATE COMPLEXITY CLINICAL DECISION MAKING:  
[facility] AM-PAC 6 Clicks Basic Mobility Inpatient Short Form How much difficulty does the patient currently have. .. Unable A Lot A Little None 1. Turning over in bed (including adjusting bedclothes, sheets and blankets)? [x] 1   [] 2   [] 3   [] 4  
2. Sitting down on and standing up from a chair with arms ( e.g., wheelchair, bedside commode, etc.)   [x] 1   [] 2   [] 3   [] 4  
3. Moving from lying on back to sitting on the side of the bed? [x] 1   [] 2   [] 3   [] 4 How much help from another person does the patient currently need. .. Total A Lot A Little None 4. Moving to and from a bed to a chair (including a wheelchair)? [x] 1   [] 2   [] 3   [] 4  
5. Need to walk in hospital room? [x] 1   [] 2   [] 3   [] 4  
6. Climbing 3-5 steps with a railing? [x] 1   [] 2   [] 3   [] 4  
© 2007, Trustees of [facility], under license to [employer]. All rights reserved Score:  Initial: 6 Most Recent: X (Date: -- ) Interpretation of Tool:  Represents activities that are increasingly more difficult (i.e. Bed mobility, Transfers, Gait). Medical Necessity:    
· Patient demonstrates fair rehab potential due to higher previous functional level. Reason for Services/Other Comments: 
· Patient continues to require skilled intervention due to medical complications, patient unable to attend/participate in therapy as expected and decreased functional mobility. Use of outcome tool(s) and clinical judgement create a POC that gives a: Questionable prediction of patient's progress: MODERATE COMPLEXITY  
  
 
 
 
TREATMENT:  
(In addition to Assessment/Re-Assessment sessions the following treatments were rendered) Pre-treatment Symptoms/Complaints:  0/10 no specific pain reported. Pain: Initial:  
Pain Intensity 1: 0  Post Session:  0/10 no specific pain reported. In addition to initial evaluation the following treatments are rendered: Therapeutic Exercise: ( 10 mins):  Exercises per grid below to improve mobility, strength and balance. Required minimal visual, verbal and manual cues to promote proper body alignment and promote proper body posture. Progressed resistance, range and repetitions as indicated. Date: 
2/1/2019 Date: 
 Date: Activity/Exercise Parameters Parameters Parameters AP's  10 x's Ankle mobility  10 x's     
Knee flexion/extension seated 10 x's Hip flexion 10 x's Hip abduction  10 x's Braces/Orthotics/Lines/Etc:  
· IV 
· O2 Device: Nasal cannula Treatment/Session Assessment:   
· Response to Treatment:  Limited participation and mobility today . Dementia. Soft mits on her hands. · Interdisciplinary Collaboration:  
o Physical Therapist 
o Registered Nurse · After treatment position/precautions:  
o Supine in bed 
o Bed alarm/tab alert on 
o Bed/Chair-wheels locked 
o Call light within reach 
o RN notified 
o Side rails x 3  
· Compliance with Program/Exercises: Will assess as treatment progresses · Recommendations/Intent for next treatment session: \"Next visit will focus on advancements to more challenging activities, reduction in assistance provided and transfers, mobility and LE ROM. \". Total Treatment Duration: PT Patient Time In/Time Out Time In: 1120 Time Out: 1140 Malka Bullard PT

## 2019-02-02 NOTE — PROGRESS NOTES
Hospitalist Progress Note Admit Date:  2019 11:28 AM  
Name:  Kj Olsen Age:  76 y.o. 
:  1950 MRN:  [id number] PCP:  Justin Payton DO Treatment Team: Attending Provider: France Mercedes MD 
 
Subjective:  
CC: SOB with hypoxia Pt is a 77 yo female with PMH of dementia, HTN, stroke, seizures, RLS, neuralgia, morbid obesity, hypercholesterolemia, OA, hearing loss. Pt has been at [facility] as a long term care resident. Pt presented to the ED via EMS with shortness of breath and hypoxia. Per EMS pt was hypoxic on her usual oxygen setting. Initially pt was on CPAP for transport and then BiPap in the ED, she has not been weaned down to 4L per NC. Pt is not able to provide any history, unaware that she is at a hospital, asking for her spouse. CT scan of chest neg for PE, there is atelectasis/infiltrate left upper and lower lobes, poss aspiration. PT/OT/SLP ordered to assess. Started IV zosyn. Overnight pt was restless, crying out and taking off oxygen. Pt was found with the O2 off and her sat was 77%. Once back on the oxygen her sat improved back to over 90%. Mitts have been applies to prevent pulling on the lines. Pt continues to vocalize that she needs a ride home, I want [facility] here right now, etc. Blood cultures pending. Objective:  
 
Patient Vitals for the past 24 hrs: 
 Temp Pulse Resp BP SpO2  
19 1116 97.8 °F (36.6 °C) 77 18 101/67 93 % 19 0719 97.9 °F (36.6 °C) 67 19 114/71 93 % 19 0306 98.3 °F (36.8 °C) 71 18 118/49 90 % 19 2302    108/47   
19 2301 98.2 °F (36.8 °C) 69 18 108/47 91 % 19 1915 97.3 °F (36.3 °C) 62 18 109/53 95 % 19 1703 97.9 °F (36.6 °C) 63 18 110/69 (!) 88 % 19 1546     92 % 19 1529  71 17 127/59 90 % 19 1431  61 17 112/55 96 % 19 1421  61 16 111/53 96 % 19 1411  61 16 110/53 96 % 19 1400  61 20 110/56 96 % 02/01/19 1351  61 18 109/55 95 % 02/01/19 1341  72 20 98/51 98 % 02/01/19 1331  68 23 109/54 96 % 02/01/19 1321  62 17 102/52 95 % 02/01/19 1311  70  108/52 95 % 02/01/19 1305  63  117/56   
02/01/19 1300  63 22 117/56 96 % Oxygen Therapy O2 Sat (%): 93 % (02/02/19 1116) Pulse via Oximetry: 67 beats per minute (02/01/19 1546) O2 Device: Nasal cannula (02/01/19 1920) O2 Flow Rate (L/min): 7 l/min (02/02/19 0719) FIO2 (%): 60 % (02/01/19 1159) Intake/Output Summary (Last 24 hours) at 2/2/2019 1256 Last data filed at 2/2/2019 9467 Gross per 24 hour Intake 0 ml Output 100 ml Net -100 ml Physical Examination: I reviewed the labs, imaging, EKGs, telemetry, and other studies done this admission. General:          Well nourished. Lethargic but wakes up with stimulation Eyes:               Normal sclera. Extraocular movements intact. ENT:                Normocephalic, atraumatic. Moist mucous membranes. Very hard of hearing CV:                  RRR. No m/r/g. Peripheral pulses 2+. Lungs:             Rales throughout Abdomen:        Soft, nontender, nondistended. +BS Extremities:     Warm and dry. No cyanosis or edema. Neurologic:      No gross focal deficit. Skin:                No rashes or jaundice. Normal coloration Psych:             Normal mood and affect. Data Review: 
I have reviewed all labs, meds, telemetry events, and studies from the last 24 hours. Recent Results (from the past 24 hour(s)) POC G3 Collection Time: 02/01/19 12:58 PM  
Result Value Ref Range Device: BIPAP    
 FIO2 (POC) 60 % pH (POC) 7.362 7.35 - 7.45    
 pCO2 (POC) 66.9 (HH) 35 - 45 MMHG  
 pO2 (POC) 75 75 - 100 MMHG  
 HCO3 (POC) 37.9 (H) 22 - 26 MMOL/L  
 sO2 (POC) 93 (L) 95 - 98 % Base excess (POC) 10 mmol/L Set Rate 8 bpm  
 PEEP/CPAP (POC) 8 cmH2O  
 PIP (POC) 16 Allens test (POC) YES Inspiratory Time 0.8 sec  Site RIGHT RADIAL    
 Patient temp. 98.6 Specimen type (POC) ARTERIAL Performed by [email or web address]   
 CO2, POC 40 MMOL/L Exhaled minute volume 12.90 L/min COLLECT TIME 1,255 CULTURE, BLOOD Collection Time: 02/01/19  1:04 PM  
Result Value Ref Range Special Requests: LEFT 
FOREARM Culture result: NO GROWTH AFTER 17 HOURS    
URINE MICROSCOPIC Collection Time: 02/01/19  3:35 PM  
Result Value Ref Range WBC 10-20 0 /hpf  
 RBC 0-3 0 /hpf Epithelial cells 3-5 0 /hpf Bacteria 3+ (H) 0 /hpf Casts 0 0 /lpf Crystals, urine 0 0 /LPF Mucus 0 0 /lpf METABOLIC PANEL, BASIC Collection Time: 02/02/19  5:47 AM  
Result Value Ref Range Sodium 140 136 - 145 mmol/L Potassium 3.9 3.5 - 5.1 mmol/L Chloride 98 98 - 107 mmol/L  
 CO2 39 (H) 21 - 32 mmol/L Anion gap 3 (L) 7 - 16 mmol/L Glucose 111 (H) 65 - 100 mg/dL BUN 14 8 - 23 MG/DL Creatinine 0.66 0.6 - 1.0 MG/DL  
 GFR est AA >60 >60 ml/min/1.73m2 GFR est non-AA >60 >60 ml/min/1.73m2 Calcium 8.9 8.3 - 10.4 MG/DL  
CBC W/O DIFF Collection Time: 02/02/19  5:47 AM  
Result Value Ref Range WBC 6.4 4.3 - 11.1 K/uL  
 RBC 3.70 (L) 4.05 - 5.2 M/uL  
 HGB 10.8 (L) 11.7 - 15.4 g/dL HCT 34.9 (L) 35.8 - 46.3 % MCV 94.3 79.6 - 97.8 FL  
 MCH 29.2 26.1 - 32.9 PG  
 MCHC 30.9 (L) 31.4 - 35.0 g/dL  
 RDW 14.5 11.9 - 14.6 % PLATELET 152 (L) 343 - 450 K/uL MPV 10.1 9.4 - 12.3 FL ABSOLUTE NRBC 0.00 0.0 - 0.2 K/uL All Micro Results Procedure Component Value Units Date/Time CULTURE, BLOOD [[id number]] Collected:  02/01/19 1145 Order Status:  Completed Specimen:  Blood Updated:  02/02/19 7614 Special Requests: --     
  LEFT Antecubital 
  
  Culture result: NO GROWTH AFTER 18 HOURS     
 CULTURE, BLOOD [[id number]] Collected:  02/01/19 1304 Order Status:  Completed Specimen:  Blood Updated:  02/02/19 5606   Special Requests: --     
  LEFT 
FOREARM 
  
 Culture result: NO GROWTH AFTER 17 HOURS Current Meds: 
Current Facility-Administered Medications Medication Dose Route Frequency  NUTRITIONAL SUPPORT ELECTROLYTE PRN ORDERS   Does Not Apply PRN  
 amLODIPine (NORVASC) tablet 10 mg  10 mg Oral DAILY  hydrALAZINE (APRESOLINE) tablet 100 mg  100 mg Per NG tube TID  levETIRAcetam (KEPPRA) oral solution 1,500 mg  1,500 mg Per G Tube BID  losartan (COZAAR) tablet 100 mg  100 mg Oral DAILY  oxyCODONE IR (ROXICODONE) tablet 2.5 mg  2.5 mg Per NG tube Q8H PRN  
 sertraline (ZOLOFT) tablet 200 mg  200 mg Oral DAILY  alum-mag hydroxide-simeth (MYLANTA) oral suspension 30 mL  30 mL Oral Q4H PRN  
 hydrALAZINE (APRESOLINE) 20 mg/mL injection 20 mg  20 mg IntraVENous Q4H PRN  
 sodium chloride (NS) flush 5-40 mL  5-40 mL IntraVENous Q8H  
 sodium chloride (NS) flush 5-40 mL  5-40 mL IntraVENous PRN  
 acetaminophen (TYLENOL) tablet 650 mg  650 mg Oral Q4H PRN  
 HYDROcodone-acetaminophen (NORCO) 5-325 mg per tablet 1 Tab  1 Tab Oral Q4H PRN  
 naloxone (NARCAN) injection 0.4 mg  0.4 mg IntraVENous PRN  
 albuterol (PROVENTIL VENTOLIN) nebulizer solution 2.5 mg  2.5 mg Nebulization Q4H PRN  
 LORazepam (ATIVAN) tablet 0.5-1 mg  0.5-1 mg Oral Q6H PRN  
 senna-docusate (PERICOLACE) 8.6-50 mg per tablet 2 Tab  2 Tab Oral BID PRN  
 enoxaparin (LOVENOX) injection 40 mg  40 mg SubCUTAneous Q12H  piperacillin-tazobactam (ZOSYN) 4.5 g in 0.9% sodium chloride (MBP/ADV) 100 mL  4.5 g IntraVENous Q8H  
 guaiFENesin ER (MUCINEX) tablet 1,200 mg  1,200 mg Oral BID  alcohol 62% (NOZIN) nasal  1 Ampule  1 Ampule Topical Q12H Diet: DIET NPO 
DIET TUBE FEEDING Other Studies (last 24 hours): 
Ct Chest W Cont Result Date: 2/1/2019 Chest CT, with intravenous contrast 2/1/2019. Indication:  History pneumonia, shortness of breath. Comparison: 9/22/2018.  Technique:  Spiral images through the chest with 100 cc Isovue 370. IV contrast was used to evaluate the pulmonary arterial tree. Coronal reconstructed images also reviewed. Radiation dose reduction techniques were used for this study. Our CT scanners use one or all of the following: Automated exposure control, adjustment of the mA and/or kV according to patient size, iterative reconstruction. The pulmonary arterial tree is adequately opacified. There are no central or convincing distal emboli. There is however significant volume left lung with mediastinal shift to the left. There is consolidation within left upper and lower lobes. There is narrowing or debris within the left lower lobe bronchus. The left upper lobe of bronchus is not well defined but appears similar to the lower lobe bronchus. There are no infiltrates on the right. 5 mm right lower lobe nodule as seen on image number 64 is noted. IMPRESSION: 1. There is  significant volume loss in the left lung with atelectasis/infiltrate involving left upper and lower lobes. The more distal upper and lower lobe bronchi are poorly defined probably debris-filled. Consider the possibility of aspiration. 2. Negative for pulmonary emboli. 3. Stable 5 mm right lung nodule. Assessment and Plan:  
 
Hospital Problems as of 2/2/2019 Date Reviewed: 6/28/2018 Codes Class Noted - Resolved POA * (Principal) Aspiration pneumonia ([facility] ICD-10-CM: J69.0 ICD-9-CM: 507.0  2/1/2019 - Present Yes Dementia without behavioral disturbance (Chronic) ICD-10-CM: F03.90 ICD-9-CM: 294.20  2/1/2019 - Present Yes Acute respiratory failure with hypoxia [facility]) ICD-10-CM: J96.01 
ICD-9-CM: 518.81  2/1/2019 - Present Yes Morbid obesity ([facility] (Chronic) ICD-10-CM: E66.01 
ICD-9-CM: 278.01  9/22/2018 - Present Yes Seizure disorder ([facility] (Chronic) ICD-10-CM: G40.909 ICD-9-CM: 345.90  1/22/2018 - Present Yes  Essential hypertension (Chronic) ICD-10-CM: J51 
 ICD-9-CM: 401.9  1/19/2015 - Present Yes A/P:   
Aspiration pneumonia 
-Oxygen to keep sat >90% -Pulse oxymetry 
-Albuterol nebs with RT 
-IV Zosyn 
-Mucinex 
  
Acute respiratory hypoxia 
-As in #1 
  
HTN 
-Cont home med 
-Monitor 
  
Seizure disorder 
-Monitor for seizure activity 
  
Dementia 
-Chronic 
-Fall precautions -PRN ativan Discharge planning:  Return to [employer] consult DVT ppx:  Lovenix 
  
Code status:  Full code, no document on file Decision Maker: None designated, spouse is her emergency contact 
  
Case reviewed with supervising physician - GRADY Mcnally Found, DO Signed: 
CAS DonaldC

## 2019-02-02 NOTE — PROGRESS NOTES
Patient remains confused and screaming. Cherylle Neda place due to incontinence and excoriation Bedside shift report to be given to oncoming RN.

## 2019-02-02 NOTE — PROGRESS NOTES
Patient found with nasal cannula off, O2 sat 77%. Nasal cannula replaced, sat now 88% and rising. Patient stable.

## 2019-02-02 NOTE — PROGRESS NOTES
Problem: Self Care Deficits Care Plan (Adult) Goal: *Acute Goals and Plan of Care (Insert Text) 1. Patient will feed self entire meal with set up assistance and adaptive utensils as needed. 2. Patient will complete upper body dressing and bathing with minimal assistance and adaptive equipment as needed. 3. Patient will participate in BUE therapeutic exercises to increase strength in deltoids/biceps to at least 4-/5 strength for participation in ADLs and functional transfers. 4. Patient will follow 75% of commands with verbal cues from therapist while performing ADLs. 5. Patient will complete rolling supine in bed with moderate assistance and verbal cues from therapist.  
 
Timeframe: 7 visits OCCUPATIONAL THERAPY: Initial Assessment and PM 2/2/2019INPATIENT:   
Payor: Felix Beam / Plan: [address] / Product Type: Managed Care Medicare /  
  
NAME/AGE/GENDER: Clive Hassan is a 76 y.o. female PRIMARY DIAGNOSIS:  Aspiration pneumonia ([address] Aspiration pneumonia ([address] Aspiration pneumonia ([address] ICD-10: Treatment Diagnosis:  
 · Generalized Muscle Weakness (M62.81) · Other lack of cordination (R27.8) Precautions/Allergies: 
   Patient has no known allergies. ASSESSMENT:  
Ms. Nick Roca  is a 76 y.o. female admitted for aspiration pneumonia. At baseline, patient is a LTC resident at [facility] and has dementia. Patient typically requires assistance with ADLs and states she uses a wheelchair, however pt is a poor historian and no visitors present to provide history. Patient states she was performing transfers with help from staff? Kortney Herron found supine in bed with bilateral mitts and agreeable to OT evaluation. Patient is on 5L O2 via NC. Reports moderate pain in chest when she coughs. Patient requires total assist for bed mobility and positioning in bed.  Patient is set up with lunch time meal and requires hand over hand assist to initiate self feeding with regular utensils and verbal cues to locate items on plate. Patient demo wet cough after drinking honey thick liquids and nursing is notified. Patient engages in [address] exercises while lying with HOB raised. Patient's deficits include decreased strength, activity tolerance, coordination, and AROM. RUE is 3/5 strength, LUE is 2/5 strength. Sheila Jacobs is currently functioning below baseline for ADLs and would benefit from acute OT to increase independence and safety with ADLs. Will follow for duration of hospital stay to address the above goals. Patient was educated on role and plan of care of occupational therapy. This section established at most recent assessment PROBLEM LIST (Impairments causing functional limitations): 1. Decreased Strength 2. Decreased ADL/Functional Activities 3. Decreased Transfer Abilities 4. Decreased Balance 5. Decreased Activity Tolerance 6. Decreased Flexibility/Joint Mobility 7. Decreased Cognition INTERVENTIONS PLANNED: (Benefits and precautions of occupational therapy have been discussed with the patient.) 1. Activities of daily living training 2. Adaptive equipment training 3. Clothing management 4. Donning&doffing training 5. Therapeutic activity 6. Therapeutic exercise TREATMENT PLAN: Frequency/Duration: Follow patient 3x/week to address above goals. Rehabilitation Potential For Stated Goals: Good RECOMMENDED REHABILITATION/EQUIPMENT: (at time of discharge pending progress): Due to the probability of continued deficits (see above) this patient will likely need continued skilled occupational therapy after discharge. Equipment:  
? None at this time OCCUPATIONAL PROFILE AND HISTORY:  
History of Present Injury/Illness (Reason for Referral): 
See H&P Past Medical History/Comorbidities: Ms. Marian Mcgee  has a past medical history of Chronic pain, Chronic sinusitis (1/19/2015), Diverticulosis (2017), Edema (1/19/2015), Former smoker, GERD (gastroesophageal reflux disease), Headache (1/19/2015), Hearing loss (1/19/2015), Heart murmur (1990), Hip osteoarthritis (1/19/2015), colonic polyps (2017), migraines, Hypercholesterolemia (1/19/2015), Hypertension, Impaired fasting glucose (1/19/2015), Joint disorder (1/19/2015), Morbid obesity ([address] Neuralgia (1/19/2015), Phlebitis and thrombophlebitis of superficial vessels of lower extremities (01/19/2015), Psychiatric disorder, Restless leg syndrome (1/19/2015), Rheumatic fever, Seizures ([address] SOB (shortness of breath) on exertion, Stroke ([address] Tobacco abuse (1/19/2015), Vascular anomalies, congenital, and Vitamin D deficiency (1/19/2015). She also has no past medical history of Adverse effect of anesthesia, Aneurysm ([address] Arrhythmia, Asthma, Autoimmune disease ([address] CAD (coronary artery disease), Cancer ([address] Chronic kidney disease, Chronic obstructive pulmonary disease ([address] Coagulation disorder ([address] Diabetes ([address] Difficult intubation, Endocarditis, Heart failure ([address] Liver disease, Malignant hyperthermia due to anesthesia, Nausea & vomiting, Pseudocholinesterase deficiency, PUD (peptic ulcer disease), Sleep apnea, Thromboembolus ([address] or Thyroid disease. Ms. Gordon Schmidt  has a past surgical history that includes hx mohs procedure (Right, 1995); hx cholecystectomy (1982); colonoscopy (N/A, 2/20/2017); pr total hip arthroplasty (Right, 2005); hx rotator cuff repair (Right, 10/31/2016); hx cataract removal (Bilateral, 3/2016); hx tubal ligation (1982); and hx appendectomy (03/15/2017). Social History/Living Environment:  
Home Environment: Skilled nursing facility [facility] Name: [facility] # Steps to Enter: 0 One/Two Story Residence: One story Living Alone: No 
Support Systems: Skilled nursing facility, Family member(s) Patient Expects to be Discharged to[de-identified] Skilled nursing facility Current DME Used/Available at Home: Wheelchair Prior Level of Function/Work/Activity: 
See above. Personal Factors:   
      Sex:  female Age:  76 y.o. Number of Personal Factors/Comorbidities that affect the Plan of Care: Expanded review of therapy/medical records (1-2):  MODERATE COMPLEXITY ASSESSMENT OF OCCUPATIONAL PERFORMANCE[de-identified]  
Activities of Daily Living:  
Basic ADLs (From Assessment) Complex ADLs (From Assessment) Feeding: Moderate assistance Oral Facial Hygiene/Grooming: Minimum assistance Bathing: Maximum assistance Upper Body Dressing: Maximum assistance Lower Body Dressing: Maximum assistance Toileting: Total assistance Grooming/Bathing/Dressing Activities of Daily Living Cognitive Retraining Safety/Judgement: Decreased insight into deficits; Decreased awareness of environment Bed/Mat Mobility Rolling: Total assistance Most Recent Physical Functioning:  
Gross Assessment: 
AROM: Grossly decreased, non-functional 
Strength: Generally decreased, functional 
Coordination: Generally decreased, functional 
Tone: Abnormal 
Sensation: Impaired Posture: 
Posture (WDL): Exceptions to [facility] Posture Assessment: Cervical, Forward head Balance: 
Sitting: Impaired Sitting - Static: Fair (occasional) Sitting - Dynamic: Poor (constant support) Standing: Impaired Bed Mobility: 
Rolling: Total assistance Wheelchair Mobility: 
  
Transfers: 
   
 
    
 
Patient Vitals for the past 6 hrs: 
 BP BP Patient Position SpO2 Pulse 02/02/19 1116 101/67 At rest 93 % 77 Mental Status Neurologic State: Confused, Drowsy Orientation Level: Disoriented to time, Disoriented to situation, Disoriented to place Cognition: Decreased command following, Impaired decision making Perception: Cues to attend left visual field Perseveration: Perseverates during conversation(Requesting ) Safety/Judgement: Decreased insight into deficits, Decreased awareness of environment Physical Skills Involved: 1. Range of Motion 2. Strength 3. Activity Tolerance 4. Fine Motor Control 5. Gross Motor Control Cognitive Skills Affected (resulting in the inability to perform in a timely and safe manner): 1. Executive Function 2. Immediate Memory 3. Short Term Recall 4. Sustained Attention Psychosocial Skills Affected: 1. Habits/Routines 2. Environmental Adaptation Number of elements that affect the Plan of Care: 3-5:  MODERATE COMPLEXITY CLINICAL DECISION MAKIN[address] [place] [address] AM-PAC 6 Clicks Daily Activity Inpatient Short Form How much help from another person does the patient currently need. .. Total A Lot A Little None 1. Putting on and taking off regular lower body clothing? [x] 1   [] 2   [] 3   [] 4  
2. Bathing (including washing, rinsing, drying)? [] 1   [x] 2   [] 3   [] 4  
3. Toileting, which includes using toilet, bedpan or urinal?   [x] 1   [] 2   [] 3   [] 4  
4. Putting on and taking off regular upper body clothing? [] 1   [x] 2   [] 3   [] 4  
5. Taking care of personal grooming such as brushing teeth? [x] 1   [] 2   [] 3   [] 4  
6. Eating meals? [] 1   [x] 2   [] 3   [] 4  
© , Trustees of [address] [place] [address], under license to [employer]. All rights reserved Score:  Initial: 9 Most Recent: X (Date: -- ) Interpretation of Tool:  Represents activities that are increasingly more difficult (i.e. Bed mobility, Transfers, Gait). Medical Necessity:    
· Patient demonstrates good rehab potential due to higher previous functional level. Reason for Services/Other Comments: 
· Patient continues to require skilled intervention due to patient unable to attend/participate in therapy as expected.   
Use of outcome tool(s) and clinical judgement create a POC that gives a: MODERATE COMPLEXITY  
 
 
 
TREATMENT:  
 (In addition to Assessment/Re-Assessment sessions the following treatments were rendered) Pre-treatment Symptoms/Complaints:   
Pain: Initial:  
Pain Intensity 1: 3 Pain Location 1: Chest 
Pain Orientation 1: Anterior  Post Session:  Same Self Care: (10): Procedure(s) (per grid) utilized to improve and/or restore self-care/home management as related to self feeding. Required maximal verbal, tactile and   cueing to facilitate activities of daily living skills. Therapeutic Exercise: ( 8):  Exercises per grid below to improve mobility, strength and coordination. Required moderate visual and tactile cues to promote proper body alignment and promote proper body mechanics. Progressed range and repetitions as indicated. Date: 
2/2/18 Date: 
 Date: Activity/Exercise Parameters Parameters Parameters Shoulder shrugs 1x10 Elbow flexion/extension 1x10 Shoulder flexion/extension 1x10 Digit flexion/extension 1x10 Braces/Orthotics/Lines/Etc:  
· O2 Device: Nasal cannula Treatment/Session Assessment:   
· Response to Treatment:  Supine in bed. · Interdisciplinary Collaboration:  
o Occupational Therapist 
o Registered Nurse 
o Certified Nursing Assistant/Patient Care Technician · After treatment position/precautions:  
o Supine in bed 
o Bed alarm/tab alert on 
o Bed in low position 
o Call light within reach 
o RN notified · Compliance with Program/Exercises: Will assess as treatment progresses. · Recommendations/Intent for next treatment session: \"Next visit will focus on advancements to more challenging activities\". Total Treatment Duration: OT Patient Time In/Time Out Time In: 2230 Time Out: 9826 Joaquín Gale OT

## 2019-02-02 NOTE — PROGRESS NOTES
Patient was calm Had mitts on No family Provided calming presence Reassured her of our care Jam Yu, staff [address], [address]  /   [email or web address]

## 2019-02-02 NOTE — PROGRESS NOTES
Bedside shift report received from Genia Bills, [facility]. Patient resting in bed in no apparent distress on 4L NC. Patient not verbal and patient reports she is confused, commands followed appropriately. Patient has feeding tube, dressing loose but clean, and dry. Bed in low, locked position with call light and belongings in reach.  at bedside.

## 2019-02-03 LAB
HCT VFR BLD AUTO: 33.5 % (ref 35.8–46.3)
HGB BLD-MCNC: 10.4 G/DL (ref 11.7–15.4)

## 2019-02-03 PROCEDURE — 74011000258 HC RX REV CODE- 258: Performed by: INTERNAL MEDICINE

## 2019-02-03 PROCEDURE — 74011250637 HC RX REV CODE- 250/637: Performed by: INTERNAL MEDICINE

## 2019-02-03 PROCEDURE — 65270000029 HC RM PRIVATE

## 2019-02-03 PROCEDURE — 74011250636 HC RX REV CODE- 250/636: Performed by: INTERNAL MEDICINE

## 2019-02-03 PROCEDURE — 36415 COLL VENOUS BLD VENIPUNCTURE: CPT

## 2019-02-03 PROCEDURE — 85018 HEMOGLOBIN: CPT

## 2019-02-03 PROCEDURE — 74011250637 HC RX REV CODE- 250/637: Performed by: NURSE PRACTITIONER

## 2019-02-03 RX ORDER — AMOXICILLIN 250 MG
2 CAPSULE ORAL
Status: DISCONTINUED | OUTPATIENT
Start: 2019-02-03 | End: 2019-02-05 | Stop reason: HOSPADM

## 2019-02-03 RX ORDER — ACETAMINOPHEN 325 MG/1
650 TABLET ORAL
Status: DISCONTINUED | OUTPATIENT
Start: 2019-02-03 | End: 2019-02-05 | Stop reason: HOSPADM

## 2019-02-03 RX ORDER — HYDROCODONE BITARTRATE AND ACETAMINOPHEN 5; 325 MG/1; MG/1
1 TABLET ORAL
Status: DISCONTINUED | OUTPATIENT
Start: 2019-02-03 | End: 2019-02-05 | Stop reason: HOSPADM

## 2019-02-03 RX ORDER — LORAZEPAM 1 MG/1
1 TABLET ORAL
Status: DISCONTINUED | OUTPATIENT
Start: 2019-02-03 | End: 2019-02-05 | Stop reason: HOSPADM

## 2019-02-03 RX ADMIN — HYDRALAZINE HYDROCHLORIDE 100 MG: 50 TABLET, FILM COATED ORAL at 09:40

## 2019-02-03 RX ADMIN — Medication 1 AMPULE: at 21:31

## 2019-02-03 RX ADMIN — HYDRALAZINE HYDROCHLORIDE 100 MG: 50 TABLET, FILM COATED ORAL at 17:32

## 2019-02-03 RX ADMIN — LORAZEPAM 1 MG: 1 TABLET ORAL at 17:32

## 2019-02-03 RX ADMIN — ENOXAPARIN SODIUM 40 MG: 40 INJECTION SUBCUTANEOUS at 09:40

## 2019-02-03 RX ADMIN — AMLODIPINE BESYLATE 10 MG: 10 TABLET ORAL at 09:40

## 2019-02-03 RX ADMIN — LOSARTAN POTASSIUM 100 MG: 50 TABLET ORAL at 09:41

## 2019-02-03 RX ADMIN — Medication 5 ML: at 21:31

## 2019-02-03 RX ADMIN — ENOXAPARIN SODIUM 40 MG: 40 INJECTION SUBCUTANEOUS at 21:30

## 2019-02-03 RX ADMIN — SERTRALINE 200 MG: 100 TABLET, FILM COATED ORAL at 09:40

## 2019-02-03 RX ADMIN — LEVETIRACETAM 1500 MG: 100 SOLUTION ORAL at 09:41

## 2019-02-03 RX ADMIN — GUAIFENESIN 1200 MG: 600 TABLET, EXTENDED RELEASE ORAL at 17:33

## 2019-02-03 RX ADMIN — LEVETIRACETAM 1500 MG: 100 SOLUTION ORAL at 17:32

## 2019-02-03 RX ADMIN — PIPERACILLIN AND TAZOBACTAM 4.5 G: 4; .5 INJECTION, POWDER, FOR SOLUTION INTRAVENOUS at 04:09

## 2019-02-03 RX ADMIN — PIPERACILLIN AND TAZOBACTAM 4.5 G: 4; .5 INJECTION, POWDER, FOR SOLUTION INTRAVENOUS at 21:30

## 2019-02-03 RX ADMIN — Medication 1 AMPULE: at 09:39

## 2019-02-03 RX ADMIN — GUAIFENESIN 1200 MG: 600 TABLET, EXTENDED RELEASE ORAL at 09:41

## 2019-02-03 RX ADMIN — Medication 10 ML: at 14:23

## 2019-02-03 RX ADMIN — PIPERACILLIN AND TAZOBACTAM 4.5 G: 4; .5 INJECTION, POWDER, FOR SOLUTION INTRAVENOUS at 12:23

## 2019-02-03 RX ADMIN — Medication 5 ML: at 06:28

## 2019-02-03 NOTE — PROGRESS NOTES
Confused  Calm at present  Tube feed in progress without difficulty  Scant residual  Incontinent of urine  purewick in place without impairment  Patient turned to right side  Head of bed elevated  Excoriation of perineum  Zinc in use

## 2019-02-03 NOTE — PROGRESS NOTES
Problem: Pressure Injury - Risk of 
Goal: *Prevention of pressure injury Document Asim Scale and appropriate interventions in the flowsheet. Outcome: Progressing Towards Goal 
Pressure Injury Interventions: 
Sensory Interventions: Assess changes in LOC, Check visual cues for pain, Discuss PT/OT consult with provider, Float heels, Keep linens dry and wrinkle-free, Maintain/enhance activity level, Minimize linen layers, Pad between skin to skin, Turn and reposition approx. every two hours (pillows and wedges if needed) Moisture Interventions: Absorbent underpads, Apply protective barrier, creams and emollients, Check for incontinence Q2 hours and as needed, Internal/External urinary devices, Limit adult briefs, Maintain skin hydration (lotion/cream), Minimize layers, Moisture barrier Activity Interventions: Increase time out of bed, Pressure redistribution bed/mattress(bed type), PT/OT evaluation Mobility Interventions: Float heels, HOB 30 degrees or less, Pressure redistribution bed/mattress (bed type), PT/OT evaluation, Suspension boots Nutrition Interventions: Document food/fluid/supplement intake, Discuss nutritional consult with provider, Offer support with meals,snacks and hydration Friction and Shear Interventions: Apply protective barrier, creams and emollients, Foam dressings/transparent film/skin sealants, HOB 30 degrees or less, Lift sheet, Minimize layers

## 2019-02-03 NOTE — PROGRESS NOTES
Hospitalist Progress Note Admit Date:  2019 11:28 AM  
Name:  Renata Marinelli Age:  76 y.o. 
:  1950 MRN:  [id number] PCP:  Nikita Hoffmann DO Treatment Team: Attending Provider: Marley Ramirez MD; Utilization Review: Kati Krishnan RN Subjective:  
CC: SOB with hypoxia Pt is a 77 yo female with PMH of dementia, HTN, stroke, seizures, RLS, neuralgia, morbid obesity, hypercholesterolemia, OA, hearing loss. Pt has been at [facility] as a long term care resident. Pt presented to the ED via EMS with shortness of breath and hypoxia. Per EMS pt was hypoxic on her usual oxygen setting. Initially pt was on CPAP for transport and then BiPap in the ED, she has not been weaned down to 4L per NC. Pt is not able to provide any history, unaware that she is at a hospital, asking for her spouse. CT scan of chest neg for PE, there is atelectasis/infiltrate left upper and lower lobes, poss aspiration. PT/OT/SLP ordered to assess. Started IV zosyn. At time of exam no family present. Mitts have been applies to prevent pulling on the lines. Pt crying out, asking for someone to help her. Calms somewhat when being spoke to but begins again when alone. Blood cultures NGTD, cont IV antibx. Per nutrition pt started on NG tube feeds, tolerating. Will get modified barium swallow tomorrow per SLP. Objective:  
 
Patient Vitals for the past 24 hrs: 
 Temp Pulse Resp BP SpO2  
19 0722 97.5 °F (36.4 °C) 65 19 111/70 94 % 19 0300 98.8 °F (37.1 °C) 67 18 114/68 92 % 19 0005 98.3 °F (36.8 °C) 66 18 104/63 92 % 19 1947 98.3 °F (36.8 °C) 67 18 106/51 95 % 19 1441 98.1 °F (36.7 °C) 66 19 91/55 95 % 19 1116 97.8 °F (36.6 °C) 77 18 101/67 93 % Oxygen Therapy O2 Sat (%): 94 % (19 0722) Pulse via Oximetry: 67 beats per minute (19 1546) O2 Device: Nasal cannula (19 1920) O2 Flow Rate (L/min): 7 l/min (19 0719) FIO2 (%): 60 % (02/01/19 1159) Intake/Output Summary (Last 24 hours) at 2/3/2019 5064 Last data filed at 2/3/2019 0938 Gross per 24 hour Intake 100 ml Output 500 ml Net -400 ml Physical Examination: I reviewed the labs, imaging, EKGs, telemetry, and other studies done this admission. General:          Well nourished. Restless and crying out Eyes:               Normal sclera. Extraocular movements intact. ENT:                Normocephalic, atraumatic. Moist mucous membranes. Very hard of hearing CV:                  RRR. No m/r/g. Peripheral pulses 2+. Lungs:             Rales throughout Abdomen:        Soft, nontender, nondistended. +BS Extremities:     Warm and dry. No cyanosis or edema. Neurologic:      No gross focal deficit. Skin:                No rashes or jaundice. Normal coloration Data Review: 
I have reviewed all labs, meds, telemetry events, and studies from the last 24 hours. Recent Results (from the past 24 hour(s)) HGB & HCT Collection Time: 02/03/19  7:42 AM  
Result Value Ref Range HGB 10.4 (L) 11.7 - 15.4 g/dL HCT 33.5 (L) 35.8 - 46.3 % All Micro Results Procedure Component Value Units Date/Time CULTURE, BLOOD [[id number]] Collected:  02/01/19 1145 Order Status:  Completed Specimen:  Blood Updated:  02/02/19 7361 Special Requests: --     
  LEFT Antecubital 
  
  Culture result: NO GROWTH AFTER 18 HOURS     
 CULTURE, BLOOD [[id number]] Collected:  02/01/19 1304 Order Status:  Completed Specimen:  Blood Updated:  02/02/19 4680 Special Requests: --     
  LEFT 
FOREARM Culture result: NO GROWTH AFTER 17 HOURS Current Meds: 
Current Facility-Administered Medications Medication Dose Route Frequency  NUTRITIONAL SUPPORT ELECTROLYTE PRN ORDERS   Does Not Apply PRN  
 amLODIPine (NORVASC) tablet 10 mg  10 mg Oral DAILY  hydrALAZINE (APRESOLINE) tablet 100 mg  100 mg Per NG tube TID  levETIRAcetam (KEPPRA) oral solution 1,500 mg  1,500 mg Per G Tube BID  losartan (COZAAR) tablet 100 mg  100 mg Oral DAILY  oxyCODONE IR (ROXICODONE) tablet 2.5 mg  2.5 mg Per NG tube Q8H PRN  
 sertraline (ZOLOFT) tablet 200 mg  200 mg Oral DAILY  alum-mag hydroxide-simeth (MYLANTA) oral suspension 30 mL  30 mL Oral Q4H PRN  
 hydrALAZINE (APRESOLINE) 20 mg/mL injection 20 mg  20 mg IntraVENous Q4H PRN  
 sodium chloride (NS) flush 5-40 mL  5-40 mL IntraVENous Q8H  
 sodium chloride (NS) flush 5-40 mL  5-40 mL IntraVENous PRN  
 acetaminophen (TYLENOL) tablet 650 mg  650 mg Oral Q4H PRN  
 HYDROcodone-acetaminophen (NORCO) 5-325 mg per tablet 1 Tab  1 Tab Oral Q4H PRN  
 naloxone (NARCAN) injection 0.4 mg  0.4 mg IntraVENous PRN  
 albuterol (PROVENTIL VENTOLIN) nebulizer solution 2.5 mg  2.5 mg Nebulization Q4H PRN  
 LORazepam (ATIVAN) tablet 0.5-1 mg  0.5-1 mg Oral Q6H PRN  
 senna-docusate (PERICOLACE) 8.6-50 mg per tablet 2 Tab  2 Tab Oral BID PRN  
 enoxaparin (LOVENOX) injection 40 mg  40 mg SubCUTAneous Q12H  piperacillin-tazobactam (ZOSYN) 4.5 g in 0.9% sodium chloride (MBP/ADV) 100 mL  4.5 g IntraVENous Q8H  
 guaiFENesin ER (MUCINEX) tablet 1,200 mg  1,200 mg Oral BID  alcohol 62% (NOZIN) nasal  1 Ampule  1 Ampule Topical Q12H Diet: DIET TUBE FEEDING 
DIET PUREED Other Studies (last 24 hours): No results found. Assessment and Plan:  
 
Hospital Problems as of 2/3/2019 Date Reviewed: 6/28/2018 Codes Class Noted - Resolved POA * (Principal) Aspiration pneumonia ([facility] ICD-10-CM: J69.0 ICD-9-CM: 507.0  2/1/2019 - Present Yes Dementia without behavioral disturbance (Chronic) ICD-10-CM: F03.90 ICD-9-CM: 294.20  2/1/2019 - Present Yes Acute respiratory failure with hypoxia [facility]) ICD-10-CM: J96.01 
ICD-9-CM: 518.81  2/1/2019 - Present Yes  Morbid obesity ([facility]) (Chronic) ICD-10-CM: E66.01 
 ICD-9-CM: 278.01  9/22/2018 - Present Yes Seizure disorder ([address] (Chronic) ICD-10-CM: G40.909 ICD-9-CM: 345.90  1/22/2018 - Present Yes Essential hypertension (Chronic) ICD-10-CM: I10 
ICD-9-CM: 401.9  1/19/2015 - Present Yes A/P:   
Aspiration pneumonia 
-Oxygen to keep sat >90% -Pulse oxymetry 
-Albuterol nebs with RT 
-IV Zosyn 
-Mucinex 
-NG tube for tube feedings 
-Modified barium swallow in am 
  
Acute respiratory hypoxia 
-As in #1 
  
HTN 
-Cont home med 
-Monitor 
  
Seizure disorder 
-Monitor for seizure activity 
  
Dementia 
-Chronic 
-Fall precautions -PRN ativan [facility]  [facility] D[facility] ppx:  Lovenix 
  
Code status:  Full code, no document on file Decision Maker: None designated, spouse is her emergency contact 
  
Case reviewed with supervising physician - HI Lyle Signed: 
CAITLIN Kim

## 2019-02-03 NOTE — PROGRESS NOTES
Pt lying in bed, awake and yelling. Pt is alert but confused. Pt is on 5 L NC wit NAD. Pt has TF going at 40 ml/hr (increased by previous nurse per orders). Pt has mittens placed to bilateral hands so that patient will not keep pulling out oxygen. Bed in low and locked position. Pt instructed to call for assistance, call light in reach. Will monitor

## 2019-02-04 ENCOUNTER — APPOINTMENT (OUTPATIENT)
Dept: GENERAL RADIOLOGY | Age: 69
DRG: 177 | End: 2019-02-04
Attending: NURSE PRACTITIONER
Payer: MEDICARE

## 2019-02-04 LAB
ANION GAP SERPL CALC-SCNC: 4 MMOL/L (ref 7–16)
BUN SERPL-MCNC: 22 MG/DL (ref 8–23)
CALCIUM SERPL-MCNC: 8.9 MG/DL (ref 8.3–10.4)
CHLORIDE SERPL-SCNC: 101 MMOL/L (ref 98–107)
CO2 SERPL-SCNC: 37 MMOL/L (ref 21–32)
CREAT SERPL-MCNC: 0.65 MG/DL (ref 0.6–1)
ERYTHROCYTE [DISTWIDTH] IN BLOOD BY AUTOMATED COUNT: 14.5 % (ref 11.9–14.6)
GLUCOSE SERPL-MCNC: 148 MG/DL (ref 65–100)
HCT VFR BLD AUTO: 33.1 % (ref 35.8–46.3)
HGB BLD-MCNC: 10.1 G/DL (ref 11.7–15.4)
MCH RBC QN AUTO: 29.3 PG (ref 26.1–32.9)
MCHC RBC AUTO-ENTMCNC: 30.5 G/DL (ref 31.4–35)
MCV RBC AUTO: 95.9 FL (ref 79.6–97.8)
NRBC # BLD: 0 K/UL (ref 0–0.2)
PLATELET # BLD AUTO: 135 K/UL (ref 150–450)
PMV BLD AUTO: 9.9 FL (ref 9.4–12.3)
POTASSIUM SERPL-SCNC: 3.9 MMOL/L (ref 3.5–5.1)
RBC # BLD AUTO: 3.45 M/UL (ref 4.05–5.2)
SODIUM SERPL-SCNC: 142 MMOL/L (ref 136–145)
WBC # BLD AUTO: 5.4 K/UL (ref 4.3–11.1)

## 2019-02-04 PROCEDURE — 74230 X-RAY XM SWLNG FUNCJ C+: CPT

## 2019-02-04 PROCEDURE — 36415 COLL VENOUS BLD VENIPUNCTURE: CPT

## 2019-02-04 PROCEDURE — 92611 MOTION FLUOROSCOPY/SWALLOW: CPT

## 2019-02-04 PROCEDURE — 74011000255 HC RX REV CODE- 255: Performed by: INTERNAL MEDICINE

## 2019-02-04 PROCEDURE — 77030018798 HC PMP KT ENTRL FED COVD -A

## 2019-02-04 PROCEDURE — 65270000029 HC RM PRIVATE

## 2019-02-04 PROCEDURE — 80048 BASIC METABOLIC PNL TOTAL CA: CPT

## 2019-02-04 PROCEDURE — 85027 COMPLETE CBC AUTOMATED: CPT

## 2019-02-04 PROCEDURE — 74011250636 HC RX REV CODE- 250/636: Performed by: INTERNAL MEDICINE

## 2019-02-04 PROCEDURE — 74011000258 HC RX REV CODE- 258: Performed by: INTERNAL MEDICINE

## 2019-02-04 PROCEDURE — 74011250637 HC RX REV CODE- 250/637: Performed by: INTERNAL MEDICINE

## 2019-02-04 RX ADMIN — PIPERACILLIN AND TAZOBACTAM 4.5 G: 4; .5 INJECTION, POWDER, FOR SOLUTION INTRAVENOUS at 12:07

## 2019-02-04 RX ADMIN — Medication 5 ML: at 21:36

## 2019-02-04 RX ADMIN — ENOXAPARIN SODIUM 40 MG: 40 INJECTION SUBCUTANEOUS at 09:28

## 2019-02-04 RX ADMIN — AMLODIPINE BESYLATE 10 MG: 10 TABLET ORAL at 09:27

## 2019-02-04 RX ADMIN — PIPERACILLIN AND TAZOBACTAM 4.5 G: 4; .5 INJECTION, POWDER, FOR SOLUTION INTRAVENOUS at 04:02

## 2019-02-04 RX ADMIN — LEVETIRACETAM 1500 MG: 100 SOLUTION ORAL at 16:58

## 2019-02-04 RX ADMIN — HYDRALAZINE HYDROCHLORIDE 100 MG: 50 TABLET, FILM COATED ORAL at 21:33

## 2019-02-04 RX ADMIN — BARIUM SULFATE 15 ML: 980 POWDER, FOR SUSPENSION ORAL at 10:38

## 2019-02-04 RX ADMIN — HYDRALAZINE HYDROCHLORIDE 100 MG: 50 TABLET, FILM COATED ORAL at 09:27

## 2019-02-04 RX ADMIN — GUAIFENESIN 1200 MG: 600 TABLET, EXTENDED RELEASE ORAL at 09:27

## 2019-02-04 RX ADMIN — PIPERACILLIN AND TAZOBACTAM 4.5 G: 4; .5 INJECTION, POWDER, FOR SOLUTION INTRAVENOUS at 21:30

## 2019-02-04 RX ADMIN — Medication 1 AMPULE: at 21:36

## 2019-02-04 RX ADMIN — Medication 10 ML: at 06:36

## 2019-02-04 RX ADMIN — GUAIFENESIN 1200 MG: 600 TABLET, EXTENDED RELEASE ORAL at 16:59

## 2019-02-04 RX ADMIN — LOSARTAN POTASSIUM 100 MG: 50 TABLET ORAL at 09:28

## 2019-02-04 RX ADMIN — HYDRALAZINE HYDROCHLORIDE 100 MG: 50 TABLET, FILM COATED ORAL at 16:59

## 2019-02-04 RX ADMIN — Medication 1 AMPULE: at 09:27

## 2019-02-04 RX ADMIN — SERTRALINE 200 MG: 100 TABLET, FILM COATED ORAL at 09:27

## 2019-02-04 RX ADMIN — BARIUM SULFATE 15 ML: 400 SUSPENSION ORAL at 10:39

## 2019-02-04 RX ADMIN — ENOXAPARIN SODIUM 40 MG: 40 INJECTION SUBCUTANEOUS at 21:32

## 2019-02-04 RX ADMIN — BARIUM SULFATE 15 ML: 400 PASTE ORAL at 10:40

## 2019-02-04 RX ADMIN — Medication 10 ML: at 17:00

## 2019-02-04 RX ADMIN — LEVETIRACETAM 1500 MG: 100 SOLUTION ORAL at 09:27

## 2019-02-04 NOTE — PROGRESS NOTES
Speech language pathology: modified barium swallow study: Initial Assessment NAME/AGE/GENDER: Charmayne Mettle is a 76 y.o. female DATE: 2/4/2019 PRIMARY DIAGNOSIS: Aspiration pneumonia ([facility] [address] ICD-10: Treatment Diagnosis: Oropharyngeal dysphagia (R13.12) INTERDISCIPLINARY COLLABORATION: Radiologist, Dr. Andreina Joel PRECAUTIONS/ALLERGIES: Patient has no known allergies. ASSESSMENT/PLAN OF CARE:Based on the objective data described below, Ms. Adiel West presents with radha aspiration of thin liquids with weak and ineffective cough response. Deep laryngeal penetration of nectar liquids by cup but not initial spoon trial. Patient refused further spoon trials of nectar. No laryngeal penetration or aspiration observed with honey-thick liquids presented by spoon or pudding. Patient was unable to take honey thick liquids by cup. Solids deferred. All swallows were triggered at valleculae with pooling in valleculae from 2-8 seconds prior to swallow initiation. Immediately after swallows, no pharyngeal residue observed with any consistency, but prior to next bolus presentation min-mod vallecular residue would be observed. Therefore, unable to rule out retrograde flow from esophagus back into pharynx. Patient's shoulders and positioning prohibited view of upper esophageal segment. Recommend continue pureed diet and honey-thick liquids. Recommend give liquids by spoon. Patient should be fully upright for all PO and remain upright for 20-30 min after PO. Crush meds in puree. ???? ? ? This section established at most recent assessment???????? 
RECOMMENDATIONS AND PLANNED INTERVENTIONS (Benefits and precautions of therapy have been discussed with the patient.): 
· continue prescribed diet MEDICATIONS: 
· crushed COMPENSATORY STRATEGIES/MODIFICATIONS INCLUDING: 
· Upright for all PO 
· Honey  Liquids by spoon FREQUENCY/DURATION: No further speech therapy indicated at this time as patient's swallow is at baselineRECOMMENDED REHABILITATION/EQUIPMENT: (at time of discharge pending progress): Due to the probability of continued deficits (see above) this patient will not likely need continued skilled speech therapy after discharge. SUBJECTIVE:  
Patient hard of hearing. History of Present Injury/Illness: Ms. Simone Dumas  has a past medical history of Chronic pain, Chronic sinusitis (1/19/2015), Diverticulosis (2017), Edema (1/19/2015), Former smoker, GERD (gastroesophageal reflux disease), Headache (1/19/2015), Hearing loss (1/19/2015), Heart murmur (1990), Hip osteoarthritis (1/19/2015), colonic polyps (2017), migraines, Hypercholesterolemia (1/19/2015), Hypertension, Impaired fasting glucose (1/19/2015), Joint disorder (1/19/2015), Morbid obesity ([address] Neuralgia (1/19/2015), Phlebitis and thrombophlebitis of superficial vessels of lower extremities (01/19/2015), Psychiatric disorder, Restless leg syndrome (1/19/2015), Rheumatic fever, Seizures ([address] SOB (shortness of breath) on exertion, Stroke ([address] Tobacco abuse (1/19/2015), Vascular anomalies, congenital, and Vitamin D deficiency (1/19/2015). She also has no past medical history of Adverse effect of anesthesia, Aneurysm ([address] Arrhythmia, Asthma, Autoimmune disease ([address] CAD (coronary artery disease), Cancer ([address] Chronic kidney disease, Chronic obstructive pulmonary disease ([address] Coagulation disorder ([address] Diabetes ([address] Difficult intubation, Endocarditis, Heart failure ([address] Liver disease, Malignant hyperthermia due to anesthesia, Nausea & vomiting, Pseudocholinesterase deficiency, PUD (peptic ulcer disease), Sleep apnea, Thromboembolus ([address] or Thyroid disease. . She also  has a past surgical history that includes hx mohs procedure (Right, 1995); hx cholecystectomy (1982); colonoscopy (N/A, 2/20/2017); pr total hip arthroplasty (Right, 2005); hx rotator cuff repair (Right, 10/31/2016); hx cataract removal (Bilateral, 3/2016); hx tubal ligation (1982); and hx appendectomy (03/15/2017). Present Symptoms: aspiration pneumonia Pain Intensity 1: 0 Pain Location 1: Chest 
Pain Orientation 1: Anterior Current Dietary Status:  Pureed/honey thick liquids Social History/Home Situation:  
Home Environment: Skilled nursing facility Care Facility Name: [facility] # Steps to Enter: 0 One/Two Story Residence: One story Living Alone: No 
Support Systems: Skilled nursing facility, Family member(s) Patient Expects to be Discharged to[de-identified] Skilled nursing facility Current DME Used/Available at Home: Wheelchair OBJECTIVE:  
Cognitive/Communication Status:  Mental Status Neurologic State: Alert, Confused Orientation Level: Oriented to person, Disoriented to time, Disoriented to situation, Disoriented to place Cognition: Decreased attention/concentration, Decreased command following Perception: Cues to attend left visual field Perseveration: Perseverates during conversation Safety/Judgement: Awareness of environment, Decreased awareness of need for safety, Decreased insight into deficits Oral Assessment:  Oral Assessment Labial: No impairment Dentition: Edentulous Oral Hygiene: adequate Lingual: Incoordinated Vocal Quality: adequate Patient Viewed:   
Film Views: Lateral, Fluoro Oral Prepatory: 
The patient was given the following: Consistency Presented: Thin liquid, Nectar thick liquid, Honey thick liquid, Pudding How Presented: SLP-fed/presented, Cup/sip, Spoon Oral Phase: 
Bolus Acceptance: No impairment Bolus Formation/Control: Impaired Propulsion: Delayed (# of seconds) Type of Impairment: Delayed, Drooling Initiation of Swallow: Triggered at vallecula Oral Phase Severity: Moderate Pharyngeal Phase: 
Timing: Pooling 1-5 sec, Pooling 6-10 sec, Vallecular Decreased Tongue Base Retraction?: No 
Laryngeal Elevation: Incomplete laryngeal closure Penetration: Before swallow, During swallow, To laryngeal vestibule, From initial swallow, To cords Aspiration/Timing: During, From initial swallow Aspiration/Penetration Score: 7 (Aspiration-Contrast passes below the cords/, but is not ejected despite attempt) Pharyngeal Dysfunction: Decreased elevation/closure Pharyngeal Phase Severity: Moderate Pharyngeal-Esophageal Segment: Suspected esophageal dysphagia Assessment/Reassessment only, no treatment provided today. Tool Used: Dysphagia Outcome and Severity Scale (KAROLINA) Score Comments Normal Diet  [] 7 With no strategies or extra time needed Functional Swallow  [] 6 May have mild oral or pharyngeal delay Mild Dysphagia 
  [] 5 Which may require one diet consistency restricted (those who demonstrate penetration which is entirely cleared on MBS would be included) Mild-Moderate Dysphagia  [] 4 With 1-2 diet consistencies restricted Moderate Dysphagia  [] 3 With 2 or more diet consistencies restricted Moderately Severe Dysphagia  [] 2 With partial PO strategies (trials with ST only) Severe Dysphagia  [] 1 With inability to tolerate any PO safely Score:  Initial: 3 Most Recent: 3 (Date:2/4/2019 ) Interpretation of Tool: The Dysphagia Outcome and Severity Scale (KAROLINA) is a simple, easy-to-use, 7-point scale developed to systematically rate the functional severity of dysphagia based on objective assessment and make recommendations for diet level, independence level, and type of nutrition. Payor: Jessica Ramos / Plan: [employer] MEDICARE COMPLETE / Product Type: [employer]. Care Medicare / Safety: After treatment position/precautions: 
· Patient waiting in radiology holding bay for transport back to room. Recommendations for treatment: No further skilled speech/swallow intervention currently indicated. Total Treatment Duration: 
Time In: 1010 Time Out: 1040 Renetta Temple MA, CCC-SLP

## 2019-02-04 NOTE — PROGRESS NOTES
Problem: Falls - Risk of 
Goal: *Absence of Falls Document Antoniettacyndie Guerrero Fall Risk and appropriate interventions in the flowsheet. Outcome: Progressing Towards Goal 
Fall Risk Interventions: 
  
 
Mentation Interventions: Bed/chair exit alarm Medication Interventions: Bed/chair exit alarm Elimination Interventions: Call light in reach

## 2019-02-04 NOTE — PROGRESS NOTES
Hospitalist Progress Note 2/3/2019 Admit Date: 2019 11:28 AM  
NAME: Alexandr Montes :  1950 MRN:  [id number] Attending: Yann Butler MD 
PCP:  Jesus Dickson DO 
 
SUBJECTIVE:  
 77 yo female with PMH of dementia, HTN, stroke, seizures, RLS, neuralgia, morbid obesity, hypercholesterolemia, OA, hearing loss.  Pt has been at [facility] as a long term care resident.  Pt presented to the ED via EMS with shortness of breath and hypoxia. Per EMS pt was hypoxic on her usual oxygen setting.  Initially pt was on CPAP for transport and then BiPap in the ED, she has since been weaned to [place].   CT scan of chest neg for PE, there is atelectasis/infiltrate left upper and lower lobes, probable aspiration.  Started IV zosyn. 2/3 - still confused. Less agitated. Review of Systems negative with exception of pertinent positives noted above PHYSICAL EXAM  
 
Visit Vitals /68 (BP 1 Location: Left arm, BP Patient Position: At rest) Pulse 82 Temp 97.9 °F (36.6 °C) Resp 19 Ht 5' 3\" (1.6 m) Wt 108.5 kg (239 lb 3.2 oz) SpO2 90% Breastfeeding? No  
BMI 42.37 kg/m² Temp (24hrs), Av.1 °F (36.7 °C), Min:97.5 °F (36.4 °C), Max:98.8 °F (37.1 °C) Oxygen Therapy O2 Sat (%): 90 % (19 1442) Pulse via Oximetry: 67 beats per minute (19 1546) O2 Device: Nasal cannula (19 192) O2 Flow Rate (L/min): 7 l/min (19 0719) FIO2 (%): 60 % (19 1159) Intake/Output Summary (Last 24 hours) at 2/3/2019 1928 Last data filed at 2/3/2019 1818 Gross per 24 hour Intake 1125 ml Output 600 ml Net 525 ml General: No acute distress   
Lungs:  Coarse breath sounds anteriorly Heart:  Regular rate and rhythm,  No murmur, rub, or gallop Abdomen: Soft, Non distended, Non tender, Positive bowel sounds Extremities: No cyanosis, clubbing or edema Neurologic:  No focal deficits ASSESSMENT Active Hospital Problems Diagnosis Date Noted  Aspiration pneumonia ([facility] 02/01/2019  Dementia without behavioral disturbance 02/01/2019  Acute respiratory failure with hypoxia ([facility] 02/01/2019  Morbid obesity ([facility] 09/22/2018  Seizure disorder ([facility] 01/22/2018  Essential hypertension 01/19/2015 A/P Aspiration pneumonia 
-Oxygen to keep sat >90% -Pulse oxymetry 
-Albuterol nebs with RT 
-IV Zosyn 
-Mucinex 
-modified diet per speech 
-Modified barium swallow in am 
  
Acute respiratory hypoxia 
-As in #1 
  
HTN 
-Cont home med 
-Monitor 
  
Seizure disorder 
-Monitor for seizure activity 
  
Dementia 
-Chronic 
-Fall precautions -PRN ativan 
  
Discharge planning:  Return to [facility], CM consult. DVT ppx:  Lovenox 
  
Code status:  Full code, no document on file Decision Maker: None designated, spouse is her emergency contact 
  
Case reviewed with supervising physician - HI Brody Letters Signed By: Wild Thakkar DO February 3, 2019

## 2019-02-04 NOTE — PROGRESS NOTES
Problem: Interdisciplinary Rounds Goal: Interdisciplinary Rounds Interdisciplinary team rounds were held 2/4/2019 with the following team members:Care Management, Physical Therapy, Physician and Clinical Coordinator and the patient. Plan of care discussed. See clinical pathway and/or care plan for interventions and desired outcomes.

## 2019-02-04 NOTE — PROGRESS NOTES
Pt lying in bed, sleeping. Pt awakens to voice. Pt is on 5 l nc with no acute distress. Pt is drowsy but responsive. Pt has hx of dementia and confusion. Pt has mittens placed to bilateral hands. Pt continues to pull out oxygen and desat's as a result. Safety precautions in place, call light in reach. Will monitor patient.

## 2019-02-04 NOTE — PROGRESS NOTES
Beside shift report received from [facility]  Patient lying in bed  Respirations present  No signs of distress  No needs expressed at this time  Safety measures in place  Will continue to monitor

## 2019-02-04 NOTE — PROGRESS NOTES
SPEECH PATHOLOGY NOTE: 
Modified Barium Swallow study complete. Patient with radha aspiration of thin liquids with weak and ineffective cough response. Deep laryngeal penetration of nectar liquids by cup but not initial spoon trial. Patient refused further spoon trials of nectar. No laryngeal penetration or aspiration observed with honey-thick liquids presented by spoon or pudding. Patient was unable to take honey thick liquids by cup. Solids deferred. All swallows were triggered at valleculae with pooling in valleculae from 2-8 seconds prior to swallow initiation. Immediately after swallows, no pharyngeal residue observed with any consistency, but prior to next bolus presentation min-mod vallecular residue would be observed. Therefore, unable to rule out retrograde flow from esophagus back into pharynx. Patient's shoulders and positioning prohibited view of upper esophageal segment. Recommend continue pureed diet and honey-thick liquids. Recommend give liquids by spoon. Patient should be fully upright for all PO and remain upright for 20-30 min after PO. Crush meds in puree. Full report to follow.  
Soham Cristina MA, CCC-SLP

## 2019-02-04 NOTE — PROGRESS NOTES
Froylan Smart MD,  I saw your patient today in the [facility], [place].  If you have any questions, please do not hesitate to contact me.  Thank you!  OLIVER Flores  Patient is sleeping  Respirations unlabored  Continue tube feeds without difficulty

## 2019-02-04 NOTE — PROGRESS NOTES
Care Management Interventions PCP Verified by CM: Yes Transition of Care Consult (CM Consult): [address] Physical Therapy Consult: Yes Occupational Therapy Consult: Yes Current Support Network: [address] [place] [address] Confirm Follow Up Transport: Other (see comment) Plan discussed with Pt/Family/Caregiver: Yes Freedom of Choice Offered: Yes Discharge Location Discharge Placement: Skilled nursing facility

## 2019-02-05 VITALS
OXYGEN SATURATION: 95 % | DIASTOLIC BLOOD PRESSURE: 46 MMHG | WEIGHT: 232.4 LBS | BODY MASS INDEX: 41.18 KG/M2 | HEART RATE: 65 BPM | TEMPERATURE: 98.1 F | RESPIRATION RATE: 18 BRPM | SYSTOLIC BLOOD PRESSURE: 120 MMHG | HEIGHT: 63 IN

## 2019-02-05 PROCEDURE — 74011000258 HC RX REV CODE- 258: Performed by: INTERNAL MEDICINE

## 2019-02-05 PROCEDURE — 74011250636 HC RX REV CODE- 250/636: Performed by: INTERNAL MEDICINE

## 2019-02-05 PROCEDURE — 94760 N-INVAS EAR/PLS OXIMETRY 1: CPT

## 2019-02-05 PROCEDURE — 74011250637 HC RX REV CODE- 250/637: Performed by: INTERNAL MEDICINE

## 2019-02-05 PROCEDURE — 77010033678 HC OXYGEN DAILY

## 2019-02-05 RX ORDER — AMOXICILLIN AND CLAVULANATE POTASSIUM 875; 125 MG/1; MG/1
1 TABLET, FILM COATED ORAL EVERY 12 HOURS
Qty: 6 TAB | Refills: 0 | Status: SHIPPED
Start: 2019-02-05 | End: 2019-02-08

## 2019-02-05 RX ORDER — ALBUTEROL SULFATE 0.83 MG/ML
2.5 SOLUTION RESPIRATORY (INHALATION)
Qty: 24 EACH | Refills: 0 | Status: SHIPPED
Start: 2019-02-05

## 2019-02-05 RX ORDER — OXYCODONE HYDROCHLORIDE 5 MG/1
2.5 TABLET ORAL
Qty: 5 TAB | Refills: 0 | Status: SHIPPED | OUTPATIENT
Start: 2019-02-05

## 2019-02-05 RX ADMIN — Medication 1 AMPULE: at 10:19

## 2019-02-05 RX ADMIN — SERTRALINE 200 MG: 100 TABLET, FILM COATED ORAL at 10:18

## 2019-02-05 RX ADMIN — Medication 5 ML: at 05:21

## 2019-02-05 RX ADMIN — HYDRALAZINE HYDROCHLORIDE 100 MG: 50 TABLET, FILM COATED ORAL at 10:18

## 2019-02-05 RX ADMIN — ENOXAPARIN SODIUM 40 MG: 40 INJECTION SUBCUTANEOUS at 10:19

## 2019-02-05 RX ADMIN — GUAIFENESIN 1200 MG: 600 TABLET, EXTENDED RELEASE ORAL at 10:18

## 2019-02-05 RX ADMIN — AMLODIPINE BESYLATE 10 MG: 10 TABLET ORAL at 10:19

## 2019-02-05 RX ADMIN — PIPERACILLIN AND TAZOBACTAM 4.5 G: 4; .5 INJECTION, POWDER, FOR SOLUTION INTRAVENOUS at 05:22

## 2019-02-05 RX ADMIN — LOSARTAN POTASSIUM 100 MG: 50 TABLET ORAL at 10:19

## 2019-02-05 NOTE — PROGRESS NOTES
LATE NOTE: 
. Care manager attempted to deliver ILM to the patient but patient was unavailable for signature. Unsigned form left at patient's bedside. Unsigned copy placed in patient's chart. Care managers notified.

## 2019-02-05 NOTE — PROGRESS NOTES
Reassessment complete. Patient resting in bed, eyes closed. Safety measures in place. Door open to monitor.

## 2019-02-05 NOTE — PROGRESS NOTES
Beside shift report received from [facility]  Patient lying in bed  Respirations present  No signs of distress  No needs expressed at this time  Safety measures in place  Will continue to monitor

## 2019-02-05 NOTE — PROGRESS NOTES
Hospitalist Progress Note 2019 Admit Date: 2019 11:28 AM  
NAME: Chiqui Bautista :  1950 MRN:  [id number] Attending: Shruti Henry MD 
PCP:  Jaymie Robertson DO 
 
SUBJECTIVE:  
 75 yo female with PMH of dementia, HTN, stroke, seizures, RLS, neuralgia, morbid obesity, hypercholesterolemia, OA, hearing loss.  Pt has been at [facility] as a long term care resident.  Pt presented to the ED via EMS with shortness of breath and hypoxia. Per EMS pt was hypoxic on her usual oxygen setting.  Initially pt was on CPAP for transport and then BiPap in the ED, she has since been weaned to [place].   CT scan of chest neg for PE, there is atelectasis/infiltrate left upper and lower lobes, probable aspiration.  Started IV zosyn.  - oriented today. Less confused.  at bedside thinks she looks better Review of Systems negative with exception of pertinent positives noted above PHYSICAL EXAM  
 
Visit Vitals /62 Pulse 71 Temp 99.4 °F (37.4 °C) Resp 22 Ht 5' 3\" (1.6 m) Wt 108.6 kg (239 lb 6.4 oz) SpO2 91% Breastfeeding? No  
BMI 42.41 kg/m² Temp (24hrs), Av.8 °F (37.1 °C), Min:97.8 °F (36.6 °C), Max:99.4 °F (37.4 °C) Oxygen Therapy O2 Sat (%): 91 % (19) Pulse via Oximetry: 67 beats per minute (19 154) O2 Device: Nasal cannula (19) O2 Flow Rate (L/min): 7 l/min (19 07) FIO2 (%): 60 % (19 115) Intake/Output Summary (Last 24 hours) at 2019 Last data filed at 2019 1417 Gross per 24 hour Intake 125 ml Output 100 ml Net 25 ml General: No acute distress   
Lungs:  Coarse breath sounds anteriorly Heart:  Regular rate and rhythm,  No murmur, rub, or gallop Abdomen: Soft, Non distended, Non tender, Positive bowel sounds Extremities: No cyanosis, clubbing or edema Neurologic:  No focal deficits ASSESSMENT Active Hospital Problems Diagnosis Date Noted  Aspiration pneumonia ([facility] 02/01/2019  Dementia without behavioral disturbance 02/01/2019  Acute respiratory failure with hypoxia ([facility] 02/01/2019  Morbid obesity ([facility] 09/22/2018  Seizure disorder ([facility] 01/22/2018  Essential hypertension 01/19/2015 A/P Aspiration pneumonia 
-Oxygen to keep sat >90% -Pulse oxymetry 
-Albuterol nebs with RT 
-IV Zosyn D4, blood cultures negative 
-Mucinex 
-modified diet per speech 
 
  
Acute respiratory hypoxia 
-As in #1 
  
HTN 
-Cont home med 
-Monitor 
  
Seizure disorder 
-Monitor for seizure activity 
  
Dementia 
-Chronic 
-Fall precautions -PRN ativan 
  
Discharge planning:  Return to [facility] CM consult. ? In AM 
DVT ppx:  Lovenox 
  
Code status:  Full code, no document on file Decision Maker: None designated, spouse is her emergency contact Signed By: Carroll Garcia DO February 4, 2019

## 2019-02-05 NOTE — PROGRESS NOTES
02/05/19 7447 Oxygen Therapy O2 Sat (%) 98 % O2 Device Nasal cannula O2 Flow Rate (L/min) 7 l/min 
(decreased to 4, O2 sat = 95%.)

## 2019-02-05 NOTE — PROGRESS NOTES
Problem: Falls - Risk of 
Goal: *Absence of Falls Document Jolynn Nunez Fall Risk and appropriate interventions in the flowsheet. Outcome: Progressing Towards Goal 
Fall Risk Interventions: 
  
 
Mentation Interventions: Bed/chair exit alarm, Door open when patient unattended Medication Interventions: Bed/chair exit alarm Elimination Interventions: Bed/chair exit alarm, Toileting schedule/hourly rounds

## 2019-02-05 NOTE — PROGRESS NOTES
Patient will discharge back to her [facility] at [address] [facility] will contact patient's spouse to inform of discharge. Room and report line given to RN.

## 2019-02-05 NOTE — DISCHARGE SUMMARY
Hospitalist Discharge Summary     Patient ID:  Tiera Ruth  [id number]  63 y.o.  1950  Admit date: 2/1/2019 11:28 AM  Discharge date and time: 2/5/2019  Attending: Hazel Alvarez MD  PCP:  Trav Soler DO  Treatment Team: Attending Provider: Hazel Alvarez MD; Speech Language Pathologist: Sunita Grant, SLP; Utilization Review: Luke Rizo RN; Care Manager: Mayito Dodson Principal Diagnosis Aspiration pneumonia ([address]   Principal Problem:    Aspiration pneumonia ([address] (2/1/2019)    Active Problems:    Essential hypertension (1/19/2015)      Seizure disorder ([address] (1/22/2018)      Morbid obesity ([address] (9/22/2018)      Dementia without behavioral disturbance (2/1/2019)      Acute respiratory failure with hypoxia ([address] (2/1/2019)             Hospital Course:  Please refer to the admission H&P for details of presentation. In summary, the patient is 77 yo female with PMH of dementia, HTN, stroke, seizures, RLS, neuralgia, morbid obesity, hypercholesterolemia, OA, hearing loss.  Pt has been at [facility] as a long term care resident.  Pt presented to the ED via EMS with shortness of breath and hypoxia. Per EMS pt was hypoxic on her usual oxygen setting.  Initially pt was on CPAP for transport and then BiPap in the ED, she has since been weaned to [place].  CT scan of chest neg for PE, there is atelectasis/infiltrate left upper and lower lobes, probable aspiration.  Started IV zosyn with clinical improvement. Oxygen requirement weaned from 7l to 4L nc (baseline). She is back to baseline mental status per . Will discharge to rehab with continued modified diet/Peg feedings and to complete course of augmentin for asp pna.          Significant Diagnostic Studies:   Result Information     Status: Final result (Exam End: 2/4/2019 10:25) Provider Status: Open   Signed by     Signed Date/Time  Phone Pager   Kiara Jesus 2/04/2019 11:04 [phone]    Exam Information Status Exam Begun  Exam Ended    Final [99] 2/04/2019 10:14 2/04/2019 10:25   Study Result     Modified barium swallow.     CLINICAL INDICATION: Oropharyngeal dysphagia, aspiration pneumonia     PROCEDURE: The patient was administered various consistencies of liquid and  solid barium by the speech pathologist. Direct fluoroscopic guidance was  provided.     Total fluoroscopic time: 1.4 minute, no images saved.     FINDINGS:       Deep penetration noted with thin and nectar consistency barium followed by  cough. No direct aspiration visualized. Note the patient could not take liquids  via cup. The patient was able to tolerate honey consistency barium without  penetration or aspiration when administered via spoon. The patient tolerated the  pudding without difficulty.     IMPRESSION  IMPRESSION: Deep penetration with thin and nectar consistency barium. . For more  detailed evaluation of the swallowing study please refer to the separately  dictated speech pathologist report        Final [99] 2/01/2019 14:48 2/01/2019 14:55   Study Result     Chest CT, with intravenous contrast 2/1/2019.     Indication:  History pneumonia, shortness of breath.     Comparison: 9/22/2018.     Technique:  Spiral images through the chest with 100 cc Isovue 370. IV contrast  was used to evaluate the pulmonary arterial tree. Coronal reconstructed images  also reviewed. Radiation dose reduction techniques were used for this study. Our CT scanners use one or all of the following: Automated exposure control,  adjustment of the mA and/or kV according to patient size, iterative  reconstruction.     The pulmonary arterial tree is adequately opacified. There are no central or  convincing distal emboli. There is however significant volume left lung with  mediastinal shift to the left. There is consolidation within left upper and  lower lobes. There is narrowing or debris within the left lower lobe bronchus.   The left upper lobe of bronchus is not well defined but appears similar to the  lower lobe bronchus.     There are no infiltrates on the right. 5 mm right lower lobe nodule as seen on  image number 64 is noted.     IMPRESSION  IMPRESSION:  1. There is  significant volume loss in the left lung with  atelectasis/infiltrate involving left upper and lower lobes. The more distal  upper and lower lobe bronchi are poorly defined probably debris-filled. Consider  the possibility of aspiration. 2. Negative for pulmonary emboli. 3. Stable 5 mm right lung nodule. Status: Final result (Exam End: 2/1/2019 12:05) Provider Status: Open   Signed by     Signed Date/Time  Phone Pager   Randy Lazo 2/01/2019 12:22 [phone]    Exam Information     Status Exam Begun  Exam Ended    Final [99] 2/01/2019 12:04 2/01/2019 12:05   Study Result     Portable chest, one view, 2/1/2019     INDICATION: Respiratory distress.     COMPARISON: 9/22/2018     The patient is rotated to the left. The heart is enlarged. There appears to be  some vascular congestion.     IMPRESSION  IMPRESSION: Cardiomegaly with vascular congestion. Labs: Results:       Chemistry Recent Labs     02/04/19  0849   *      K 3.9      CO2 37*   BUN 22   CREA 0.65   CA 8.9   AGAP 4*      CBC w/Diff Recent Labs     02/04/19  0849 02/03/19  0742   WBC 5.4  --    RBC 3.45*  --    HGB 10.1* 10.4*   HCT 33.1* 33.5*   *  --       Cardiac Enzymes No results for input(s): CPK, CKND1, CHEIKH in the last 72 hours. No lab exists for component: CKRMB, TROIP   Coagulation No results for input(s): PTP, INR, APTT in the last 72 hours.     No lab exists for component: INREXT    Lipid Panel Lab Results   Component Value Date/Time    Cholesterol, total 190 07/30/2018 04:26 AM    HDL Cholesterol 69 (H) 07/30/2018 04:26 AM    LDL, calculated 108 (H) 07/30/2018 04:26 AM    VLDL, calculated 13 07/30/2018 04:26 AM    Triglyceride 65 07/30/2018 04:26 AM    CHOL/HDL Ratio 2.8 07/30/2018 04:26 AM      BNP No results for input(s): BNPP in the last 72 hours. Liver Enzymes No results for input(s): TP, ALB, TBIL, AP, SGOT, GPT in the last 72 hours. No lab exists for component: DBIL   Thyroid Studies Lab Results   Component Value Date/Time    TSH 1.590 12/23/2017 01:44 PM            Discharge Exam:  Visit Vitals  /46   Pulse 65   Temp 98.1 °F (36.7 °C)   Resp 18   Ht 5' 3\" (1.6 m)   Wt 105.4 kg (232 lb 6.4 oz)   SpO2 95%   Breastfeeding? No   BMI 41.17 kg/m²     General appearance: alert, cooperative, oriented x3 but still with conversational confusion  Lungs: diminished b/l  Heart: regular rate and rhythm, S1, S2 normal, no murmur, click, rub or gallop  Abdomen: soft, non-tender. Bowel sounds normal. No masses,  no organomegaly  Extremities: no cyanosis or edema  Neurologic: Grossly normal    Disposition:rehab  Discharge Condition: stable  Patient Instructions:   Current Discharge Medication List      START taking these medications    Details   albuterol (PROVENTIL VENTOLIN) 2.5 mg /3 mL (0.083 %) nebulizer solution 3 mL by Nebulization route every four (4) hours as needed for Wheezing or Shortness of Breath. Qty: 24 Each, Refills: 0      guaiFENesin ER (MUCINEX) 1,200 mg Ta12 ER tablet Take 1 Tab by mouth two (2) times a day. Qty: 20 Tab, Refills: 0      amoxicillin-clavulanate (AUGMENTIN) 875-125 mg per tablet Take 1 Tab by mouth every twelve (12) hours for 3 days. Qty: 6 Tab, Refills: 0         CONTINUE these medications which have CHANGED    Details   oxyCODONE IR (ROXICODONE) 5 mg immediate release tablet 0.5 Tabs by Per NG tube route every eight (8) hours as needed. Max Daily Amount: 7.5 mg.  Qty: 5 Tab, Refills: 0    Associated Diagnoses: Chronic pain of multiple sites         CONTINUE these medications which have NOT CHANGED    Details   Lactose-Free Food with Fiber (JEVITY 1.5 KELLY) 0.06 gram-1.5 kcal/mL liqd Take 65 mL/hr by mouth.  Start at 1800 and run unit 1040 ml have infused bisacodyl (DULCOLAX, BISACODYL,) 10 mg suppository Insert 10 mg into rectum daily as needed. simethicone (MYLICON) 40 YR/2.7 mL drops Take 80 mg by mouth four (4) times daily as needed. tamsulosin (FLOMAX) 0.4 mg capsule Take 0.4 mg by mouth nightly. acetaminophen (TYLENOL) 325 mg tablet 650 mg by Per G Tube route every six (6) hours as needed for Pain.      hydrALAZINE (APRESOLINE) 100 mg tablet 1 Tab by Per NG tube route three (3) times daily. Qty: 90 Tab, Refills: 2      amLODIPine (NORVASC) 10 mg tablet Take 10 mg by mouth daily. ergocalciferol (VITAMIN D2) 50,000 unit capsule TAKE 1 CAPSULE ONE TIME WEEKLY- takes on sat  Qty: 5 Cap, Refills: 11    Associated Diagnoses: Vitamin D deficiency      sertraline (ZOLOFT) 100 mg tablet Take 2 Tabs by mouth daily. Qty: 180 Tab, Refills: 3    Associated Diagnoses: Moderate episode of recurrent major depressive disorder (HCC)      levETIRAcetam (KEPPRA) 100 mg/ml soln oral solution 15 mL by Per G Tube route two (2) times a day for 30 days. Qty: 900 mL, Refills: 3      losartan (COZAAR) 100 mg tablet Take 1 Tab by mouth daily. Indications: hypertension  Qty: 30 Tab, Refills: 11    Associated Diagnoses: Essential hypertension         STOP taking these medications       gabapentin (NEURONTIN) 300 mg capsule Comments:   Reason for Stopping:         levETIRAcetam (KEPPRA) 500 mg tablet Comments:   Reason for Stopping:         dextromethorphan-guaiFENesin (ROBITUSSIN-DM)  mg/5 mL syrup Comments:   Reason for Stopping:         cefdinir (OMNICEF) 300 mg capsule Comments:   Reason for Stopping:               Activity: Activity as tolerated  Diet:Pureed/honey.  PEG TF's per nutrition      Follow-up  ·   1 week PCP  Time spent to discharge patient 35 minutes  Signed:  Vivian Silva DO  2/5/2019  1:43 PM

## 2019-02-05 NOTE — PROGRESS NOTES
Patient is returning to [facility] via [facility]. CM informed patient's spouse of discharge.

## 2019-02-05 NOTE — PROGRESS NOTES
TRANSFER - OUT REPORT: 
 
Verbal report given to Meliza(name) on Darroll Chaitanya  being transferred to [facility]) for routine progression of care Report consisted of patients Situation, Background, Assessment and  
Recommendations(SBAR). Information from the following report(s) SBAR was reviewed with the receiving nurse. Lines:  
Peripheral IV 02/05/19 Posterior;Right Hand (Active) Site Assessment Clean, dry, & intact 2/5/2019  7:54 AM  
Phlebitis Assessment 0 2/5/2019  7:54 AM  
Infiltration Assessment 0 2/5/2019  7:54 AM  
Dressing Status Clean, dry, & intact 2/5/2019  7:54 AM  
Dressing Type Tape;Transparent 2/5/2019  7:54 AM  
Hub Color/Line Status Infusing 2/5/2019  7:54 AM  
Alcohol Cap Used No 2/5/2019  6:29 AM  
  
 
Opportunity for questions and clarification was provided. Patient transported with: 
 O2 @ 4 liters

## 2019-02-05 NOTE — PROGRESS NOTES
Bedside report from Karrie Obando RN. Patient altered mental status, resting in bed. Respirations even and unlabored, no acute distress noted. Peg tube patent, Jevity 1.2 infusing per order. Safety measures in place. Door open to monitor.

## 2019-02-06 ENCOUNTER — PATIENT OUTREACH (OUTPATIENT)
Dept: CASE MANAGEMENT | Age: 69
End: 2019-02-06

## 2019-02-06 NOTE — PROGRESS NOTES
This note will not be viewable in [address] Patient discharged back to [facility] where she is a LTC resident on 2/5/19. All patient needs are met by staff. No CHEMO is indicated.

## 2019-02-11 PROBLEM — D64.9 ANEMIA: Status: ACTIVE | Noted: 2019-02-11

## 2019-02-11 PROBLEM — Z23 NEED FOR SHINGLES VACCINE: Status: ACTIVE | Noted: 2019-02-11

## 2019-03-23 ENCOUNTER — APPOINTMENT (OUTPATIENT)
Dept: CT IMAGING | Age: 69
End: 2019-03-23
Attending: EMERGENCY MEDICINE
Payer: MEDICARE

## 2019-03-23 ENCOUNTER — HOSPITAL ENCOUNTER (OUTPATIENT)
Age: 69
Setting detail: OBSERVATION
Discharge: NURSING, REHAB OR LONG-TERM CARE | End: 2019-03-28
Attending: EMERGENCY MEDICINE | Admitting: INTERNAL MEDICINE
Payer: MEDICARE

## 2019-03-23 ENCOUNTER — APPOINTMENT (OUTPATIENT)
Dept: GENERAL RADIOLOGY | Age: 69
End: 2019-03-23
Attending: EMERGENCY MEDICINE
Payer: MEDICARE

## 2019-03-23 DIAGNOSIS — R41.82 ALTERED MENTAL STATUS, UNSPECIFIED ALTERED MENTAL STATUS TYPE: ICD-10-CM

## 2019-03-23 DIAGNOSIS — F03.90 DEMENTIA WITHOUT BEHAVIORAL DISTURBANCE, UNSPECIFIED DEMENTIA TYPE: Chronic | ICD-10-CM

## 2019-03-23 DIAGNOSIS — R53.83 FATIGUE, UNSPECIFIED TYPE: ICD-10-CM

## 2019-03-23 DIAGNOSIS — G89.29 CHRONIC PAIN OF MULTIPLE SITES: Chronic | ICD-10-CM

## 2019-03-23 DIAGNOSIS — Z71.89 ACP (ADVANCE CARE PLANNING): ICD-10-CM

## 2019-03-23 DIAGNOSIS — R52 CHRONIC PAIN OF MULTIPLE SITES: Chronic | ICD-10-CM

## 2019-03-23 DIAGNOSIS — Z51.5 ENCOUNTER FOR PALLIATIVE CARE: ICD-10-CM

## 2019-03-23 DIAGNOSIS — N39.0 URINARY TRACT INFECTION WITHOUT HEMATURIA, SITE UNSPECIFIED: Primary | ICD-10-CM

## 2019-03-23 DIAGNOSIS — I10 ESSENTIAL HYPERTENSION: ICD-10-CM

## 2019-03-23 DIAGNOSIS — R60.0 EDEMA OF HAND: ICD-10-CM

## 2019-03-23 DIAGNOSIS — R41.0 CONFUSION: ICD-10-CM

## 2019-03-23 DIAGNOSIS — R31.9 URINARY TRACT INFECTION WITH HEMATURIA, SITE UNSPECIFIED: ICD-10-CM

## 2019-03-23 DIAGNOSIS — N39.0 URINARY TRACT INFECTION WITH HEMATURIA, SITE UNSPECIFIED: ICD-10-CM

## 2019-03-23 LAB
ALBUMIN SERPL-MCNC: 2.8 G/DL (ref 3.2–4.6)
ALBUMIN/GLOB SERPL: 0.7 {RATIO} (ref 1.2–3.5)
ALP SERPL-CCNC: 99 U/L (ref 50–136)
ALT SERPL-CCNC: 22 U/L (ref 12–65)
AMORPH CRY URNS QL MICRO: ABNORMAL
ANION GAP SERPL CALC-SCNC: 1 MMOL/L (ref 7–16)
AST SERPL-CCNC: 12 U/L (ref 15–37)
ATRIAL RATE: 68 BPM
BACTERIA URNS QL MICRO: ABNORMAL /HPF
BASOPHILS # BLD: 0 K/UL (ref 0–0.2)
BASOPHILS NFR BLD: 0 % (ref 0–2)
BILIRUB SERPL-MCNC: 0.2 MG/DL (ref 0.2–1.1)
BUN SERPL-MCNC: 28 MG/DL (ref 8–23)
CALCIUM SERPL-MCNC: 8.9 MG/DL (ref 8.3–10.4)
CALCULATED P AXIS, ECG09: 83 DEGREES
CALCULATED R AXIS, ECG10: 30 DEGREES
CALCULATED T AXIS, ECG11: 63 DEGREES
CASTS URNS QL MICRO: ABNORMAL /LPF
CHLORIDE SERPL-SCNC: 103 MMOL/L (ref 98–107)
CO2 SERPL-SCNC: 35 MMOL/L (ref 21–32)
CREAT SERPL-MCNC: 0.6 MG/DL (ref 0.6–1)
CRYSTALS URNS QL MICRO: 0 /LPF
DIAGNOSIS, 93000: NORMAL
DIFFERENTIAL METHOD BLD: ABNORMAL
EOSINOPHIL # BLD: 0.1 K/UL (ref 0–0.8)
EOSINOPHIL NFR BLD: 2 % (ref 0.5–7.8)
EPI CELLS #/AREA URNS HPF: ABNORMAL /HPF
ERYTHROCYTE [DISTWIDTH] IN BLOOD BY AUTOMATED COUNT: 14 % (ref 11.9–14.6)
GLOBULIN SER CALC-MCNC: 3.8 G/DL (ref 2.3–3.5)
GLUCOSE SERPL-MCNC: 116 MG/DL (ref 65–100)
HCT VFR BLD AUTO: 35.5 % (ref 35.8–46.3)
HGB BLD-MCNC: 11 G/DL (ref 11.7–15.4)
IMM GRANULOCYTES # BLD AUTO: 0 K/UL (ref 0–0.5)
IMM GRANULOCYTES NFR BLD AUTO: 1 % (ref 0–5)
LACTATE BLD-SCNC: 0.77 MMOL/L (ref 0.5–1.9)
LYMPHOCYTES # BLD: 0.8 K/UL (ref 0.5–4.6)
LYMPHOCYTES NFR BLD: 10 % (ref 13–44)
MCH RBC QN AUTO: 28 PG (ref 26.1–32.9)
MCHC RBC AUTO-ENTMCNC: 31 G/DL (ref 31.4–35)
MCV RBC AUTO: 90.3 FL (ref 79.6–97.8)
MONOCYTES # BLD: 0.6 K/UL (ref 0.1–1.3)
MONOCYTES NFR BLD: 7 % (ref 4–12)
MUCOUS THREADS URNS QL MICRO: 0 /LPF
NEUTS SEG # BLD: 6.2 K/UL (ref 1.7–8.2)
NEUTS SEG NFR BLD: 80 % (ref 43–78)
NRBC # BLD: 0 K/UL (ref 0–0.2)
P-R INTERVAL, ECG05: 166 MS
PLATELET # BLD AUTO: 152 K/UL (ref 150–450)
PMV BLD AUTO: 9.9 FL (ref 9.4–12.3)
POTASSIUM SERPL-SCNC: 4.2 MMOL/L (ref 3.5–5.1)
PROT SERPL-MCNC: 6.6 G/DL (ref 6.3–8.2)
Q-T INTERVAL, ECG07: 410 MS
QRS DURATION, ECG06: 88 MS
QTC CALCULATION (BEZET), ECG08: 412 MS
RBC # BLD AUTO: 3.93 M/UL (ref 4.05–5.2)
RBC #/AREA URNS HPF: ABNORMAL /HPF
SODIUM SERPL-SCNC: 139 MMOL/L (ref 136–145)
VENTRICULAR RATE, ECG03: 61 BPM
WBC # BLD AUTO: 7.7 K/UL (ref 4.3–11.1)
WBC URNS QL MICRO: ABNORMAL /HPF

## 2019-03-23 PROCEDURE — 80053 COMPREHEN METABOLIC PANEL: CPT

## 2019-03-23 PROCEDURE — 74011000258 HC RX REV CODE- 258: Performed by: EMERGENCY MEDICINE

## 2019-03-23 PROCEDURE — 77030005520 HC CATH URETH FOL38 BARD -A

## 2019-03-23 PROCEDURE — 93005 ELECTROCARDIOGRAM TRACING: CPT | Performed by: EMERGENCY MEDICINE

## 2019-03-23 PROCEDURE — 51702 INSERT TEMP BLADDER CATH: CPT | Performed by: EMERGENCY MEDICINE

## 2019-03-23 PROCEDURE — 99218 HC RM OBSERVATION: CPT

## 2019-03-23 PROCEDURE — 81015 MICROSCOPIC EXAM OF URINE: CPT

## 2019-03-23 PROCEDURE — 74011250636 HC RX REV CODE- 250/636: Performed by: INTERNAL MEDICINE

## 2019-03-23 PROCEDURE — 74011250636 HC RX REV CODE- 250/636: Performed by: EMERGENCY MEDICINE

## 2019-03-23 PROCEDURE — 96365 THER/PROPH/DIAG IV INF INIT: CPT

## 2019-03-23 PROCEDURE — 74011250637 HC RX REV CODE- 250/637: Performed by: INTERNAL MEDICINE

## 2019-03-23 PROCEDURE — 85025 COMPLETE CBC W/AUTO DIFF WBC: CPT

## 2019-03-23 PROCEDURE — 76450000000

## 2019-03-23 PROCEDURE — 99284 EMERGENCY DEPT VISIT MOD MDM: CPT | Performed by: EMERGENCY MEDICINE

## 2019-03-23 PROCEDURE — 70450 CT HEAD/BRAIN W/O DYE: CPT

## 2019-03-23 PROCEDURE — 71045 X-RAY EXAM CHEST 1 VIEW: CPT

## 2019-03-23 PROCEDURE — 36415 COLL VENOUS BLD VENIPUNCTURE: CPT

## 2019-03-23 PROCEDURE — 96372 THER/PROPH/DIAG INJ SC/IM: CPT

## 2019-03-23 PROCEDURE — 96361 HYDRATE IV INFUSION ADD-ON: CPT

## 2019-03-23 PROCEDURE — 87040 BLOOD CULTURE FOR BACTERIA: CPT

## 2019-03-23 PROCEDURE — 83605 ASSAY OF LACTIC ACID: CPT

## 2019-03-23 RX ORDER — ACETAMINOPHEN 325 MG/1
650 TABLET ORAL
Status: DISCONTINUED | OUTPATIENT
Start: 2019-03-23 | End: 2019-03-24

## 2019-03-23 RX ORDER — HYDROCODONE BITARTRATE AND ACETAMINOPHEN 5; 325 MG/1; MG/1
1 TABLET ORAL
Status: DISCONTINUED | OUTPATIENT
Start: 2019-03-23 | End: 2019-03-24

## 2019-03-23 RX ORDER — AMLODIPINE BESYLATE 10 MG/1
10 TABLET ORAL DAILY
Status: DISCONTINUED | OUTPATIENT
Start: 2019-03-24 | End: 2019-03-24

## 2019-03-23 RX ORDER — HEPARIN SODIUM 5000 [USP'U]/ML
5000 INJECTION, SOLUTION INTRAVENOUS; SUBCUTANEOUS EVERY 8 HOURS
Status: DISCONTINUED | OUTPATIENT
Start: 2019-03-23 | End: 2019-03-24

## 2019-03-23 RX ORDER — GUAIFENESIN 100 MG/5ML
400 SOLUTION ORAL EVERY 8 HOURS
Status: DISCONTINUED | OUTPATIENT
Start: 2019-03-23 | End: 2019-03-28 | Stop reason: HOSPADM

## 2019-03-23 RX ORDER — SODIUM CHLORIDE 0.9 % (FLUSH) 0.9 %
5-40 SYRINGE (ML) INJECTION EVERY 8 HOURS
Status: DISCONTINUED | OUTPATIENT
Start: 2019-03-23 | End: 2019-03-28 | Stop reason: HOSPADM

## 2019-03-23 RX ORDER — GUAIFENESIN 600 MG/1
1200 TABLET, EXTENDED RELEASE ORAL 2 TIMES DAILY
Status: DISCONTINUED | OUTPATIENT
Start: 2019-03-23 | End: 2019-03-23

## 2019-03-23 RX ORDER — LOSARTAN POTASSIUM 50 MG/1
100 TABLET ORAL DAILY
Status: DISCONTINUED | OUTPATIENT
Start: 2019-03-24 | End: 2019-03-24

## 2019-03-23 RX ORDER — HYDRALAZINE HYDROCHLORIDE 50 MG/1
100 TABLET, FILM COATED ORAL 3 TIMES DAILY
Status: DISCONTINUED | OUTPATIENT
Start: 2019-03-23 | End: 2019-03-28 | Stop reason: HOSPADM

## 2019-03-23 RX ORDER — SODIUM CHLORIDE 9 MG/ML
75 INJECTION, SOLUTION INTRAVENOUS CONTINUOUS
Status: DISCONTINUED | OUTPATIENT
Start: 2019-03-23 | End: 2019-03-24

## 2019-03-23 RX ORDER — FACIAL-BODY WIPES
10 EACH TOPICAL DAILY PRN
Status: DISCONTINUED | OUTPATIENT
Start: 2019-03-23 | End: 2019-03-28 | Stop reason: HOSPADM

## 2019-03-23 RX ORDER — ONDANSETRON 2 MG/ML
4 INJECTION INTRAMUSCULAR; INTRAVENOUS
Status: DISCONTINUED | OUTPATIENT
Start: 2019-03-23 | End: 2019-03-28 | Stop reason: HOSPADM

## 2019-03-23 RX ORDER — SODIUM CHLORIDE 0.9 % (FLUSH) 0.9 %
5-40 SYRINGE (ML) INJECTION AS NEEDED
Status: DISCONTINUED | OUTPATIENT
Start: 2019-03-23 | End: 2019-03-28 | Stop reason: HOSPADM

## 2019-03-23 RX ORDER — DEXTROMETHORPHAN/PSEUDOEPHED 2.5-7.5/.8
80 DROPS ORAL
Status: DISCONTINUED | OUTPATIENT
Start: 2019-03-23 | End: 2019-03-28 | Stop reason: HOSPADM

## 2019-03-23 RX ORDER — ALBUTEROL SULFATE 0.83 MG/ML
2.5 SOLUTION RESPIRATORY (INHALATION)
Status: DISCONTINUED | OUTPATIENT
Start: 2019-03-23 | End: 2019-03-28 | Stop reason: HOSPADM

## 2019-03-23 RX ORDER — TAMSULOSIN HYDROCHLORIDE 0.4 MG/1
0.4 CAPSULE ORAL
Status: DISCONTINUED | OUTPATIENT
Start: 2019-03-23 | End: 2019-03-28 | Stop reason: HOSPADM

## 2019-03-23 RX ORDER — SERTRALINE HYDROCHLORIDE 100 MG/1
200 TABLET, FILM COATED ORAL DAILY
Status: DISCONTINUED | OUTPATIENT
Start: 2019-03-24 | End: 2019-03-28 | Stop reason: HOSPADM

## 2019-03-23 RX ORDER — NALOXONE HYDROCHLORIDE 0.4 MG/ML
0.4 INJECTION, SOLUTION INTRAMUSCULAR; INTRAVENOUS; SUBCUTANEOUS AS NEEDED
Status: DISCONTINUED | OUTPATIENT
Start: 2019-03-23 | End: 2019-03-28 | Stop reason: HOSPADM

## 2019-03-23 RX ORDER — NYSTATIN 100000 [USP'U]/G
POWDER TOPICAL 2 TIMES DAILY
Status: DISCONTINUED | OUTPATIENT
Start: 2019-03-23 | End: 2019-03-28 | Stop reason: HOSPADM

## 2019-03-23 RX ORDER — HYDRALAZINE HYDROCHLORIDE 50 MG/1
100 TABLET, FILM COATED ORAL 3 TIMES DAILY
Status: DISCONTINUED | OUTPATIENT
Start: 2019-03-23 | End: 2019-03-23

## 2019-03-23 RX ADMIN — CEFTRIAXONE SODIUM 1 G: 1 INJECTION, POWDER, FOR SOLUTION INTRAMUSCULAR; INTRAVENOUS at 13:02

## 2019-03-23 RX ADMIN — GUAIFENESIN 400 MG: 100 SOLUTION ORAL at 21:01

## 2019-03-23 RX ADMIN — NYSTATIN: 100000 POWDER TOPICAL at 17:19

## 2019-03-23 RX ADMIN — HEPARIN SODIUM 5000 UNITS: 5000 INJECTION INTRAVENOUS; SUBCUTANEOUS at 14:29

## 2019-03-23 RX ADMIN — Medication 10 ML: at 14:19

## 2019-03-23 RX ADMIN — HYDRALAZINE HYDROCHLORIDE 100 MG: 50 TABLET, FILM COATED ORAL at 21:01

## 2019-03-23 RX ADMIN — LEVETIRACETAM 1500 MG: 100 SOLUTION ORAL at 17:19

## 2019-03-23 RX ADMIN — HEPARIN SODIUM 5000 UNITS: 5000 INJECTION INTRAVENOUS; SUBCUTANEOUS at 21:01

## 2019-03-23 RX ADMIN — Medication 10 ML: at 21:01

## 2019-03-23 RX ADMIN — TAMSULOSIN HYDROCHLORIDE 0.4 MG: 0.4 CAPSULE ORAL at 21:01

## 2019-03-23 RX ADMIN — SODIUM CHLORIDE 75 ML/HR: 900 INJECTION, SOLUTION INTRAVENOUS at 14:23

## 2019-03-23 RX ADMIN — HYDRALAZINE HYDROCHLORIDE 100 MG: 50 TABLET, FILM COATED ORAL at 15:48

## 2019-03-23 RX ADMIN — HYDROCODONE BITARTRATE AND ACETAMINOPHEN 1 TABLET: 5; 325 TABLET ORAL at 21:02

## 2019-03-23 NOTE — ED TRIAGE NOTES
Pt arrives via [facility], called out for AMS. Pt has a hx of cognitive impairment, but is more confused than baseline per facility. Slow to respond but will respond, states \"I just feel bad. \". HR 60, /60, , strong urine odor, diagnosed with e coli 2 days ago and has not started tx for it yet.  A febrile, 98.5 axillary and oral.

## 2019-03-23 NOTE — PROGRESS NOTES
TRANSFER - IN REPORT: 
 
Verbal report received from April Tho RN (name) on [address]  being received from ED (unit) for routine progression of care Report consisted of patients Situation, Background, Assessment and  
Recommendations(SBAR). Information from the following report(s) SBAR, Kardex and ED Summary was reviewed with the receiving nurse. Opportunity for questions and clarification was provided. Assessment completed upon patients arrival to unit and care assumed. SBAR given to receiving RNEs.

## 2019-03-23 NOTE — ED NOTES
TRANSFER - OUT REPORT: 
 
Verbal report given to Rivera Bhagat RN on [facility]  being transferred to 8th floor for routine progression of care Report consisted of patients Situation, Background, Assessment and  
Recommendations(SBAR). Information from the following report(s) ED Summary was reviewed with the receiving nurse. Lines:  
Peripheral IV 03/23/19 Right Antecubital (Active) Opportunity for questions and clarification was provided. Patient transported with: 
 O2 @ 5 liters

## 2019-03-23 NOTE — PROGRESS NOTES
Unable to administer Mucinex. Patient is NPO and this medication is not able to be crushed. MD has been paged.

## 2019-03-23 NOTE — H&P
Hospitalist H&P Note Admit Date:  3/23/2019 10:43 AM  
Name:  Manpreet Tavares Age:  76 y.o. 
:  1950 MRN:  [id number] PCP:  Zechariah Cavanaugh DO Treatment Team: Attending Provider: Steven Barnes MD; Primary Nurse: Lala Nettles RN 
 
HPI:  
Patient history was obtained from the ER provider prior to seeing the patient. From ER HPI: 
HPI: 
70 female here with altered mental status. History is limited from patient. History of hypertension, seizure. From nursing home. Strong urinary odor. Was reported by EMS to nursing staff that there were possible Escherichia coli in the urine but has not started treatment yet. Unable to obtain history from patient. 3/23/19: 
75 yo female with PMH of dementia, HTN, stroke,dysphagia with peg and purreed honey thick diet which she refuses,  seizures, RLS, neuralgia, morbid obesity, hypercholesterolemia, OA, hearing loss.  Pt has been at [facility] as a long term care resident.  job has arrived and he is wrestling with code status and does want to speak early next week with pall. Care when available because he knows his wife does not want to spend her last days in and out of hospitals. Recent admit with resp failure and recurrent utis. Presents from snf by ems with uti and altered mental status. Pt had dnr prior but  changed his mind. She is to be admitted with ams and uti to observation. No high fevers or leukocytosis. She may be appropriate to discharge to hospice care if he agrees and if logistically possible. 10 systems reviewed and negative except as noted in HPI. Past Medical History:  
Diagnosis Date  Chronic pain \"all over body\"-takes Zoloft daily  Chronic sinusitis 2015  Diverticulosis 2017  Edema 2015 Resolved  Former smoker  GERD (gastroesophageal reflux disease)  Headache 2015  Hearing loss 2015 \"some\"- no hearing aids  Heart murmur   Hip osteoarthritis 1/19/2015  
 feet, hands  Hx of colonic polyps 2017 SSA  Hx of migraines   
 on medication  Hypercholesterolemia 1/19/2015  Hypertension  Impaired fasting glucose 1/19/2015  Joint disorder 1/19/2015  Morbid obesity ([facility] [address] BMI 40  
 Neuralgia 1/19/2015  Phlebitis and thrombophlebitis of superficial vessels of lower extremities 01/19/2015  
 pt denies  Psychiatric disorder  Restless leg syndrome 1/19/2015  Rheumatic fever   
 pt reports possibly R.F.  
 Seizures ([facility] [address]   
 seizures as a child, has NOT had one since she was 13years old  SOB (shortness of breath) on exertion  Stroke [facility])   
 at age 11 only residual poor peripheral vision  Tobacco abuse 1/19/2015  
 quit smoking 4/2015  Vascular anomalies, congenital   
 Vitamin D deficiency 1/19/2015 Past Surgical History:  
Procedure Laterality Date  COLONOSCOPY N/A 2/20/2017 Bjorn--appendiceal serrated sessile polyp, transverse and rectal hyperplastic--3 year recall  HX APPENDECTOMY  03/15/2017  HX CATARACT REMOVAL Bilateral 3/2016 [place]  
 pt denies  HX ROTATOR CUFF REPAIR Right 10/31/2016  
 x2 [address]  TOTAL HIP ARTHROPLASTY Right 2005  
 and again 5/2015 and another revision No Known Allergies Social History Tobacco Use  Smoking status: Former Smoker Packs/day: 1.00 Years: 50.00 Pack years: 50.00 Types: Cigarettes Last attempt to quit: 3/29/2015 Years since quitting: 3.9  Smokeless tobacco: Never Used  Tobacco comment: quit 2 months and 2 weeks ago Substance Use Topics  Alcohol use: No  
  Alcohol/week: 0.0 oz  
  Comment: Former- socially Family History Problem Relation Age of Onset  Hypertension Mother  Cancer Mother Bladder  Depression Mother  Parkinson's Disease Father  Alcohol abuse Brother  Alcohol abuse Son  No Known Problems Sister  No Known Problems Brother  Malignant Hyperthermia Neg Hx  Pseudocholinesterase Deficiency Neg Hx  Delayed Awakening Neg Hx  Post-op Nausea/Vomiting Neg Hx  Emergence Delirium Neg Hx  Post-op Cognitive Dysfunction Neg Hx   
 Other Neg Hx Immunization History Administered Date(s) Administered  Influenza High Dose Vaccine PF 2015, 2017  Influenza Vaccine 2010, 2011, 10/31/2013, 10/15/2014  Influenza Vaccine (Quad) PF 2018  Pneumococcal Conjugate (PCV-13) 2015  Pneumococcal Polysaccharide (PPSV-23) 2018  TB Skin Test (PPD) Intradermal 2015, 2017, 2018, 2018  Td, Adsorbed PF 2015  ZZZ-RETIRED (DO NOT USE) Pneumococcal Vaccine (Unspecified Type) 02/10/2012  Zoster Vaccine, Live 2012 PTA Medications: 
Prior to Admission Medications Prescriptions Last Dose Informant Patient Reported? Taking? Lactose-Free Food with Fiber (JEVITY 1.5 KELLY) 0.06 gram-1.5 kcal/mL liqd   Yes No  
Sig: Take 65 mL/hr by mouth. Start at 1800 and run unit 1040 ml have infused  
acetaminophen (TYLENOL) 325 mg tablet   Yes No  
Si mg by Per G Tube route every six (6) hours as needed for Pain. albuterol (PROVENTIL VENTOLIN) 2.5 mg /3 mL (0.083 %) nebulizer solution   No No  
Sig: 3 mL by Nebulization route every four (4) hours as needed for Wheezing or Shortness of Breath. amLODIPine (NORVASC) 10 mg tablet   Yes No  
Sig: Take 10 mg by mouth daily. bisacodyl (DULCOLAX, BISACODYL,) 10 mg suppository   Yes No  
Sig: Insert 10 mg into rectum daily as needed. ergocalciferol (VITAMIN D2) 50,000 unit capsule   No No  
Sig: TAKE 1 CAPSULE ONE TIME WEEKLY- takes on sat  
guaiFENesin ER (MUCINEX) 1,200 mg Ta12 ER tablet   No No  
Sig: Take 1 Tab by mouth two (2) times a day.   
hydrALAZINE (APRESOLINE) 100 mg tablet   No No  
 Si Tab by Per NG tube route three (3) times daily. levETIRAcetam (KEPPRA) 100 mg/ml soln oral solution   No No  
Sig: 15 mL by Per G Tube route two (2) times a day for 30 days. losartan (COZAAR) 100 mg tablet   No No  
Sig: Take 1 Tab by mouth daily. Indications: hypertension Patient taking differently: 100 mg by Per G Tube route daily. oxyCODONE IR (ROXICODONE) 5 mg immediate release tablet   No No  
Si.5 Tabs by Per NG tube route every eight (8) hours as needed. Max Daily Amount: 7.5 mg.  
sertraline (ZOLOFT) 100 mg tablet   No No  
Sig: Take 2 Tabs by mouth daily. Patient taking differently: 200 mg by Per G Tube route daily. simethicone (MYLICON) 40 IK/0.5 mL drops   Yes No  
Sig: Take 80 mg by mouth four (4) times daily as needed. tamsulosin (FLOMAX) 0.4 mg capsule   Yes No  
Sig: Take 0.4 mg by mouth nightly. Facility-Administered Medications: None Objective:  
 
Patient Vitals for the past 24 hrs: 
 Temp Pulse Resp BP SpO2  
19 1046 98.5 °F (36.9 °C) 63 16 140/63 94 % Oxygen Therapy O2 Sat (%): 94 % (19 1046) Pulse via Oximetry: 63 beats per minute (19 1046) No intake or output data in the 24 hours ending 19 1254 Physical Exam: 
 
 
Physical Exam  
Constitutional:  
Lethargic opens eyes to loud voice stimuli. Baseline advanced dementia-[facility] resident HENT:  
Nose: Nose normal.  
Mouth/Throat: No oropharyngeal exudate. Eyes: EOM are normal. No scleral icterus. Neck: No tracheal deviation present. No thyromegaly present. Cardiovascular: Normal rate and normal heart sounds. Exam reveals no gallop. Pulmonary/Chest: She has no wheezes. She has no rales. Abdominal: There is no rebound and no guarding. peg Musculoskeletal: She exhibits no tenderness or deformity. Neurological:  
Obtunded but responds to voice Skin: Skin is dry. No rash noted. She is not diaphoretic. I reviewed the labs, imaging, EKGs, telemetry, and other studies done this admission. Data Review:  
Recent Results (from the past 24 hour(s)) EKG, 12 LEAD, INITIAL Collection Time: 03/23/19 10:55 AM  
Result Value Ref Range Ventricular Rate 61 BPM  
 Atrial Rate 68 BPM  
 P-R Interval 166 ms  
 QRS Duration 88 ms Q-T Interval 410 ms QTC Calculation (Bezet) 412 ms Calculated P Axis 83 degrees Calculated R Axis 30 degrees Calculated T Axis 63 degrees Diagnosis    
  !! AGE AND GENDER SPECIFIC ECG ANALYSIS !! Normal sinus rhythm Normal ECG When compared with ECG of 01-FEB-2019 11:34, No significant change was found Confirmed by [facility] JEFF MD (), MAYELA SAINI ([id number]) on 3/23/2019 12:39:06 PM 
  
CBC WITH AUTOMATED DIFF Collection Time: 03/23/19 11:12 AM  
Result Value Ref Range WBC 7.7 4.3 - 11.1 K/uL  
 RBC 3.93 (L) 4.05 - 5.2 M/uL  
 HGB 11.0 (L) 11.7 - 15.4 g/dL HCT 35.5 (L) 35.8 - 46.3 % MCV 90.3 79.6 - 97.8 FL  
 MCH 28.0 26.1 - 32.9 PG  
 MCHC 31.0 (L) 31.4 - 35.0 g/dL  
 RDW 14.0 11.9 - 14.6 % PLATELET 541 923 - 871 K/uL MPV 9.9 9.4 - 12.3 FL ABSOLUTE NRBC 0.00 0.0 - 0.2 K/uL  
 DF AUTOMATED NEUTROPHILS 80 (H) 43 - 78 % LYMPHOCYTES 10 (L) 13 - 44 % MONOCYTES 7 4.0 - 12.0 % EOSINOPHILS 2 0.5 - 7.8 % BASOPHILS 0 0.0 - 2.0 % IMMATURE GRANULOCYTES 1 0.0 - 5.0 %  
 ABS. NEUTROPHILS 6.2 1.7 - 8.2 K/UL  
 ABS. LYMPHOCYTES 0.8 0.5 - 4.6 K/UL  
 ABS. MONOCYTES 0.6 0.1 - 1.3 K/UL  
 ABS. EOSINOPHILS 0.1 0.0 - 0.8 K/UL  
 ABS. BASOPHILS 0.0 0.0 - 0.2 K/UL  
 ABS. IMM. GRANS. 0.0 0.0 - 0.5 K/UL METABOLIC PANEL, COMPREHENSIVE Collection Time: 03/23/19 11:12 AM  
Result Value Ref Range Sodium 139 136 - 145 mmol/L Potassium 4.2 3.5 - 5.1 mmol/L Chloride 103 98 - 107 mmol/L  
 CO2 35 (H) 21 - 32 mmol/L Anion gap 1 (L) 7 - 16 mmol/L Glucose 116 (H) 65 - 100 mg/dL BUN 28 (H) 8 - 23 MG/DL  Creatinine 0.60 0.6 - 1.0 MG/DL  
 GFR est AA >60 >60 ml/min/1.73m2 GFR est non-AA >60 >60 ml/min/1.73m2 Calcium 8.9 8.3 - 10.4 MG/DL Bilirubin, total 0.2 0.2 - 1.1 MG/DL  
 ALT (SGPT) 22 12 - 65 U/L  
 AST (SGOT) 12 (L) 15 - 37 U/L Alk. phosphatase 99 50 - 136 U/L Protein, total 6.6 6.3 - 8.2 g/dL Albumin 2.8 (L) 3.2 - 4.6 g/dL Globulin 3.8 (H) 2.3 - 3.5 g/dL A-G Ratio 0.7 (L) 1.2 - 3.5 POC LACTIC ACID Collection Time: 03/23/19 11:18 AM  
Result Value Ref Range Lactic Acid (POC) 0.77 0.5 - 1.9 mmol/L All Micro Results Procedure Component Value Units Date/Time CULTURE, BLOOD [[id number]] Collected:  03/23/19 1224 Order Status:  Completed Specimen:  Blood Updated:  03/23/19 1226 CULTURE, BLOOD [[id number]] Collected:  03/23/19 1112 Order Status:  Completed Specimen:  Blood Updated:  03/23/19 1149 Other Studies: Xr Chest Sngl V Result Date: 3/23/2019 CHEST X-RAY, one view. HISTORY:  Altered mental status. Possible aspiration. TECHNIQUE:  AP semiupright view COMPARISON: 1 February 2019. FINDINGS:  The lungs are clear. The heart size is normal. The costophrenic angles are sharp. The pulmonary vasculature is unremarkable. Included portion of the upper abdomen is unremarkable. IMPRESSION:  Negative for aspiration pneumonia. Assessment and Plan:  
 
Hospital Problems as of 3/23/2019 Date Reviewed: 6/28/2018 Codes Class Noted - Resolved POA  
 UTI (urinary tract infection) ICD-10-CM: N39.0 ICD-9-CM: 599.0  3/23/2019 - Present Unknown Altered mental status ICD-10-CM: R41.82 
ICD-9-CM: 780.97  3/23/2019 - Present Unknown PLAN: 
Encephalopathy -- suspect uti on baseline dementia- need abg to eval co2 if not better next 12-24hour, sonorous but no gross resp. Distress. 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 
Sabrina Iraheta,   [phone] Admit status:observation Estimated LOS: less than midnights Risk:  high Signed: 
Tessa Das DO

## 2019-03-23 NOTE — ED PROVIDER NOTES
HPI: 
70 female here with altered mental status. History is limited from patient. History of hypertension, seizure. From nursing home. Strong urinary odor. Was reported by EMS to nursing staff that there were possible Escherichia coli in the urine but has not started treatment yet. Unable to obtain history from patient. ROS Limited due to altered mental status Visit Vitals /63 Pulse 63 Temp 98.5 °F (36.9 °C) Resp 16 Wt 113.4 kg (250 lb) SpO2 94% BMI 44.29 kg/m² Past Medical History:  
Diagnosis Date  Chronic pain \"all over body\"-takes Zoloft daily  Chronic sinusitis 1/19/2015  Diverticulosis 2017  Edema 1/19/2015 Resolved  Former smoker  GERD (gastroesophageal reflux disease)  Headache 1/19/2015  Hearing loss 1/19/2015 \"some\"- no hearing aids  Heart murmur 1990  
 Hip osteoarthritis 1/19/2015  
 feet, hands  Hx of colonic polyps 2017 SSA  Hx of migraines   
 on medication  Hypercholesterolemia 1/19/2015  Hypertension  Impaired fasting glucose 1/19/2015  Joint disorder 1/19/2015  Morbid obesity ([address] BMI 40  
 Neuralgia 1/19/2015  Phlebitis and thrombophlebitis of superficial vessels of lower extremities 01/19/2015  
 pt denies  Psychiatric disorder  Restless leg syndrome 1/19/2015  Rheumatic fever   
 pt reports possibly R.F.  
 Seizures ([address]   
 seizures as a child, has NOT had one since she was 13years old  SOB (shortness of breath) on exertion  Stroke [facility])   
 at age 11 only residual poor peripheral vision  Tobacco abuse 1/19/2015  
 quit smoking 4/2015  Vascular anomalies, congenital   
 Vitamin D deficiency 1/19/2015 Past Surgical History:  
Procedure Laterality Date  COLONOSCOPY N/A 2/20/2017 Bjorn--appendiceal serrated sessile polyp, transverse and rectal hyperplastic--3 year recall  HX APPENDECTOMY  03/15/2017  HX CATARACT REMOVAL Bilateral 3/2016 [id number] [facility]  
 pt denies  HX ROTATOR CUFF REPAIR Right 10/31/2016  
 x2 [address]  TOTAL HIP ARTHROPLASTY Right   
 and again 2015 and another revision Prior to Admission Medications Prescriptions Last Dose Informant Patient Reported? Taking? Lactose-Free Food with Fiber (JEVITY 1.5 KELLY) 0.06 gram-1.5 kcal/mL liqd   Yes No  
Sig: Take 65 mL/hr by mouth. Start at 1800 and run unit 1040 ml have infused  
acetaminophen (TYLENOL) 325 mg tablet   Yes No  
Si mg by Per G Tube route every six (6) hours as needed for Pain. albuterol (PROVENTIL VENTOLIN) 2.5 mg /3 mL (0.083 %) nebulizer solution   No No  
Sig: 3 mL by Nebulization route every four (4) hours as needed for Wheezing or Shortness of Breath. amLODIPine (NORVASC) 10 mg tablet   Yes No  
Sig: Take 10 mg by mouth daily. bisacodyl (DULCOLAX, BISACODYL,) 10 mg suppository   Yes No  
Sig: Insert 10 mg into rectum daily as needed. ergocalciferol (VITAMIN D2) 50,000 unit capsule   No No  
Sig: TAKE 1 CAPSULE ONE TIME WEEKLY- takes on sat  
guaiFENesin ER (MUCINEX) 1,200 mg Ta12 ER tablet   No No  
Sig: Take 1 Tab by mouth two (2) times a day. hydrALAZINE (APRESOLINE) 100 mg tablet   No No  
Si Tab by Per NG tube route three (3) times daily. levETIRAcetam (KEPPRA) 100 mg/ml soln oral solution   No No  
Sig: 15 mL by Per G Tube route two (2) times a day for 30 days. losartan (COZAAR) 100 mg tablet   No No  
Sig: Take 1 Tab by mouth daily. Indications: hypertension Patient taking differently: 100 mg by Per G Tube route daily. oxyCODONE IR (ROXICODONE) 5 mg immediate release tablet   No No  
Si.5 Tabs by Per NG tube route every eight (8) hours as needed. Max Daily Amount: 7.5 mg.  
sertraline (ZOLOFT) 100 mg tablet   No No  
Sig: Take 2 Tabs by mouth daily. Patient taking differently: 200 mg by Per G Tube route daily. simethicone (MYLICON) 40 BF/3.8 mL drops   Yes No  
Sig: Take 80 mg by mouth four (4) times daily as needed. tamsulosin (FLOMAX) 0.4 mg capsule   Yes No  
Sig: Take 0.4 mg by mouth nightly. Facility-Administered Medications: None Adult Exam  
General: alert, no acute distress Head: normocephalic, atraumatic ENT: moist mucous membranes Neck:  
Cardiovascular: regular rate and rhythm, normal peripheral perfusion, no edema. Equal radial pulses Respiratory:  Rattles noted on lung exam with changes in crackle noted in the lung bases Gastrointestinal: soft, non-tender; no rebound or guarding, no peritoneal signs, no distension Strong urinary odor Back: No obvious step-off noted at the C, T, L-spine. Musculoskeletal: normal range of motion, normal strength, no gross deformities Neurological: No obvious unilateral facial droop. Heart the patient not following commands Psychiatric: cooperative; appropriate mood and affect MDM: Concern for UTI, aspiration pneumonia with altered mental status. We'll obtain chest x-ray, CT head, lab, blood culture. Unable to obtain history from patient Concern for UTI. Chest x-ray without any obvious consolidation. Suspect UTI. Nursing staff did a Avila which has very cloudy and purulent like urine. We'll give IV Rocephin. Blood culture and urine culture are obtained. CT head is pending however have a low suspicion for intracranial hemorrhage. Spoke with the hospitalist for admission for suspected UTI with associated altered mental status EKG rate of 61 normal sinus rhythm. Normal axis and interval.  No STEMI ischemic changes noted Dragon voice recognition software was used to create this note. Although the note has been reviewed and corrected where necessary, additional errors may have been overlooked and remain in the text.

## 2019-03-23 NOTE — PROGRESS NOTES
Notified Dr. Bri York of patient bright red urine noted to wu cath. This is a new finding since admission. MD requests that wu be emptied and that further urine output be watched. Hgb: 11.0 and HCT 35.5. VSS. MD will order H&H for am. If bleeding continues or worsens call again and MD will come assess patient. Temp: 97.4 °F (36.3 °C) (03/23/19 1904) Pulse (Heart Rate): 91 (03/23/19 1904) Resp Rate: 20 (03/23/19 1904) BP: 144/76 (03/23/19 1904) O2 Sat (%): 91 % (03/23/19 1904) Weight: 113.4 kg (250 lb) (03/23/19 1046) Will pass to oncoming shift.

## 2019-03-23 NOTE — PROGRESS NOTES
Patient arrived to floor via stretcher from ED. Patient transferred to bed with assist x 3. No incident noted. Patient placed to Oxygen via nasal cannula at 5 LPM. BM noted. Patient diaper changed, pericare and cath care performed. Dual skin assessment completed with Aidan Augustin RN. Patient skin within normal limits with some redness noted beneath panis and redness to groin. No pressure ulcers noted to sacrum or heels. Allevyn dressing placed to sacrum prophylacticly. Will get order for nystatin to groin. Hemorrhoids noted to rectum. Foot drop noted bilaterally. Patient is not oriented. Patient states she knows her name but is unable to tell this nurse. Patient yells, \"help me, please\" and \"hurry up. \" 
 
Bed left in low/locked position with side rails up x 3. Posey in place. Will continue to monitor.

## 2019-03-24 PROBLEM — R31.0 GROSS HEMATURIA: Status: ACTIVE | Noted: 2019-03-24

## 2019-03-24 LAB
ALBUMIN SERPL-MCNC: 2.5 G/DL (ref 3.2–4.6)
ALBUMIN/GLOB SERPL: 0.7 {RATIO} (ref 1.2–3.5)
ALP SERPL-CCNC: 96 U/L (ref 50–136)
ALT SERPL-CCNC: 24 U/L (ref 12–65)
ANION GAP SERPL CALC-SCNC: 3 MMOL/L (ref 7–16)
ARTERIAL PATENCY WRIST A: YES
AST SERPL-CCNC: 13 U/L (ref 15–37)
BASE EXCESS BLD CALC-SCNC: 4 MMOL/L
BASOPHILS # BLD: 0 K/UL (ref 0–0.2)
BASOPHILS NFR BLD: 0 % (ref 0–2)
BDY SITE: ABNORMAL
BILIRUB SERPL-MCNC: 0.3 MG/DL (ref 0.2–1.1)
BODY TEMPERATURE: 98.6
BUN SERPL-MCNC: 18 MG/DL (ref 8–23)
CALCIUM SERPL-MCNC: 8.5 MG/DL (ref 8.3–10.4)
CHLORIDE SERPL-SCNC: 106 MMOL/L (ref 98–107)
CO2 BLD-SCNC: 31 MMOL/L
CO2 SERPL-SCNC: 32 MMOL/L (ref 21–32)
COLLECT TIME,HTIME: 840
CREAT SERPL-MCNC: 0.51 MG/DL (ref 0.6–1)
DIFFERENTIAL METHOD BLD: ABNORMAL
EOSINOPHIL # BLD: 0.1 K/UL (ref 0–0.8)
EOSINOPHIL NFR BLD: 1 % (ref 0.5–7.8)
ERYTHROCYTE [DISTWIDTH] IN BLOOD BY AUTOMATED COUNT: 14.1 % (ref 11.9–14.6)
FLOW RATE ISTAT,IFRATE: 2 L/MIN
GAS FLOW.O2 O2 DELIVERY SYS: ABNORMAL L/MIN
GLOBULIN SER CALC-MCNC: 3.5 G/DL (ref 2.3–3.5)
GLUCOSE SERPL-MCNC: 112 MG/DL (ref 65–100)
HCO3 BLD-SCNC: 29.6 MMOL/L (ref 22–26)
HCT VFR BLD AUTO: 34.7 % (ref 35.8–46.3)
HCT VFR BLD AUTO: 37.1 % (ref 35.8–46.3)
HGB BLD-MCNC: 10.7 G/DL (ref 11.7–15.4)
HGB BLD-MCNC: 11.6 G/DL (ref 11.7–15.4)
IMM GRANULOCYTES # BLD AUTO: 0 K/UL (ref 0–0.5)
IMM GRANULOCYTES NFR BLD AUTO: 1 % (ref 0–5)
LYMPHOCYTES # BLD: 0.8 K/UL (ref 0.5–4.6)
LYMPHOCYTES NFR BLD: 13 % (ref 13–44)
MCH RBC QN AUTO: 28.2 PG (ref 26.1–32.9)
MCHC RBC AUTO-ENTMCNC: 30.8 G/DL (ref 31.4–35)
MCV RBC AUTO: 91.3 FL (ref 79.6–97.8)
MONOCYTES # BLD: 0.4 K/UL (ref 0.1–1.3)
MONOCYTES NFR BLD: 7 % (ref 4–12)
NEUTS SEG # BLD: 4.8 K/UL (ref 1.7–8.2)
NEUTS SEG NFR BLD: 79 % (ref 43–78)
NRBC # BLD: 0 K/UL (ref 0–0.2)
PCO2 BLD: 49.9 MMHG (ref 35–45)
PH BLD: 7.38 [PH] (ref 7.35–7.45)
PLATELET # BLD AUTO: 135 K/UL (ref 150–450)
PMV BLD AUTO: 10 FL (ref 9.4–12.3)
PO2 BLD: 86 MMHG (ref 75–100)
POTASSIUM SERPL-SCNC: 3.9 MMOL/L (ref 3.5–5.1)
PROT SERPL-MCNC: 6 G/DL (ref 6.3–8.2)
RBC # BLD AUTO: 3.8 M/UL (ref 4.05–5.2)
SAO2 % BLD: 96 % (ref 95–98)
SERVICE CMNT-IMP: ABNORMAL
SODIUM SERPL-SCNC: 141 MMOL/L (ref 136–145)
SPECIMEN TYPE: ABNORMAL
WBC # BLD AUTO: 6 K/UL (ref 4.3–11.1)

## 2019-03-24 PROCEDURE — 87186 SC STD MICRODIL/AGAR DIL: CPT

## 2019-03-24 PROCEDURE — 85025 COMPLETE CBC W/AUTO DIFF WBC: CPT

## 2019-03-24 PROCEDURE — 74011250636 HC RX REV CODE- 250/636: Performed by: INTERNAL MEDICINE

## 2019-03-24 PROCEDURE — 80053 COMPREHEN METABOLIC PANEL: CPT

## 2019-03-24 PROCEDURE — 99218 HC RM OBSERVATION: CPT

## 2019-03-24 PROCEDURE — 74011250637 HC RX REV CODE- 250/637: Performed by: INTERNAL MEDICINE

## 2019-03-24 PROCEDURE — 87086 URINE CULTURE/COLONY COUNT: CPT

## 2019-03-24 PROCEDURE — 82803 BLOOD GASES ANY COMBINATION: CPT

## 2019-03-24 PROCEDURE — 77030021907 HC KT URIN FOL O&M -A

## 2019-03-24 PROCEDURE — 77030033269 HC SLV COMPR SCD KNE2 CARD -B

## 2019-03-24 PROCEDURE — 85018 HEMOGLOBIN: CPT

## 2019-03-24 PROCEDURE — 77030018798 HC PMP KT ENTRL FED COVD -A

## 2019-03-24 PROCEDURE — 96361 HYDRATE IV INFUSION ADD-ON: CPT

## 2019-03-24 PROCEDURE — 74011000258 HC RX REV CODE- 258: Performed by: INTERNAL MEDICINE

## 2019-03-24 PROCEDURE — 96366 THER/PROPH/DIAG IV INF ADDON: CPT

## 2019-03-24 PROCEDURE — 77030032490 HC SLV COMPR SCD KNE COVD -B

## 2019-03-24 PROCEDURE — 36600 WITHDRAWAL OF ARTERIAL BLOOD: CPT

## 2019-03-24 PROCEDURE — 36415 COLL VENOUS BLD VENIPUNCTURE: CPT

## 2019-03-24 PROCEDURE — 77030020263 HC SOL INJ SOD CL0.9% LFCR 1000ML

## 2019-03-24 PROCEDURE — 87088 URINE BACTERIA CULTURE: CPT

## 2019-03-24 RX ORDER — HYDROCODONE BITARTRATE AND ACETAMINOPHEN 5; 325 MG/1; MG/1
1 TABLET ORAL
Status: DISCONTINUED | OUTPATIENT
Start: 2019-03-24 | End: 2019-03-28 | Stop reason: HOSPADM

## 2019-03-24 RX ORDER — ACETAMINOPHEN 325 MG/1
650 TABLET ORAL
Status: DISCONTINUED | OUTPATIENT
Start: 2019-03-24 | End: 2019-03-28 | Stop reason: HOSPADM

## 2019-03-24 RX ORDER — AMLODIPINE BESYLATE 10 MG/1
10 TABLET ORAL DAILY
Status: DISCONTINUED | OUTPATIENT
Start: 2019-03-25 | End: 2019-03-25

## 2019-03-24 RX ORDER — LOSARTAN POTASSIUM 50 MG/1
100 TABLET ORAL DAILY
Status: DISCONTINUED | OUTPATIENT
Start: 2019-03-25 | End: 2019-03-25

## 2019-03-24 RX ADMIN — NYSTATIN: 100000 POWDER TOPICAL at 17:13

## 2019-03-24 RX ADMIN — HYDRALAZINE HYDROCHLORIDE 100 MG: 50 TABLET, FILM COATED ORAL at 09:25

## 2019-03-24 RX ADMIN — HYDRALAZINE HYDROCHLORIDE 100 MG: 50 TABLET, FILM COATED ORAL at 17:13

## 2019-03-24 RX ADMIN — LEVETIRACETAM 1500 MG: 100 SOLUTION ORAL at 09:26

## 2019-03-24 RX ADMIN — Medication 10 ML: at 20:35

## 2019-03-24 RX ADMIN — Medication 10 ML: at 14:41

## 2019-03-24 RX ADMIN — HYDRALAZINE HYDROCHLORIDE 100 MG: 50 TABLET, FILM COATED ORAL at 20:34

## 2019-03-24 RX ADMIN — AMLODIPINE BESYLATE 10 MG: 10 TABLET ORAL at 09:25

## 2019-03-24 RX ADMIN — HYDROCODONE BITARTRATE AND ACETAMINOPHEN 1 TABLET: 5; 325 TABLET ORAL at 06:32

## 2019-03-24 RX ADMIN — CEFTRIAXONE SODIUM 1 G: 1 INJECTION, POWDER, FOR SOLUTION INTRAMUSCULAR; INTRAVENOUS at 12:29

## 2019-03-24 RX ADMIN — GUAIFENESIN 400 MG: 100 SOLUTION ORAL at 14:40

## 2019-03-24 RX ADMIN — HYDROCODONE BITARTRATE AND ACETAMINOPHEN 1 TABLET: 5; 325 TABLET ORAL at 20:37

## 2019-03-24 RX ADMIN — SERTRALINE HYDROCHLORIDE 200 MG: 100 TABLET ORAL at 09:25

## 2019-03-24 RX ADMIN — LEVETIRACETAM 1500 MG: 100 SOLUTION ORAL at 17:17

## 2019-03-24 RX ADMIN — Medication 10 ML: at 05:34

## 2019-03-24 RX ADMIN — NYSTATIN: 100000 POWDER TOPICAL at 09:34

## 2019-03-24 RX ADMIN — GUAIFENESIN 400 MG: 100 SOLUTION ORAL at 20:34

## 2019-03-24 RX ADMIN — Medication 1 AMPULE: at 20:33

## 2019-03-24 RX ADMIN — SODIUM CHLORIDE 75 ML/HR: 900 INJECTION, SOLUTION INTRAVENOUS at 02:29

## 2019-03-24 RX ADMIN — TAMSULOSIN HYDROCHLORIDE 0.4 MG: 0.4 CAPSULE ORAL at 20:35

## 2019-03-24 RX ADMIN — HYDROCODONE BITARTRATE AND ACETAMINOPHEN 1 TABLET: 5; 325 TABLET ORAL at 02:36

## 2019-03-24 RX ADMIN — GUAIFENESIN 400 MG: 100 SOLUTION ORAL at 05:34

## 2019-03-24 RX ADMIN — HYDROCODONE BITARTRATE AND ACETAMINOPHEN 1 TABLET: 5; 325 TABLET ORAL at 14:39

## 2019-03-24 RX ADMIN — LOSARTAN POTASSIUM 100 MG: 50 TABLET ORAL at 09:25

## 2019-03-24 NOTE — PROGRESS NOTES
Nutrition: 
Reason for assessment: Consult for TF management per nutritional support protocols. (Hospitalist) Assessment:  
Food/Nutrition History: PMH: Dementia,CVA with dysphagia with PEG and pureed diet with honey thick liquid but refuses po 
Presented with AMS, recent  ? E coli UTI. Now with hematuria. GT in place,  Abdomen WDL with active bowel sounds. Date of Last BM: Unknown TF at NH: 1 L/d of Jevity 1.5 at 65 ml/he infused over ~ 15 hrs/d with 60 ml water flush very 1 hr. Provides 1500 kcal, 64 grams protein, 206 grams CHO and ~1700 ml of free water/d Pertinent Medications: IVF: NS at 75 ml/hr Rocephin Pertinent labs: POC Glucoses:  112-116 mg/dl, no noted hx of DM but hx of HgbA1C range 5.6 to 6 dating back to 2015 to 2018 Anthropometrics: Per EMR: Height 5' 4\"(maximum listed),  Weight Source: Standing scale (comment), Weight: 106 kg (233 lb 11.2 oz), Body mass index is 41.4 kg/m². BMI class of overweight for age >71. Macronutrient needs: EER:  8159-1380 kcal /day (12-14 kcal/kg ABW/106 kg) EPR:   grams protein/day (0.8-1 grams/kg ABW) Max CHO:  204 grams/day (55% kcal) Fluid:  1ml/kcal 
Intake/Comparative standards: Current NPO status does not meet kcal or protein needs Nutrition Diagnosis: Difficulty swallowing r/t CVA with dysphagia as evidenced by TF dependent via GT, refuses po. Intervention: 
Meals and snacks: NPO 
EN:Start TF of Jevity 1.2 at 50 ml/hr with 25ml water q 1hr to provide 1440 kcal/day (100% of needs), 67 grams protein/day (78% of needs), 202 grams CHO/day (does not exceed max CHO),  and ~ 1584ml free water/day (100% of needs). Nutrition Supplement Therapy: Electrolyte replacement per nutritional support protocols are active on MAR. Labs: BMP  Phos and Magnesium tomorrow AM  
IV: Stop IVF once TF is started. Discharge nutrition plan: Continue prior TF once transferred back to NH. Doris Parnell, [address], [address] [place] [address], [address] [place] [address]

## 2019-03-24 NOTE — PROGRESS NOTES
Patient seen for gross hematuria. Urine cherry red. Good urine output. Patient states she is feeling better with no weakness when speaking to her. PE: chronic slurred speech. Able to answer questions appropriately but slow to answer. Frail appearing RRR, no murmur CTA bilaterally with poor airway movement. Non distended abdomen that is non tender to palpation. No tenderness when placing pressure on pubic symphysis. Will stop heparin subQ and start SCDs. Repeat H&H. Suspecting inflammation from bladder wall with current UTI and trauma from Avila has caused bleeding in bladder wall. Monitor. If clot formation or worsening bleeding, will consider urology consult.

## 2019-03-24 NOTE — PROGRESS NOTES
Order for Tube feeds has been acknowledged by this nurse. No feeding pumps found on this unit or available from [employer]. This nurse is trying to locate a pump. Will contact physician about possible bolus feeds if unable to locate feeding pump.

## 2019-03-24 NOTE — PROGRESS NOTES
LMSW spoke with pt spouse by phone about code 40 and medicare observation status. Copy of notices left in pt room for spouse to retrieve Monday and he is to ask for case manageer sovering tomorrow if he has any additional questions. Also noted that pt face sheet did not include medicaid so called info to registration to verify and add to pt chart. Pt is a long term care resident at [facility] and spouse has confirmed that he wants pt to return to SNF at discharge for continued care. Also confirmed bed hold with Boni Landers at [facility]. Care Management Interventions Mode of Transport at Discharge: JOSES(Denise) Transition of Care Consult (CM Consult): SNF(Pt is a long term medicaid resident at [facility] with a 10 day medicaid bedhold. ) Partner SNF: Yes Discharge Durable Medical Equipment: No 
Physical Therapy Consult: No 
Occupational Therapy Consult: No 
Speech Therapy Consult: No 
Current Support Network: Nursing Facility(Pt spouse still resides in the couple's home that is located near the SNF where pt resides.  ) Plan discussed with Pt/Family/Caregiver: Yes Freedom of Choice Offered: Yes [employer] Information Provided?: No 
Discharge Location Discharge Placement: Skilled nursing facility(Pt will return to [facility] at discharge for continued long term care.  )

## 2019-03-24 NOTE — PROGRESS NOTES
Patient is sleeping  Bed is in the lowest position wheels are locked call light within reach  Avila is intact/draing blood colored urine  Respirations are even unlabored on 2L NC  Will give BSR to oncoming RN

## 2019-03-24 NOTE — PROGRESS NOTES
Patient remains in stable condition with respirations even/unlabored. No acute distress noted. No needs noted or voiced at this time. Safety measures in place. Oxygen remains to nasal cannula at 2 LPM. Feeding is running at this time, Jevity 1.2 @ 50 cc/hr with a 30 cc water flush every 1 hour. Call light remains within reach. Preparing to give report to oncoming shift.

## 2019-03-24 NOTE — PROGRESS NOTES
Hospitalist Progress Note Admit Date:  3/23/2019 10:43 AM  
Name:  Nixon Hermosillo Age:  76 y.o. 
:  1950 MRN:  [id number] PCP:  Sunil Santos DO Treatment Team: Attending Provider: Dell Tierney DO; Utilization Review: Stella Tierney RN Subjective:  
 
From ER HPI: 
HPI: 
70 female here with altered mental status.  History is limited from patient. History of hypertension, seizure. From nursing home.  Strong urinary odor.  Was reported by EMS to nursing staff that there were possible Escherichia coli in the urine but has not started treatment yet. Unable to obtain history from patient. 
  
  
  
3/23/19: 
75 yo female with PMH of dementia, HTN, stroke,dysphagia with peg and purreed honey thick diet which she refuses,  seizures, RLS, neuralgia, morbid obesity, hypercholesterolemia, OA, hearing loss.  Pt has been at [facility] as a long term care resident.  job has arrived and he is wrestling with code status and does want to speak early next week with pall. Care when available because he knows his wife does not want to spend her last days in and out of hospitals. Recent admit with resp failure and recurrent utis. Presents from snf by ems with uti and altered mental status. Pt had dnr prior but  changed his mind. She is to be admitted with ams and uti to observation. No high fevers or leukocytosis. She may be appropriate to discharge to hospice care if he agrees and if logistically possible 3/24/19: 
Sedate but arousable. Hx of dementia but unclear baseline. Perhaps more alert than 3/23? ABG ordered due to smoking hx and sonorous respiration and evidence of chronic hypercarbic respiratory failure. No acute findings. Pyuria in er but I could not confirm that urine culture set up and I have re-ordered/ordered. Blood cultures sent. Gross hematuria since admit and wu cath.   wanted to speak with palliative care about possible hospice care on admission but also requested full code status. Objective:  
 
Patient Vitals for the past 24 hrs: 
 Temp Pulse Resp BP SpO2  
03/24/19 1130 98.2 °F (36.8 °C) 93 19 105/60 90 % 03/24/19 0738 98.5 °F (36.9 °C) 89 21 152/76 93 % 03/24/19 0406 98.5 °F (36.9 °C) 88 20 129/73 91 %  
03/23/19 2348 98.4 °F (36.9 °C) (!) 103 20 137/78 92 % 03/23/19 1904 97.4 °F (36.3 °C) 91 20 144/76 91 %  
03/23/19 1502     95 % 03/23/19 1446     96 % 03/23/19 1419 98 °F (36.7 °C) 72 20 125/74 95 % 03/23/19 1336  72  145/62 96 % 03/23/19 1328  68   96 % 03/23/19 1318  68   94 % 03/23/19 1259  67  135/63 94 % 03/23/19 1252     94 % Oxygen Therapy O2 Sat (%): 90 % (03/24/19 1130) Pulse via Oximetry: 73 beats per minute (03/23/19 1336) O2 Device: Nasal cannula (03/24/19 0731) O2 Flow Rate (L/min): 2 l/min (03/24/19 0731) Intake/Output Summary (Last 24 hours) at 3/24/2019 1214 Last data filed at 3/24/2019 8169 Gross per 24 hour Intake 1665 ml Output 1850 ml Net -185 ml Physical Examination: 
Physical Exam  
Constitutional: No distress. HENT:  
Right Ear: External ear normal.  
Left Ear: External ear normal.  
Nose: Nose normal.  
Eyes: Right eye exhibits no discharge. Left eye exhibits no discharge. No scleral icterus. Neck: No tracheal deviation present. Cardiovascular: Normal rate. No murmur heard. Pulmonary/Chest: No respiratory distress. She has no rales. Abdominal: Bowel sounds are normal. She exhibits no distension. There is no rebound. Musculoskeletal: She exhibits no tenderness or deformity. Lymphadenopathy:  
  She has no cervical adenopathy. Neurological: No cranial nerve deficit. She exhibits normal muscle tone. Skin: Skin is dry. No rash noted. She is not diaphoretic. Data Review: 
I have reviewed all labs, meds, telemetry events, and studies from the last 24 hours. Recent Results (from the past 24 hour(s)) CULTURE, BLOOD Collection Time: 03/23/19 12:24 PM  
Result Value Ref Range Special Requests: RIGHT 
HAND Culture result: NO GROWTH AFTER 17 HOURS    
URINE MICROSCOPIC Collection Time: 03/23/19 12:31 PM  
Result Value Ref Range WBC 10-20 0 /hpf  
 RBC 3-5 0 /hpf Epithelial cells 0-3 0 /hpf Bacteria 3+ (H) 0 /hpf Casts GRANULAR 0 /lpf Crystals, urine 0 0 /LPF Amorphous Crystals 1+ (H) 0 Mucus 0 0 /lpf HGB & HCT Collection Time: 03/24/19 12:40 AM  
Result Value Ref Range HGB 11.6 (L) 11.7 - 15.4 g/dL HCT 37.1 35.8 - 46.3 % METABOLIC PANEL, COMPREHENSIVE Collection Time: 03/24/19  6:11 AM  
Result Value Ref Range Sodium 141 136 - 145 mmol/L Potassium 3.9 3.5 - 5.1 mmol/L Chloride 106 98 - 107 mmol/L  
 CO2 32 21 - 32 mmol/L Anion gap 3 (L) 7 - 16 mmol/L Glucose 112 (H) 65 - 100 mg/dL BUN 18 8 - 23 MG/DL Creatinine 0.51 (L) 0.6 - 1.0 MG/DL  
 GFR est AA >60 >60 ml/min/1.73m2 GFR est non-AA >60 >60 ml/min/1.73m2 Calcium 8.5 8.3 - 10.4 MG/DL Bilirubin, total 0.3 0.2 - 1.1 MG/DL  
 ALT (SGPT) 24 12 - 65 U/L  
 AST (SGOT) 13 (L) 15 - 37 U/L Alk. phosphatase 96 50 - 136 U/L Protein, total 6.0 (L) 6.3 - 8.2 g/dL Albumin 2.5 (L) 3.2 - 4.6 g/dL Globulin 3.5 2.3 - 3.5 g/dL A-G Ratio 0.7 (L) 1.2 - 3.5    
CBC WITH AUTOMATED DIFF Collection Time: 03/24/19  6:11 AM  
Result Value Ref Range WBC 6.0 4.3 - 11.1 K/uL  
 RBC 3.80 (L) 4.05 - 5.2 M/uL  
 HGB 10.7 (L) 11.7 - 15.4 g/dL HCT 34.7 (L) 35.8 - 46.3 % MCV 91.3 79.6 - 97.8 FL  
 MCH 28.2 26.1 - 32.9 PG  
 MCHC 30.8 (L) 31.4 - 35.0 g/dL  
 RDW 14.1 11.9 - 14.6 % PLATELET 913 (L) 426 - 450 K/uL MPV 10.0 9.4 - 12.3 FL ABSOLUTE NRBC 0.00 0.0 - 0.2 K/uL  
 DF AUTOMATED NEUTROPHILS 79 (H) 43 - 78 % LYMPHOCYTES 13 13 - 44 % MONOCYTES 7 4.0 - 12.0 % EOSINOPHILS 1 0.5 - 7.8 %  BASOPHILS 0 0.0 - 2.0 %  
 IMMATURE GRANULOCYTES 1 0.0 - 5.0 %  
 ABS. NEUTROPHILS 4.8 1.7 - 8.2 K/UL  
 ABS. LYMPHOCYTES 0.8 0.5 - 4.6 K/UL  
 ABS. MONOCYTES 0.4 0.1 - 1.3 K/UL  
 ABS. EOSINOPHILS 0.1 0.0 - 0.8 K/UL  
 ABS. BASOPHILS 0.0 0.0 - 0.2 K/UL  
 ABS. IMM. GRANS. 0.0 0.0 - 0.5 K/UL  
POC G3 Collection Time: 03/24/19  8:43 AM  
Result Value Ref Range Device: NASAL CANNULA pH (POC) 7.381 7.35 - 7.45    
 pCO2 (POC) 49.9 (H) 35 - 45 MMHG  
 pO2 (POC) 86 75 - 100 MMHG  
 HCO3 (POC) 29.6 (H) 22 - 26 MMOL/L  
 sO2 (POC) 96 95 - 98 % Base excess (POC) 4 mmol/L Allens test (POC) YES Site RIGHT RADIAL Patient temp. 98.6 Specimen type (POC) ARTERIAL Performed by Bird   
 CO2, POC 31 MMOL/L Flow rate (POC) 2.000 L/min COLLECT TIME 840 All Micro Results Procedure Component Value Units Date/Time CULTURE, BLOOD [[id number]] Collected:  03/23/19 1112 Order Status:  Completed Specimen:  Blood Updated:  03/24/19 6114 Special Requests: --     
  LEFT Antecubital 
  
  Culture result: NO GROWTH AFTER 18 HOURS     
 CULTURE, BLOOD [[id number]] Collected:  03/23/19 1224 Order Status:  Completed Specimen:  Blood Updated:  03/24/19 6760 Special Requests: --     
  RIGHT 
HAND Culture result: NO GROWTH AFTER 17 HOURS Current Meds: 
Current Facility-Administered Medications Medication Dose Route Frequency  sodium chloride (NS) flush 5-40 mL  5-40 mL IntraVENous Q8H  
 sodium chloride (NS) flush 5-40 mL  5-40 mL IntraVENous PRN  
 0.9% sodium chloride infusion  75 mL/hr IntraVENous CONTINUOUS  
 acetaminophen (TYLENOL) tablet 650 mg  650 mg Oral Q4H PRN  
 HYDROcodone-acetaminophen (NORCO) 5-325 mg per tablet 1 Tab  1 Tab Oral Q4H PRN  
 naloxone (NARCAN) injection 0.4 mg  0.4 mg IntraVENous PRN  
 ondansetron (ZOFRAN) injection 4 mg  4 mg IntraVENous Q4H PRN  
 bisacodyl (DULCOLAX) suppository 10 mg  10 mg Rectal DAILY PRN  
  albuterol (PROVENTIL VENTOLIN) nebulizer solution 2.5 mg  2.5 mg Nebulization Q4H PRN  
 amLODIPine (NORVASC) tablet 10 mg  10 mg Oral DAILY  levETIRAcetam (KEPPRA) oral solution 1,500 mg  1,500 mg Per G Tube BID  losartan (COZAAR) tablet 100 mg  100 mg Oral DAILY  sertraline (ZOLOFT) tablet 200 mg  200 mg Per G Tube DAILY  simethicone (MYLICON) 26OC/1.7TD oral drops 80 mg  80 mg Per G Tube QID PRN  
 tamsulosin (FLOMAX) capsule 0.4 mg  0.4 mg Oral QHS  cefTRIAXone (ROCEPHIN) 1 g in 0.9% sodium chloride (MBP/ADV) 50 mL  1 g IntraVENous Q24H  nystatin (MYCOSTATIN) 100,000 unit/gram powder   Topical BID  hydrALAZINE (APRESOLINE) tablet 100 mg  100 mg Per NG tube TID  guaiFENesin (ROBITUSSIN) 100 mg/5 mL oral liquid 400 mg  400 mg Per G Tube Q8H Diet: DIET NPO 
DIET TUBE FEEDING Other Studies (last 24 hours): 
Ct Head Wo Cont Result Date: 3/23/2019 CT HEAD WITHOUT CONTRAST. INDICATION: Altered mental status. Multiple arteriovenous malformations on prior MRI. COMPARISON: 22 September 2018. TECHNIQUE:   5 mm axial scans from the skull base to the vertex. Our CT scanners use one or more of the following:  Automated exposure control, adjustment of the mA and or kV according to patient size, iterative reconstruction. FINDINGS:  No acute intraparenchymal hemorrhage or abnormal extra-axial fluid collection. The ventricles are normal size. There is large calcified mass in the right parietal and occipital lobe. Additional hyperattenuating foci this likely hyperdense to normal gray matter are demonstrated. These appear similar to prior exam.  No midline shift or mass effect. Included portion of the paranasal sinuses and orbits grossly unremarkable. IMPRESSION:  Multiple abnormalities as above which appear stable. These represent extensive arteriovenous malformations. Assessment and Plan:  
 
Hospital Problems as of 3/24/2019 Date Reviewed: 6/28/2018 Codes Class Noted - Resolved POA Gross hematuria ICD-10-CM: R31.0 ICD-9-CM: 599.71  3/24/2019 - Present Unknown UTI (urinary tract infection) ICD-10-CM: N39.0 ICD-9-CM: 599.0  3/23/2019 - Present Unknown * (Principal) Altered mental status ICD-10-CM: R41.82 
ICD-9-CM: 780.97  3/23/2019 - Present Unknown Confusion ICD-10-CM: R41.0 ICD-9-CM: 298.9  3/23/2019 - Present Unknown Dementia without behavioral disturbance (Chronic) ICD-10-CM: F03.90 ICD-9-CM: 294.20  2/1/2019 - Present Yes Seizure disorder ([facility] [address] (Chronic) ICD-10-CM: G40.909 ICD-9-CM: 345.90  1/22/2018 - Present Yes Essential hypertension (Chronic) ICD-10-CM: I10 
ICD-9-CM: 401.9  1/19/2015 - Present Yes Morbid obesity with BMI of 40.0-44.9, adult (HCC) (Chronic) ICD-10-CM: E66.01, Z68.41 
ICD-9-CM: 278.01, V85.41  1/19/2015 - Present Yes A/P:   
Encephalopathy -- suspect uti on baseline dementia-  
            Rocephin, ivf, cultures-- 
  
Multiple comorbidities and  states wife would not want this and he is considering hospice care-- pcc when available --discuss end of life care with spouse 
  
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 
  
Discharge planning:  snf possible hospice pend above DVT ppx: sq heparin 
  
Code status:  Full (for now per job) Decision Maker: 
Jac Barrera,   [phone]

## 2019-03-24 NOTE — PROGRESS NOTES
ROGER recevied from [address]. Patient remains in stable condition with respirations even/unlabored. No acute distress noted at this time. Oxygen noted at 2 LPM NC. IVF: NS @ 75 mL per hour.  cath noted hanging to gravity at bedside with bright red urine noted. Urine appears to be clearing in color from last night. Patient does not answer when nurse speaks to her. Call light remains within reach, patient encouraged to call nurse prn assist. Will continue to monitor per policy.

## 2019-03-24 NOTE — PROGRESS NOTES
Jd Fitzpatrick LPN from [facility] called at this time to check on Mrs. Herron. . Nazanin Pappas gives this nurse permission to speak with her. Jd Fitzpatrick reports that patient had recently had diarrhea at [facility] and had been tested for C-Diff and found negative. Jd Fitzpatrick reports that the diarrhea looked just like patient's tube feeding and reports she doesn't think Mrs. Herron was tolerating the feeding very well. She reports the patient being on Jevity 1.5 @ 65 mL/hr with a 60 mL water flush every hour. Jd Fitzpatrick also reports that patient was on Depakote sprinkles secondary to psych consult for yelling out inappropriately. Depakote sprinkles did not seem to give patient much relief from agitation. Current feeding orders are Jevity 1.2 @ 50 mL/hr with 30 mL water flush every one hour. Will watch for diarrhea. Feeding started on feeding pump. See flow sheet.

## 2019-03-24 NOTE — PROGRESS NOTES
Received BSR from RN patient is resting in bed  Avila is intact with bloody urine  Respirations are even unlabored on 2L NC  There are no signs of distress  day shift RN stated that she will notify doctor of hematuria   will continue to monitor

## 2019-03-25 LAB
ANION GAP SERPL CALC-SCNC: 6 MMOL/L (ref 7–16)
APPEARANCE UR: ABNORMAL
BACTERIA URNS QL MICRO: 0 /HPF
BASOPHILS # BLD: 0 K/UL (ref 0–0.2)
BASOPHILS NFR BLD: 1 % (ref 0–2)
BILIRUB UR QL: NEGATIVE
BUN SERPL-MCNC: 16 MG/DL (ref 8–23)
CALCIUM SERPL-MCNC: 8.6 MG/DL (ref 8.3–10.4)
CASTS URNS QL MICRO: ABNORMAL /LPF
CHLORIDE SERPL-SCNC: 105 MMOL/L (ref 98–107)
CO2 SERPL-SCNC: 30 MMOL/L (ref 21–32)
COLOR UR: ABNORMAL
CREAT SERPL-MCNC: 0.45 MG/DL (ref 0.6–1)
DIFFERENTIAL METHOD BLD: ABNORMAL
EOSINOPHIL # BLD: 0.2 K/UL (ref 0–0.8)
EOSINOPHIL NFR BLD: 3 % (ref 0.5–7.8)
EPI CELLS #/AREA URNS HPF: 0 /HPF
ERYTHROCYTE [DISTWIDTH] IN BLOOD BY AUTOMATED COUNT: 14.3 % (ref 11.9–14.6)
GLUCOSE SERPL-MCNC: 154 MG/DL (ref 65–100)
GLUCOSE UR STRIP.AUTO-MCNC: NEGATIVE MG/DL
HCT VFR BLD AUTO: 34.5 % (ref 35.8–46.3)
HGB BLD-MCNC: 10.7 G/DL (ref 11.7–15.4)
HGB UR QL STRIP: ABNORMAL
IMM GRANULOCYTES # BLD AUTO: 0 K/UL (ref 0–0.5)
IMM GRANULOCYTES NFR BLD AUTO: 0 % (ref 0–5)
KETONES UR QL STRIP.AUTO: ABNORMAL MG/DL
LEUKOCYTE ESTERASE UR QL STRIP.AUTO: ABNORMAL
LYMPHOCYTES # BLD: 0.8 K/UL (ref 0.5–4.6)
LYMPHOCYTES NFR BLD: 12 % (ref 13–44)
MAGNESIUM SERPL-MCNC: 2 MG/DL (ref 1.8–2.4)
MCH RBC QN AUTO: 28.8 PG (ref 26.1–32.9)
MCHC RBC AUTO-ENTMCNC: 31 G/DL (ref 31.4–35)
MCV RBC AUTO: 92.7 FL (ref 79.6–97.8)
MONOCYTES # BLD: 0.6 K/UL (ref 0.1–1.3)
MONOCYTES NFR BLD: 8 % (ref 4–12)
NEUTS SEG # BLD: 5.3 K/UL (ref 1.7–8.2)
NEUTS SEG NFR BLD: 76 % (ref 43–78)
NITRITE UR QL STRIP.AUTO: NEGATIVE
NRBC # BLD: 0 K/UL (ref 0–0.2)
PH UR STRIP: 6.5 [PH] (ref 5–9)
PHOSPHATE SERPL-MCNC: 3.3 MG/DL (ref 2.3–3.7)
PLATELET # BLD AUTO: 145 K/UL (ref 150–450)
PMV BLD AUTO: 10.1 FL (ref 9.4–12.3)
POTASSIUM SERPL-SCNC: 3.5 MMOL/L (ref 3.5–5.1)
PROT UR STRIP-MCNC: 300 MG/DL
RBC # BLD AUTO: 3.72 M/UL (ref 4.05–5.2)
RBC #/AREA URNS HPF: >100 /HPF
SODIUM SERPL-SCNC: 141 MMOL/L (ref 136–145)
SP GR UR REFRACTOMETRY: 1.02 (ref 1–1.02)
UROBILINOGEN UR QL STRIP.AUTO: 1 EU/DL (ref 0.2–1)
WBC # BLD AUTO: 7 K/UL (ref 4.3–11.1)
WBC URNS QL MICRO: >100 /HPF

## 2019-03-25 PROCEDURE — 74011250636 HC RX REV CODE- 250/636: Performed by: INTERNAL MEDICINE

## 2019-03-25 PROCEDURE — 74011250637 HC RX REV CODE- 250/637: Performed by: INTERNAL MEDICINE

## 2019-03-25 PROCEDURE — 80048 BASIC METABOLIC PNL TOTAL CA: CPT

## 2019-03-25 PROCEDURE — 85025 COMPLETE CBC W/AUTO DIFF WBC: CPT

## 2019-03-25 PROCEDURE — 83735 ASSAY OF MAGNESIUM: CPT

## 2019-03-25 PROCEDURE — 74011000258 HC RX REV CODE- 258: Performed by: INTERNAL MEDICINE

## 2019-03-25 PROCEDURE — 96366 THER/PROPH/DIAG IV INF ADDON: CPT

## 2019-03-25 PROCEDURE — 77030019605

## 2019-03-25 PROCEDURE — 77030018798 HC PMP KT ENTRL FED COVD -A

## 2019-03-25 PROCEDURE — 99222 1ST HOSP IP/OBS MODERATE 55: CPT | Performed by: NURSE PRACTITIONER

## 2019-03-25 PROCEDURE — 84100 ASSAY OF PHOSPHORUS: CPT

## 2019-03-25 PROCEDURE — 99218 HC RM OBSERVATION: CPT

## 2019-03-25 PROCEDURE — 36415 COLL VENOUS BLD VENIPUNCTURE: CPT

## 2019-03-25 PROCEDURE — 81001 URINALYSIS AUTO W/SCOPE: CPT

## 2019-03-25 RX ORDER — LOSARTAN POTASSIUM 50 MG/1
50 TABLET ORAL DAILY
Status: DISCONTINUED | OUTPATIENT
Start: 2019-03-25 | End: 2019-03-28 | Stop reason: HOSPADM

## 2019-03-25 RX ORDER — AMLODIPINE BESYLATE 5 MG/1
5 TABLET ORAL DAILY
Status: DISCONTINUED | OUTPATIENT
Start: 2019-03-25 | End: 2019-03-28 | Stop reason: HOSPADM

## 2019-03-25 RX ORDER — POTASSIUM CHLORIDE 1.5 G/1.77G
40 POWDER, FOR SOLUTION ORAL
Status: COMPLETED | OUTPATIENT
Start: 2019-03-25 | End: 2019-03-25

## 2019-03-25 RX ADMIN — HYDROCODONE BITARTRATE AND ACETAMINOPHEN 1 TABLET: 5; 325 TABLET ORAL at 04:11

## 2019-03-25 RX ADMIN — HYDROCODONE BITARTRATE AND ACETAMINOPHEN 1 TABLET: 5; 325 TABLET ORAL at 20:44

## 2019-03-25 RX ADMIN — HYDRALAZINE HYDROCHLORIDE 100 MG: 50 TABLET, FILM COATED ORAL at 17:03

## 2019-03-25 RX ADMIN — HYDRALAZINE HYDROCHLORIDE 100 MG: 50 TABLET, FILM COATED ORAL at 20:42

## 2019-03-25 RX ADMIN — NYSTATIN: 100000 POWDER TOPICAL at 09:22

## 2019-03-25 RX ADMIN — AMLODIPINE BESYLATE 5 MG: 5 TABLET ORAL at 09:12

## 2019-03-25 RX ADMIN — LEVETIRACETAM 1500 MG: 100 SOLUTION ORAL at 17:09

## 2019-03-25 RX ADMIN — Medication 1 AMPULE: at 20:41

## 2019-03-25 RX ADMIN — LEVETIRACETAM 1500 MG: 100 SOLUTION ORAL at 09:10

## 2019-03-25 RX ADMIN — GUAIFENESIN 400 MG: 100 SOLUTION ORAL at 05:13

## 2019-03-25 RX ADMIN — NYSTATIN: 100000 POWDER TOPICAL at 17:18

## 2019-03-25 RX ADMIN — GUAIFENESIN 400 MG: 100 SOLUTION ORAL at 20:41

## 2019-03-25 RX ADMIN — GUAIFENESIN 400 MG: 100 SOLUTION ORAL at 17:09

## 2019-03-25 RX ADMIN — TAMSULOSIN HYDROCHLORIDE 0.4 MG: 0.4 CAPSULE ORAL at 20:43

## 2019-03-25 RX ADMIN — Medication 1 AMPULE: at 09:07

## 2019-03-25 RX ADMIN — CEFTRIAXONE SODIUM 1 G: 1 INJECTION, POWDER, FOR SOLUTION INTRAMUSCULAR; INTRAVENOUS at 13:00

## 2019-03-25 RX ADMIN — HYDROCODONE BITARTRATE AND ACETAMINOPHEN 1 TABLET: 5; 325 TABLET ORAL at 09:58

## 2019-03-25 RX ADMIN — Medication 10 ML: at 13:32

## 2019-03-25 RX ADMIN — SERTRALINE HYDROCHLORIDE 200 MG: 100 TABLET ORAL at 09:10

## 2019-03-25 RX ADMIN — POTASSIUM CHLORIDE 40 MEQ: 1.5 POWDER, FOR SOLUTION ORAL at 09:10

## 2019-03-25 RX ADMIN — Medication 10 ML: at 05:13

## 2019-03-25 RX ADMIN — Medication 10 ML: at 20:43

## 2019-03-25 NOTE — PROGRESS NOTES
Hospitalist Progress Note Admit Date:  3/23/2019 10:43 AM  
Name:  Erin Washington Age:  76 y.o. 
:  1950 MRN:  [id number] PCP:  Bernarda Landeros DO Treatment Team: Attending Provider: Wilton Mullen DO; Utilization Review: David Cantu RN; Care Manager: Luis Pérez RN; Consulting Provider: Naty Bar NP Subjective: HPI: 
70 female here with altered mental status.  History is limited from patient. History of hypertension, seizure. From nursing home.  Strong urinary odor.  Was reported by EMS to nursing staff that there were possible Escherichia coli in the urine but has not started treatment yet. Unable to obtain history from patient. 
  
  
  
3/23/19: 
75 yo female with PMH of dementia, HTN, stroke,dysphagia with peg and purreed honey thick diet which she refuses,  seizures, RLS, neuralgia, morbid obesity, hypercholesterolemia, OA, hearing loss.  Pt has been at [facility] as a long term care resident.  job has arrived and he is wrestling with code status and does want to speak early next week with pall. Care when available because he knows his wife does not want to spend her last days in and out of hospitals. Recent admit with resp failure and recurrent utis.             Presents from snf by ems with uti and altered mental status. Pt had dnr prior but  changed his mind. She is to be admitted with ams and uti to observation. No high fevers or leukocytosis. She may be appropriate to discharge to hospice care if he agrees and if logistically possible 
  
3/24/19: 
Sedate but arousable. Hx of dementia but unclear baseline. Perhaps more alert than 3/23? ABG ordered due to smoking hx and sonorous respiration and evidence of chronic hypercarbic respiratory failure. No acute findings. Pyuria in er but I could not confirm that urine culture set up and I have re-ordered/ordered. Blood cultures sent. Gross hematuria since admit and wu cath.  wanted to speak with palliative care about possible hospice care on admission but also requested full code status. 3/25/19 Pt more alert but one word answers and advanced dementia. I called  job to assess if patient is at baseline and he says that yesterday she was \"almost there\" but more fatigued. He is frustrated about meeting with palliative care/hospice decisions later today but again states \"I don't want her to go through a whole lot\". He continues to desire full code status as of this am. Still with blood in wu catheter but clearing compared to 3/24/19. Need disposition Objective:  
 
Patient Vitals for the past 24 hrs: 
 Temp Pulse Resp BP SpO2  
03/25/19 0708 98.6 °F (37 °C) 76 18 102/62 92 % 03/25/19 0350 98.8 °F (37.1 °C) 86 18 122/69 94 % 03/25/19 0011 98.3 °F (36.8 °C) 93 18 126/70 93 % 03/24/19 1958 98.1 °F (36.7 °C) 94 19 125/73 93 % 03/24/19 1512 98.8 °F (37.1 °C) 91 18 121/71 95 % 03/24/19 1130 98.2 °F (36.8 °C) 93 19 105/60 90 % Oxygen Therapy O2 Sat (%): 92 % (03/25/19 0708) Pulse via Oximetry: 73 beats per minute (03/23/19 1336) O2 Device: Nasal cannula (03/25/19 8445) O2 Flow Rate (L/min): 2 l/min (03/25/19 0922) Intake/Output Summary (Last 24 hours) at 3/25/2019 1037 Last data filed at 3/25/2019 8335 Gross per 24 hour Intake 1391 ml Output 1000 ml Net 391 ml Physical Examination: 
Physical Exam  
Constitutional: No distress. Now responds to voice - advanced dementia HENT:  
Nose: Nose normal.  
Mouth/Throat: No oropharyngeal exudate. Eyes: Conjunctivae are normal. No scleral icterus. Neck: No JVD present. No tracheal deviation present. Cardiovascular: Normal rate. Exam reveals no gallop. Pulmonary/Chest: Breath sounds normal. No respiratory distress. Abdominal: There is no tenderness. There is no rebound. Wu with bloody urine Musculoskeletal: She exhibits no tenderness or deformity. Neurological:  
Advanced dementia - verbal but not alert to place or time. Skin: Skin is dry. No rash noted. She is not diaphoretic. Psychiatric:  
Short term memory absent Data Review: 
I have reviewed all labs, meds, telemetry events, and studies from the last 24 hours. Recent Results (from the past 24 hour(s)) CULTURE, URINE Collection Time: 03/24/19 12:45 PM  
Result Value Ref Range Special Requests: NO SPECIAL REQUESTS Culture result:     
  10,000 to 50,000 COLONIES/mL MIXED SKIN TRINO ISOLATED METABOLIC PANEL, BASIC Collection Time: 03/25/19  4:08 AM  
Result Value Ref Range Sodium 141 136 - 145 mmol/L Potassium 3.5 3.5 - 5.1 mmol/L Chloride 105 98 - 107 mmol/L  
 CO2 30 21 - 32 mmol/L Anion gap 6 (L) 7 - 16 mmol/L Glucose 154 (H) 65 - 100 mg/dL BUN 16 8 - 23 MG/DL Creatinine 0.45 (L) 0.6 - 1.0 MG/DL  
 GFR est AA >60 >60 ml/min/1.73m2 GFR est non-AA >60 >60 ml/min/1.73m2 Calcium 8.6 8.3 - 10.4 MG/DL  
CBC WITH AUTOMATED DIFF Collection Time: 03/25/19  4:08 AM  
Result Value Ref Range WBC 7.0 4.3 - 11.1 K/uL  
 RBC 3.72 (L) 4.05 - 5.2 M/uL  
 HGB 10.7 (L) 11.7 - 15.4 g/dL HCT 34.5 (L) 35.8 - 46.3 % MCV 92.7 79.6 - 97.8 FL  
 MCH 28.8 26.1 - 32.9 PG  
 MCHC 31.0 (L) 31.4 - 35.0 g/dL  
 RDW 14.3 11.9 - 14.6 % PLATELET 250 (L) 724 - 450 K/uL MPV 10.1 9.4 - 12.3 FL ABSOLUTE NRBC 0.00 0.0 - 0.2 K/uL  
 DF AUTOMATED NEUTROPHILS 76 43 - 78 % LYMPHOCYTES 12 (L) 13 - 44 % MONOCYTES 8 4.0 - 12.0 % EOSINOPHILS 3 0.5 - 7.8 % BASOPHILS 1 0.0 - 2.0 % IMMATURE GRANULOCYTES 0 0.0 - 5.0 %  
 ABS. NEUTROPHILS 5.3 1.7 - 8.2 K/UL  
 ABS. LYMPHOCYTES 0.8 0.5 - 4.6 K/UL  
 ABS. MONOCYTES 0.6 0.1 - 1.3 K/UL  
 ABS. EOSINOPHILS 0.2 0.0 - 0.8 K/UL  
 ABS. BASOPHILS 0.0 0.0 - 0.2 K/UL  
 ABS. IMM. GRANS. 0.0 0.0 - 0.5 K/UL PHOSPHORUS  Collection Time: 03/25/19  4:08 AM  
 Result Value Ref Range Phosphorus 3.3 2.3 - 3.7 MG/DL MAGNESIUM Collection Time: 03/25/19  4:08 AM  
Result Value Ref Range Magnesium 2.0 1.8 - 2.4 mg/dL All Micro Results Procedure Component Value Units Date/Time CULTURE, BLOOD [[id number]] Collected:  03/23/19 1112 Order Status:  Completed Specimen:  Blood Updated:  03/25/19 9615 Special Requests: --     
  LEFT Antecubital 
  
  Culture result: NO GROWTH 2 DAYS     
 CULTURE, BLOOD [[id number]] Collected:  03/23/19 1224 Order Status:  Completed Specimen:  Blood Updated:  03/25/19 3646 Special Requests: --     
  RIGHT 
HAND Culture result: NO GROWTH 2 DAYS     
 CULTURE, URINE [[id number]] Collected:  03/24/19 1245 Order Status:  Completed Specimen:  Urine from Avila Specimen Updated:  03/25/19 2186 Special Requests: NO SPECIAL REQUESTS Culture result:    
  10,000 to 50,000 COLONIES/mL MIXED SKIN TRINO ISOLATED Current Meds: 
Current Facility-Administered Medications Medication Dose Route Frequency  amLODIPine (NORVASC) tablet 5 mg  5 mg Per G Tube DAILY  losartan (COZAAR) tablet 50 mg  50 mg Per G Tube DAILY  NUTRITIONAL SUPPORT ELECTROLYTE PRN ORDERS   Does Not Apply PRN  
 alcohol 62% (NOZIN) nasal  1 Ampule  1 Ampule Topical Q12H  
 HYDROcodone-acetaminophen (NORCO) 5-325 mg per tablet 1 Tab  1 Tab Per G Tube Q4H PRN  
 acetaminophen (TYLENOL) tablet 650 mg  650 mg Per G Tube Q4H PRN  
 sodium chloride (NS) flush 5-40 mL  5-40 mL IntraVENous Q8H  
 sodium chloride (NS) flush 5-40 mL  5-40 mL IntraVENous PRN  
 naloxone (NARCAN) injection 0.4 mg  0.4 mg IntraVENous PRN  
 ondansetron (ZOFRAN) injection 4 mg  4 mg IntraVENous Q4H PRN  
 bisacodyl (DULCOLAX) suppository 10 mg  10 mg Rectal DAILY PRN  
 albuterol (PROVENTIL VENTOLIN) nebulizer solution 2.5 mg  2.5 mg Nebulization Q4H PRN  
 levETIRAcetam (KEPPRA) oral solution 1,500 mg  1,500 mg Per G Tube BID  
  sertraline (ZOLOFT) tablet 200 mg  200 mg Per G Tube DAILY  simethicone (MYLICON) 96HS/3.0JP oral drops 80 mg  80 mg Per G Tube QID PRN  
 tamsulosin (FLOMAX) capsule 0.4 mg  0.4 mg Oral QHS  cefTRIAXone (ROCEPHIN) 1 g in 0.9% sodium chloride (MBP/ADV) 50 mL  1 g IntraVENous Q24H  nystatin (MYCOSTATIN) 100,000 unit/gram powder   Topical BID  hydrALAZINE (APRESOLINE) tablet 100 mg  100 mg Per NG tube TID  guaiFENesin (ROBITUSSIN) 100 mg/5 mL oral liquid 400 mg  400 mg Per G Tube Q8H Diet: DIET NPO 
DIET TUBE FEEDING Other Studies (last 24 hours): No results found. Assessment and Plan:  
 
Hospital Problems as of 3/25/2019 Date Reviewed: 6/28/2018 Codes Class Noted - Resolved POA Gross hematuria ICD-10-CM: R31.0 ICD-9-CM: 599.71  3/24/2019 - Present Unknown UTI (urinary tract infection) ICD-10-CM: N39.0 ICD-9-CM: 599.0  3/23/2019 - Present Unknown * (Principal) Altered mental status ICD-10-CM: R41.82 
ICD-9-CM: 780.97  3/23/2019 - Present Unknown Confusion ICD-10-CM: R41.0 ICD-9-CM: 298.9  3/23/2019 - Present Unknown Dementia without behavioral disturbance (Chronic) ICD-10-CM: F03.90 ICD-9-CM: 294.20  2/1/2019 - Present Yes Seizure disorder ([facility] [address] (Chronic) ICD-10-CM: G40.909 ICD-9-CM: 345.90  1/22/2018 - Present Yes Essential hypertension (Chronic) ICD-10-CM: I10 
ICD-9-CM: 401.9  1/19/2015 - Present Yes Morbid obesity with BMI of 40.0-44.9, adult (HCC) (Chronic) ICD-10-CM: E66.01, Z68.41 
ICD-9-CM: 278.01, V85.41  1/19/2015 - Present Yes A/P:   
Encephalopathy -- suspect hemorrhagic uti on baseline dementia-  
            improved still withdrawaln lethargic and unknown baseline Rocephin to continue with c and s <100k of mixed magali  If hsuband decides to pursue hospice AND able to discharge to snf would dc wu and use empiric quinolone 
  
 Multiple comorbidities and  states wife would not want this and he is considering hospice care-- pcc when available --discuss end of life care with spouse--1pm today per Adrian Stager (spouse) 
  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 
  
Discharge planning:  snf possible hospice pend above DVT ppx: sq heparin 
  
Code status:  Full (for now per job) Decision Maker: 
Lynne Karluk,   [phone]

## 2019-03-25 NOTE — ACP (ADVANCE CARE PLANNING)
Mr. Ruth Malcolm said that his goal is to keep his wife comfortable. He is interested in the addition of hospice services to help him meet this goal.  JUDI Moya is aware; he will speak to [facility] who contracts with a particular hospice agency. Brenda Lozano is aware that, if hospice is not a good fit at this time, the  would like a palliative care service. Mr. Ruth Malcolm is not yet prepared to change his wife's code status to DNR. When asked if he would want the pt intubated if her breathing were impaired, he said that he doesn't know. We discussed that hospice will talk to him about code status and will assist him in making the change when he is ready.

## 2019-03-25 NOTE — PROGRESS NOTES
Patient is resting with no current signs of pain  Continuous feeding Formula options: High Fiber (Jevity 1.2) Delivery Method Continuous Starting rate (mL/hr) 50 Advance No   
Free Water Yes Volume (mL) 25 Frequency Q 1 hour Start Status 03/24/19 1100 Sent Avila in intact/bloody drainage with sediment respirations are even unlabored on 2L NC  Will give BSR to oncoming RN

## 2019-03-25 NOTE — CONSULTS
Palliative Care    Patient: King Traci MRN: [id number]  SSN: [id number]    YOB: 1950  Age: 76 y.o. Sex: female      Date of Request: 3/23/19  Date of Consult:  3/25/2019  Reason for Consult:  goals of care  Requesting Physician: Dr. Jose Galan     Assessment/Plan:     Principal Diagnosis:    Dementia  F03.90     Additional Diagnoses:   · Altered Mental Status R41.82  · Dyspnea  R06.00  · Dysphagia  R13.10  · Counseling, Encounter for Medical Advice  Z71.9  · Encounter for Palliative Care  Z51.5    Palliative Performance Scale (PPS):  PPS: 10    Medical Decision Making:   Reviewed and summarized notes from admission to present. Discussed case with appropriate providers: RORY Gastelum, Dr. Jose Galan. Reviewed laboratory and x-ray data from admission to present. Pt is resting in bed, no visitors at bedside. Pt briefly opens her eyes to voice, then falls asleep again. Was given Norco once yesterday and twice today; during her last hospitalization, in July-August of 2018, she c/o pain in her R leg. The pt receives feedings and meds through a PEG. She is a long-term resident at [place] [address] on phone this morning with , [phone]. He says he will arrive here between 1 and 2 today, and will call me when he arrives. RORY Oakes is aware and will also call me when he sees Mr. Theresa Pardo. In August 2108, the pt stated she wanted to be DNR, and code status was changed accordingly. Per this visit 's H&P,  later changed code status back to full code. During meeting this afternoon, will discuss goals of care, including code status and appropriateness of hospice. 5 - Met with  in pt's room. Pt able to respond to basic questions. The pt was able to say that her current pain is not in her leg as it was on her previous admission, but in her back. She denies N/V and SOB (on O2). Mr. Theresa Pardo said that his goal is to keep his wife comfortable.   He is interested in the addition of hospice services to help him meet this goal.  JUDI Whitley is aware; he will speak to [facility] who contracts with a particular hospice agency. Gwendolyn Palencia is aware that, if hospice is not a good fit at this time, the  would like a palliative care service. Mr. Nick Roca is not yet prepared to change his wife's code status to DNR. rvice. Mr. Nick Roca is not yet prepared to change his wife's code status to DNR. When asked if he would want the pt intubated if her breathing were impaired, he said that he doesn't know. We discussed that hospice will talk to him about code status and will assist him in making the change when he is ready. Will continue to follow. Thank you for this referral.          .    Subjective:     History obtained from:  Family, Care Provider and Chart    Chief Complaint: Pt unable to state. History of Present Illness:  Ms. Nick Roca is a 75 yo F with a PMH of dementia, HTN, stroke,dysphagia with peg and purreed honey thick diet which she refuses, and other conditions listed below who was sent from [facility] where she is a long-term care resident to the ER on 3/23/19 with c/o of UTI and AMS. Advance Directive: No       Code Status:  Full Code            Health Care Power of : No - Patient does not have a [address]  is health care surrogate.      Past Medical History:   Diagnosis Date    Chronic pain     \"all over body\"-takes Zoloft daily    Chronic sinusitis 1/19/2015    Diverticulosis 2017    Edema 1/19/2015    Resolved    Former smoker     GERD (gastroesophageal reflux disease)     Headache 1/19/2015    Hearing loss 1/19/2015    \"some\"- no hearing aids    Heart murmur 1990    Hip osteoarthritis 1/19/2015    feet, hands    Hx of colonic polyps 2017    SSA    Hx of migraines     on medication    Hypercholesterolemia 1/19/2015    Hypertension     Impaired fasting glucose 1/19/2015    Joint disorder 1/19/2015    Morbid obesity ([facility]     BMI 40    Neuralgia 1/19/2015    Phlebitis and thrombophlebitis of superficial vessels of lower extremities 01/19/2015    pt denies     Psychiatric disorder     Restless leg syndrome 1/19/2015    Rheumatic fever     pt reports possibly R.F.    Seizures ([facility]     seizures as a child, has NOT had one since she was 13years old    SOB (shortness of breath) on exertion     Stroke ([facility]     at age 11 only residual poor peripheral vision    Tobacco abuse 1/19/2015    quit smoking 4/2015    Vascular anomalies, congenital     Vitamin D deficiency 1/19/2015      Past Surgical History:   Procedure Laterality Date    COLONOSCOPY N/A 2/20/2017    Bjorn--appendiceal serrated sessile polyp, transverse and rectal hyperplastic--3 year recall    HX APPENDECTOMY  03/15/2017    HX CATARACT REMOVAL Bilateral 3/2016    HX CHOLECYSTECTOMY  1982    HX MOHS PROCEDURES Right 1995    pt denies    HX ROTATOR CUFF REPAIR Right 10/31/2016    x2    HX TUBAL LIGATION  1982    TOTAL HIP ARTHROPLASTY Right 2005    and again 5/2015 and another revision     Family History   Problem Relation Age of Onset    Hypertension Mother     Cancer Mother         Bladder    Depression Mother     Parkinson's Disease Father     Alcohol abuse Brother     Alcohol abuse Son     No Known Problems Sister     No Known Problems Brother     Malignant Hyperthermia Neg Hx     Pseudocholinesterase Deficiency Neg Hx     Delayed Awakening Neg Hx     Post-op Nausea/Vomiting Neg Hx     Emergence Delirium Neg Hx     Post-op Cognitive Dysfunction Neg Hx     Other Neg Hx       Social History     Tobacco Use    Smoking status: Former Smoker     Packs/day: 1.00     Years: 50.00     Pack years: 50.00     Types: Cigarettes     Last attempt to quit: 3/29/2015     Years since quitting: 3.9    Smokeless tobacco: Never Used    Tobacco comment: quit 2 months and 2 weeks ago   Substance Use Topics    Alcohol use: No     Alcohol/week: 0.0 oz     Comment: Former- socially     Prior to Admission medications    Medication Sig Start Date End Date Taking? Authorizing Provider   oxyCODONE IR (ROXICODONE) 5 mg immediate release tablet 0.5 Tabs by Per NG tube route every eight (8) hours as needed. Max Daily Amount: 7.5 mg. 2/5/19   Chad CHOUDHARY, DO   albuterol (PROVENTIL VENTOLIN) 2.5 mg /3 mL (0.083 %) nebulizer solution 3 mL by Nebulization route every four (4) hours as needed for Wheezing or Shortness of Breath. 2/5/19   Chad CHOUDHARY, DO   guaiFENesin ER (MUCINEX) 1,200 mg Ta12 ER tablet Take 1 Tab by mouth two (2) times a day. 2/5/19   Jon Saba, DO   Lactose-Free Food with Fiber (JEVITY 1.5 KELLY) 0.06 gram-1.5 kcal/mL liqd Take 65 mL/hr by mouth. Start at 1800 and run unit 1040 ml have infused    Provider, Historical   bisacodyl (DULCOLAX, BISACODYL,) 10 mg suppository Insert 10 mg into rectum daily as needed. Provider, Historical   simethicone (MYLICON) 40 PT/7.7 mL drops Take 80 mg by mouth four (4) times daily as needed. Provider, Historical   tamsulosin (FLOMAX) 0.4 mg capsule Take 0.4 mg by mouth nightly. Provider, Historical   acetaminophen (TYLENOL) 325 mg tablet 650 mg by Per G Tube route every six (6) hours as needed for Pain. Provider, Historical   hydrALAZINE (APRESOLINE) 100 mg tablet 1 Tab by Per NG tube route three (3) times daily. 8/6/18   Dylon Su MD   levETIRAcetam (KEPPRA) 100 mg/ml soln oral solution 15 mL by Per G Tube route two (2) times a day for 30 days. 8/6/18 9/5/18  Dylon Su MD   amLODIPine (NORVASC) 10 mg tablet Take 10 mg by mouth daily. Other, MD Mauri   losartan (COZAAR) 100 mg tablet Take 1 Tab by mouth daily. Indications: hypertension  Patient taking differently: 100 mg by Per G Tube route daily.  6/18/18   Conrado Vizcarra DO   ergocalciferol (VITAMIN D2) 50,000 unit capsule TAKE 1 CAPSULE ONE TIME WEEKLY- takes on sat 3/1/18   Salma Partida, Micheal KOCH DO   sertraline (ZOLOFT) 100 mg tablet Take 2 Tabs by mouth daily. Patient taking differently: 200 mg by Per G Tube route daily. 1/25/18   Ciarra Ramsey DO       No Known Allergies     Review of Systems:  A comprehensive review of systems was negative except for: Respiratory: Positive for dyspnea. Genitourinary: Positive for UTI . Behavioral/Psychiatric: Positive for  Hx of severe dementia     Objective:     Visit Vitals  /62   Pulse 76   Temp 98.6 °F (37 °C)   Resp 18   Wt 231 lb (104.8 kg)   SpO2 92%   BMI 40.92 kg/m²        Physical Exam:    General:  Minimally responsive. No acute distress. Eyes:  Conjunctivae/corneas clear    Nose: Nares normal. Septum midline. Neck: Supple, symmetrical, trachea midline, no JVD   Lungs:   Clear to auscultation bilaterally, unlabored   Heart:  Regular rate and rhythm, no murmur    Abdomen:   Soft, non-tender, non-distended   Extremities: Normal, atraumatic, no cyanosis or edema   Skin: Skin color, texture, turgor normal. No rash or lesions. Neurologic: Verbal statements are meandering. Psych: Responds to basic questions.       Assessment:     Hospital Problems  Date Reviewed: 6/28/2018          Codes Class Noted POA    Gross hematuria ICD-10-CM: R31.0  ICD-9-CM: 599.71  3/24/2019 Unknown        UTI (urinary tract infection) ICD-10-CM: N39.0  ICD-9-CM: 599.0  3/23/2019 Unknown        * (Principal) Altered mental status ICD-10-CM: R41.82  ICD-9-CM: 780.97  3/23/2019 Unknown        Confusion ICD-10-CM: R41.0  ICD-9-CM: 298.9  3/23/2019 Unknown        Dementia without behavioral disturbance (Chronic) ICD-10-CM: F03.90  ICD-9-CM: 294.20  2/1/2019 Yes        Seizure disorder ([facility] (Chronic) ICD-10-CM: G40.909  ICD-9-CM: 345.90  1/22/2018 Yes        Essential hypertension (Chronic) ICD-10-CM: I10  ICD-9-CM: 401.9  1/19/2015 Yes        Morbid obesity with BMI of 40.0-44.9, adult (HCC) (Chronic) ICD-10-CM: E66.01, Z68.41  ICD-9-CM: 278.01, V85.41 1/19/2015 Yes              Signed By: Franco No, NP     March 25, 2019

## 2019-03-25 NOTE — PHYSICIAN ADVISORY
Letter of Determination:  Outpatient Status receiving Observation Services This case was reviewed, and I concur with Outpatient status receiving Observation services. This determination may change depending on further medical information, condition changes of the patient, or treatment requirements. Garrison Booker MD, FERNANDO, Physician Advisor [address] [place] [address].

## 2019-03-25 NOTE — INTERDISCIPLINARY ROUNDS
Interdisciplinary team rounds were held 3/25/2019 with the following team members:Care Management, Physical Therapy, Physician and Clinical Coordinator and the patient and spouse. Plan of care discussed. See clinical pathway and/or care plan for interventions and desired outcomes.

## 2019-03-25 NOTE — PROGRESS NOTES
Pt resting in the bed with HOB elevated. Pt is stable. Pt is alert and oriented to person. Pt speech is hard to understand. Pt denies pain or discomfort. Respirations are even and unlabored on nasal cannula 2 L/min. Bed is in low and locked position. Call light is within reach. Bed alarm is in place. Door is open when unattended. Will continue to monitor.

## 2019-03-25 NOTE — PROGRESS NOTES
Received BSR from RN  Patient is sleeping in bed  Avila is intact/draining blood  Continuous feeding Access type PEG/G tube Formula options: High Fiber (Jevity 1.2) Delivery Method Continuous Starting rate (mL/hr) 50 Advance No   
Free Water Yes Volume (mL) 25 Frequency Q 1 hour Start Status Respirations are even unlabored on 2L NC  Will continue to monitor

## 2019-03-25 NOTE — PROGRESS NOTES
Met with patient and her  during IDT rounds. They would like to have hospice care on return to long-term care. Case Management will continue to follow. Care Management Interventions Mode of Transport at Discharge: ISAIAH(Denise) Transition of Care Consult (CM Consult): SNF(Pt is a long term medicaid resident at [facility] with a 10 day medicaid bedhold. ) Partner SNF: Yes Discharge Durable Medical Equipment: No 
Physical Therapy Consult: No 
Occupational Therapy Consult: No 
Speech Therapy Consult: No 
Current Support Network: Nursing Facility(Pt spouse still resides in the couple's home that is located near the SNF where pt resides.  ) Plan discussed with Pt/Family/Caregiver: Yes Freedom of Choice Offered: Yes [employer] Information Provided?: No 
Discharge Location Discharge Placement: Skilled nursing facility(Pt will return to [facility] at discharge for continued long term care.  )

## 2019-03-25 NOTE — PROGRESS NOTES
Patient admitted from long-term care at [facility]. She is on a 10-day Medicaid bed hold there. Anticipating discharge back to [facility] at discharge. Case Management will continue to follow. Care Management Interventions Mode of Transport at Discharge: ISAIAH(Denise) Transition of Care Consult (CM Consult): SNF(Pt is a long term medicaid resident at [facility] with a 10 day medicaid bedhold. ) Partner SNF: Yes Discharge Durable Medical Equipment: No 
Physical Therapy Consult: No 
Occupational Therapy Consult: No 
Speech Therapy Consult: No 
Current Support Network: Nursing Facility(Pt spouse still resides in the couple's home that is located near the SNF where pt resides.  ) Plan discussed with Pt/Family/Caregiver: Yes Freedom of Choice Offered: Yes [employer] Information Provided?: No 
Discharge Location Discharge Placement: Skilled nursing facility(Pt will return to [facility] at discharge for continued long term care.  )

## 2019-03-26 LAB
ANION GAP SERPL CALC-SCNC: 5 MMOL/L (ref 7–16)
BASOPHILS # BLD: 0 K/UL (ref 0–0.2)
BASOPHILS NFR BLD: 0 % (ref 0–2)
BUN SERPL-MCNC: 12 MG/DL (ref 8–23)
CALCIUM SERPL-MCNC: 8.7 MG/DL (ref 8.3–10.4)
CHLORIDE SERPL-SCNC: 102 MMOL/L (ref 98–107)
CO2 SERPL-SCNC: 32 MMOL/L (ref 21–32)
CREAT SERPL-MCNC: 0.44 MG/DL (ref 0.6–1)
DIFFERENTIAL METHOD BLD: ABNORMAL
EOSINOPHIL # BLD: 0.3 K/UL (ref 0–0.8)
EOSINOPHIL NFR BLD: 4 % (ref 0.5–7.8)
ERYTHROCYTE [DISTWIDTH] IN BLOOD BY AUTOMATED COUNT: 14.1 % (ref 11.9–14.6)
GLUCOSE SERPL-MCNC: 102 MG/DL (ref 65–100)
HCT VFR BLD AUTO: 32.8 % (ref 35.8–46.3)
HGB BLD-MCNC: 10.3 G/DL (ref 11.7–15.4)
IMM GRANULOCYTES # BLD AUTO: 0 K/UL (ref 0–0.5)
IMM GRANULOCYTES NFR BLD AUTO: 0 % (ref 0–5)
LYMPHOCYTES # BLD: 0.9 K/UL (ref 0.5–4.6)
LYMPHOCYTES NFR BLD: 13 % (ref 13–44)
MCH RBC QN AUTO: 28.7 PG (ref 26.1–32.9)
MCHC RBC AUTO-ENTMCNC: 31.4 G/DL (ref 31.4–35)
MCV RBC AUTO: 91.4 FL (ref 79.6–97.8)
MONOCYTES # BLD: 0.5 K/UL (ref 0.1–1.3)
MONOCYTES NFR BLD: 8 % (ref 4–12)
NEUTS SEG # BLD: 5.1 K/UL (ref 1.7–8.2)
NEUTS SEG NFR BLD: 74 % (ref 43–78)
NRBC # BLD: 0 K/UL (ref 0–0.2)
PLATELET # BLD AUTO: 133 K/UL (ref 150–450)
PMV BLD AUTO: 10.3 FL (ref 9.4–12.3)
POTASSIUM SERPL-SCNC: 3.8 MMOL/L (ref 3.5–5.1)
RBC # BLD AUTO: 3.59 M/UL (ref 4.05–5.2)
SODIUM SERPL-SCNC: 139 MMOL/L (ref 136–145)
WBC # BLD AUTO: 6.8 K/UL (ref 4.3–11.1)

## 2019-03-26 PROCEDURE — 74011000258 HC RX REV CODE- 258: Performed by: INTERNAL MEDICINE

## 2019-03-26 PROCEDURE — 80048 BASIC METABOLIC PNL TOTAL CA: CPT

## 2019-03-26 PROCEDURE — 85025 COMPLETE CBC W/AUTO DIFF WBC: CPT

## 2019-03-26 PROCEDURE — 99232 SBSQ HOSP IP/OBS MODERATE 35: CPT | Performed by: NURSE PRACTITIONER

## 2019-03-26 PROCEDURE — 99218 HC RM OBSERVATION: CPT

## 2019-03-26 PROCEDURE — 96366 THER/PROPH/DIAG IV INF ADDON: CPT

## 2019-03-26 PROCEDURE — 74011250637 HC RX REV CODE- 250/637: Performed by: INTERNAL MEDICINE

## 2019-03-26 PROCEDURE — 74011250636 HC RX REV CODE- 250/636: Performed by: INTERNAL MEDICINE

## 2019-03-26 PROCEDURE — 77030020186 HC BOOT HL PROTCT SAGE -B

## 2019-03-26 PROCEDURE — 36415 COLL VENOUS BLD VENIPUNCTURE: CPT

## 2019-03-26 RX ADMIN — GUAIFENESIN 400 MG: 100 SOLUTION ORAL at 21:58

## 2019-03-26 RX ADMIN — Medication 10 ML: at 21:58

## 2019-03-26 RX ADMIN — Medication 10 ML: at 13:00

## 2019-03-26 RX ADMIN — SERTRALINE HYDROCHLORIDE 200 MG: 100 TABLET ORAL at 08:52

## 2019-03-26 RX ADMIN — Medication 1 AMPULE: at 21:58

## 2019-03-26 RX ADMIN — NYSTATIN: 100000 POWDER TOPICAL at 17:27

## 2019-03-26 RX ADMIN — Medication 1 AMPULE: at 08:53

## 2019-03-26 RX ADMIN — HYDRALAZINE HYDROCHLORIDE 100 MG: 50 TABLET, FILM COATED ORAL at 21:58

## 2019-03-26 RX ADMIN — GUAIFENESIN 400 MG: 100 SOLUTION ORAL at 13:00

## 2019-03-26 RX ADMIN — AMLODIPINE BESYLATE 5 MG: 5 TABLET ORAL at 08:52

## 2019-03-26 RX ADMIN — HYDROCODONE BITARTRATE AND ACETAMINOPHEN 1 TABLET: 5; 325 TABLET ORAL at 02:13

## 2019-03-26 RX ADMIN — TAMSULOSIN HYDROCHLORIDE 0.4 MG: 0.4 CAPSULE ORAL at 21:58

## 2019-03-26 RX ADMIN — LEVETIRACETAM 1500 MG: 100 SOLUTION ORAL at 17:26

## 2019-03-26 RX ADMIN — HYDRALAZINE HYDROCHLORIDE 100 MG: 50 TABLET, FILM COATED ORAL at 08:52

## 2019-03-26 RX ADMIN — Medication 10 ML: at 05:23

## 2019-03-26 RX ADMIN — LOSARTAN POTASSIUM 50 MG: 50 TABLET ORAL at 08:52

## 2019-03-26 RX ADMIN — NYSTATIN: 100000 POWDER TOPICAL at 09:00

## 2019-03-26 RX ADMIN — GUAIFENESIN 400 MG: 100 SOLUTION ORAL at 05:23

## 2019-03-26 RX ADMIN — CEFTRIAXONE SODIUM 1 G: 1 INJECTION, POWDER, FOR SOLUTION INTRAMUSCULAR; INTRAVENOUS at 12:55

## 2019-03-26 RX ADMIN — LEVETIRACETAM 1500 MG: 100 SOLUTION ORAL at 08:52

## 2019-03-26 NOTE — WOUND CARE
Patient seen for irregular purple blistering areas on buttocks near gluteal cleft fold. Consistent with shearing. Sacrum and coccyx area clear of redness and breakdown. Bilateral heels pink but blanchable. Started heel suspension boots and turning wedge at this time. Instructed primary nurse on keeping boot in place, turning patient side to side and no pillow/wedge behind sacrum/buttocks. Demonstrated at this time. Ordered low air loss bed (nurse to get from 10th floor). Left buttock [id number] and right buttock 2x6.5x0 cm. Wound team will monitor and adjust treatment as needed. Patient sent to ER by PCP with c/o chest pain starting this morning. Patient has ekg from doctors office reporting Incomplete RBBB. Patient given 2 aspirin prior to leaving office today. Denies sob.

## 2019-03-26 NOTE — PROGRESS NOTES
Insurance authorization for return to [place] received. She can go when medically ready for discharge. Case Management will continue to follow. Care Management Interventions Mode of Transport at Discharge: ISAIAH(Denise) Transition of Care Consult (CM Consult): SNF(Pt is a long term medicaid resident at [facility] with a 10 day medicaid bedhold. ) Partner SNF: Yes Discharge Durable Medical Equipment: No 
Physical Therapy Consult: No 
Occupational Therapy Consult: No 
Speech Therapy Consult: No 
Current Support Network: Nursing Facility(Pt spouse still resides in the couple's home that is located near the SNF where pt resides.  ) Plan discussed with Pt/Family/Caregiver: Yes Freedom of Choice Offered: Yes [employer] Information Provided?: No 
Discharge Location Discharge Placement: Skilled nursing facility(Pt will return to [facility] at discharge for continued long term care.  )

## 2019-03-26 NOTE — PROGRESS NOTES
Spiritual Care Visit, initial visit. Visited with patient at bedside. Patient gave minimal response;  offered prayer for her. Prayed for patient's healing and health. Visit by Zachary Pickett, Staff .  Donna., Sonia.SIMON., B.A.

## 2019-03-26 NOTE — PROGRESS NOTES
Gastroenterology Outpatient History and Physical    Patient: Meliza Harris    Physician: Kvng Britt MD    Chief Complaint: H/o colon polyps  History of Present Illness: No GI complaints    History:  Past Medical History:   Diagnosis Date    Adverse effect of anesthesia     slow to wake    Arrhythmia     a. fib    Arthritis     hands    Cardiomegaly     Cardiomyopathy in other diseases classified elsewhere     Diabetes ([facility]     BORDERLINE    Difficult intubation     Hypertension     Other acute and subacute form of ischemic heart disease     Other ill-defined conditions(799.89)     ATHEROSCLEROSIS/HYPERLIPIDEMIA    PAF (paroxysmal atrial fibrillation) (HCC)     Shortness of breath     Unspecified sleep apnea     USES C-PAP/pt dosent know dr name      Past Surgical History:   Procedure Laterality Date    COLONOSCOPY N/A 1/13/2017    COLONOSCOPY performed by Vita Keene MD at [address] [place] [address]  1/13/2017         HX GI      COLONOSCOPY    HX HERNIA REPAIR Left     LEFT INGUINAL HERNIA    HX KNEE REPLACEMENT Bilateral 2006    BILAT TKR    [place] COLSC FLX W/REMOVAL LESION BY HOT BX FORCEPS  5/14/2014           Social History     Socioeconomic History    Marital status:      Spouse name: Not on file    Number of children: Not on file    Years of education: Not on file    Highest education level: Not on file   Tobacco Use    Smoking status: Never Smoker    Smokeless tobacco: Never Used   Substance and Sexual Activity    Alcohol use: No     Alcohol/week: 0.0 oz    Drug use: No      Family History   Problem Relation Age of Onset    Heart Disease Mother         w/pacemaker    Diabetes Father     Diabetes Sister       Patient Active Problem List   Diagnosis Code    Status post total knee replacement Z96.659    Aseptic loosening of prosthetic knee ([facility] T84.038A, Z96.659    DM type 2 (diabetes mellitus, type 2) ([facility] E11.9    Hypercholesteremia Patient is sleeping  Respirations are even unlabored on 2L NC there are no signs of distress  Will give BSR to oncoming RN 
 E78.00    HTN (hypertension) I10    Cardiomyopathy ([facility] I42.9    Sleep apnea G47.30    Essential hypertension, benign I10    Chronic systolic heart failure (HCC) I50.22    Type II or unspecified type diabetes mellitus without mention of complication, not stated as uncontrolled E11.9    Obesity, unspecified E66.9    Atrial fibrillation (HCC) I48.91    PAF (paroxysmal atrial fibrillation) (HCC) I48.0    Obesity, morbid (HCC) E66.01       Allergies: No Known Allergies  Medications:   Prior to Admission medications    Medication Sig Start Date End Date Taking? Authorizing Provider   carvedilol (COREG) 6.25 mg tablet TAKE 1 TABLET BY MOUTH TWICE DAILY 10/18/18  Yes Keyona Conteh NP   metFORMIN (GLUCOPHAGE) 500 mg tablet Take 500 mg by mouth two (2) times daily (with meals). 9/1/18  Yes Provider, Historical   ELIQUIS 5 mg tablet TAKE 1 TABLET BY MOUTH TWICE DAILY. STOP COUMADIN 8/12/18  Yes Keyona Conteh NP   cpap machine kit by Does Not Apply route nightly. Yes Provider, Historical   simvastatin (ZOCOR) 20 mg tablet Take  by mouth nightly. Yes Provider, Historical   potassium chloride SR (KLOR-CON 10) 10 mEq tablet Take 20 mEq by mouth daily. Yes Provider, Historical   aspirin delayed-release (ST. ALEXANDER ASPIRIN) 81 mg tablet Take  by mouth daily. Yes Provider, Historical   lisinopril (PRINIVIL, ZESTRIL) 40 mg tablet Take 40 mg by mouth daily. Yes Provider, Historical   amlodipine (NORVASC) 10 mg tablet Take  by mouth daily. Yes Provider, Historical   hydrochlorothiazide (HYDRODIURIL) 25 mg tablet Take 25 mg by mouth daily. Yes Provider, Historical     Physical Exam:   Vital Signs: Blood pressure 144/73, pulse (!) 46, resp. rate 18, height 5' 8\" (1.727 m), weight 122 kg (269 lb), SpO2 98 %.   General: well developed, well nourished   HEENT: unremarkable   Heart: regular rhythm no mumur    Lungs: clear   Abdominal:  benign   Neurological: unremarkable   Extremities: no edema Findings/Diagnosis: H/o colon polyps  Plan of Care/Planned Procedure: Colonoscopy with conscious/deep sedation    Signed:  Daniel Paul MD 12/21/2018

## 2019-03-26 NOTE — PROGRESS NOTES
Patient stated that pain has decreased since continuous feeding was stopped   There is no Residual  Will restart continuous feed and continue to monitor

## 2019-03-26 NOTE — PROGRESS NOTES
Spoke with doctor Elie Fontaine concerning patient complaining of pain in abdomen  Order to stop continuous feed and check residual to see if pain decreases

## 2019-03-26 NOTE — PROGRESS NOTES
Received BSR from RN patient is resting in bed  Respirations are even unlabored on 2L NC Access type PEG/G tube Formula options: High Fiber (Jevity 1.2) Delivery Method Continuous Starting rate (mL/hr) 50 Advance No   
Free Water Yes Volume (mL) 25 Frequency Q 1 hour Start Status 03/24/19 1100 Sent Avila is intact/draining  there are no signs of distress  Will continue to monitor

## 2019-03-26 NOTE — PROGRESS NOTES
Pt lying in bed quietly. Pt is stable. Pt is alert and oriented to person. Pt speech is tough to understand. Pt denies pain or discomfort. Bed is in low and locked position. Gripper socks on pt. Call light within reach. Door open when unattended. Will continue to monitor.

## 2019-03-26 NOTE — PROGRESS NOTES
Palliative Care Progress Note Patient: Nixon Hermosillo MRN: [id number]  SSN: [id number] YOB: 1950  Age: 76 y.o. Sex: female Assessment/Plan: Chief Complaint/Interval History: \"Tired\" Principal Diagnosis:   
Dementia  F03.90 Additional Diagnoses: · Altered Mental Status R41.82 · Dyspnea  R06.00 · Dysphagia  R13.10 · Counseling, Encounter for Medical Advice  Z71.9 
· Encounter for Palliative Care  Z51.5 Palliative Performance Scale (PPS) PPS: 10 Medical Decision Making:  
Reviewed and summarized notes from last 24 hours. Discussed case with appropriate providers:  
Reviewed lab and X-ray data from last 24 hours. Pt resting in bed, no visitors present. Ms. Liz Riojas is able to respond verbally to questions about her condition. She confirms that the pain in her abdomen is \"better now\", after the PEG feeds were stopped for awhile and she was given Norco at 0200. Pt denies SOB, N/V. Ms. iLz Riojas said that she's tired, but was unable to expand upon it. She appears puffy and sweaty, although her temperature is in range. Is scheduled to be on IV Rocephin through 3/29. She requests to change position; EVANGELINA James assisted. Perhaps this will increase her comfort. Will discuss findings with members of the interdisciplinary team.   
 
  
More than 50% of this 15 minute visit was spent counseling and coordination of care as outlined above. Subjective:  
 
Review of Systems: A comprehensive review of systems was negative except for: Constitutional: Positive for fatigue. Respiratory: Positive for dyspnea, on O2. Objective:  
 
Visit Vitals /65 Pulse 80 Temp 98.1 °F (36.7 °C) Resp 18 Wt 237 lb 6.4 oz (107.7 kg) SpO2 93% BMI 42.05 kg/m² Physical Exam: 
 
General:  Cooperative. No acute distress. Eyes:  Conjunctivae/corneas clear Nose: Nares normal. Septum midline. Neck: Supple, symmetrical, trachea midline, no JVD Lungs:   Crackles bilaterally, unlabored Heart:  Regular rate and rhythm, no murmur Abdomen:   Soft, non-tender, non-distended Extremities: Normal, atraumatic, edema in right hand and forearm. Skin: Skin color, texture, turgor normal. No rash or lesions. Neurologic: Nonfocal  
Psych: Intermittently alert, oriented to self, place Signed By: Mariana Kauffman NP March 26, 2019

## 2019-03-26 NOTE — PROGRESS NOTES
Pt bathed. Wedge placed under rt side so weight is off buttocks. Prevalon boots placed on pt due to pink heels.

## 2019-03-26 NOTE — INTERDISCIPLINARY ROUNDS
Interdisciplinary team rounds were held 3/26/2019 with the following team members:Care Management, Physical Therapy, Physician and Clinical Coordinator and the patient and spouse. Plan of care discussed. See clinical pathway and/or care plan for interventions and desired outcomes.

## 2019-03-26 NOTE — PROGRESS NOTES
Avila care completed  Dressing around peg tube insertion site changed  With help from assistant  Patient turned to left side

## 2019-03-26 NOTE — PROGRESS NOTES
Hospitalist Progress Note Admit Date:  3/23/2019 10:43 AM  
Name:  Simon Roman Age:  76 y.o. 
:  1950 MRN:  [id number] PCP:  Radha Butler DO Treatment Team: Attending Provider: Veto Barragan MD; Care Manager: Frances Yost, RORY; Consulting Provider: Skip Ravi NP; Utilization Review: Jsoe A Dueñas RN 
 
HPI/Subjective:  
75 y/o WF admitted on 3/22 with AMS. Has h/o HTN, seizure d/o, CVA/dysphagia with PEG, HLD, obesity. 3/26:  present and says wife looks much better than admission. Patient is hard of hearing and appears confused. She denies any pain. Unable to obtain full ROS. Objective:  
 
Patient Vitals for the past 24 hrs: 
 Temp Pulse Resp BP SpO2  
19 1514 98.1 °F (36.7 °C) 78 18 114/57 94 % 19 1106 98 °F (36.7 °C) 83 18 129/53 92 % 19 0722 98.1 °F (36.7 °C) 80 18 138/65 93 % 19 0319 98 °F (36.7 °C) 76 17 112/59 95 % 19 2310 97.7 °F (36.5 °C) 80 18 116/54 91 %  
19 1951 97.6 °F (36.4 °C) 83 18 127/65 93 % Oxygen Therapy O2 Sat (%): 94 % (19 1514) Pulse via Oximetry: 73 beats per minute (19 1336) O2 Device: Nasal cannula (19 0753) O2 Flow Rate (L/min): 2 l/min (19 0753) Intake/Output Summary (Last 24 hours) at 3/26/2019 1606 Last data filed at 3/26/2019 1306 Gross per 24 hour Intake 0 ml Output 1650 ml Net -1650 ml *Note that automatically entered I/Os may not be accurate; dependent on patient compliance with collection and accurate  by techs. General:    Well nourished. Alert. Obese. Confused. Hard of hearing. CV:   RRR. No murmur, rub, or gallop. Lungs:   CTAB. No wheezing, rhonchi, or rales. Abdomen:   Soft, nontender, nondistended. PEG tube. Extremities: Warm and dry. No cyanosis or edema. Skin:     No rashes or jaundice. Neuro:  No gross focal deficits Data Review: I have reviewed all labs, meds, and studies from the last 24 hours: 
 
Recent Results (from the past 24 hour(s)) METABOLIC PANEL, BASIC Collection Time: 03/26/19  3:25 AM  
Result Value Ref Range Sodium 139 136 - 145 mmol/L Potassium 3.8 3.5 - 5.1 mmol/L Chloride 102 98 - 107 mmol/L  
 CO2 32 21 - 32 mmol/L Anion gap 5 (L) 7 - 16 mmol/L Glucose 102 (H) 65 - 100 mg/dL BUN 12 8 - 23 MG/DL Creatinine 0.44 (L) 0.6 - 1.0 MG/DL  
 GFR est AA >60 >60 ml/min/1.73m2 GFR est non-AA >60 >60 ml/min/1.73m2 Calcium 8.7 8.3 - 10.4 MG/DL  
CBC WITH AUTOMATED DIFF Collection Time: 03/26/19  3:25 AM  
Result Value Ref Range WBC 6.8 4.3 - 11.1 K/uL  
 RBC 3.59 (L) 4.05 - 5.2 M/uL  
 HGB 10.3 (L) 11.7 - 15.4 g/dL HCT 32.8 (L) 35.8 - 46.3 % MCV 91.4 79.6 - 97.8 FL  
 MCH 28.7 26.1 - 32.9 PG  
 MCHC 31.4 31.4 - 35.0 g/dL  
 RDW 14.1 11.9 - 14.6 % PLATELET 799 (L) 441 - 450 K/uL MPV 10.3 9.4 - 12.3 FL ABSOLUTE NRBC 0.00 0.0 - 0.2 K/uL  
 DF AUTOMATED NEUTROPHILS 74 43 - 78 % LYMPHOCYTES 13 13 - 44 % MONOCYTES 8 4.0 - 12.0 % EOSINOPHILS 4 0.5 - 7.8 % BASOPHILS 0 0.0 - 2.0 % IMMATURE GRANULOCYTES 0 0.0 - 5.0 %  
 ABS. NEUTROPHILS 5.1 1.7 - 8.2 K/UL  
 ABS. LYMPHOCYTES 0.9 0.5 - 4.6 K/UL  
 ABS. MONOCYTES 0.5 0.1 - 1.3 K/UL  
 ABS. EOSINOPHILS 0.3 0.0 - 0.8 K/UL  
 ABS. BASOPHILS 0.0 0.0 - 0.2 K/UL  
 ABS. IMM. GRANS. 0.0 0.0 - 0.5 K/UL All Micro Results Procedure Component Value Units Date/Time CULTURE, BLOOD [[id number]] Collected:  03/23/19 1112 Order Status:  Completed Specimen:  Blood Updated:  03/26/19 0818 Special Requests: --     
  LEFT Antecubital 
  
  Culture result: NO GROWTH 3 DAYS     
 CULTURE, BLOOD [[id number]] Collected:  03/23/19 1224 Order Status:  Completed Specimen:  Blood Updated:  03/26/19 0818 Special Requests: --     
  RIGHT 
HAND Culture result: NO GROWTH 3 DAYS CULTURE, URINE [[id number]]  (Abnormal) Collected:  03/24/19 1245 Order Status:  Completed Specimen:  Urine from Avila Specimen Updated:  03/26/19 0744 Special Requests: NO SPECIAL REQUESTS Culture result:    
  50,000-100,000 COLONIES/mL PRESUMPTIVE ENTEROCOCCUS SPECIES SUBCULTURE IN PROGRESS  
     
      
  10,000 to 50,000 COLONIES/mL MIXED SKIN TRINO ISOLATED No results found for this visit on 03/23/19. Current Meds: 
Current Facility-Administered Medications Medication Dose Route Frequency  amLODIPine (NORVASC) tablet 5 mg  5 mg Per G Tube DAILY  losartan (COZAAR) tablet 50 mg  50 mg Per G Tube DAILY  NUTRITIONAL SUPPORT ELECTROLYTE PRN ORDERS   Does Not Apply PRN  
 alcohol 62% (NOZIN) nasal  1 Ampule  1 Ampule Topical Q12H  
 HYDROcodone-acetaminophen (NORCO) 5-325 mg per tablet 1 Tab  1 Tab Per G Tube Q4H PRN  
 acetaminophen (TYLENOL) tablet 650 mg  650 mg Per G Tube Q4H PRN  
 sodium chloride (NS) flush 5-40 mL  5-40 mL IntraVENous Q8H  
 sodium chloride (NS) flush 5-40 mL  5-40 mL IntraVENous PRN  
 naloxone (NARCAN) injection 0.4 mg  0.4 mg IntraVENous PRN  
 ondansetron (ZOFRAN) injection 4 mg  4 mg IntraVENous Q4H PRN  
 bisacodyl (DULCOLAX) suppository 10 mg  10 mg Rectal DAILY PRN  
 albuterol (PROVENTIL VENTOLIN) nebulizer solution 2.5 mg  2.5 mg Nebulization Q4H PRN  
 levETIRAcetam (KEPPRA) oral solution 1,500 mg  1,500 mg Per G Tube BID  sertraline (ZOLOFT) tablet 200 mg  200 mg Per G Tube DAILY  simethicone (MYLICON) 98IM/9.3OF oral drops 80 mg  80 mg Per G Tube QID PRN  
 tamsulosin (FLOMAX) capsule 0.4 mg  0.4 mg Oral QHS  cefTRIAXone (ROCEPHIN) 1 g in 0.9% sodium chloride (MBP/ADV) 50 mL  1 g IntraVENous Q24H  nystatin (MYCOSTATIN) 100,000 unit/gram powder   Topical BID  hydrALAZINE (APRESOLINE) tablet 100 mg  100 mg Per NG tube TID  guaiFENesin (ROBITUSSIN) 100 mg/5 mL oral liquid 400 mg  400 mg Per G Tube Q8H Other Studies (last 24 hours): No results found. Assessment and Plan:  
 
Hospital Problems as of 3/26/2019 Date Reviewed: 6/28/2018 Codes Class Noted - Resolved POA Gross hematuria ICD-10-CM: R31.0 ICD-9-CM: 599.71  3/24/2019 - Present Unknown UTI (urinary tract infection) ICD-10-CM: N39.0 ICD-9-CM: 599.0  3/23/2019 - Present Unknown * (Principal) Altered mental status ICD-10-CM: R41.82 
ICD-9-CM: 780.97  3/23/2019 - Present Unknown Confusion ICD-10-CM: R41.0 ICD-9-CM: 298.9  3/23/2019 - Present Unknown Dementia without behavioral disturbance (Chronic) ICD-10-CM: F03.90 ICD-9-CM: 294.20  2/1/2019 - Present Yes Seizure disorder ([address] (Chronic) ICD-10-CM: G40.909 ICD-9-CM: 345.90  1/22/2018 - Present Yes Essential hypertension (Chronic) ICD-10-CM: I10 
ICD-9-CM: 401.9  1/19/2015 - Present Yes Morbid obesity with BMI of 40.0-44.9, adult (HCC) (Chronic) ICD-10-CM: E66.01, Z68.41 
ICD-9-CM: 278.01, V85.41  1/19/2015 - Present Yes Plan: # Acute metabolic encephalopathy 
 - improved. 2/2 UTI. 
 - Cxs with ? Enterococcus 
 - con't abx # H/o CVA with dysphagia and PEG 
 - con't TFs # HTN 
 - home meds # H/o seizure d/o - Keppra DC planning/Dispo:  F/u ID/sens, transition to PO soon. ? Dc back to [facility]. PC also following for goals of care/code status. Diet:  DIET NPO 
DIET TUBE FEEDING 
DVT ppx:  SCDs.  
 
Signed: 
Giselle Chen MD

## 2019-03-26 NOTE — PROGRESS NOTES
Pt resting in bed watching tv. Pt is stable. No distress noted. Door is left open when unattended. Will continue to monitor.

## 2019-03-27 PROCEDURE — 99218 HC RM OBSERVATION: CPT

## 2019-03-27 PROCEDURE — 74011250637 HC RX REV CODE- 250/637: Performed by: INTERNAL MEDICINE

## 2019-03-27 PROCEDURE — 77010033678 HC OXYGEN DAILY

## 2019-03-27 PROCEDURE — 99231 SBSQ HOSP IP/OBS SF/LOW 25: CPT | Performed by: NURSE PRACTITIONER

## 2019-03-27 PROCEDURE — 94760 N-INVAS EAR/PLS OXIMETRY 1: CPT

## 2019-03-27 RX ORDER — POTASSIUM CHLORIDE 20MEQ/15ML
20 LIQUID (ML) ORAL DAILY
Status: DISCONTINUED | OUTPATIENT
Start: 2019-03-28 | End: 2019-03-27 | Stop reason: CLARIF

## 2019-03-27 RX ORDER — AMOXICILLIN 500 MG/1
500 CAPSULE ORAL EVERY 8 HOURS
Status: DISCONTINUED | OUTPATIENT
Start: 2019-03-27 | End: 2019-03-28 | Stop reason: HOSPADM

## 2019-03-27 RX ORDER — POTASSIUM CHLORIDE 1.5 G/1.77G
20 POWDER, FOR SOLUTION ORAL DAILY
Status: DISCONTINUED | OUTPATIENT
Start: 2019-03-28 | End: 2019-03-28 | Stop reason: HOSPADM

## 2019-03-27 RX ADMIN — Medication 1 AMPULE: at 20:23

## 2019-03-27 RX ADMIN — GUAIFENESIN 400 MG: 100 SOLUTION ORAL at 05:04

## 2019-03-27 RX ADMIN — HYDRALAZINE HYDROCHLORIDE 100 MG: 50 TABLET, FILM COATED ORAL at 20:25

## 2019-03-27 RX ADMIN — TAMSULOSIN HYDROCHLORIDE 0.4 MG: 0.4 CAPSULE ORAL at 20:25

## 2019-03-27 RX ADMIN — GUAIFENESIN 400 MG: 100 SOLUTION ORAL at 20:24

## 2019-03-27 RX ADMIN — Medication 1 AMPULE: at 09:11

## 2019-03-27 RX ADMIN — GUAIFENESIN 400 MG: 100 SOLUTION ORAL at 14:46

## 2019-03-27 RX ADMIN — AMLODIPINE BESYLATE 5 MG: 5 TABLET ORAL at 09:12

## 2019-03-27 RX ADMIN — SERTRALINE HYDROCHLORIDE 200 MG: 100 TABLET ORAL at 09:12

## 2019-03-27 RX ADMIN — NYSTATIN: 100000 POWDER TOPICAL at 17:41

## 2019-03-27 RX ADMIN — HYDRALAZINE HYDROCHLORIDE 100 MG: 50 TABLET, FILM COATED ORAL at 09:12

## 2019-03-27 RX ADMIN — Medication 10 ML: at 05:05

## 2019-03-27 RX ADMIN — Medication 10 ML: at 20:25

## 2019-03-27 RX ADMIN — AMOXICILLIN 500 MG: 500 CAPSULE ORAL at 20:24

## 2019-03-27 RX ADMIN — LEVETIRACETAM 1500 MG: 100 SOLUTION ORAL at 09:12

## 2019-03-27 RX ADMIN — LOSARTAN POTASSIUM 50 MG: 50 TABLET ORAL at 09:11

## 2019-03-27 RX ADMIN — LEVETIRACETAM 1500 MG: 100 SOLUTION ORAL at 17:42

## 2019-03-27 RX ADMIN — Medication 10 ML: at 17:42

## 2019-03-27 RX ADMIN — AMOXICILLIN 500 MG: 500 CAPSULE ORAL at 14:46

## 2019-03-27 RX ADMIN — NYSTATIN: 100000 POWDER TOPICAL at 09:13

## 2019-03-27 RX ADMIN — HYDRALAZINE HYDROCHLORIDE 100 MG: 50 TABLET, FILM COATED ORAL at 14:50

## 2019-03-27 NOTE — PROGRESS NOTES
Nutrition: 
TF management per nutritional support protocols. (Hospitalist) Assessment:  
Food/Nutrition History: The patient is currently tube feeding dependent. She is unable to provide RD with any meaningful information. Nursing assistant in room and states that the patient has not had a bowel movement in the past 2 days. According to the EMR the patient has not been with a bowel movement since admission. She likely needs to be checked for an impaction. She has dulcolax ordered PRN but this has not been administered. GT in place,  Abdomen soft with active bowel sounds. Date of Last BM: Unknown Pertinent Medications: IVF: NS at 125 ml/hr Pertinent labs: Na 146, K 3.0 Anthropometrics: Per EMR: Height 5' 4\"(maximum listed),  Weight Source: Bed, Weight: 105.9 kg (233 lb 6.4 oz), Body mass index is 41.34 kg/m². BMI class of overweight for age >71. Macronutrient needs: EER:  7842-3709 kcal /day (12-14 kcal/kg ABW/106 kg) EPR:   grams protein/day (0.8-1 grams/kg ABW) Max CHO:  204 grams/day (55% kcal) Fluid:  1ml/kcal 
Intake/Comparative standards: Current TF provides: TF of Jevity 1.2 at 50 ml/hr with 25ml water q 1hr to provide 1440 kcal/day (100% of needs), 67 grams protein/day (78% of needs), 202 grams CHO/day (does not exceed max CHO),  and ~ 1584ml free water/day (100% of needs). Intervention: 
Meals and snacks: NPO 
EN: Continue Jevity 1.2 at 50 ml/hr with 25ml water q 1hr to provide 1440 kcal/day (100% of needs), 67 grams protein/day (78% of needs), 202 grams CHO/day (does not exceed max CHO),  and ~ 1584ml free water/day (100% of needs). Nutrition Supplement Therapy: Electrolyte replacement per nutritional support protocols are active on MAR. Replace K as needed. IV: Per MD 
Discharge nutrition plan: Continue prior TF once transferred back to [facility]. Lucero Later [address] [place] [address], LD  
[phone]

## 2019-03-27 NOTE — PROGRESS NOTES
Received BSR from RN patient is resting in bed  Respirations are even unlabored on 2L NC  Avila is intact/draing Formula options: High Fiber (Jevity 1.2) Delivery Method Continuous Starting rate (mL/hr) 50 Advance No   
Free Water Yes Volume (mL) 25 Frequency Q 1 hour Start Status  
  
 
 there are no signs of distress at this time  Will continue to monitor

## 2019-03-27 NOTE — PROGRESS NOTES
Avila catheter removed per order  purewick placed  Peg site cleaned of some dark drainage and split dressing placed

## 2019-03-27 NOTE — PROGRESS NOTES
Palliative Care Progress Note Patient: Simon Roman MRN: [id number]  SSN: [id number] YOB: 1950  Age: 76 y.o. Sex: female Assessment/Plan: Chief Complaint/Interval History: pt nonverbal 
  
Principal Diagnosis:   
Dementia  F03.90 -ongoing Additional Diagnoses: · Altered Mental Status R41.82-ongoing · Dyspnea  R06.00 · Dysphagia  R13.10-PEG tube infusing · Counseling, Encounter for Medical Advice  Z71.9 
· Encounter for Palliative Care  Z51.5 Palliative Performance Scale (PPS) PPS: 10-20 Medical Decision Making:  
Reviewed and summarized notes from last 24 hours. Discussed case with appropriate providers: discussed with Case Management Reviewed lab and X-ray data from last 24 hours. Pt resting in bed, no visitors present. Plan is for pt to be dc back to Medicaid bed. Hospice will speak with pt spouse back at facility. Will discuss findings with members of the interdisciplinary team.   
  
More than 50% of this 15 minute visit was spent counseling and coordination of care as outlined above. Subjective:  
 
Review of Systems: A comprehensive review of systems was not obtained due to pt unresponsiveness. Objective:  
 
Visit Vitals /70 Pulse 80 Temp 98 °F (36.7 °C) Resp 17 Wt 233 lb 6.4 oz (105.9 kg) SpO2 91% BMI 41.34 kg/m² Physical Exam: 
 
General:  Cooperative. No acute distress. Eyes:  Conjunctivae/corneas clear Nose: Nares normal. Septum midline. Neck: Supple, symmetrical, trachea midline, no JVD Lungs:   unlabored Heart:  Irregular rate/rhythm Abdomen:   Soft, non-tender, non-distended Extremities: Normal, atraumatic Skin: Skin color, texture, turgor normal. No rash or lesions. Neurologic: Nonfocal  
Psych: Unresponsive to me Signed By: Leobardo Brennan NP March 27, 2019

## 2019-03-27 NOTE — PROGRESS NOTES
Patient has not slept well throughout the night continued to pull at catheter tubing  Placed in soft restraint mittens   Avila is intact/draining Formula options: High Fiber (Jevity 1.2)   Delivery Method Continuous    
Starting rate (mL/hr) 50    
Advance No    
Free Water Yes    
Volume (mL) 25    
Frequency Q 1 hour    
  
Start Status

## 2019-03-27 NOTE — PROGRESS NOTES
Hospitalist Progress Note Admit Date:  3/23/2019 10:43 AM  
Name:  Joseph Ballard Age:  76 y.o. 
:  1950 MRN:  [id number] PCP:  Vanessa Doyle DO Treatment Team: Attending Provider: Linda Elizabeth MD; Care Manager: Lacey Samuel RN; Consulting Provider: Keren Santiago NP; Utilization Review: Germaine Ramirez RN 
 
HPI/Subjective:  
77 y/o WF admitted on 3/22 with AMS. Has h/o HTN, seizure d/o, CVA/dysphagia with PEG, HLD, obesity. 
  
3/27:  not present. Patient awake in bed, appears intermittently confused but says she's at [facility]. Denies pain. Didn't sleep much last night. ROS otherwise negative. Objective:  
 
Patient Vitals for the past 24 hrs: 
 Temp Pulse Resp BP SpO2  
19 0729 98 °F (36.7 °C) 80 17 124/70 91 %  
19 0331 98.7 °F (37.1 °C) 80 17 136/59 95 % 19 2322 98.5 °F (36.9 °C) 90 18 121/51 92 % 19 1936 97.9 °F (36.6 °C) 67 18 126/59 91 %  
19 1514 98.1 °F (36.7 °C) 78 18 114/57 94 % Oxygen Therapy O2 Sat (%): 91 % (19) Pulse via Oximetry: 73 beats per minute (19 1336) O2 Device: Nasal cannula (19) O2 Flow Rate (L/min): 2 l/min (19 07) Intake/Output Summary (Last 24 hours) at 3/27/2019 1108 Last data filed at 3/27/2019 1007 Gross per 24 hour Intake 50 ml Output 1500 ml Net -1450 ml *Note that automatically entered I/Os may not be accurate; dependent on patient compliance with collection and accurate  by techs. General:    Well nourished. Alert. Intermittent confusion. CV:   RRR. No murmur, rub, or gallop. Lungs:   CTAB. No wheezing, rhonchi, or rales. Abdomen:   Soft, nontender, nondistended. PEG in place. Extremities: Warm and dry. No cyanosis or edema. Skin:     No rashes or jaundice. Neuro:  No gross focal deficits Data Review: 
I have reviewed all labs, meds, and studies from the last 24 hours: No results found for this or any previous visit (from the past 24 hour(s)). All Micro Results Procedure Component Value Units Date/Time CULTURE, BLOOD [[id number]] Collected:  03/23/19 1112 Order Status:  Completed Specimen:  Blood Updated:  03/27/19 3112 Special Requests: --     
  LEFT Antecubital 
  
  Culture result: NO GROWTH 4 DAYS     
 CULTURE, BLOOD [[id number]] Collected:  03/23/19 1224 Order Status:  Completed Specimen:  Blood Updated:  03/27/19 8167 Special Requests: --     
  RIGHT 
HAND Culture result: NO GROWTH 4 DAYS     
 CULTURE, URINE [[id number]]  (Abnormal) Collected:  03/24/19 1245 Order Status:  Completed Specimen:  Urine from Avila Specimen Updated:  03/27/19 0700 Special Requests: NO SPECIAL REQUESTS Culture result:    
  50,000-100,000 COLONIES/mL PRESUMPTIVE ENTEROCOCCUS SPECIES IDENTIFICATION AND SUSCEPTIBILITY TO FOLLOW 
  
      
  10,000 to 50,000 COLONIES/mL MIXED SKIN TRINO ISOLATED No results found for this visit on 03/23/19. Current Meds: 
Current Facility-Administered Medications Medication Dose Route Frequency  amoxicillin (AMOXIL) capsule 500 mg  500 mg Oral Q8H  
 amLODIPine (NORVASC) tablet 5 mg  5 mg Per G Tube DAILY  losartan (COZAAR) tablet 50 mg  50 mg Per G Tube DAILY  NUTRITIONAL SUPPORT ELECTROLYTE PRN ORDERS   Does Not Apply PRN  
 alcohol 62% (NOZIN) nasal  1 Ampule  1 Ampule Topical Q12H  
 HYDROcodone-acetaminophen (NORCO) 5-325 mg per tablet 1 Tab  1 Tab Per G Tube Q4H PRN  
 acetaminophen (TYLENOL) tablet 650 mg  650 mg Per G Tube Q4H PRN  
 sodium chloride (NS) flush 5-40 mL  5-40 mL IntraVENous Q8H  
 sodium chloride (NS) flush 5-40 mL  5-40 mL IntraVENous PRN  
 naloxone (NARCAN) injection 0.4 mg  0.4 mg IntraVENous PRN  
 ondansetron (ZOFRAN) injection 4 mg  4 mg IntraVENous Q4H PRN  
 bisacodyl (DULCOLAX) suppository 10 mg  10 mg Rectal DAILY PRN  
  albuterol (PROVENTIL VENTOLIN) nebulizer solution 2.5 mg  2.5 mg Nebulization Q4H PRN  
 levETIRAcetam (KEPPRA) oral solution 1,500 mg  1,500 mg Per G Tube BID  sertraline (ZOLOFT) tablet 200 mg  200 mg Per G Tube DAILY  simethicone (MYLICON) 77FP/9.5AN oral drops 80 mg  80 mg Per G Tube QID PRN  
 tamsulosin (FLOMAX) capsule 0.4 mg  0.4 mg Oral QHS  nystatin (MYCOSTATIN) 100,000 unit/gram powder   Topical BID  hydrALAZINE (APRESOLINE) tablet 100 mg  100 mg Per NG tube TID  guaiFENesin (ROBITUSSIN) 100 mg/5 mL oral liquid 400 mg  400 mg Per G Tube Q8H Other Studies (last 24 hours): No results found. Assessment and Plan:  
 
Hospital Problems as of 3/27/2019 Date Reviewed: 6/28/2018 Codes Class Noted - Resolved POA Gross hematuria ICD-10-CM: R31.0 ICD-9-CM: 599.71  3/24/2019 - Present Unknown UTI (urinary tract infection) ICD-10-CM: N39.0 ICD-9-CM: 599.0  3/23/2019 - Present Unknown * (Principal) Altered mental status ICD-10-CM: R41.82 
ICD-9-CM: 780.97  3/23/2019 - Present Unknown Confusion ICD-10-CM: R41.0 ICD-9-CM: 298.9  3/23/2019 - Present Unknown Dementia without behavioral disturbance (Chronic) ICD-10-CM: F03.90 ICD-9-CM: 294.20  2/1/2019 - Present Yes Seizure disorder ([facility] [address] (Chronic) ICD-10-CM: G40.909 ICD-9-CM: 345.90  1/22/2018 - Present Yes Essential hypertension (Chronic) ICD-10-CM: I10 
ICD-9-CM: 401.9  1/19/2015 - Present Yes Morbid obesity with BMI of 40.0-44.9, adult (HCC) (Chronic) ICD-10-CM: E66.01, Z68.41 
ICD-9-CM: 278.01, V85.41  1/19/2015 - Present Yes Plan: # Acute metabolic encephalopathy 
            - improved. 2/2 UTI. DC wu 3/27. 
            - Cxs with ? Enterococcus 
            - ceftriaxone changed to amoxicillin, final ID/sens pending 
  
# H/o CVA with dysphagia and PEG 
            - con't TFs 
  
# HTN 
            - controlled.  home meds. 
  
# H/o seizure d/o 
 - Keppra DC planning/Dispo: DC 1-2d back to [place]. Diet:  DIET NPO 
DIET TUBE FEEDING 
DVT ppx:  SCDs Signed: 
Donell Worthy MD

## 2019-03-27 NOTE — PROGRESS NOTES
Spiritual Care Visit, initial visit. Attempted visit. Patient did not respond. Prayed for patient's healing, Visit by Yolanda Leff Leopoldo Else, Staff .  Donna., Sonia.B., B.A.

## 2019-03-27 NOTE — PROGRESS NOTES
Alert  Confused  Tube feed via peg  Peg site intact  Head of bed elevated  Patient is trying to pull  Avila  biateral mitts on without impairment  Avila with clear romeo urine

## 2019-03-28 VITALS
RESPIRATION RATE: 17 BRPM | SYSTOLIC BLOOD PRESSURE: 113 MMHG | WEIGHT: 249.4 LBS | OXYGEN SATURATION: 97 % | HEART RATE: 63 BPM | TEMPERATURE: 97.6 F | DIASTOLIC BLOOD PRESSURE: 72 MMHG | BODY MASS INDEX: 44.18 KG/M2

## 2019-03-28 PROBLEM — R31.0 GROSS HEMATURIA: Status: RESOLVED | Noted: 2019-03-24 | Resolved: 2019-03-28

## 2019-03-28 PROBLEM — R41.82 ALTERED MENTAL STATUS: Status: RESOLVED | Noted: 2019-03-23 | Resolved: 2019-03-28

## 2019-03-28 PROBLEM — N39.0 UTI (URINARY TRACT INFECTION): Status: RESOLVED | Noted: 2019-03-23 | Resolved: 2019-03-28

## 2019-03-28 PROBLEM — R41.0 CONFUSION: Status: RESOLVED | Noted: 2019-03-23 | Resolved: 2019-03-28

## 2019-03-28 LAB
BACTERIA SPEC CULT: ABNORMAL
BACTERIA SPEC CULT: ABNORMAL
BACTERIA SPEC CULT: NORMAL
BACTERIA SPEC CULT: NORMAL
SERVICE CMNT-IMP: ABNORMAL
SERVICE CMNT-IMP: NORMAL
SERVICE CMNT-IMP: NORMAL

## 2019-03-28 PROCEDURE — 77030019605

## 2019-03-28 PROCEDURE — 99218 HC RM OBSERVATION: CPT

## 2019-03-28 PROCEDURE — 74011250637 HC RX REV CODE- 250/637: Performed by: INTERNAL MEDICINE

## 2019-03-28 PROCEDURE — 77010033678 HC OXYGEN DAILY

## 2019-03-28 PROCEDURE — 94760 N-INVAS EAR/PLS OXIMETRY 1: CPT

## 2019-03-28 RX ORDER — AMLODIPINE BESYLATE 5 MG/1
5 TABLET ORAL DAILY
Qty: 1 TAB | Refills: 0 | Status: SHIPPED
Start: 2019-03-28

## 2019-03-28 RX ORDER — AMOXICILLIN 500 MG/1
500 CAPSULE ORAL EVERY 8 HOURS
Qty: 15 CAP | Refills: 0 | Status: SHIPPED | OUTPATIENT
Start: 2019-03-28 | End: 2019-04-02

## 2019-03-28 RX ORDER — LOSARTAN POTASSIUM 50 MG/1
100 TABLET ORAL DAILY
Qty: 1 TAB | Refills: 0 | Status: SHIPPED
Start: 2019-03-28

## 2019-03-28 RX ADMIN — NYSTATIN: 100000 POWDER TOPICAL at 08:00

## 2019-03-28 RX ADMIN — Medication 10 ML: at 05:29

## 2019-03-28 RX ADMIN — SERTRALINE HYDROCHLORIDE 200 MG: 100 TABLET ORAL at 08:01

## 2019-03-28 RX ADMIN — Medication 1 AMPULE: at 08:02

## 2019-03-28 RX ADMIN — POTASSIUM CHLORIDE 20 MEQ: 1.5 POWDER, FOR SOLUTION ORAL at 08:02

## 2019-03-28 RX ADMIN — LEVETIRACETAM 1500 MG: 100 SOLUTION ORAL at 08:01

## 2019-03-28 RX ADMIN — GUAIFENESIN 400 MG: 100 SOLUTION ORAL at 05:28

## 2019-03-28 RX ADMIN — AMOXICILLIN 500 MG: 500 CAPSULE ORAL at 05:29

## 2019-03-28 RX ADMIN — HYDRALAZINE HYDROCHLORIDE 100 MG: 50 TABLET, FILM COATED ORAL at 08:01

## 2019-03-28 NOTE — PROGRESS NOTES
Patient slept well throughout the night with no signs of distress  Respirations are even unlabored on 2L NC  Will give BSR to oncoming RN

## 2019-03-28 NOTE — DISCHARGE SUMMARY
Hospitalist Discharge Summary     Admit Date:  3/23/2019 10:43 AM   Name:  Kj Olsen   Age:  76 y.o.  :  1950   MRN:  [id number]   PCP:  Justin Payton DO  Treatment Team: Attending Provider: Zorita Paget, MD; Care Manager: Shelby Brand RN; Consulting Provider: Thea Rodriguez NP; Utilization Review: Ninoska Hess RN    Problem List for this Hospitalization:  Hospital Problems as of 3/28/2019 Date Reviewed: 2018          Codes Class Noted - Resolved POA    Dementia without behavioral disturbance (Chronic) ICD-10-CM: F03.90  ICD-9-CM: 294.20  2019 - Present Yes        Se[facility] disorder ([facility] (Chronic) ICD-10-CM: G40.909  ICD-9-CM: 345.90  2018 - Present Yes        Essential hypertension (Chronic) ICD-10-CM: I10  ICD-9-CM: 401.9  2015 - Present Yes        Morbid obesity with BMI of 40.0-44.9, adult ([facility] (Chronic) ICD-10-CM: E66.01, Z68.41  ICD-9-CM: 278.01, V85.41  2015 - Present Yes        RESOLVED: Gross hematuria ICD-10-CM: R31.0  ICD-9-CM: 599.71  3/24/2019 - 3/28/2019 No        RESOLVED: UTI (urinary tract infection) ICD-10-CM: N39.0  ICD-9-CM: 599.0  3/23/2019 - 3/28/2019 Unknown        * (Principal) RESOLVED: Altered mental status ICD-10-CM: R41.82  ICD-9-CM: 780.97  3/23/2019 - 3/28/2019 Yes        RESOLVED: Confusion ICD-10-CM: R41.0  ICD-9-CM: 298.9  3/23/2019 - 3/28/2019 Yes            Hospital Course:  Ms. Simone Dumas is a 77 y/o WF with a h/o dementia, HTN, CVA with dysphagia and PEG tube who was admitted from NH on 3/23 with encephalopathy thought 2/2 UTI. She was started on antibiotics and improved, although of note she was on ceftriaxone. Her urine culture grew enterococcus faecalis sensitive to PCN so she was transitioned to oral amoxicillin. Head CT on admission showed stable AVMs. Amlodipine and losartan were decreased. Tube feeds and Keppra were continued.  Palliative care was consulted for goals of care and patient has remained full code but, per notes, Hospice will speak with her  at the nursing home. Patient is on oral antibiotics and medically stable for discharge back to nursing home. Disposition:   Activity: Activity as tolerated  Diet: DIET NPO With Tube Feedings  DIET TUBE FEEDING Open order for details    Follow up instructions, discharge meds at bottom of this note. Plan was discussed with nursing, JUDI. All questions answered. Patient was stable at time of discharge. Diagnostic Imaging/Tests:   Xr Chest Sngl V    Result Date: 3/23/2019  CHEST X-RAY, one view. HISTORY:  Altered mental status. Possible aspiration. TECHNIQUE:  AP semiupright view COMPARISON: 1 February 2019. FINDINGS:  The lungs are clear. The heart size is normal. The costophrenic angles are sharp. The pulmonary vasculature is unremarkable. Included portion of the upper abdomen is unremarkable. IMPRESSION:  Negative for aspiration pneumonia. Ct Head Wo Cont    Result Date: 3/23/2019  CT HEAD WITHOUT CONTRAST. INDICATION: Altered mental status. Multiple arteriovenous malformations on prior MRI. COMPARISON: 22 September 2018. TECHNIQUE:   5 mm axial scans from the skull base to the vertex. Our CT scanners use one or more of the following:  Automated exposure control, adjustment of the mA and or kV according to patient size, iterative reconstruction. FINDINGS:  No acute intraparenchymal hemorrhage or abnormal extra-axial fluid collection. The ventricles are normal size. There is large calcified mass in the right parietal and occipital lobe. Additional hyperattenuating foci this likely hyperdense to normal gray matter are demonstrated. These appear similar to prior exam.  No midline shift or mass effect. Included portion of the paranasal sinuses and orbits grossly unremarkable. IMPRESSION:  Multiple abnormalities as above which appear stable. These represent extensive arteriovenous malformations.        Echocardiogram results:  No results found for this visit on 03/23/19. Procedures done this admission:  * No surgery found *    All Micro Results     Procedure Component Value Units Date/Time    CULTURE, BLOOD [[id number]] Collected:  03/23/19 1112    Order Status:  Completed Specimen:  Blood Updated:  03/28/19 0746     Special Requests: --        LEFT  Antecubital       Culture result: NO GROWTH 5 DAYS       CULTURE, BLOOD [[id number]] Collected:  03/23/19 1224    Order Status:  Completed Specimen:  Blood Updated:  03/28/19 0746     Special Requests: --        RIGHT  HAND       Culture result: NO GROWTH 5 DAYS       CULTURE, URINE [[id number]]  (Abnormal)  (Susceptibility) Collected:  03/24/19 1245    Order Status:  Completed Specimen:  Urine from [facility] Specimen Updated:  03/28/19 0658     Special Requests: NO SPECIAL REQUESTS        Culture result:       50,000-100,000 COLONIES/mL ENTEROCOCCUS FAECALIS GROUP D                  10,000 to 50,000 COLONIES/mL MIXED SKIN TRINO ISOLATED                Labs: Results:       BMP, Mg, Phos Recent Labs     03/26/19  0325      K 3.8      CO2 32   AGAP 5*   BUN 12   CREA 0.44*   CA 8.7   *      CBC Recent Labs     03/26/19  0325   WBC 6.8   RBC 3.59*   HGB 10.3*   HCT 32.8*   *   GRANS 74   LYMPH 13   EOS 4   MONOS 8   BASOS 0   IG 0   ANEU 5.1   ABL 0.9   KIANA 0.3   ABM 0.5   ABB 0.0   AIG 0.0      LFT No results for input(s): SGOT, ALT, TBIL, AP, TP, ALB, GLOB, AGRAT, GPT in the last 72 hours.    Cardiac Testing Lab Results   Component Value Date/Time    BNP 30 (H) 02/01/2019 11:45 AM     09/22/2018 07:36 AM    Troponin-I, Qt. <0.02 (L) 01/16/2018 11:57 AM      Coagulation Tests Lab Results   Component Value Date/Time    Prothrombin time 13.5 07/30/2018 04:26 AM    Prothrombin time 10.2 04/22/2015 12:11 PM    Prothrombin time 10.6 01/23/2012 09:12 AM    INR 1.1 07/30/2018 04:26 AM    INR 1.0 04/22/2015 12:11 PM    INR 1.0 01/23/2012 09:12 AM    aPTT 29.8 07/30/2018 04:26 AM    aPTT 24.8 (L) 04/22/2015 12:11 PM    aPTT 25.3 01/23/2012 09:12 AM      A1c Lab Results   Component Value Date/Time    Hemoglobin A1c 5.6 06/12/2018 11:26 AM    Hemoglobin A1c 5.6 06/26/2017 02:28 PM    Hemoglobin A1c 5.9 (H) 06/15/2017 02:34 PM    Hemoglobin A1c, External 5.7 12/10/2014      Lipid Panel Lab Results   Component Value Date/Time    Cholesterol, total 190 07/30/2018 04:26 AM    HDL Cholesterol 69 (H) 07/30/2018 04:26 AM    LDL, calculated 108 (H) 07/30/2018 04:26 AM    VLDL, calculated 13 07/30/2018 04:26 AM    Triglyceride 65 07/30/2018 04:26 AM    CHOL/HDL Ratio 2.8 07/30/2018 04:26 AM      Thyroid Panel Lab Results   Component Value Date/Time    TSH 1.590 12/23/2017 01:44 PM        Most Recent UA Lab Results   Component Value Date/Time    Color RED 03/25/2019 10:48 AM    Appearance CLOUDY 03/25/2019 10:48 AM    Specific gravity 1.020 03/25/2019 10:48 AM    pH (UA) 6.5 03/25/2019 10:48 AM    Protein 300 (A) 03/25/2019 10:48 AM    Glucose NEGATIVE  03/25/2019 10:48 AM    Ketone TRACE (A) 03/25/2019 10:48 AM    Bilirubin NEGATIVE  03/25/2019 10:48 AM    Blood LARGE (A) 03/25/2019 10:48 AM    Urobilinogen 1.0 03/25/2019 10:48 AM    Nitrites NEGATIVE  03/25/2019 10:48 AM    Leukocyte Esterase MODERATE (A) 03/25/2019 10:48 AM    WBC >100 (H) 03/25/2019 10:48 AM    RBC >100 (H) 03/25/2019 10:48 AM    Epithelial cells 0 03/25/2019 10:48 AM    Bacteria 0 03/25/2019 10:48 AM    Casts 10-20 03/25/2019 10:48 AM    Crystals, urine 0 03/23/2019 12:31 PM    Mucus 0 03/23/2019 12:31 PM    Other observations RESULTS VERIFIED MANUALLY 09/23/2018 04:45 PM        No Known Allergies  Immunization History   Administered Date(s) Administered    (RETIRED) Pneumococcal Vaccine (Unspecified Type) 02/10/2012    Influenza High Dose Vaccine PF 08/25/2015, 01/06/2017    Influenza Vaccine 09/21/2010, 09/09/2011, 10/31/2013, 10/15/2014    Influenza Vaccine (Quad) PF 09/25/2018    Pneumococcal Conjugate (PCV-13) 06/03/2015    Pneumococcal Polysaccharide (PPSV-23) 01/03/2018    TB Skin Test (PPD) Intradermal 05/05/2015, 12/22/2017, 07/30/2018, 09/22/2018    Td, Adsorbed PF 11/24/2015    Zoster Vaccine, Live 06/21/2012       All Labs from Last 24 Hrs:  No results found for this or any previous visit (from the past 24 hour(s)).     Current Med List in Hospital:   Current Facility-Administered Medications   Medication Dose Route Frequency    amoxicillin (AMOXIL) capsule 500 mg  500 mg Oral Q8H    potassium chloride (KLOR-CON) packet for solution 20 mEq  20 mEq Per G Tube DAILY    amLODIPine (NORVASC) tablet 5 mg  5 mg Per G Tube DAILY    losartan (COZAAR) tablet 50 mg  50 mg Per G Tube DAILY    NUTRITIONAL SUPPORT ELECTROLYTE PRN ORDERS   Does Not Apply PRN    alcohol 62% (NOZIN) nasal  1 Ampule  1 Ampule Topical Q12H    HYDROcodone-acetaminophen (NORCO) 5-325 mg per tablet 1 Tab  1 Tab Per G Tube Q4H PRN    acetaminophen (TYLENOL) tablet 650 mg  650 mg Per G Tube Q4H PRN    sodium chloride (NS) flush 5-40 mL  5-40 mL IntraVENous Q8H    sodium chloride (NS) flush 5-40 mL  5-40 mL IntraVENous PRN    naloxone (NARCAN) injection 0.4 mg  0.4 mg IntraVENous PRN    ondansetron (ZOFRAN) injection 4 mg  4 mg IntraVENous Q4H PRN    bisacodyl (DULCOLAX) suppository 10 mg  10 mg Rectal DAILY PRN    albuterol (PROVENTIL VENTOLIN) nebulizer solution 2.5 mg  2.5 mg Nebulization Q4H PRN    levETIRAcetam (KEPPRA) oral solution 1,500 mg  1,500 mg Per G Tube BID    sertraline (ZOLOFT) tablet 200 mg  200 mg Per G Tube DAILY    simethicone (MYLICON) 97HY/3.4GL oral drops 80 mg  80 mg Per G Tube QID PRN    tamsulosin (FLOMAX) capsule 0.4 mg  0.4 mg Oral QHS    nystatin (MYCOSTATIN) 100,000 unit/gram powder   Topical BID    hydrALAZINE (APRESOLINE) tablet 100 mg  100 mg Per NG tube TID    guaiFENesin (ROBITUSSIN) 100 mg/5 mL oral liquid 400 mg  400 mg Per G Tube Q8H       Discharge Exam:  Patient Vitals for the past 24 hrs:   Temp Pulse Resp BP SpO2   03/28/19 0713 97.8 °F (36.6 °C) 65 19 108/71 95 %   03/28/19 0254 98.6 °F (37 °C) 77 17 107/65 93 %   03/27/19 2158 99.3 °F (37.4 °C) 82 18 109/54 92 %   03/27/19 1916 98.3 °F (36.8 °C) 89 22 122/69 93 %   03/27/19 1556 98 °F (36.7 °C) 97 18 113/64 95 %   03/27/19 1401     94 %   03/27/19 1140 98.1 °F (36.7 °C) 87 18 120/73 96 %   03/27/19 1127     91 %     Oxygen Therapy  O2 Sat (%): 95 % (03/28/19 0713)  Pulse via Oximetry: 73 beats per minute (03/23/19 1336)  O2 Device: Nasal cannula (03/28/19 0110)  O2 Flow Rate (L/min): 2 l/min (03/28/19 0110)    Intake/Output Summary (Last 24 hours) at 3/28/2019 0948  Last data filed at 3/28/2019 0805  Gross per 24 hour   Intake 1065 ml   Output 1400 ml   Net -335 ml       *Note that automatically entered I/Os may not be accurate; dependent on patient compliance with collection and accurate  by assistants. General:    Well nourished. Alert. Intermittent confusion, hard of hearing. Eyes:   Normal sclera. Extraocular movements intact. ENT:  Normocephalic, atraumatic. Moist mucous membranes  CV:   Regular rate and rhythm. No murmur, rub, or gallop. Lungs:  Clear to auscultation bilaterally. No wheezing, rhonchi, or rales. Abdomen: Soft, nontender, nondistended. Bowel sounds normal. PEG. Extremities: Warm and dry. No cyanosis or edema. Neurologic: CN II-XII grossly intact. Sensation intact. Skin:     No rashes or jaundice. Psych:  Normal mood and affect. Discharge Info:   Current Discharge Medication List      START taking these medications    Details   amoxicillin (AMOXIL) 500 mg capsule Take 1 Cap by mouth every eight (8) hours for 5 days. Qty: 15 Cap, Refills: 0         CONTINUE these medications which have CHANGED    Details   amLODIPine (NORVASC) 5 mg tablet Take 1 Tab by mouth daily. Qty: 1 Tab, Refills: 0      losartan (COZAAR) 50 mg tablet Take 2 Tabs by mouth daily.  Indications: high blood pressure  Qty: 1 Tab, Refills: 0    Associated Diagnoses: Essential hypertension         CONTINUE these medications which have NOT CHANGED    Details   oxyCODONE IR (ROXICODONE) 5 mg immediate release tablet 0.5 Tabs by Per NG tube route every eight (8) hours as needed. Max Daily Amount: 7.5 mg.  Qty: 5 Tab, Refills: 0    Associated Diagnoses: Chronic pain of multiple sites      albuterol (PROVENTIL VENTOLIN) 2.5 mg /3 mL (0.083 %) nebulizer solution 3 mL by Nebulization route every four (4) hours as needed for Wheezing or Shortness of Breath. Qty: 24 Each, Refills: 0      guaiFENesin ER (MUCINEX) 1,200 mg Ta12 ER tablet Take 1 Tab by mouth two (2) times a day. Qty: 20 Tab, Refills: 0      Lactose-Free Food with Fiber (JEVITY 1.5 KELLY) 0.06 gram-1.5 kcal/mL liqd Take 65 mL/hr by mouth. Start at 1800 and run unit 1040 ml have infused      bisacodyl (DULCOLAX, BISACODYL,) 10 mg suppository Insert 10 mg into rectum daily as needed. simethicone (MYLICON) 40 CO/0.3 mL drops Take 80 mg by mouth four (4) times daily as needed. tamsulosin (FLOMAX) 0.4 mg capsule Take 0.4 mg by mouth nightly. acetaminophen (TYLENOL) 325 mg tablet 650 mg by Per G Tube route every six (6) hours as needed for Pain.      hydrALAZINE (APRESOLINE) 100 mg tablet 1 Tab by Per NG tube route three (3) times daily. Qty: 90 Tab, Refills: 2      levETIRAcetam (KEPPRA) 100 mg/ml soln oral solution 15 mL by Per G Tube route two (2) times a day for 30 days. Qty: 900 mL, Refills: 3      ergocalciferol (VITAMIN D2) 50,000 unit capsule TAKE 1 CAPSULE ONE TIME WEEKLY- takes on sat  Qty: 5 Cap, Refills: 11    Associated Diagnoses: Vitamin D deficiency      sertraline (ZOLOFT) 100 mg tablet Take 2 Tabs by mouth daily. Qty: 180 Tab, Refills: 3    Associated Diagnoses: Moderate episode of recurrent major depressive disorder ([facility]             Follow Up Orders:   Follow-up Appointments   Procedures    FOLLOW UP VISIT Appointment in: Other (Specify) PCP/Pax -- 1 week     PCP/[place] -- 1 week     Standing Status:   Standing     Number of Occurrences:   1     Order Specific Question:   Appointment in     Answer: Other (Specify)       Follow-up Information     Follow up With Specialties Details Why Devaughn Hawley [address] [place] [address] [facility]  [phone]            Time spent in patient discharge planning and coordination 35 minutes.     Signed:  Nikko Ragsdale MD

## 2019-03-28 NOTE — PROGRESS NOTES
Patient is discharging to [facility] today. She will transport via [facility] at 12:30 (next available transport time). Spoke with patient's  by phone and he voices understanding and agreement with the discharge plan. Case Management will remain available to assist as needed. Care Management Interventions Mode of Transport at Discharge: ISAIAH(Denise) Transition of Care Consult (CM Consult): SNF(Pt is a long term medicaid resident at [facility] with a 10 day medicaid bedhold. ) Partner SNF: Yes Discharge Durable Medical Equipment: No 
Physical Therapy Consult: No 
Occupational Therapy Consult: No 
Speech Therapy Consult: No 
Current Support Network: Nursing Facility(Pt spouse still resides in the couple's home that is located near the SNF where pt resides.  ) Plan discussed with Pt/Family/Caregiver: Yes Freedom of Choice Offered: Yes [employer] Information Provided?: No 
Discharge Location Discharge Placement: Skilled nursing facility(Pt will return to [facility] at discharge for continued long term care.  )

## 2019-03-28 NOTE — PROGRESS NOTES
Received BSR from RN patient is sleeping in bed  respirations are even unlabored on 2L NC  There are no signs of distress  Will continue to monitor

## 2019-03-28 NOTE — PROGRESS NOTES
PIV removed for discharge. Report called to Toni Louis, RN @ [facility] 
 
Awaiting transportation around [id number]

## 2019-09-23 PROBLEM — Z23 NEED FOR SHINGLES VACCINE: Status: RESOLVED | Noted: 2019-02-11 | Resolved: 2019-09-23

## 2021-03-29 NOTE — INTERVAL H&P NOTE
H&P Update:  Jillian Seth was seen and examined. Found to have a  Polyp at appendiceal orifice. Unable to remove endoscopically  History and physical has been reviewed. The patient has been examined.  There have been no significant clinical changes since the completion of the originally dated History and Physical.    Signed By: Trista Calderon MD     March 15, 2017 1:39 PM
Awake

## (undated) DEVICE — REM POLYHESIVE ADULT PATIENT RETURN ELECTRODE: Brand: VALLEYLAB

## (undated) DEVICE — UNIVERSAL FIXATION CANNULA: Brand: VERSAONE

## (undated) DEVICE — CONTAINER PREFIL FRMLN 40ML --

## (undated) DEVICE — 2000CC GUARDIAN II: Brand: GUARDIAN

## (undated) DEVICE — TRAY CATH 16F DRN BG LTX -- CONVERT TO ITEM 363158

## (undated) DEVICE — DERMABOND SKIN ADH 0.7ML -- DERMABOND ADVANCED 12/BX

## (undated) DEVICE — CANNULA NSL ORAL AD FOR CAPNOFLEX CO2 O2 AIRLFE

## (undated) DEVICE — ARTICULATION RELOAD WITH TRI-STAPLE TECHNOLOGY: Brand: ENDO GIA

## (undated) DEVICE — BLOCK BITE AD 60FR W/ VELC STRP ADDRESSES MOST PT AND

## (undated) DEVICE — ARTICULATING RELOAD WITH TRI-STAPLE TECHNOLOGY: Brand: ENDO GIA

## (undated) DEVICE — SUTURE MCRYL SZ 4-0 L27IN ABSRB UD L19MM PS-2 1/2 CIR PRIM Y426H

## (undated) DEVICE — [HIGH FLOW INSUFFLATOR,  DO NOT USE IF PACKAGE IS DAMAGED,  KEEP DRY,  KEEP AWAY FROM SUNLIGHT,  PROTECT FROM HEAT AND RADIOACTIVE SOURCES.]: Brand: PNEUMOSURE

## (undated) DEVICE — BUTTON SWITCH PENCIL BLADE ELECTRODE, HOLSTER: Brand: EDGE

## (undated) DEVICE — CONNECTOR TBNG OD5-7MM O2 END DISP

## (undated) DEVICE — LAP CHOLE: Brand: MEDLINE INDUSTRIES, INC.

## (undated) DEVICE — SOL ANTI-FOG 6ML MEDC -- MEDICHOICE - CONVERT TO 358427

## (undated) DEVICE — (D)PREP SKN CHLRAPRP APPL 26ML -- CONVERT TO ITEM 371833

## (undated) DEVICE — SNARE POLYP SM W13MMXL240CM SHTH DIA2.4MM OVL FLX DISP

## (undated) DEVICE — NEEDLE HYPO 21GA L1.5IN INTRAMUSCULAR S STL LATCH BVL UP

## (undated) DEVICE — 2, DISPOSABLE SUCTION/IRRIGATOR WITHOUT DISPOSABLE TIP: Brand: STRYKEFLOW

## (undated) DEVICE — KENDALL RADIOLUCENT FOAM MONITORING ELECTRODE RECTANGULAR SHAPE: Brand: KENDALL

## (undated) DEVICE — TROCAR ENDOSCP CONVERTERLESS THRD BLUNTPORT + DISP

## (undated) DEVICE — KIT GASTMY PERC PEG PULL 20FR -- ENDOVIVE BX/2

## (undated) DEVICE — BLADELESS OPTICAL TROCAR WITH FIXATION CANNULA: Brand: VERSAPORT

## (undated) DEVICE — SPECIMEN RETRIEVAL POUCH: Brand: ENDO CATCH GOLD

## (undated) DEVICE — SOLUTION IRRIG 3000ML 0.9% SOD CHL FLX CONT 0797208] ICU MEDICAL INC]

## (undated) DEVICE — KENDALL SCD EXPRESS SLEEVES, KNEE LENGTH, MEDIUM: Brand: KENDALL SCD

## (undated) DEVICE — CONTAINER SPEC FRMLN 120ML --

## (undated) DEVICE — FORCEPS BX L240CM JAW DIA2.8MM L CAP W/ NDL MIC MESH TOOTH

## (undated) DEVICE — SUTURE SZ 0 27IN 5/8 CIR UR-6  TAPER PT VIOLET ABSRB VICRYL J603H

## (undated) DEVICE — UNIVERSAL STAPLER: Brand: ENDO GIA ULTRA